# Patient Record
Sex: FEMALE | Race: WHITE | NOT HISPANIC OR LATINO | Employment: FULL TIME | URBAN - NONMETROPOLITAN AREA
[De-identification: names, ages, dates, MRNs, and addresses within clinical notes are randomized per-mention and may not be internally consistent; named-entity substitution may affect disease eponyms.]

---

## 2017-01-25 DIAGNOSIS — R59.9 LYMPH NODE ENLARGEMENT: Primary | ICD-10-CM

## 2017-02-20 ENCOUNTER — OFFICE VISIT (OUTPATIENT)
Dept: OTOLARYNGOLOGY | Facility: CLINIC | Age: 37
End: 2017-02-20

## 2017-02-20 VITALS — WEIGHT: 168 LBS | HEIGHT: 61 IN | BODY MASS INDEX: 31.72 KG/M2

## 2017-02-20 DIAGNOSIS — Z71.1 FEARED CONDITION NOT DEMONSTRATED: Primary | ICD-10-CM

## 2017-02-20 PROCEDURE — 99203 OFFICE O/P NEW LOW 30 MIN: CPT | Performed by: OTOLARYNGOLOGY

## 2017-02-21 NOTE — PROGRESS NOTES
Subjective   Moy Sparrow is a 36 y.o. female.     History of Present Illness   Patient reports she has felt a mass in her neck and under her jaw on the right side since last summer.  Thinks she had an upper respiratory infection at the outset.  At one point was bigger than it is now that she can still feel something there.  Back in June 2016 when she first noticed this she underwent an ultrasound and the area of concern was identified as a lymph node measuring 0.7 x 0.8 x 0.3 cm.  Denies weight loss, fevers, chills.  No dysphagia.      The following portions of the patient's history were reviewed and updated as appropriate: allergies, current medications, past family history, past medical history, past social history, past surgical history and problem list.      Moy Sparrow reports that she quit smoking about 6 months ago. She smoked 0.50 packs per day. She does not have any smokeless tobacco history on file. She reports that she does not drink alcohol.  Patient is not a tobacco user and has not been counseled for use of tobacco products    Family History   Problem Relation Age of Onset   • Bipolar disorder Mother    • Schizophrenia Mother    • Colon cancer Father      Colorectal Cancer         Current Outpatient Prescriptions:   •  FLUoxetine (PROzac) 40 MG capsule, Take 40 mg by mouth Daily., Disp: , Rfl:       Past Medical History   Diagnosis Date   • Depressive disorder    • Encounter for gynecological examination    • Encounter for vision screening 2005     Vision screen (1)     • Health examination of defined subpopulation      Health examination of sub-group     • Malaise and fatigue    • Palpitations    • Soft tissue swelling      right side of neck-2 different areas    • Urinary tract infectious disease          Review of Systems   Constitutional: Negative for fever and unexpected weight change.   All other systems reviewed and are negative.          Objective   Physical Exam  General:  Well-developed well-nourished female in no acute distress.  Alert and oriented ×3. Head: Normocephalic. Face: Symmetrical strength and appearance. PERRL. EOMI. Voice:Strong. Speech:Fluent  Ears: External ears no deformity, canals no discharge, tympanic membranes intact clear and mobile bilaterally.  Nose: Nares show no discharge mass polyp or purulence.  Boggy mucosa is present.  No gross external deformity.  Septum: Midline  Oral cavity: Lips and gums without lesions.  Tongue and floor of mouth without lesions.  Parotid and submandibular ducts unobstructed.  No mucosal lesions on the buccal mucosa or vestibule of the mouth.  Pharynx: No erythema exudate mass or ulcer  Neck: No lymphadenopathy.  No thyromegaly.  Trachea and larynx midline.  No masses in the parotid or submandibular glands.  Pacific leave the area of patient concern does not demonstrate a palpable mass today.  The area she is concerned about is actually just some of the suprahyoid musculature that is palpable in this area.        Assessment/Plan   Moy was seen today for neck mass.    Diagnoses and all orders for this visit:    Feared condition not demonstrated      Plan: Reassurance to the patient.  I suspect this was some mild reactive lymphoid hyperplasia when she first noticed it that is now resolved.  I would recommend no further treatment or diagnostic studies unless the mass recurs.  She is to call for recurrence.

## 2017-03-23 ENCOUNTER — HOSPITAL ENCOUNTER (OUTPATIENT)
Dept: CT IMAGING | Facility: HOSPITAL | Age: 37
Discharge: HOME | End: 2017-03-23
Admitting: NURSE PRACTITIONER

## 2017-03-23 PROCEDURE — 70450 CT HEAD/BRAIN W/O DYE: CPT

## 2017-03-24 ENCOUNTER — TRANSCRIBE ORDERS (OUTPATIENT)
Dept: LAB | Facility: HOSPITAL | Age: 37
End: 2017-03-24

## 2017-03-24 DIAGNOSIS — E02 SUBCLINICAL IODINE-DEFICIENCY HYPOTHYROIDISM: ICD-10-CM

## 2017-03-24 DIAGNOSIS — I27.0 PRIMARY PULMONARY HYPERTENSION (HCC): ICD-10-CM

## 2017-03-24 DIAGNOSIS — E56.9 UNSPECIFIED VITAMIN DEFICIENCY: Primary | ICD-10-CM

## 2017-03-28 ENCOUNTER — TELEPHONE (OUTPATIENT)
Dept: URGENT CARE | Facility: CLINIC | Age: 37
End: 2017-03-28

## 2017-11-01 ENCOUNTER — APPOINTMENT (OUTPATIENT)
Dept: LAB | Facility: HOSPITAL | Age: 37
End: 2017-11-01

## 2017-11-01 ENCOUNTER — OFFICE VISIT (OUTPATIENT)
Dept: FAMILY MEDICINE CLINIC | Facility: CLINIC | Age: 37
End: 2017-11-01

## 2017-11-01 VITALS
HEIGHT: 61 IN | DIASTOLIC BLOOD PRESSURE: 68 MMHG | BODY MASS INDEX: 29.45 KG/M2 | SYSTOLIC BLOOD PRESSURE: 110 MMHG | WEIGHT: 156 LBS

## 2017-11-01 DIAGNOSIS — R42 VERTIGO: Primary | ICD-10-CM

## 2017-11-01 DIAGNOSIS — H53.9 VISION CHANGES: Primary | ICD-10-CM

## 2017-11-01 DIAGNOSIS — H55.00 NYSTAGMUS: ICD-10-CM

## 2017-11-01 DIAGNOSIS — H53.9 VISION CHANGES: ICD-10-CM

## 2017-11-01 PROBLEM — J01.00 ACUTE NON-RECURRENT MAXILLARY SINUSITIS: Status: ACTIVE | Noted: 2017-11-01

## 2017-11-01 LAB
25(OH)D3 SERPL-MCNC: 35.5 NG/ML (ref 30–100)
ALBUMIN SERPL-MCNC: 4.3 G/DL (ref 3.4–4.8)
ALBUMIN/GLOB SERPL: 1.3 G/DL (ref 1.1–1.8)
ALP SERPL-CCNC: 47 U/L (ref 38–126)
ALT SERPL W P-5'-P-CCNC: 20 U/L (ref 9–52)
ANION GAP SERPL CALCULATED.3IONS-SCNC: 13 MMOL/L (ref 5–15)
AST SERPL-CCNC: 24 U/L (ref 14–36)
BASOPHILS # BLD AUTO: 0.05 10*3/MM3 (ref 0–0.2)
BASOPHILS NFR BLD AUTO: 0.7 % (ref 0–2)
BILIRUB SERPL-MCNC: 0.5 MG/DL (ref 0.2–1.3)
BUN BLD-MCNC: 13 MG/DL (ref 7–21)
BUN/CREAT SERPL: 19.1 (ref 7–25)
CALCIUM SPEC-SCNC: 9.1 MG/DL (ref 8.4–10.2)
CHLORIDE SERPL-SCNC: 104 MMOL/L (ref 95–110)
CO2 SERPL-SCNC: 23 MMOL/L (ref 22–31)
CREAT BLD-MCNC: 0.68 MG/DL (ref 0.5–1)
DEPRECATED RDW RBC AUTO: 40.9 FL (ref 36.4–46.3)
EOSINOPHIL # BLD AUTO: 0.19 10*3/MM3 (ref 0–0.7)
EOSINOPHIL NFR BLD AUTO: 2.8 % (ref 0–7)
ERYTHROCYTE [DISTWIDTH] IN BLOOD BY AUTOMATED COUNT: 12.6 % (ref 11.5–14.5)
GFR SERPL CREATININE-BSD FRML MDRD: 96 ML/MIN/1.73 (ref 64–149)
GLOBULIN UR ELPH-MCNC: 3.4 GM/DL (ref 2.3–3.5)
GLUCOSE BLD-MCNC: 71 MG/DL (ref 60–100)
HCT VFR BLD AUTO: 39.7 % (ref 35–45)
HGB BLD-MCNC: 13.1 G/DL (ref 12–15.5)
IMM GRANULOCYTES # BLD: 0.01 10*3/MM3 (ref 0–0.02)
IMM GRANULOCYTES NFR BLD: 0.1 % (ref 0–0.5)
IRON 24H UR-MRATE: 126 MCG/DL (ref 37–170)
LYMPHOCYTES # BLD AUTO: 1.78 10*3/MM3 (ref 0.6–4.2)
LYMPHOCYTES NFR BLD AUTO: 25.9 % (ref 10–50)
MAGNESIUM SERPL-MCNC: 2.1 MG/DL (ref 1.6–2.3)
MCH RBC QN AUTO: 29.6 PG (ref 26.5–34)
MCHC RBC AUTO-ENTMCNC: 33 G/DL (ref 31.4–36)
MCV RBC AUTO: 89.6 FL (ref 80–98)
MONOCYTES # BLD AUTO: 0.57 10*3/MM3 (ref 0–0.9)
MONOCYTES NFR BLD AUTO: 8.3 % (ref 0–12)
NEUTROPHILS # BLD AUTO: 4.28 10*3/MM3 (ref 2–8.6)
NEUTROPHILS NFR BLD AUTO: 62.2 % (ref 37–80)
PLATELET # BLD AUTO: 251 10*3/MM3 (ref 150–450)
PMV BLD AUTO: 10.3 FL (ref 8–12)
POTASSIUM BLD-SCNC: 4.4 MMOL/L (ref 3.5–5.1)
PROT SERPL-MCNC: 7.7 G/DL (ref 6.3–8.6)
RBC # BLD AUTO: 4.43 10*6/MM3 (ref 3.77–5.16)
SODIUM BLD-SCNC: 140 MMOL/L (ref 137–145)
TSH SERPL DL<=0.05 MIU/L-ACNC: 2.87 MIU/ML (ref 0.46–4.68)
VIT B12 BLD-MCNC: 261 PG/ML (ref 239–931)
WBC NRBC COR # BLD: 6.88 10*3/MM3 (ref 3.2–9.8)

## 2017-11-01 PROCEDURE — 36415 COLL VENOUS BLD VENIPUNCTURE: CPT | Performed by: NURSE PRACTITIONER

## 2017-11-01 PROCEDURE — 80053 COMPREHEN METABOLIC PANEL: CPT | Performed by: NURSE PRACTITIONER

## 2017-11-01 PROCEDURE — 82607 VITAMIN B-12: CPT | Performed by: NURSE PRACTITIONER

## 2017-11-01 PROCEDURE — 83540 ASSAY OF IRON: CPT | Performed by: NURSE PRACTITIONER

## 2017-11-01 PROCEDURE — 99213 OFFICE O/P EST LOW 20 MIN: CPT | Performed by: NURSE PRACTITIONER

## 2017-11-01 PROCEDURE — 82306 VITAMIN D 25 HYDROXY: CPT | Performed by: NURSE PRACTITIONER

## 2017-11-01 PROCEDURE — 84443 ASSAY THYROID STIM HORMONE: CPT | Performed by: NURSE PRACTITIONER

## 2017-11-01 PROCEDURE — 85025 COMPLETE CBC W/AUTO DIFF WBC: CPT | Performed by: NURSE PRACTITIONER

## 2017-11-01 PROCEDURE — 83735 ASSAY OF MAGNESIUM: CPT | Performed by: NURSE PRACTITIONER

## 2017-11-01 RX ORDER — HYDROCHLOROTHIAZIDE 25 MG/1
25 TABLET ORAL DAILY
Qty: 30 TABLET | Refills: 5 | Status: SHIPPED | OUTPATIENT
Start: 2017-11-01 | End: 2017-12-06

## 2017-11-01 RX ORDER — CEFUROXIME AXETIL 500 MG/1
500 TABLET ORAL 2 TIMES DAILY
Qty: 20 TABLET | Refills: 0 | Status: SHIPPED | OUTPATIENT
Start: 2017-11-01 | End: 2017-12-06

## 2017-11-01 RX ORDER — FLUCONAZOLE 150 MG/1
150 TABLET ORAL ONCE
Qty: 2 TABLET | Refills: 0 | Status: SHIPPED | OUTPATIENT
Start: 2017-11-01 | End: 2017-11-01

## 2017-11-01 NOTE — PROGRESS NOTES
Chief Complaint   Patient presents with   • Dizziness     has been dealing with this since March     Subjective   Moy Sparrow is a 37 y.o. female.     HPI Comments: Presents with continued  Dizziness since March-woke up one day with dizziness, seen in urgent care-normal CT-dizziness has continued and worsening     Dizziness   This is a recurrent problem. The current episode started more than 1 month ago. The problem occurs constantly. The problem has been gradually worsening. Associated symptoms include congestion, fatigue, headaches, numbness, vertigo, a visual change, vomiting and weakness. Pertinent negatives include no abdominal pain, anorexia, arthralgias, change in bowel habit, chest pain, chills, coughing, diaphoresis, fever, joint swelling, myalgias, nausea, neck pain, rash, sore throat, swollen glands or urinary symptoms. The symptoms are aggravated by bending and exertion. She has tried rest (antivert did not help-benadryl did not help ) for the symptoms. The treatment provided no relief.   Loss of Vision   This is a new problem. The current episode started 1 to 4 weeks ago. The problem occurs intermittently. The problem has been gradually worsening. Associated symptoms include congestion, fatigue, headaches, numbness, vertigo, a visual change, vomiting and weakness. Pertinent negatives include no abdominal pain, anorexia, arthralgias, change in bowel habit, chest pain, chills, coughing, diaphoresis, fever, joint swelling, myalgias, nausea, neck pain, rash, sore throat, swollen glands or urinary symptoms. The symptoms are aggravated by bending (bending and turning of head ). She has tried rest for the symptoms. The treatment provided mild relief.        The following portions of the patient's history were reviewed and updated as appropriate: allergies, current medications, past social history and problem list.    Review of Systems   Constitutional: Positive for fatigue. Negative for chills,  "diaphoresis and fever.   HENT: Positive for congestion and postnasal drip. Negative for dental problem, rhinorrhea, sinus pain, sinus pressure, sneezing, sore throat, tinnitus, trouble swallowing and voice change.    Eyes: Negative.    Respiratory: Negative.  Negative for cough.    Cardiovascular: Negative.  Negative for chest pain.   Gastrointestinal: Positive for vomiting. Negative for abdominal pain, anorexia, change in bowel habit and nausea.   Endocrine: Negative.  Negative for cold intolerance, heat intolerance, polydipsia, polyphagia and polyuria.   Genitourinary: Negative.    Musculoskeletal: Negative.  Negative for arthralgias, back pain, gait problem, joint swelling, myalgias, neck pain and neck stiffness.   Skin: Negative.  Negative for rash.   Allergic/Immunologic: Negative.  Negative for environmental allergies, food allergies and immunocompromised state.   Neurological: Positive for dizziness, vertigo, weakness, numbness and headaches. Negative for tremors, seizures, syncope, facial asymmetry, speech difficulty and light-headedness.   Hematological: Negative.  Negative for adenopathy. Does not bruise/bleed easily.   Psychiatric/Behavioral: Negative.  Negative for agitation, behavioral problems, confusion and decreased concentration.       Objective   /68  Ht 61\" (154.9 cm)  Wt 156 lb (70.8 kg)  BMI 29.48 kg/m2  Physical Exam   Constitutional: She is oriented to person, place, and time. She appears well-developed and well-nourished. No distress.   HENT:   Head: Normocephalic and atraumatic.   Mouth/Throat: No oropharyngeal exudate.   Eyes: EOM are normal. Pupils are equal, round, and reactive to light. Right eye exhibits no discharge. Left eye exhibits no discharge. No scleral icterus.   Neck: Normal range of motion. Neck supple. No JVD present. No tracheal deviation present. No thyromegaly present.   Cardiovascular: Normal rate, regular rhythm, normal heart sounds and normal pulses.  Exam " reveals no gallop and no friction rub.    No murmur heard.  Pulmonary/Chest: Effort normal and breath sounds normal. No stridor. No respiratory distress. She has no wheezes. She has no rales. She exhibits no tenderness.   Abdominal: Soft. Bowel sounds are normal. She exhibits no distension and no mass. There is no tenderness. There is no rebound and no guarding. No hernia.   Genitourinary: Rectum normal and vagina normal. Pelvic exam was performed with patient supine. Uterus is not deviated, not enlarged, not fixed and not tender. Cervix exhibits no motion tenderness, no discharge and no friability. Right adnexum displays no mass. Left adnexum displays no mass.   Musculoskeletal: Normal range of motion. She exhibits no edema, tenderness or deformity.   Lymphadenopathy:     She has no cervical adenopathy.   Neurological: She is alert and oriented to person, place, and time. She has normal reflexes. She displays normal reflexes. No cranial nerve deficit. She exhibits normal muscle tone. Coordination normal.   Nystagmus noted with lateral gaze to the right    Skin: Skin is warm and dry. No rash noted. She is not diaphoretic. No erythema. No pallor.   Psychiatric: She has a normal mood and affect.   Nursing note and vitals reviewed.      Assessment/Plan   Problem List Items Addressed This Visit        Respiratory    Acute non-recurrent maxillary sinusitis       Other    Vertigo - Primary    Relevant Orders    CBC & Differential    Comprehensive Metabolic Panel    Iron    Vitamin D 25 Hydroxy    Vitamin B12    TSH    Magnesium    CBC Auto Differential    Vision changes    Relevant Orders    MRI Brain Without Contrast    CBC & Differential    Comprehensive Metabolic Panel    Iron    Vitamin D 25 Hydroxy    Vitamin B12    TSH    Magnesium    CBC Auto Differential           New Medications Ordered This Visit   Medications   • hydrochlorothiazide (HYDRODIURIL) 25 MG tablet     Sig: Take 1 tablet by mouth Daily.     Dispense:   30 tablet     Refill:  5   • cefuroxime (CEFTIN) 500 MG tablet     Sig: Take 1 tablet by mouth 2 (Two) Times a Day.     Dispense:  20 tablet     Refill:  0   • fluconazole (DIFLUCAN) 150 MG tablet     Sig: Take 1 tablet by mouth 1 (One) Time for 1 dose.     Dispense:  2 tablet     Refill:  0       It's not just what you eat, but when you eat  Eat breakfast, and eat smaller meals throughout the day. A healthy breakfast can jumpstart your metabolism, while eating small, healthy meals (rather than the standard three large meals) keeps your energy up.   Avoid eating at night. Try to eat dinner earlier and fast for 14-16 hours until breakfast the next morning. Studies suggest that eating only when you’re most active and giving your digestive system a long break each day may help to regulate     meds as directed, diet discussed, recheck as directed

## 2017-11-09 ENCOUNTER — HOSPITAL ENCOUNTER (OUTPATIENT)
Dept: MRI IMAGING | Facility: HOSPITAL | Age: 37
Discharge: HOME | End: 2017-11-09
Admitting: NURSE PRACTITIONER

## 2017-11-09 DIAGNOSIS — H53.9 VISION CHANGES: ICD-10-CM

## 2017-11-09 PROCEDURE — A9576 INJ PROHANCE MULTIPACK: HCPCS | Performed by: NURSE PRACTITIONER

## 2017-11-09 PROCEDURE — 70553 MRI BRAIN STEM W/O & W/DYE: CPT

## 2017-11-09 PROCEDURE — 25010000002 GADOTERIDOL PER 1 ML: Performed by: NURSE PRACTITIONER

## 2017-11-09 RX ADMIN — GADOTERIDOL 15 ML: 279.3 INJECTION, SOLUTION INTRAVENOUS at 18:45

## 2017-11-10 DIAGNOSIS — R42 VERTIGO: Primary | ICD-10-CM

## 2017-11-15 ENCOUNTER — APPOINTMENT (OUTPATIENT)
Dept: LAB | Facility: HOSPITAL | Age: 37
End: 2017-11-15

## 2017-11-15 ENCOUNTER — OFFICE VISIT (OUTPATIENT)
Dept: FAMILY MEDICINE CLINIC | Facility: CLINIC | Age: 37
End: 2017-11-15

## 2017-11-15 VITALS
WEIGHT: 150 LBS | SYSTOLIC BLOOD PRESSURE: 100 MMHG | BODY MASS INDEX: 28.32 KG/M2 | HEIGHT: 61 IN | DIASTOLIC BLOOD PRESSURE: 72 MMHG

## 2017-11-15 DIAGNOSIS — F41.9 ANXIETY: ICD-10-CM

## 2017-11-15 DIAGNOSIS — Z00.00 GENERAL MEDICAL EXAM: ICD-10-CM

## 2017-11-15 DIAGNOSIS — Z12.4 PAPANICOLAOU SMEAR FOR CERVICAL CANCER SCREENING: Primary | ICD-10-CM

## 2017-11-15 LAB
CANDIDA ALBICANS: NEGATIVE
GARDNERELLA VAGINALIS: POSITIVE
TRICHOMONAS VAGINALIS PCR: NEGATIVE

## 2017-11-15 PROCEDURE — 87661 TRICHOMONAS VAGINALIS AMPLIF: CPT | Performed by: NURSE PRACTITIONER

## 2017-11-15 PROCEDURE — 87660 TRICHOMONAS VAGIN DIR PROBE: CPT | Performed by: NURSE PRACTITIONER

## 2017-11-15 PROCEDURE — 87591 N.GONORRHOEAE DNA AMP PROB: CPT | Performed by: NURSE PRACTITIONER

## 2017-11-15 PROCEDURE — 87491 CHLMYD TRACH DNA AMP PROBE: CPT | Performed by: NURSE PRACTITIONER

## 2017-11-15 PROCEDURE — 87510 GARDNER VAG DNA DIR PROBE: CPT | Performed by: NURSE PRACTITIONER

## 2017-11-15 PROCEDURE — 87624 HPV HI-RISK TYP POOLED RSLT: CPT | Performed by: NURSE PRACTITIONER

## 2017-11-15 PROCEDURE — 88142 CYTOPATH C/V THIN LAYER: CPT | Performed by: NURSE PRACTITIONER

## 2017-11-15 PROCEDURE — 88141 CYTOPATH C/V INTERPRET: CPT | Performed by: PATHOLOGY

## 2017-11-15 PROCEDURE — 99213 OFFICE O/P EST LOW 20 MIN: CPT | Performed by: NURSE PRACTITIONER

## 2017-11-15 PROCEDURE — 87480 CANDIDA DNA DIR PROBE: CPT | Performed by: NURSE PRACTITIONER

## 2017-11-15 PROCEDURE — 87109 MYCOPLASMA: CPT | Performed by: NURSE PRACTITIONER

## 2017-11-15 RX ORDER — FLUOXETINE HYDROCHLORIDE 20 MG/1
20 CAPSULE ORAL DAILY
Qty: 30 CAPSULE | Refills: 11 | Status: SHIPPED | OUTPATIENT
Start: 2017-11-15 | End: 2018-03-28

## 2017-11-15 RX ORDER — METRONIDAZOLE 500 MG/1
500 TABLET ORAL 2 TIMES DAILY
Qty: 14 TABLET | Refills: 0 | Status: SHIPPED | OUTPATIENT
Start: 2017-11-15 | End: 2017-12-06

## 2017-11-15 RX ORDER — BUSPIRONE HYDROCHLORIDE 5 MG/1
5 TABLET ORAL 3 TIMES DAILY
Qty: 90 TABLET | Refills: 5 | Status: SHIPPED | OUTPATIENT
Start: 2017-11-15 | End: 2018-08-31

## 2017-11-15 NOTE — PROGRESS NOTES
Chief Complaint   Patient presents with   • Annual Exam     Pap smear     Subjective   Moy Sparrow is a 37 y.o. female.     HPI Comments: Presents with continued  Dizziness since March-woke up one day with dizziness, seen in urgent care-normal CT-dizziness has continued and worsening     Gynecologic Exam   The patient's primary symptoms include pelvic pain and vaginal discharge. The patient's pertinent negatives include no genital itching, genital lesions, genital odor, genital rash, missed menses or vaginal bleeding. This is a recurrent problem. The current episode started 1 to 4 weeks ago. The problem occurs constantly. The problem has been gradually improving. The pain is mild. She is not pregnant. Pertinent negatives include no abdominal pain, anorexia, back pain, diarrhea, discolored urine, dysuria, fever, flank pain, frequency, hematuria, joint pain, joint swelling, nausea, rash or urgency. The treatment provided mild relief. She is sexually active. No, her partner does not have an STD. Her menstrual history has been regular. There is no history of an abdominal surgery, a  section, an ectopic pregnancy, endometriosis, a gynecological surgery, herpes simplex, menorrhagia, metrorrhagia, miscarriage, ovarian cysts, perineal abscess, PID, a terminated pregnancy or vaginosis.   Anxiety   Presents for follow-up visit. Symptoms include decreased concentration, excessive worry, irritability, nervous/anxious behavior and panic. Patient reports no chest pain, compulsions, confusion, depressed mood, dizziness, dry mouth, feeling of choking, hyperventilation, impotence, malaise, muscle tension, nausea, obsessions, palpitations, restlessness, shortness of breath or suicidal ideas. Symptoms occur most days. The quality of sleep is good. Nighttime awakenings: none.     Compliance with medications is %.        The following portions of the patient's history were reviewed and updated as appropriate:  allergies, current medications, past social history and problem list.    Review of Systems   Constitutional: Positive for irritability. Negative for fever.   HENT: Positive for postnasal drip. Negative for dental problem, rhinorrhea, sinus pain, sinus pressure, sneezing, tinnitus, trouble swallowing and voice change.    Eyes: Negative.  Negative for photophobia, pain, discharge, redness, itching and visual disturbance.   Respiratory: Negative.  Negative for apnea, choking, chest tightness and shortness of breath.    Cardiovascular: Negative.  Negative for chest pain, palpitations and leg swelling.   Gastrointestinal: Negative.  Negative for abdominal distention, abdominal pain, anorexia, diarrhea and nausea.   Endocrine: Negative.  Negative for cold intolerance, heat intolerance, polydipsia, polyphagia and polyuria.   Genitourinary: Positive for pelvic pain and vaginal discharge. Negative for decreased urine volume, difficulty urinating, dyspareunia, dysuria, enuresis, flank pain, frequency, genital sores, hematuria, impotence, menorrhagia, menstrual problem, missed menses, urgency, vaginal bleeding and vaginal pain.   Musculoskeletal: Negative.  Negative for arthralgias, back pain, gait problem, joint pain, joint swelling, myalgias, neck pain and neck stiffness.   Skin: Negative.  Negative for rash.   Allergic/Immunologic: Negative.  Negative for environmental allergies, food allergies and immunocompromised state.   Neurological: Negative.  Negative for dizziness, tremors, seizures, syncope, facial asymmetry, speech difficulty and light-headedness.   Hematological: Negative.  Negative for adenopathy. Does not bruise/bleed easily.   Psychiatric/Behavioral: Positive for decreased concentration. Negative for agitation, behavioral problems, confusion, dysphoric mood, hallucinations, self-injury, sleep disturbance and suicidal ideas. The patient is nervous/anxious. The patient is not hyperactive.    All other systems  "reviewed and are negative.      Objective   /72  Ht 61\" (154.9 cm)  Wt 150 lb (68 kg)  BMI 28.34 kg/m2  Physical Exam   Constitutional: She is oriented to person, place, and time. She appears well-developed and well-nourished. No distress. She is not intubated.   HENT:   Head: Normocephalic and atraumatic.   Mouth/Throat: No oropharyngeal exudate.   Eyes: EOM are normal. Pupils are equal, round, and reactive to light. Right eye exhibits no discharge. Left eye exhibits no discharge. No scleral icterus.   Neck: Normal range of motion. Neck supple. No JVD present. No tracheal deviation present. No thyromegaly present.   Cardiovascular: Normal rate, regular rhythm, normal heart sounds and normal pulses.  Exam reveals no gallop and no friction rub.    No murmur heard.  Pulmonary/Chest: Effort normal and breath sounds normal. No accessory muscle usage or stridor. No apnea, no tachypnea and no bradypnea. She is not intubated. No respiratory distress. She has no decreased breath sounds. She has no wheezes. She has no rhonchi. She has no rales. Chest wall is not dull to percussion. She exhibits no mass, no tenderness, no bony tenderness, no laceration, no crepitus, no edema, no deformity, no swelling and no retraction. Right breast exhibits no inverted nipple, no mass, no nipple discharge, no skin change and no tenderness. Left breast exhibits no inverted nipple, no mass, no nipple discharge, no skin change and no tenderness.   Abdominal: Soft. Bowel sounds are normal. She exhibits no distension and no mass. There is no tenderness. There is no rebound and no guarding. No hernia. Hernia confirmed negative in the right inguinal area and confirmed negative in the left inguinal area.   Genitourinary: Rectum normal and vagina normal. Rectal exam shows no external hemorrhoid, no internal hemorrhoid, no fissure, no mass, no tenderness, anal tone normal and guaiac negative stool. Pelvic exam was performed with patient " supine. No labial fusion. There is no rash, tenderness, lesion or injury on the right labia. There is no rash, tenderness, lesion or injury on the left labia. Uterus is not deviated, not enlarged, not fixed and not tender. Cervix exhibits no motion tenderness, no discharge and no friability. Right adnexum displays no mass, no tenderness and no fullness. Left adnexum displays no mass and no fullness. No erythema, tenderness or bleeding in the vagina. No foreign body in the vagina. No vaginal discharge found.   Musculoskeletal: Normal range of motion. She exhibits no edema, tenderness or deformity.   Lymphadenopathy:     She has no cervical adenopathy.        Right: No inguinal adenopathy present.        Left: No inguinal adenopathy present.   Neurological: She is alert and oriented to person, place, and time. She has normal reflexes. She displays normal reflexes. No cranial nerve deficit. She exhibits normal muscle tone. Coordination normal.   Nystagmus noted with lateral gaze to the right    Skin: Skin is warm and dry. No rash noted. She is not diaphoretic. No erythema. No pallor.   Psychiatric: She has a normal mood and affect.   Nursing note and vitals reviewed.      Assessment/Plan   Problem List Items Addressed This Visit        Other    Papanicolaou smear for cervical cancer screening - Primary    Relevant Orders    Liquid-based Pap Smear, Screening - ThinPrep Vial, Cervix    General medical exam    Relevant Orders    Chlamydia trachomatis, Neisseria gonorrhoeae, Trichomonas vaginalis, PCR - Swab, Vagina    Gardnerella vaginalis, Trichomonas vaginalis, Candida albicans, PCR - Swab, Vagina    Mycoplasma / Ureaplasma Culture - Swab, Cervix    Anxiety           New Medications Ordered This Visit   Medications   • FLUoxetine (PROZAC) 20 MG capsule     Sig: Take 1 capsule by mouth Daily.     Dispense:  30 capsule     Refill:  11   • busPIRone (BUSPAR) 5 MG tablet     Sig: Take 1 tablet by mouth 3 (Three) Times a  Day.     Dispense:  90 tablet     Refill:  5       Stress relief discussed in length. Consider therapy, suggest yoga, exercise, meditation -patient is agrees-will call back if worsen for referral

## 2017-11-16 LAB
C TRACH RRNA CVX QL NAA+PROBE: NEGATIVE
N GONORRHOEA RRNA SPEC QL NAA+PROBE: NEGATIVE
T VAGINALIS DNA VAG QL PROBE+SIG AMP: NEGATIVE

## 2017-11-20 LAB
LAB AP CASE REPORT: ABNORMAL
LAB AP GYN ADDITIONAL INFORMATION: ABNORMAL
Lab: ABNORMAL
PATH INTERP SPEC-IMP: ABNORMAL
STAT OF ADQ CVX/VAG CYTO-IMP: ABNORMAL

## 2017-11-20 RX ORDER — DOXYCYCLINE 100 MG/1
100 CAPSULE ORAL 2 TIMES DAILY
Qty: 28 CAPSULE | Refills: 0 | Status: SHIPPED | OUTPATIENT
Start: 2017-11-20 | End: 2017-12-06

## 2017-11-22 LAB
MYCOPLASMA HOMINIS: NEGATIVE
UREAPLASMA UREALYTICUM: POSITIVE

## 2017-11-24 LAB — HPV I/H RISK 4 DNA CVX QL PROBE+SIG AMP: NEGATIVE

## 2017-11-28 ENCOUNTER — TELEPHONE (OUTPATIENT)
Dept: FAMILY MEDICINE CLINIC | Facility: CLINIC | Age: 37
End: 2017-11-28

## 2017-11-28 NOTE — PROGRESS NOTES
CICI Barros, Ms.Andrews has been called with her recent lab results & recommendations.  Continue her current medications and follow-up as planned or sooner if any problems.

## 2017-11-28 NOTE — TELEPHONE ENCOUNTER
CICI Barros,  has been called with her recent lab results & recommendations.  Continue her current medications and follow-up as planned or sooner if any problems.      ----- Message from CICI Akers sent at 11/27/2017  7:59 AM CST -----  Can you let her know that HPV is negative-she is will glad over this

## 2017-12-04 RX ORDER — FLUCONAZOLE 150 MG/1
150 TABLET ORAL ONCE
Qty: 2 TABLET | Refills: 0 | Status: SHIPPED | OUTPATIENT
Start: 2017-12-04 | End: 2017-12-04

## 2017-12-06 ENCOUNTER — OFFICE VISIT (OUTPATIENT)
Dept: FAMILY MEDICINE CLINIC | Facility: CLINIC | Age: 37
End: 2017-12-06

## 2017-12-06 ENCOUNTER — APPOINTMENT (OUTPATIENT)
Dept: LAB | Facility: HOSPITAL | Age: 37
End: 2017-12-06

## 2017-12-06 VITALS
SYSTOLIC BLOOD PRESSURE: 100 MMHG | BODY MASS INDEX: 27.56 KG/M2 | WEIGHT: 146 LBS | HEIGHT: 61 IN | DIASTOLIC BLOOD PRESSURE: 70 MMHG

## 2017-12-06 DIAGNOSIS — B37.0 ORAL THRUSH: ICD-10-CM

## 2017-12-06 DIAGNOSIS — N76.0 ACUTE VAGINITIS: Primary | ICD-10-CM

## 2017-12-06 LAB
CANDIDA ALBICANS: NEGATIVE
GARDNERELLA VAGINALIS: NEGATIVE
TRICHOMONAS VAGINALIS PCR: NEGATIVE

## 2017-12-06 PROCEDURE — 87480 CANDIDA DNA DIR PROBE: CPT | Performed by: NURSE PRACTITIONER

## 2017-12-06 PROCEDURE — 87109 MYCOPLASMA: CPT | Performed by: NURSE PRACTITIONER

## 2017-12-06 PROCEDURE — 87510 GARDNER VAG DNA DIR PROBE: CPT | Performed by: NURSE PRACTITIONER

## 2017-12-06 PROCEDURE — 87660 TRICHOMONAS VAGIN DIR PROBE: CPT | Performed by: NURSE PRACTITIONER

## 2017-12-06 PROCEDURE — 99213 OFFICE O/P EST LOW 20 MIN: CPT | Performed by: NURSE PRACTITIONER

## 2017-12-06 NOTE — PROGRESS NOTES
Chief Complaint   Patient presents with   • Vaginitis     has been on antibiotics x 1 month. Burning      Subjective   Moy Sparrow is a 37 y.o. female.     Vaginitis   This is a new problem. The current episode started in the past 7 days. The problem occurs daily. The problem has been gradually worsening. Pertinent negatives include no abdominal pain, anorexia, arthralgias, change in bowel habit, chest pain, chills, congestion, coughing, diaphoresis, fatigue, fever, headaches, joint swelling, myalgias, nausea, neck pain, numbness, rash, sore throat or visual change. Treatments tried: diflucan, doxycycline and flaygl  The treatment provided mild relief.        The following portions of the patient's history were reviewed and updated as appropriate: allergies, current medications, past social history and problem list.    Review of Systems   Constitutional: Negative.  Negative for chills, diaphoresis, fatigue and fever.   HENT: Negative.  Negative for congestion and sore throat.         Sore tongue -white coating in mouth    Eyes: Negative.    Respiratory: Negative.  Negative for cough.    Cardiovascular: Negative.  Negative for chest pain, palpitations and leg swelling.   Gastrointestinal: Negative for abdominal distention, abdominal pain, anal bleeding, anorexia, change in bowel habit and nausea.   Endocrine: Negative.  Negative for cold intolerance, heat intolerance, polydipsia, polyphagia and polyuria.   Genitourinary: Positive for pelvic pain and vaginal discharge. Negative for decreased urine volume, flank pain, genital sores, urgency, vaginal bleeding and vaginal pain.        Treated for mycoplasma 2 weeks now has oral thrush and vaginitis    Musculoskeletal: Negative.  Negative for arthralgias, back pain, gait problem, joint swelling, myalgias and neck pain.   Skin: Negative.  Negative for rash.   Allergic/Immunologic: Negative.    Neurological: Negative.  Negative for numbness and headaches.  "  Hematological: Negative.    Psychiatric/Behavioral: Negative.        Objective   /70  Ht 154.9 cm (61\")  Wt 66.2 kg (146 lb)  BMI 27.59 kg/m2  Physical Exam   Constitutional: She is oriented to person, place, and time. She appears well-developed and well-nourished. No distress. She is not intubated.   HENT:   Head: Normocephalic and atraumatic.   Mouth/Throat: No oropharyngeal exudate.   White coating in mouth    Eyes: EOM are normal. Pupils are equal, round, and reactive to light. Right eye exhibits no discharge. Left eye exhibits no discharge. No scleral icterus.   Neck: Normal range of motion. Neck supple. No JVD present. No tracheal deviation present. No thyromegaly present.   Cardiovascular: Normal rate, regular rhythm, normal heart sounds and normal pulses.  Exam reveals no gallop and no friction rub.    No murmur heard.  Pulmonary/Chest: Effort normal and breath sounds normal. No accessory muscle usage or stridor. No apnea, no tachypnea and no bradypnea. She is not intubated. No respiratory distress. She has no decreased breath sounds. She has no wheezes. She has no rhonchi. She has no rales. Chest wall is not dull to percussion. She exhibits no mass, no tenderness, no bony tenderness, no laceration, no crepitus, no edema, no deformity, no swelling and no retraction. Right breast exhibits no inverted nipple, no mass, no nipple discharge, no skin change and no tenderness. Left breast exhibits no inverted nipple, no mass, no nipple discharge, no skin change and no tenderness.   Abdominal: Soft. Bowel sounds are normal. She exhibits no distension and no mass. There is no tenderness. There is no rebound and no guarding. No hernia. Hernia confirmed negative in the right inguinal area and confirmed negative in the left inguinal area.   Genitourinary: Rectum normal. Rectal exam shows no external hemorrhoid, no internal hemorrhoid, no fissure, no mass, no tenderness, anal tone normal and guaiac negative " stool. Pelvic exam was performed with patient supine. No labial fusion. There is no rash, tenderness, lesion or injury on the right labia. There is no rash, tenderness, lesion or injury on the left labia. Uterus is not deviated, not enlarged, not fixed and not tender. Cervix exhibits no motion tenderness, no discharge and no friability. Right adnexum displays no mass, no tenderness and no fullness. Left adnexum displays no mass and no fullness. No erythema, tenderness or bleeding in the vagina. No foreign body in the vagina. Vaginal discharge found.   Musculoskeletal: Normal range of motion. She exhibits no edema, tenderness or deformity.   Lymphadenopathy:     She has no cervical adenopathy.        Right: No inguinal adenopathy present.        Left: No inguinal adenopathy present.   Neurological: She is alert and oriented to person, place, and time. She has normal reflexes. She displays normal reflexes. No cranial nerve deficit. She exhibits normal muscle tone. Coordination normal.   Nystagmus noted with lateral gaze to the right    Skin: Skin is warm and dry. No rash noted. She is not diaphoretic. No erythema. No pallor.   Psychiatric: She has a normal mood and affect.   Nursing note and vitals reviewed.      Assessment/Plan   Problem List Items Addressed This Visit        Genitourinary    Acute vaginitis - Primary    Relevant Orders    Mycoplasma / Ureaplasma Culture - Swab, Cervix    Gardnerella vaginalis, Trichomonas vaginalis, Candida albicans, PCR - Swab, Vagina       Other    Oral thrush         No orders of the defined types were placed in this encounter.      It's not just what you eat, but when you eat  Eat breakfast, and eat smaller meals throughout the day. A healthy breakfast can jumpstart your metabolism, while eating small, healthy meals (rather than the standard three large meals) keeps your energy up.   Avoid eating at night. Try to eat dinner earlier and fast for 14-16 hours until breakfast the  next morning. Studies suggest that eating only when you’re most active and giving your digestive system a long break each day may help to regulate weight.     Increase yogurt in diet,pro biotics as directed

## 2017-12-07 ENCOUNTER — OFFICE VISIT (OUTPATIENT)
Dept: OTOLARYNGOLOGY | Facility: CLINIC | Age: 37
End: 2017-12-07

## 2017-12-07 VITALS
DIASTOLIC BLOOD PRESSURE: 70 MMHG | BODY MASS INDEX: 28.25 KG/M2 | WEIGHT: 149.6 LBS | SYSTOLIC BLOOD PRESSURE: 100 MMHG | HEIGHT: 61 IN

## 2017-12-07 DIAGNOSIS — H81.11 BENIGN PAROXYSMAL POSITIONAL VERTIGO OF RIGHT EAR: Primary | ICD-10-CM

## 2017-12-07 PROCEDURE — 99243 OFF/OP CNSLTJ NEW/EST LOW 30: CPT | Performed by: OTOLARYNGOLOGY

## 2017-12-07 NOTE — PROGRESS NOTES
Subjective   Moy Sparrow is a 37 y.o. female.   Is a consultation from Sally BOLANOS  History of Present Illness   Patient complains of vertigo.  Began in March and at that time symptoms were severe.  Symptoms included spinning dizziness and nausea that would be brought on by head movement.  No change in hearing.  At that time symptoms lasted approximately a month then went away for about a month but subsequently recurred.  Symptoms are not as severe now but are brought on by extending her neck or rolling over to the right in bed.  Still has a subjective spinning sensation that lasts a few seconds to it most a minute.  No otorrhea, no otalgia, no previous otologic surgery.  Also states she is being treated for oral thrush following antibiotic administration.  Was apparently given a low-dose diuretic that she says didn't help at all and she has stopped taking.      The following portions of the patient's history were reviewed and updated as appropriate: allergies, current medications, past family history, past medical history, past social history, past surgical history and problem list.      Moy Sparrow reports that she has been smoking.  She has been smoking about 0.50 packs per day. She has never used smokeless tobacco. She reports that she does not drink alcohol.  Patient is not a tobacco user and has not been counseled for use of tobacco products    Family History   Problem Relation Age of Onset   • Bipolar disorder Mother    • Schizophrenia Mother    • Colon cancer Father      Colorectal Cancer       No Known Allergies      Current Outpatient Prescriptions:   •  busPIRone (BUSPAR) 5 MG tablet, Take 1 tablet by mouth 3 (Three) Times a Day., Disp: 90 tablet, Rfl: 5  •  FLUoxetine (PROZAC) 20 MG capsule, Take 1 capsule by mouth Daily., Disp: 30 capsule, Rfl: 11  •  nystatin (MYCOSTATIN) 852249 UNIT/ML suspension, Swish and swallow 5 mL 4 (Four) Times a Day., Disp: 400 mL, Rfl: 0    Past Medical  History:   Diagnosis Date   • Depressive disorder    • Encounter for gynecological examination    • Encounter for vision screening 2005    Vision screen (1)     • Health examination of defined subpopulation     Health examination of sub-group     • Malaise and fatigue    • Palpitations    • Soft tissue swelling     right side of neck-2 different areas    • Urinary tract infectious disease          Review of Systems   Constitutional: Negative for fever.   HENT: Negative for hearing loss.    Neurological: Positive for dizziness.   All other systems reviewed and are negative.          Objective   Physical Exam  General: Well-developed well-nourished female in no acute distress.  Alert and oriented ×3. Head: Normocephalic. Face: Symmetrical strength and appearance. PERRL. EOMI. Voice:Strong. Speech:Fluent  Ears: External ears no deformity, canals no discharge, tympanic membranes intact clear and mobile bilaterally.  Nose: Nares show no discharge mass polyp or purulence.  Boggy mucosa is present.  No gross external deformity.  Septum: Midline  Oral cavity: Lips and gums without lesions.  Tongue with what appears to be some resolving Candida.  Floor of mouth without lesions.  Parotid and submandibular ducts unobstructed.  No mucosal lesions on the buccal mucosa or vestibule of the mouth.  Pharynx: No erythema exudate mass or ulcer  Neck: No lymphadenopathy.  No thyromegaly.  Trachea and larynx midline.  No masses in the parotid or submandibular glands.    [place]-Hallpike maneuvers produced brief rotatory nystagmus and subjective spinning vertigo with the head to the right    Assessment/Plan   Moy was seen today for dizziness.    Diagnoses and all orders for this visit:    Benign paroxysmal positional vertigo of right ear      Plan: Explained the nature of BPPV to the patient.  She is already partially compensated but I think she should perform [place] exercises at home morning and evening until her symptoms have  completely resolved.  Avoid antihistamines and meclizine/Dramamine as these will prolong the time for compensation.  Told her if her symptoms resolve and she can no longer induce dizziness she may discontinue the exercises and follow up with me as needed.  Advised her to call if she is still symptomatic in 6 weeks and at that point would consider physical therapy consultation.    My thanks to Ms. Cunningham for this consult

## 2017-12-12 LAB
MYCOPLASMA HOMINIS: NEGATIVE
UREAPLASMA UREALYTICUM: NEGATIVE

## 2017-12-13 ENCOUNTER — OFFICE VISIT (OUTPATIENT)
Dept: FAMILY MEDICINE CLINIC | Facility: CLINIC | Age: 37
End: 2017-12-13

## 2017-12-13 VITALS
SYSTOLIC BLOOD PRESSURE: 108 MMHG | WEIGHT: 149 LBS | DIASTOLIC BLOOD PRESSURE: 70 MMHG | BODY MASS INDEX: 28.13 KG/M2 | HEIGHT: 61 IN

## 2017-12-13 DIAGNOSIS — K64.4 EXTERNAL HEMORRHOIDS: ICD-10-CM

## 2017-12-13 DIAGNOSIS — Z12.4 ENCOUNTER FOR PAPANICOLAOU SMEAR FOR CERVICAL CANCER SCREENING: Primary | ICD-10-CM

## 2017-12-13 DIAGNOSIS — K62.89 RECTAL PAIN: ICD-10-CM

## 2017-12-13 PROCEDURE — 88141 CYTOPATH C/V INTERPRET: CPT | Performed by: PATHOLOGY

## 2017-12-13 PROCEDURE — 88142 CYTOPATH C/V THIN LAYER: CPT | Performed by: NURSE PRACTITIONER

## 2017-12-13 PROCEDURE — 87624 HPV HI-RISK TYP POOLED RSLT: CPT | Performed by: NURSE PRACTITIONER

## 2017-12-13 PROCEDURE — 99213 OFFICE O/P EST LOW 20 MIN: CPT | Performed by: NURSE PRACTITIONER

## 2017-12-13 RX ORDER — DIAZEPAM 2 MG/1
2 TABLET ORAL EVERY 8 HOURS PRN
Qty: 30 TABLET | Refills: 0 | Status: SHIPPED | OUTPATIENT
Start: 2017-12-13 | End: 2018-08-31

## 2017-12-13 NOTE — PROGRESS NOTES
Chief Complaint   Patient presents with   • Pap Smear     Subjective   Moy Sparrow is a 37 y.o. female.     Gynecologic Exam   The patient's pertinent negatives include no genital itching, genital lesions, genital odor, genital rash, missed menses, pelvic pain, vaginal bleeding or vaginal discharge. This is a recurrent problem. The current episode started 1 to 4 weeks ago. The problem occurs constantly. The problem has been gradually improving. The pain is mild. She is not pregnant. Associated symptoms include back pain. Pertinent negatives include no abdominal pain, anorexia, chills, constipation, diarrhea, discolored urine, dysuria, fever, flank pain, frequency, headaches, hematuria, joint pain, joint swelling, nausea, painful intercourse, rash, sore throat, urgency or vomiting. The treatment provided mild relief. She is sexually active. No, her partner does not have an STD. Her menstrual history has been regular. There is no history of an abdominal surgery, a  section, an ectopic pregnancy, endometriosis, a gynecological surgery, herpes simplex, menorrhagia, metrorrhagia, miscarriage, ovarian cysts, perineal abscess, PID, a terminated pregnancy or vaginosis.   Anxiety   Presents for follow-up visit. Symptoms include decreased concentration, depressed mood, excessive worry, nervous/anxious behavior and panic. Patient reports no chest pain, compulsions, confusion, dizziness, dry mouth, feeling of choking, hyperventilation, impotence, insomnia, irritability, nausea, obsessions, palpitations, restlessness, shortness of breath or suicidal ideas. Symptoms occur most days. The severity of symptoms is moderate. The quality of sleep is good. Nighttime awakenings: none.     Compliance with medications is %.        The following portions of the patient's history were reviewed and updated as appropriate: allergies, current medications, past social history and problem list.    Review of Systems    Constitutional: Negative.  Negative for activity change, appetite change, chills, diaphoresis, fatigue, fever, irritability and unexpected weight change.   HENT: Negative.  Negative for congestion, dental problem, drooling, ear discharge, ear pain, facial swelling, hearing loss, mouth sores, nosebleeds, postnasal drip, rhinorrhea, sinus pain, sinus pressure, sneezing, sore throat, tinnitus, trouble swallowing and voice change.    Eyes: Negative.  Negative for photophobia, pain, discharge, redness, itching and visual disturbance.   Respiratory: Negative.  Negative for apnea, cough, choking, chest tightness, shortness of breath, wheezing and stridor.    Cardiovascular: Negative.  Negative for chest pain, palpitations and leg swelling.   Gastrointestinal: Positive for blood in stool. Negative for abdominal distention, abdominal pain, anorexia, constipation, diarrhea, nausea and vomiting.   Endocrine: Negative.  Negative for cold intolerance, heat intolerance, polydipsia, polyphagia and polyuria.   Genitourinary: Negative.  Negative for decreased urine volume, difficulty urinating, dyspareunia, dysuria, enuresis, flank pain, frequency, genital sores, hematuria, impotence, menorrhagia, menstrual problem, missed menses, pelvic pain, urgency, vaginal bleeding, vaginal discharge and vaginal pain.   Musculoskeletal: Positive for back pain. Negative for arthralgias, gait problem, joint pain, joint swelling, myalgias, neck pain and neck stiffness.   Skin: Negative.  Negative for color change and rash.   Allergic/Immunologic: Negative.  Negative for environmental allergies, food allergies and immunocompromised state.   Neurological: Negative.  Negative for dizziness, tremors, seizures, syncope, facial asymmetry, speech difficulty, weakness, light-headedness, numbness and headaches.   Hematological: Negative.  Negative for adenopathy. Does not bruise/bleed easily.   Psychiatric/Behavioral: Positive for decreased concentration.  "Negative for agitation, behavioral problems, confusion, dysphoric mood, hallucinations, self-injury, sleep disturbance and suicidal ideas. The patient is nervous/anxious. The patient does not have insomnia and is not hyperactive.    All other systems reviewed and are negative.      Objective   /70  Ht 154.9 cm (61\")  Wt 67.6 kg (149 lb)  BMI 28.15 kg/m2  Physical Exam   Constitutional: She is oriented to person, place, and time. She appears well-developed and well-nourished. No distress. She is not intubated.   HENT:   Head: Normocephalic and atraumatic.   Mouth/Throat: No oropharyngeal exudate.   Eyes: EOM are normal. Pupils are equal, round, and reactive to light. Right eye exhibits no discharge. Left eye exhibits no discharge. No scleral icterus.   Neck: Normal range of motion. Neck supple. No JVD present. No tracheal deviation present. No thyromegaly present.   Cardiovascular: Normal rate, regular rhythm, normal heart sounds and normal pulses.  Exam reveals no gallop and no friction rub.    No murmur heard.  Pulmonary/Chest: Effort normal and breath sounds normal. No accessory muscle usage or stridor. No apnea, no tachypnea and no bradypnea. She is not intubated. No respiratory distress. She has no decreased breath sounds. She has no wheezes. She has no rhonchi. She has no rales. Chest wall is not dull to percussion. She exhibits no mass, no tenderness, no bony tenderness, no laceration, no crepitus, no edema, no deformity, no swelling and no retraction. Right breast exhibits no inverted nipple, no mass, no nipple discharge, no skin change and no tenderness. Left breast exhibits no inverted nipple, no mass, no nipple discharge, no skin change and no tenderness.   Abdominal: Soft. Bowel sounds are normal. She exhibits no shifting dullness, no distension, no pulsatile liver, no fluid wave, no abdominal bruit, no ascites, no pulsatile midline mass and no mass. There is no hepatosplenomegaly, splenomegaly " or hepatomegaly. There is no tenderness. There is no rigidity, no rebound, no guarding, no CVA tenderness, no tenderness at McBurney's point and negative Blackman's sign. No hernia. Hernia confirmed negative in the ventral area, confirmed negative in the right inguinal area and confirmed negative in the left inguinal area.   Genitourinary: Rectum normal and vagina normal. Rectal exam shows no external hemorrhoid, no internal hemorrhoid, no fissure, no mass, no tenderness, anal tone normal and guaiac negative stool. Pelvic exam was performed with patient supine. No labial fusion. There is no rash, tenderness, lesion or injury on the right labia. There is no rash, tenderness, lesion or injury on the left labia. Uterus is not deviated, not enlarged, not fixed and not tender. Cervix exhibits no motion tenderness, no discharge and no friability. Right adnexum displays no mass, no tenderness and no fullness. Left adnexum displays no mass and no fullness. No erythema, tenderness or bleeding in the vagina. No foreign body in the vagina. No vaginal discharge found.   Musculoskeletal: Normal range of motion. She exhibits no edema, tenderness or deformity.   Lymphadenopathy:     She has no cervical adenopathy.        Right: No inguinal adenopathy present.        Left: No inguinal adenopathy present.   Neurological: She is alert and oriented to person, place, and time. She has normal reflexes. She displays normal reflexes. No cranial nerve deficit. She exhibits normal muscle tone. Coordination normal.   Nystagmus noted with lateral gaze to the right    Skin: Skin is warm and dry. No rash noted. She is not diaphoretic. No erythema. No pallor.   Psychiatric: She has a normal mood and affect.   Nursing note and vitals reviewed.      Assessment/Plan   Problem List Items Addressed This Visit        Cardiovascular and Mediastinum    External hemorrhoids       Digestive    Rectal pain    Relevant Orders    Ambulatory Referral to  Gastroenterology       Other    Encounter for Papanicolaou smear for cervical cancer screening - Primary    Relevant Orders    Liquid-based Pap Smear, Screening - ThinPrep Vial, Cervix           New Medications Ordered This Visit   Medications   • diazePAM (VALIUM) 2 MG tablet     Sig: Take 1 tablet by mouth Every 8 (Eight) Hours As Needed for Anxiety.     Dispense:  30 tablet     Refill:  0       Patient understands the risks associated with this controlled medication, including tolerance and addiction.  she also agrees to only obtain this medication from me, and not from a another provider, unless that provider is covering for me in my absence.  she also agrees to be compliant in dosing, and not self adjust the dose of medication.  A signed controlled substance agreement is on file, and she has received a controlled substance education sheet at this a previous visit.  she has also signed a consent for treatment with a controlled substance as per [facility] policy. WM was obtained.

## 2017-12-20 ENCOUNTER — TELEPHONE (OUTPATIENT)
Dept: FAMILY MEDICINE CLINIC | Facility: CLINIC | Age: 37
End: 2017-12-20

## 2017-12-22 LAB — HPV I/H RISK 4 DNA CVX QL PROBE+SIG AMP: NEGATIVE

## 2018-03-01 ENCOUNTER — ANESTHESIA EVENT (OUTPATIENT)
Dept: GASTROENTEROLOGY | Facility: HOSPITAL | Age: 38
End: 2018-03-01

## 2018-03-01 ENCOUNTER — HOSPITAL ENCOUNTER (OUTPATIENT)
Facility: HOSPITAL | Age: 38
Setting detail: HOSPITAL OUTPATIENT SURGERY
Discharge: HOME | End: 2018-03-01
Attending: INTERNAL MEDICINE | Admitting: INTERNAL MEDICINE

## 2018-03-01 ENCOUNTER — ANESTHESIA (OUTPATIENT)
Dept: GASTROENTEROLOGY | Facility: HOSPITAL | Age: 38
End: 2018-03-01

## 2018-03-01 VITALS
OXYGEN SATURATION: 100 % | RESPIRATION RATE: 16 BRPM | DIASTOLIC BLOOD PRESSURE: 68 MMHG | HEART RATE: 81 BPM | WEIGHT: 150.5 LBS | TEMPERATURE: 98.4 F | SYSTOLIC BLOOD PRESSURE: 111 MMHG | BODY MASS INDEX: 28.42 KG/M2 | HEIGHT: 61 IN

## 2018-03-01 DIAGNOSIS — Z12.11 ENCOUNTER FOR SCREENING COLONOSCOPY: ICD-10-CM

## 2018-03-01 LAB — B-HCG UR QL: NEGATIVE

## 2018-03-01 PROCEDURE — 81025 URINE PREGNANCY TEST: CPT | Performed by: INTERNAL MEDICINE

## 2018-03-01 PROCEDURE — 25010000002 MIDAZOLAM PER 1 MG: Performed by: NURSE ANESTHETIST, CERTIFIED REGISTERED

## 2018-03-01 PROCEDURE — 88305 TISSUE EXAM BY PATHOLOGIST: CPT | Performed by: INTERNAL MEDICINE

## 2018-03-01 PROCEDURE — 25010000002 PROPOFOL 10 MG/ML EMULSION: Performed by: NURSE ANESTHETIST, CERTIFIED REGISTERED

## 2018-03-01 PROCEDURE — 88305 TISSUE EXAM BY PATHOLOGIST: CPT | Performed by: PATHOLOGY

## 2018-03-01 RX ORDER — LIDOCAINE HYDROCHLORIDE 10 MG/ML
INJECTION, SOLUTION INFILTRATION; PERINEURAL AS NEEDED
Status: DISCONTINUED | OUTPATIENT
Start: 2018-03-01 | End: 2018-03-01 | Stop reason: SURG

## 2018-03-01 RX ORDER — MIDAZOLAM HYDROCHLORIDE 1 MG/ML
INJECTION INTRAMUSCULAR; INTRAVENOUS AS NEEDED
Status: DISCONTINUED | OUTPATIENT
Start: 2018-03-01 | End: 2018-03-01 | Stop reason: SURG

## 2018-03-01 RX ORDER — PROPOFOL 10 MG/ML
VIAL (ML) INTRAVENOUS AS NEEDED
Status: DISCONTINUED | OUTPATIENT
Start: 2018-03-01 | End: 2018-03-01 | Stop reason: SURG

## 2018-03-01 RX ORDER — DEXTROSE AND SODIUM CHLORIDE 5; .45 G/100ML; G/100ML
30 INJECTION, SOLUTION INTRAVENOUS CONTINUOUS
Status: DISCONTINUED | OUTPATIENT
Start: 2018-03-01 | End: 2018-03-01 | Stop reason: HOSPADM

## 2018-03-01 RX ADMIN — PROPOFOL 70 MG: 10 INJECTION, EMULSION INTRAVENOUS at 10:30

## 2018-03-01 RX ADMIN — PROPOFOL 60 MG: 10 INJECTION, EMULSION INTRAVENOUS at 10:39

## 2018-03-01 RX ADMIN — MIDAZOLAM 2 MG: 1 INJECTION INTRAMUSCULAR; INTRAVENOUS at 10:28

## 2018-03-01 RX ADMIN — LIDOCAINE HYDROCHLORIDE 100 MG: 10 INJECTION, SOLUTION INFILTRATION; PERINEURAL at 10:30

## 2018-03-01 RX ADMIN — PROPOFOL 70 MG: 10 INJECTION, EMULSION INTRAVENOUS at 10:36

## 2018-03-01 RX ADMIN — DEXTROSE AND SODIUM CHLORIDE 30 ML/HR: 5; 450 INJECTION, SOLUTION INTRAVENOUS at 09:51

## 2018-03-01 NOTE — ANESTHESIA POSTPROCEDURE EVALUATION
Patient: Moy Sparrow    Procedure Summary     Date Anesthesia Start Anesthesia Stop Room / Location    03/01/18 1028 1049 [facility] ENDOSCOPY 2 / [facility] ENDOSCOPY       Procedure Diagnosis Surgeon Provider    COLONOSCOPY (N/A ) Encounter for screening colonoscopy  (Encounter for screening colonoscopy [Z12.11]) DO Leigh Mckeon CRNA          Anesthesia Type: MAC  Last vitals  BP   107/61 (03/01/18 0938)   Temp   97.2 °F (36.2 °C) (03/01/18 0938)   Pulse   71 (03/01/18 0938)   Resp   18 (03/01/18 0938)     SpO2   98 % (03/01/18 0938)     Post Anesthesia Care and Evaluation    Patient location during evaluation: bedside  Patient participation: complete - patient participated  Level of consciousness: sleepy but conscious  Pain score: 0  Pain management: adequate  Airway patency: patent  Anesthetic complications: No anesthetic complications  PONV Status: none  Cardiovascular status: acceptable  Respiratory status: acceptable  Hydration status: acceptable

## 2018-03-01 NOTE — H&P
Magen Ospina DO,[facility]  Gastroenterology  Hepatology  Endoscopy  Board Certified in Internal Medicine and gastroenterology  [address], [address]  [place]. [address]   [phone]   F [phone]     GASTROENTEROLOGY HISTORY AND PHYSICAL  NOTE   MAGEN OSPINA DO.         SUBJECTIVE:   3/1/2018    Name: Moy Sparrow  DOD: 1980        Chief Complaint:       Subjective : Rectal bleeding with a family history of colon cancer at the age of 50    Patient is 37 y.o. female presents with desire for elective colonoscopy.      ROS/HISTORY/ CURRENT MEDICATIONS/OBJECTIVE/VS/PE:   Review of Systems:   Review of Systems    History:     Past Medical History:   Diagnosis Date   • Depressive disorder    • Encounter for gynecological examination    • Encounter for vision screening     Vision screen (1)     • Health examination of defined subpopulation     Health examination of sub-group     • Malaise and fatigue    • Palpitations    • Soft tissue swelling     right side of neck-2 different areas    • Urinary tract infectious disease      Past Surgical History:   Procedure Laterality Date   •  SECTION     • PAP SMEAR       Family History   Problem Relation Age of Onset   • Bipolar disorder Mother    • Schizophrenia Mother    • Colon cancer Father      Colorectal Cancer     Social History   Substance Use Topics   • Smoking status: Current Every Day Smoker     Packs/day: 1.00     Last attempt to quit: 2016   • Smokeless tobacco: Never Used   • Alcohol use No     Prescriptions Prior to Admission   Medication Sig Dispense Refill Last Dose   • hydrocortisone (PROCTOSOL HC) 2.5 % rectal cream Apply to affected area topically 2 times every day for 2 weeks, and then as needed. 28.35 g 5 Past Month at Unknown time   • magic mouthwash oral suspension Swish and spit 10 mL 4 (Four) Times a Day.   2018   • busPIRone (BUSPAR) 5 MG tablet Take 1 tablet by mouth 3 (Three) Times a  Day. 90 tablet 5 More than a month at Unknown time   • diazePAM (VALIUM) 2 MG tablet Take 1 tablet by mouth Every 8 (Eight) Hours As Needed for Anxiety. 30 tablet 0 More than a month at Unknown time   • FLUoxetine (PROZAC) 20 MG capsule Take 1 capsule by mouth Daily. 30 capsule 11 Taking     Allergies:  Review of patient's allergies indicates no known allergies.    I have reviewed the patients medical history, surgical history and family history in the available medical record system.     Current Medications:     Current Facility-Administered Medications   Medication Dose Route Frequency Provider Last Rate Last Dose   • dextrose 5 % and sodium chloride 0.45 % infusion  30 mL/hr Intravenous Continuous Shin Vanessa, DO 30 mL/hr at 03/01/18 0951 30 mL/hr at 03/01/18 0951       Objective     Physical Exam:   Temp:  [97.2 °F (36.2 °C)] 97.2 °F (36.2 °C)  Heart Rate:  [71] 71  Resp:  [18] 18  BP: (107)/(61) 107/61    Physical Exam:  General Appearance:    Alert, cooperative, in no acute distress   Head:    Normocephalic, without obvious abnormality, atraumatic   Eyes:            Lids and lashes normal, conjunctivae and sclerae normal, no   icterus, no pallor, corneas clear, PERRLA   Ears:    Ears appear intact with no abnormalities noted   Throat:   No oral lesions, no thrush, oral mucosa moist   Neck:   No adenopathy, supple, trachea midline, no thyromegaly, no     carotid bruit, no JVD   Back:     No kyphosis present, no scoliosis present, no skin lesions,       erythema or scars, no tenderness to percussion or                   palpation,   range of motion normal   Lungs:     Clear to auscultation,respirations regular, even and                   unlabored    Heart:    Regular rhythm and normal rate, normal S1 and S2, no            murmur, no gallop, no rub, no click   Breast Exam:    Deferred   Abdomen:     Normal bowel sounds, no masses, no organomegaly, soft        non-tender, non-distended, no guarding, no  rebound                 tenderness   Genitalia:    Deferred   Extremities:   Moves all extremities well, no edema, no cyanosis, no              redness   Pulses:   Pulses palpable and equal bilaterally   Skin:   No bleeding, bruising or rash   Lymph nodes:   No palpable adenopathy   Neurologic:   Cranial nerves 2 - 12 grossly intact, sensation intact, DTR        present and equal bilaterally      Results Review:     Lab Results   Component Value Date    WBC 6.88 11/01/2017    HGB 13.1 11/01/2017    HCT 39.7 11/01/2017     11/01/2017             No results found for: LIPASE  No results found for: INR       Radiology Review:  Imaging Results (last 72 hours)     ** No results found for the last 72 hours. **           I reviewed the patient's new clinical results.  I reviewed the patient's new imaging results and agree with the interpretation.     ASSESSMENT/PLAN:   ASSESSMENT:   1.  Rectal bleeding  2.  Family history of colon cancer    PLAN:   1.  Colonoscopy    Risk and benefits associated with procedure are reviewed with the patient.  The patient wishes to proceed      Shin Vanessa DO  03/01/18  10:07 AM

## 2018-03-01 NOTE — PLAN OF CARE
Problem: Patient Care Overview (Adult)  Goal: Plan of Care Review  Outcome: Ongoing (interventions implemented as appropriate)   03/01/18 1050   Coping/Psychosocial Response Interventions   Plan Of Care Reviewed With patient   Patient Care Overview   Progress no change   Outcome Evaluation   Outcome Summary/Follow up Plan vss       Problem: GI Endoscopy (Adult)  Goal: Signs and Symptoms of Listed Potential Problems Will be Absent or Manageable (GI Endoscopy)  Outcome: Ongoing (interventions implemented as appropriate)   03/01/18 1050   GI Endoscopy   Problems Assessed (GI Endoscopy) all   Problems Present (GI Endoscopy) none

## 2018-03-01 NOTE — ANESTHESIA PREPROCEDURE EVALUATION
Anesthesia Evaluation     Patient summary reviewed   NPO Solid Status: > 8 hours  NPO Liquid Status: > 4 hours           Airway   Mallampati: II  TM distance: <3 FB  Neck ROM: full  No difficulty expected  Dental - normal exam     Pulmonary - normal exam   Cardiovascular - normal exam        Neuro/Psych  (+) dizziness/light headedness, psychiatric history Anxiety and Depression,     GI/Hepatic/Renal/Endo      Musculoskeletal     Abdominal  - normal exam   Substance History      OB/GYN          Other                        Anesthesia Plan    ASA 2     MAC     intravenous induction

## 2018-03-01 NOTE — PLAN OF CARE
Problem: Patient Care Overview (Adult)  Goal: Plan of Care Review  Outcome: Outcome(s) achieved Date Met: 03/01/18 03/01/18 1105   Coping/Psychosocial Response Interventions   Plan Of Care Reviewed With patient   Patient Care Overview   Progress no change   Outcome Evaluation   Outcome Summary/Follow up Plan vss. pt alert       Problem: GI Endoscopy (Adult)  Goal: Signs and Symptoms of Listed Potential Problems Will be Absent or Manageable (GI Endoscopy)  Outcome: Outcome(s) achieved Date Met: 03/01/18 03/01/18 1105   GI Endoscopy   Problems Assessed (GI Endoscopy) all   Problems Present (GI Endoscopy) none

## 2018-03-02 LAB
LAB AP CASE REPORT: NORMAL
Lab: NORMAL
PATH REPORT.FINAL DX SPEC: NORMAL
PATH REPORT.GROSS SPEC: NORMAL

## 2018-03-28 ENCOUNTER — OFFICE VISIT (OUTPATIENT)
Dept: FAMILY MEDICINE CLINIC | Facility: CLINIC | Age: 38
End: 2018-03-28

## 2018-03-28 VITALS
SYSTOLIC BLOOD PRESSURE: 110 MMHG | WEIGHT: 150 LBS | BODY MASS INDEX: 28.32 KG/M2 | DIASTOLIC BLOOD PRESSURE: 78 MMHG | HEIGHT: 61 IN

## 2018-03-28 DIAGNOSIS — F41.9 ANXIETY: Primary | ICD-10-CM

## 2018-03-28 DIAGNOSIS — Z12.31 ENCOUNTER FOR SCREENING MAMMOGRAM FOR MALIGNANT NEOPLASM OF BREAST: ICD-10-CM

## 2018-03-28 DIAGNOSIS — F32.89 OTHER DEPRESSION: ICD-10-CM

## 2018-03-28 DIAGNOSIS — R13.19 OTHER DYSPHAGIA: ICD-10-CM

## 2018-03-28 DIAGNOSIS — J02.9 SORE THROAT: ICD-10-CM

## 2018-03-28 PROBLEM — F32.A DEPRESSION: Status: ACTIVE | Noted: 2018-03-28

## 2018-03-28 PROCEDURE — 99213 OFFICE O/P EST LOW 20 MIN: CPT | Performed by: NURSE PRACTITIONER

## 2018-03-28 NOTE — PROGRESS NOTES
Chief Complaint   Patient presents with   • Follow-up     meds     Subjective   Moy Sparrow is a 38 y.o. female.     Anxiety   Presents for follow-up visit. Symptoms include depressed mood, excessive worry, irritability, nervous/anxious behavior, palpitations and panic. Patient reports no chest pain, compulsions, confusion, decreased concentration, dizziness, dry mouth, feeling of choking, hyperventilation, impotence, insomnia, malaise, muscle tension, nausea, obsessions, restlessness, shortness of breath or suicidal ideas. Symptoms occur most days. The severity of symptoms is moderate. The quality of sleep is good. Nighttime awakenings: none.     Her past medical history is significant for depression. Compliance with medications is %.   Depression Patient presents with the following symptoms: depressed mood, excessive worry, irritability, nervousness/anxiety, palpitations and panic.  Patient is not experiencing: choking sensation, compulsions, confusion, decreased concentration, dry mouth, hyperventilation, impotence, insomnia, malaise, muscle tension, obsessions, restlessness, shortness of breath and suicidal ideas.    Sore Throat    This is a recurrent problem. The current episode started more than 1 month ago. The problem has been gradually worsening. There has been no fever. The pain is at a severity of 7/10. The pain is severe. Associated symptoms include trouble swallowing. Pertinent negatives include no abdominal pain, congestion, coughing, diarrhea, drooling, ear discharge, ear pain, headaches, hoarse voice, plugged ear sensation, neck pain, shortness of breath, stridor, swollen glands or vomiting. She has had no exposure to strep or mono. She has tried acetaminophen, NSAIDs, gargles and cool liquids for the symptoms. The treatment provided no relief.        The following portions of the patient's history were reviewed and updated as appropriate: allergies, current medications, past social  "history and problem list.    Review of Systems   Constitutional: Positive for irritability. Negative for activity change, appetite change, chills, diaphoresis, fatigue, fever and unexpected weight change.   HENT: Positive for sore throat and trouble swallowing. Negative for congestion, dental problem, drooling, ear discharge, ear pain, hoarse voice, tinnitus and voice change.         Hx of thrush requesting refills of meds for thrush at this time.    Eyes: Negative.  Negative for visual disturbance.   Respiratory: Negative.  Negative for apnea, cough, choking, chest tightness, shortness of breath, wheezing and stridor.    Cardiovascular: Positive for palpitations. Negative for chest pain and leg swelling.   Gastrointestinal: Negative.  Negative for abdominal distention, abdominal pain, anal bleeding, blood in stool, constipation, diarrhea, nausea, rectal pain and vomiting.   Endocrine: Negative.  Negative for cold intolerance, heat intolerance and polydipsia.   Genitourinary: Negative.  Negative for difficulty urinating, dyspareunia, dysuria, enuresis and impotence.   Musculoskeletal: Negative.  Negative for arthralgias, back pain and neck pain.   Skin: Negative.  Negative for color change, pallor, rash and wound.   Allergic/Immunologic: Negative.  Negative for environmental allergies, food allergies and immunocompromised state.   Neurological: Negative.  Negative for dizziness, seizures, facial asymmetry, light-headedness, numbness and headaches.   Hematological: Negative.  Negative for adenopathy. Does not bruise/bleed easily.   Psychiatric/Behavioral: Positive for agitation and sleep disturbance. Negative for confusion, decreased concentration, dysphoric mood, hallucinations, self-injury and suicidal ideas. The patient is nervous/anxious. The patient does not have insomnia and is not hyperactive.    All other systems reviewed and are negative.      Objective   /78   Ht 154.9 cm (61\")   Wt 68 kg (150 lb)  "  LMP 03/01/2018   BMI 28.34 kg/m²   Physical Exam   Constitutional: She is oriented to person, place, and time. She appears well-developed and well-nourished. No distress.   HENT:   Head: Normocephalic and atraumatic.   Right Ear: External ear normal.   Left Ear: External ear normal.   Mouth/Throat: Oropharynx is clear and moist. No oropharyngeal exudate.   Eyes: Pupils are equal, round, and reactive to light. EOM are normal. Right eye exhibits no discharge. Left eye exhibits no discharge. No scleral icterus.   Neck: Normal range of motion. Neck supple. No tracheal deviation present. No thyromegaly present.   Cardiovascular: Normal rate, regular rhythm, normal heart sounds and intact distal pulses.  Exam reveals no gallop and no friction rub.    No murmur heard.  Pulmonary/Chest: Effort normal and breath sounds normal. No respiratory distress. She has no wheezes. She has no rales. She exhibits no tenderness.   Abdominal: Soft. Bowel sounds are normal. She exhibits no distension and no mass. There is tenderness. There is no rebound and no guarding. No hernia.   Epigastric pain    Musculoskeletal: Normal range of motion. She exhibits no edema, tenderness or deformity.   Lymphadenopathy:     She has no cervical adenopathy.   Neurological: She is alert and oriented to person, place, and time. She has normal reflexes. She displays normal reflexes. No cranial nerve deficit. She exhibits normal muscle tone. Coordination normal.   Skin: Skin is warm and dry. No rash noted. She is not diaphoretic. No erythema. No pallor.   Nursing note and vitals reviewed.      Assessment/Plan   Problem List Items Addressed This Visit        Respiratory    Sore throat       Digestive    Other dysphagia    Relevant Orders    Ambulatory Referral to Gastroenterology       Other    Anxiety - Primary    Depression    Relevant Medications    Vortioxetine HBr (TRINTELLIX) 5 MG tablet    Encounter for screening mammogram for malignant neoplasm of  breast    Relevant Orders    Mammo Screening Digital Tomosynthesis Bilateral With CAD           New Medications Ordered This Visit   Medications   • Vortioxetine HBr (TRINTELLIX) 5 MG tablet     Sig: Take 5 mg by mouth Daily With Breakfast.     Dispense:  30 tablet     Refill:  11          patient has tried and failed on the following medication--zoloft, prozac, celexa, buspar, wellbutrin and valium-patient would benefit from the following medication trintellix due to treatment failure with other medications      Refer to gastro per patient request due to trouble swallowing-requested Dr Vanessa.

## 2018-05-08 ENCOUNTER — OFFICE VISIT (OUTPATIENT)
Dept: OTOLARYNGOLOGY | Facility: CLINIC | Age: 38
End: 2018-05-08

## 2018-05-08 VITALS — BODY MASS INDEX: 29.98 KG/M2 | WEIGHT: 158.8 LBS | OXYGEN SATURATION: 98 % | HEIGHT: 61 IN

## 2018-05-08 DIAGNOSIS — K21.9 GASTROESOPHAGEAL REFLUX DISEASE WITHOUT ESOPHAGITIS: ICD-10-CM

## 2018-05-08 DIAGNOSIS — J37.0 CHRONIC LARYNGITIS: ICD-10-CM

## 2018-05-08 DIAGNOSIS — R09.89 GLOBUS SENSATION: Primary | ICD-10-CM

## 2018-05-08 PROCEDURE — 99214 OFFICE O/P EST MOD 30 MIN: CPT | Performed by: OTOLARYNGOLOGY

## 2018-05-08 PROCEDURE — 31575 DIAGNOSTIC LARYNGOSCOPY: CPT | Performed by: OTOLARYNGOLOGY

## 2018-05-08 RX ORDER — RANITIDINE 150 MG/1
150 CAPSULE ORAL 2 TIMES DAILY
Qty: 60 CAPSULE | Refills: 11 | Status: SHIPPED | OUTPATIENT
Start: 2018-05-08 | End: 2018-08-28

## 2018-05-10 NOTE — PROGRESS NOTES
Subjective   Moy Sparrow is a 38 y.o. female.       History of Present Illness   Patient that I saw previously with benign positional vertigo.  Reports that her symptoms have resolved as far as her dizziness.  Has a new complaint regarding her throat.  Was diagnosed with thrush and treated.  States the thrush has resolved but feels like she has something in the back of her throat and down around or voicebox that feels like mucus or something that she can't swallow but cannot expectorate.  Says she feels like her uvula is enlarged.  Says her throat feels very dry when she wakes up during the night or first thing in the morning.  Has some symptoms of acid reflux.  Feels like she has quite a bit of postnasal drainage but does not have any anterior rhinorrhea.  No overt dysphagia.  No hemoptysis.  Nothing in particular brought this on.  She is a former smoker.      The following portions of the patient's history were reviewed and updated as appropriate: allergies, current medications, past family history, past medical history, past social history, past surgical history and problem list.     reports that she quit smoking about 20 months ago. She smoked 1.00 pack per day. She has never used smokeless tobacco. She reports that she does not drink alcohol.   Patient is not a tobacco user and has not been counseled for use of tobacco products      Review of Systems   Constitutional: Negative for fever.   HENT: Positive for sore throat.            Objective   Physical Exam  General: Well-developed well-nourished female in no acute distress.  Alert and oriented ×3. Head: Normocephalic. Face: Symmetrical strength and appearance. PERRL. EOMI. Voice:Strong. Speech:Fluent  Ears: External ears no deformity, canals no discharge, tympanic membranes intact clear and mobile bilaterally.  Nose: Nares show no discharge mass polyp or purulence.  Boggy mucosa is present.  No gross external deformity.  Septum: Midline  Oral cavity:  Lips and gums without lesions.  Tongue and floor of mouth without lesions.  Parotid and submandibular ducts unobstructed.  No mucosal lesions on the buccal mucosa or vestibule of the mouth.  Pharynx: No erythema exudate mass or ulcer.  Uvula is unremarkable.  Neck: No lymphadenopathy.  No thyromegaly.  Trachea and larynx midline.  No masses in the parotid or submandibular glands.    Procedure Note    Pre-operative Diagnosis: Patient presents with:  Sore Throat      Post-operative Diagnosis: Chronic laryngitis    Anesthesia: topical with xylocaine and neosynephrine    Endoscopy Type:  Flexible Laryngoscopy    Procedure Details:    The patient was placed in the sitting position.  After topical anesthesia and decongestion, the 4 mm laryngoscope was passed.  The nasal cavities, nasopharynx, oropharynx, hypopharynx, and larynx were all examined.  Vocal cords were examined during respiration and phonation.  The following findings were noted:    Findings: Previously noted nasal findings were confirmed. Nasopharynx without mass, hypopharynx and larynx without evidence of neoplasm. Vocal cord mobility intact. There is chronic appearing edema and erythema of the laryngeal structures consistent with chronic laryngitis.    Condition:  Stable.  Patient tolerated procedure well.    Complications:  None    Assessment/Plan   Moy was seen today for sore throat.    Diagnoses and all orders for this visit:    Globus sensation    Chronic laryngitis    Gastroesophageal reflux disease without esophagitis        Plan: Explained findings to patient.  Reassured her there was no evidence of neoplasm.  Reassured her that her uvula was normal.  Add ranitidine 150 mg by mouth twice a day.  Advise use of cool mist vaporizer.  Maintain tobacco abstinence.  Return in 3 months, call sooner for problems.

## 2018-07-02 ENCOUNTER — OFFICE VISIT (OUTPATIENT)
Dept: FAMILY MEDICINE CLINIC | Facility: CLINIC | Age: 38
End: 2018-07-02

## 2018-07-02 VITALS
WEIGHT: 158 LBS | HEIGHT: 61 IN | BODY MASS INDEX: 29.83 KG/M2 | SYSTOLIC BLOOD PRESSURE: 100 MMHG | DIASTOLIC BLOOD PRESSURE: 64 MMHG

## 2018-07-02 DIAGNOSIS — Z12.31 ENCOUNTER FOR SCREENING MAMMOGRAM FOR MALIGNANT NEOPLASM OF BREAST: Primary | ICD-10-CM

## 2018-07-02 DIAGNOSIS — M54.2 NECK PAIN: Primary | ICD-10-CM

## 2018-07-02 DIAGNOSIS — R05.9 COUGH: ICD-10-CM

## 2018-07-02 PROCEDURE — 99213 OFFICE O/P EST LOW 20 MIN: CPT | Performed by: NURSE PRACTITIONER

## 2018-07-02 RX ORDER — BACLOFEN 10 MG/1
10 TABLET ORAL 3 TIMES DAILY
Qty: 60 TABLET | Refills: 5 | Status: SHIPPED | OUTPATIENT
Start: 2018-07-02 | End: 2018-08-29

## 2018-07-02 NOTE — PROGRESS NOTES
Chief Complaint   Patient presents with   • Neck Pain   • Shoulder Pain     left side     Subjective   Moy Sparrow is a 38 y.o. female.     Neck Pain    This is a recurrent problem. The current episode started more than 1 month ago. The problem occurs intermittently. The problem has been gradually worsening. The pain is present in the left side. The quality of the pain is described as cramping. The pain is at a severity of 5/10. The pain is moderate. The symptoms are aggravated by bending. The pain is same all the time. Stiffness is present all day. Pertinent negatives include no chest pain, fever, headaches or numbness.   Anxiety   Presents for follow-up visit. Symptoms include depressed mood, excessive worry, irritability, nervous/anxious behavior, palpitations and panic. Patient reports no chest pain, compulsions, confusion, decreased concentration, dizziness, dry mouth, feeling of choking, hyperventilation, insomnia, malaise, muscle tension, nausea, obsessions, restlessness, shortness of breath or suicidal ideas. Symptoms occur most days. The severity of symptoms is moderate. The quality of sleep is good. Nighttime awakenings: none.     Her past medical history is significant for depression. Compliance with medications is %.   Depression Patient presents with the following symptoms: depressed mood, excessive worry, irritability, nervousness/anxiety, palpitations and panic.  Patient is not experiencing: choking sensation, compulsions, confusion, decreased concentration, dry mouth, hyperventilation, insomnia, malaise, muscle tension, obsessions, restlessness, shortness of breath and suicidal ideas.    Cough   This is a recurrent problem. The current episode started more than 1 month ago. The problem has been gradually worsening. The problem occurs every few hours. The cough is non-productive. Associated symptoms include nasal congestion. Pertinent negatives include no chest pain, chills, ear  congestion, ear pain, fever, headaches, heartburn, hemoptysis, myalgias, sore throat, shortness of breath or wheezing. The treatment provided mild relief. There is no history of environmental allergies.        The following portions of the patient's history were reviewed and updated as appropriate: allergies, current medications, past social history and problem list.    Review of Systems   Constitutional: Positive for irritability. Negative for activity change, appetite change, chills, diaphoresis, fatigue, fever and unexpected weight change.   HENT: Negative for congestion, dental problem, drooling, ear pain, sneezing, sore throat and tinnitus.    Eyes: Negative.  Negative for visual disturbance.   Respiratory: Positive for cough. Negative for apnea, hemoptysis, choking, chest tightness, shortness of breath, wheezing and stridor.    Cardiovascular: Positive for palpitations. Negative for chest pain and leg swelling.   Gastrointestinal: Negative.  Negative for abdominal distention, abdominal pain, anal bleeding, blood in stool, constipation, diarrhea, heartburn and nausea.   Endocrine: Negative.  Negative for cold intolerance, heat intolerance and polydipsia.   Genitourinary: Negative.  Negative for difficulty urinating, dyspareunia, dysuria and enuresis.   Musculoskeletal: Positive for neck pain and neck stiffness. Negative for arthralgias, back pain, gait problem, joint swelling and myalgias.   Skin: Negative.  Negative for color change and pallor.   Allergic/Immunologic: Negative.  Negative for environmental allergies, food allergies and immunocompromised state.   Neurological: Negative.  Negative for dizziness, seizures, facial asymmetry, light-headedness, numbness and headaches.   Hematological: Negative.  Negative for adenopathy. Does not bruise/bleed easily.   Psychiatric/Behavioral: Positive for agitation and sleep disturbance. Negative for confusion, decreased concentration, dysphoric mood, hallucinations,  "self-injury and suicidal ideas. The patient is nervous/anxious. The patient does not have insomnia and is not hyperactive.    All other systems reviewed and are negative.      Objective   /64   Ht 154.9 cm (61\")   Wt 71.7 kg (158 lb)   BMI 29.85 kg/m²   Physical Exam   Constitutional: She is oriented to person, place, and time. She appears well-developed and well-nourished. No distress.   HENT:   Head: Normocephalic and atraumatic.   Right Ear: External ear normal.   Mouth/Throat: No oropharyngeal exudate.   Eyes: EOM are normal. Pupils are equal, round, and reactive to light. Right eye exhibits no discharge. Left eye exhibits no discharge. No scleral icterus.   Neck: Normal range of motion. Neck supple. No JVD present. No tracheal deviation present. No thyromegaly present.   Cardiovascular: Normal rate, regular rhythm, normal heart sounds and intact distal pulses.  Exam reveals no gallop and no friction rub.    No murmur heard.  Pulmonary/Chest: Effort normal and breath sounds normal. No stridor. No respiratory distress. She has no wheezes. She has no rales. She exhibits no tenderness.   Abdominal: Soft. Bowel sounds are normal. She exhibits no distension and no mass. There is no tenderness. There is no rebound and no guarding. No hernia.   Musculoskeletal: She exhibits tenderness. She exhibits no edema or deformity.        Cervical back: She exhibits decreased range of motion, bony tenderness, pain and spasm.        Back:    Lymphadenopathy:     She has no cervical adenopathy.   Neurological: She is alert and oriented to person, place, and time. She has normal reflexes. She displays normal reflexes. No cranial nerve deficit. She exhibits normal muscle tone. Coordination normal.   Skin: Skin is warm and dry. No rash noted. She is not diaphoretic. No erythema. No pallor.   Nursing note and vitals reviewed.      Assessment/Plan   Problem List Items Addressed This Visit        Respiratory    Cough       " Nervous and Auditory    Neck pain - Primary    Relevant Orders    XR Chest 2 View (Completed)    XR Spine Cervical 2 or 3 View (Completed)      XR Spine Cervical 2 or 3 View [IMG56] (Order [id number])   Order   Status: Final result   Appointment Information     PACS Images     Radiology Images   Study Result     Procedure: Cervical spine     Indication:  Neck pain        Technique:  Three    views     Prior relevant exam:  None       No evidence of acute bone abnormality is noted. No prevertebral  soft tissue swelling or widening of interspinous process is  noted. Disc heights are well maintained at all levels. Bone  mineralization is overall within normal limits.     IMPRESSION:  CONCLUSION:   1.  Normal examination of the cervical spine.     Electronically signed by:  Dilan Fabian MD  7/2/2018 4:08 PM CDT  Workstation: [id number]          New Medications Ordered This Visit   Medications   • baclofen (LIORESAL) 10 MG tablet     Sig: Take 1 tablet by mouth 3 (Three) Times a Day.     Dispense:  60 tablet     Refill:  5      neck massager as directed, diet discussed, meds as directed, recheck if worsen -xray was neg-both chest and neck

## 2018-07-23 DIAGNOSIS — R13.19 OTHER DYSPHAGIA: Primary | ICD-10-CM

## 2018-07-23 PROBLEM — Z12.31 ENCOUNTER FOR SCREENING MAMMOGRAM FOR MALIGNANT NEOPLASM OF BREAST: Status: ACTIVE | Noted: 2018-07-23

## 2018-08-06 ENCOUNTER — OFFICE VISIT (OUTPATIENT)
Dept: FAMILY MEDICINE CLINIC | Facility: CLINIC | Age: 38
End: 2018-08-06

## 2018-08-06 VITALS
HEIGHT: 61 IN | DIASTOLIC BLOOD PRESSURE: 78 MMHG | BODY MASS INDEX: 29.83 KG/M2 | WEIGHT: 158 LBS | SYSTOLIC BLOOD PRESSURE: 110 MMHG

## 2018-08-06 DIAGNOSIS — F17.200 CURRENT SMOKER: ICD-10-CM

## 2018-08-06 DIAGNOSIS — R13.19 OTHER DYSPHAGIA: Primary | ICD-10-CM

## 2018-08-06 DIAGNOSIS — F41.9 ANXIETY: ICD-10-CM

## 2018-08-06 DIAGNOSIS — J39.2 THROAT MASS: ICD-10-CM

## 2018-08-06 PROCEDURE — 99213 OFFICE O/P EST LOW 20 MIN: CPT | Performed by: NURSE PRACTITIONER

## 2018-08-06 RX ORDER — PANTOPRAZOLE SODIUM 40 MG/1
40 TABLET, DELAYED RELEASE ORAL DAILY
Qty: 30 TABLET | Refills: 11 | Status: SHIPPED | OUTPATIENT
Start: 2018-08-06 | End: 2019-10-21

## 2018-08-06 NOTE — PROGRESS NOTES
Chief Complaint   Patient presents with   • Follow-up     still having trouble swallowing     Subjective   Moy Sparrow is a 38 y.o. female.     Presents with swelling left side of neck, trouble swallowing-worsening daily -patient is a smoker-recent us showed enlarged lymph node-patient reports pain is constant and worsening with swallowing       Neck Pain    This is a recurrent problem. The current episode started more than 1 month ago. The problem occurs intermittently. The problem has been rapidly worsening. The pain is present in the left side. The quality of the pain is described as cramping. The pain is at a severity of 10/10. The pain is severe. The symptoms are aggravated by swallowing. The pain is same all the time. Stiffness is present all day. Associated symptoms include trouble swallowing. Pertinent negatives include no chest pain, fever, headaches or numbness.   Anxiety   Presents for follow-up visit. Symptoms include depressed mood, excessive worry, irritability, nervous/anxious behavior, palpitations and panic. Patient reports no chest pain, compulsions, confusion, decreased concentration, dizziness, dry mouth, feeling of choking, hyperventilation, insomnia, malaise, muscle tension, nausea, obsessions, restlessness, shortness of breath or suicidal ideas. Symptoms occur most days. The severity of symptoms is moderate. The quality of sleep is good. Nighttime awakenings: none.     Her past medical history is significant for depression. Compliance with medications is %.   Depression Patient presents with the following symptoms: depressed mood, excessive worry, irritability, nervousness/anxiety, palpitations and panic.  Patient is not experiencing: choking sensation, compulsions, confusion, decreased concentration, dry mouth, hyperventilation, insomnia, malaise, muscle tension, obsessions, restlessness, shortness of breath and suicidal ideas.    Cough   This is a recurrent problem. The  current episode started more than 1 month ago. The problem has been gradually worsening. The problem occurs every few hours. The cough is non-productive. Associated symptoms include nasal congestion. Pertinent negatives include no chest pain, chills, ear congestion, ear pain, fever, headaches, heartburn, hemoptysis, myalgias, rash, sore throat, shortness of breath or wheezing. The treatment provided mild relief. There is no history of environmental allergies.        The following portions of the patient's history were reviewed and updated as appropriate: allergies, current medications, past social history and problem list.    Review of Systems   Constitutional: Positive for irritability. Negative for activity change, appetite change, chills, diaphoresis, fatigue, fever and unexpected weight change.        Hx of smoking-continues to smoke    HENT: Positive for trouble swallowing and voice change. Negative for congestion, dental problem, drooling, ear pain, sinus pressure, sneezing, sore throat and tinnitus.    Eyes: Negative.  Negative for visual disturbance.   Respiratory: Positive for cough. Negative for apnea, hemoptysis, choking, chest tightness, shortness of breath, wheezing and stridor.    Cardiovascular: Positive for palpitations. Negative for chest pain and leg swelling.   Gastrointestinal: Positive for abdominal pain. Negative for abdominal distention, anal bleeding, blood in stool, constipation, diarrhea, heartburn, nausea and rectal pain.   Endocrine: Negative.  Negative for cold intolerance, heat intolerance and polydipsia.   Genitourinary: Negative.  Negative for difficulty urinating, dyspareunia, dysuria, enuresis and flank pain.   Musculoskeletal: Positive for neck pain and neck stiffness. Negative for arthralgias, back pain, gait problem, joint swelling and myalgias.   Skin: Negative.  Negative for color change, pallor, rash and wound.   Allergic/Immunologic: Negative.  Negative for environmental  "allergies, food allergies and immunocompromised state.   Neurological: Negative.  Negative for dizziness, seizures, facial asymmetry, speech difficulty, light-headedness, numbness and headaches.   Hematological: Negative.  Negative for adenopathy. Does not bruise/bleed easily.   Psychiatric/Behavioral: Positive for agitation, behavioral problems and sleep disturbance. Negative for confusion, decreased concentration, dysphoric mood, hallucinations, self-injury and suicidal ideas. The patient is nervous/anxious. The patient does not have insomnia and is not hyperactive.    All other systems reviewed and are negative.      Objective   /78   Ht 154.9 cm (61\")   Wt 71.7 kg (158 lb)   LMP 08/03/2018   BMI 29.85 kg/m²   Physical Exam   Constitutional: She is oriented to person, place, and time. She appears well-developed and well-nourished. No distress.   HENT:   Head: Normocephalic and atraumatic.   Right Ear: External ear normal.   Left Ear: External ear normal.   Nose: Nose normal.   Mouth/Throat: Oropharynx is clear and moist. No oropharyngeal exudate.   Swelling left side of neck in area of patient concern-see recent us.    Eyes: Pupils are equal, round, and reactive to light. EOM are normal. Right eye exhibits no discharge. Left eye exhibits no discharge. No scleral icterus.   Neck: Normal range of motion. Neck supple. No JVD present. No tracheal deviation present. No thyromegaly present.   Cardiovascular: Normal rate, regular rhythm, normal heart sounds and intact distal pulses.  Exam reveals no gallop and no friction rub.    No murmur heard.  Pulmonary/Chest: Effort normal and breath sounds normal. No stridor. No respiratory distress. She has no wheezes. She has no rales. She exhibits no tenderness.   Abdominal: Soft. Bowel sounds are normal. She exhibits no distension and no mass. There is no tenderness. There is no rebound and no guarding. No hernia.   Musculoskeletal: She exhibits no edema, tenderness " or deformity.        Cervical back: She exhibits normal range of motion, no tenderness, no bony tenderness, no pain and no spasm.   Lymphadenopathy:     She has no cervical adenopathy.   Neurological: She is alert and oriented to person, place, and time. She has normal reflexes. She displays normal reflexes. No cranial nerve deficit. She exhibits normal muscle tone. Coordination normal.   Skin: Skin is warm and dry. No rash noted. She is not diaphoretic. No erythema. No pallor.   Nursing note and vitals reviewed.      Assessment/Plan   Problem List Items Addressed This Visit        Respiratory    Throat mass    Relevant Orders    CT soft tissue neck w contrast (Completed)       Digestive    Other dysphagia - Primary    Relevant Orders    Ambulatory referral for Screening EGD       Other    Anxiety    Current smoker           New Medications Ordered This Visit   Medications   • pantoprazole (PROTONIX) 40 MG EC tablet     Sig: Take 1 tablet by mouth Daily.     Dispense:  30 tablet     Refill:  11   • diazePAM (VALIUM) 5 MG tablet     Sig: Take 1 tablet by mouth Every 6 (Six) Hours As Needed for Anxiety.     Dispense:  60 tablet     Refill:  0      add protonix for now, smoking cessation discussed, meds reviewed, diet discussed    It's not just what you eat, but when you eat  Eat breakfast, and eat smaller meals throughout the day. A healthy breakfast can jumpstart your metabolism, while eating small, healthy meals (rather than the standard three large meals) keeps your energy up.   Avoid eating at night. Try to eat dinner earlier and fast for 14-16 hours until breakfast the next morning. Studies suggest that eating only when you’re most active and giving your digestive system a long break each day may help to regulate weight.           Patient understands the risks associated with this controlled medication, including tolerance and addiction.  she also agrees to only obtain this medication from me, and not from a  another provider, unless that provider is covering for me in my absence.  she also agrees to be compliant in dosing, and not self adjust the dose of medication.  A signed controlled substance agreement is on file, and she has received a controlled substance education sheet at this a previous visit.  she has also signed a consent for treatment with a controlled substance as per [facility] policy. WM was obtained.

## 2018-08-10 ENCOUNTER — HOSPITAL ENCOUNTER (OUTPATIENT)
Dept: CT IMAGING | Facility: HOSPITAL | Age: 38
Discharge: HOME | End: 2018-08-10
Admitting: NURSE PRACTITIONER

## 2018-08-10 PROCEDURE — 70491 CT SOFT TISSUE NECK W/DYE: CPT

## 2018-08-10 PROCEDURE — 25010000002 IOPAMIDOL 61 % SOLUTION: Performed by: NURSE PRACTITIONER

## 2018-08-10 RX ADMIN — IOPAMIDOL 80 ML: 612 INJECTION, SOLUTION INTRAVENOUS at 18:18

## 2018-08-27 ENCOUNTER — OFFICE VISIT (OUTPATIENT)
Dept: SURGERY | Facility: CLINIC | Age: 38
End: 2018-08-27

## 2018-08-27 VITALS
HEART RATE: 130 BPM | SYSTOLIC BLOOD PRESSURE: 136 MMHG | WEIGHT: 152 LBS | DIASTOLIC BLOOD PRESSURE: 88 MMHG | BODY MASS INDEX: 28.7 KG/M2 | HEIGHT: 61 IN | TEMPERATURE: 98.8 F

## 2018-08-27 DIAGNOSIS — C50.411 MALIGNANT NEOPLASM OF UPPER-OUTER QUADRANT OF RIGHT BREAST IN FEMALE, ESTROGEN RECEPTOR POSITIVE (HCC): ICD-10-CM

## 2018-08-27 DIAGNOSIS — N63.0 BREAST LUMP: Primary | ICD-10-CM

## 2018-08-27 DIAGNOSIS — Z17.0 MALIGNANT NEOPLASM OF UPPER-OUTER QUADRANT OF RIGHT BREAST IN FEMALE, ESTROGEN RECEPTOR POSITIVE (HCC): ICD-10-CM

## 2018-08-27 DIAGNOSIS — N63.0 BREAST MASS: Primary | ICD-10-CM

## 2018-08-27 PROCEDURE — 99213 OFFICE O/P EST LOW 20 MIN: CPT | Performed by: SURGERY

## 2018-08-27 NOTE — PROGRESS NOTES
Subjective   Moy Sparrow is a 38 y.o. female     History of Present Illness felt a mass in the lateral aspect of her right breast last week and call her primary care provider who ordered a mammogram and ultrasound.  Patient had a mammogram and ultrasound done this morning and it was felt to be a BI-RADS 5 study of the right breast and so she was referred directly to us.  No history nipple discharge breast pain no history of any palpable masses otherwise in either breast.  No family history of significant breast cancer.  She does state that she had 2 great aunts that had breast cancer but no other relatives.  No history of trauma to her breast.  I reviewed her mammogram as well as her ultrasound and at the 9 o'clock position in the right breast 2 cm from the nipple she has a 1.4 x 1.8 x 1.1 cm hypoechoic concerning mass suggestive of malignancy.  The report states that in the right breast she has a 5 x 4 x 3 mm lymph node 6    centimeters from the nipple at 1 o'clock position.  I can appreciate this on ultrasound are not appreciated on mammogram.  Patient also has a small cyst 4 o'clock position right breast.  This was patient's first mammogram.  Denies any history of nipple discharge.  She is not on any blood thinners.  She does smoke.  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      Review of Systems   Constitutional: Negative.    HENT: Positive for trouble swallowing.    Eyes: Negative.    Respiratory: Negative.    Cardiovascular: Negative.    Gastrointestinal:        Blood in stool.   Endocrine: Negative.    Genitourinary: Negative.    Musculoskeletal: Negative.    Skin: Negative.    Allergic/Immunologic: Negative.    Neurological: Positive for headaches.   Psychiatric/Behavioral:        Depression.     Past Medical History:   Diagnosis Date   • Depressive disorder    • Encounter for gynecological examination    • Encounter for vision screening 2005    Vision screen (1)     • Health examination of defined  subpopulation     Health examination of sub-group     • Malaise and fatigue    • Palpitations    • Soft tissue swelling     right side of neck-2 different areas    • Urinary tract infectious disease      Past Surgical History:   Procedure Laterality Date   •  SECTION     • COLONOSCOPY N/A 3/1/2018    Procedure: COLONOSCOPY;  Surgeon: Shin Vanessa DO;  Location: [facility] ENDOSCOPY;  Service:    • PAP SMEAR       Family History   Problem Relation Age of Onset   • Bipolar disorder Mother    • Schizophrenia Mother    • Colon cancer Father         Colorectal Cancer     Social History     Social History   • Marital status: Single     Spouse name: N/A   • Number of children: N/A   • Years of education: N/A     Occupational History   • Not on file.     Social History Main Topics   • Smoking status: Former Smoker     Packs/day: 1.00     Quit date: 2016   • Smokeless tobacco: Never Used   • Alcohol use No   • Drug use: Unknown   • Sexual activity: Defer     Other Topics Concern   • Not on file     Social History Narrative   • No narrative on file     No Known Allergies    Home Medications:  Prior to Admission medications    Medication Sig Start Date End Date Taking? Authorizing Provider   pantoprazole (PROTONIX) 40 MG EC tablet Take 1 tablet by mouth Daily. 18  Yes Sally Cunningham APRN   Vortioxetine HBr (TRINTELLIX) 10 MG tablet Take 1 tablet by mouth Daily. 18  Yes Sally Cunningham APRN   baclofen (LIORESAL) 10 MG tablet Take 1 tablet by mouth 3 (Three) Times a Day. 18   Sally Cunningham APRN   busPIRone (BUSPAR) 5 MG tablet Take 1 tablet by mouth 3 (Three) Times a Day. 11/15/17   Sally Cunningham APRN   diazePAM (VALIUM) 2 MG tablet Take 1 tablet by mouth Every 8 (Eight) Hours As Needed for Anxiety. 17   Sally Cunningham APRN   hydrocortisone (PROCTOSOL HC) 2.5 % rectal cream Apply to affected area topically 2 times every day for 2 weeks, and then as needed. 18       MethylPREDNISolone (MEDROL, GABO,) 4 MG tablet Take as directed on package with food. 8/27/18   Shazia Gomes APRN   ranitidine (ZANTAC) 150 MG capsule Take 1 capsule by mouth 2 (Two) Times a Day. 5/8/18 5/8/19  Jah Arita MD   Vortioxetine HBr (TRINTELLIX) 5 MG tablet Take 5 mg by mouth Daily With Breakfast. 3/28/18   Sally Cunningham APRN       Objective   Physical Exam   Constitutional: She is oriented to person, place, and time. She appears well-developed and well-nourished. No distress.   HENT:   Head: Normocephalic and atraumatic.   Nose: Nose normal.   Eyes: Conjunctivae are normal.   Neck: Normal range of motion. No tracheal deviation present. No thyromegaly present.   Cardiovascular: Normal rate, regular rhythm and normal heart sounds.    No murmur heard.  Pulmonary/Chest: Effort normal and breath sounds normal. No respiratory distress. She has no wheezes. She has no rales. She exhibits no tenderness.       Abdominal: Soft. She exhibits no distension. There is no tenderness. There is no rebound and no guarding. No hernia.   Musculoskeletal: She exhibits no tenderness or deformity.   Neurological: She is alert and oriented to person, place, and time.   Skin: Skin is warm and dry. No rash noted.   Psychiatric: She has a normal mood and affect. Her behavior is normal. Judgment and thought content normal.   Vitals reviewed.      Assessment/Plan palpable concerning mass right breast.  I would recommend ultrasound guided mammotome biopsy of right breast mass and possible also biopsied a small lymph node.  I fully discussed the procedure and also leaving a clip with the patient she clearly understands the procedure alternatives risk and benefits and wishes to proceed      The encounter diagnosis was Breast mass.                     This document has been electronically signed by Abdoul Ortega MD on August 27, 2018 4:20 PM          Patient underwent ultrasound-guided right breast mammotome  biopsy and pathology was invasive ductal carcinoma that was estrogen positive progesterone negative and Herceptin negative.  I have gone over these findings with the patient and she clearly understands and wishes to proceed with mastectomy and sentinel lymph node injection and biopsy and possible axillary lymph node dissection.  We also discussed possibly doing a prophylactic left mastectomy but she wishes to wait a couple days before making that decision.  She clearly would like to proceed with surgery done as soon as possible so we will set her up for surgery next week.  I fully explained injection of right breast for sentinel lymph node biopsy and then performing sentinel lymph node biopsy and then removing right breast and nipple and possible axillary lymph node dissection.  She clearly is understands her alternatives risk and benefits and wishes to proceed

## 2018-08-27 NOTE — PATIENT INSTRUCTIONS

## 2018-08-28 ENCOUNTER — HOSPITAL ENCOUNTER (OUTPATIENT)
Dept: ULTRASOUND IMAGING | Facility: HOSPITAL | Age: 38
Discharge: HOME | End: 2018-08-28

## 2018-08-28 ENCOUNTER — HOSPITAL ENCOUNTER (OUTPATIENT)
Dept: ULTRASOUND IMAGING | Facility: HOSPITAL | Age: 38
Discharge: HOME | End: 2018-08-28
Admitting: SURGERY

## 2018-08-28 VITALS
RESPIRATION RATE: 18 BRPM | SYSTOLIC BLOOD PRESSURE: 104 MMHG | OXYGEN SATURATION: 97 % | HEIGHT: 61 IN | HEART RATE: 104 BPM | DIASTOLIC BLOOD PRESSURE: 57 MMHG | BODY MASS INDEX: 28.47 KG/M2 | WEIGHT: 150.79 LBS | TEMPERATURE: 98.7 F

## 2018-08-28 DIAGNOSIS — N63.0 BREAST MASS: ICD-10-CM

## 2018-08-28 PROCEDURE — 19083 BX BREAST 1ST LESION US IMAG: CPT | Performed by: SURGERY

## 2018-08-28 PROCEDURE — 88305 TISSUE EXAM BY PATHOLOGIST: CPT | Performed by: SURGERY

## 2018-08-28 PROCEDURE — 88341 IMHCHEM/IMCYTCHM EA ADD ANTB: CPT | Performed by: PATHOLOGY

## 2018-08-28 PROCEDURE — 88360 TUMOR IMMUNOHISTOCHEM/MANUAL: CPT | Performed by: PATHOLOGY

## 2018-08-28 PROCEDURE — 88341 IMHCHEM/IMCYTCHM EA ADD ANTB: CPT | Performed by: SURGERY

## 2018-08-28 PROCEDURE — 76942 ECHO GUIDE FOR BIOPSY: CPT

## 2018-08-28 PROCEDURE — A4648 IMPLANTABLE TISSUE MARKER: HCPCS

## 2018-08-28 PROCEDURE — 10022 US GUIDED FINE NEEDLE ASPIRATION: CPT | Performed by: SURGERY

## 2018-08-28 PROCEDURE — 88342 IMHCHEM/IMCYTCHM 1ST ANTB: CPT | Performed by: SURGERY

## 2018-08-28 PROCEDURE — 3394F QUANT HER2 IHC EVAL BRST CX: CPT | Performed by: PATHOLOGY

## 2018-08-28 PROCEDURE — 88173 CYTOPATH EVAL FNA REPORT: CPT | Performed by: SURGERY

## 2018-08-28 PROCEDURE — 88173 CYTOPATH EVAL FNA REPORT: CPT | Performed by: PATHOLOGY

## 2018-08-28 PROCEDURE — 88305 TISSUE EXAM BY PATHOLOGIST: CPT | Performed by: PATHOLOGY

## 2018-08-28 PROCEDURE — 88342 IMHCHEM/IMCYTCHM 1ST ANTB: CPT | Performed by: PATHOLOGY

## 2018-08-28 PROCEDURE — 88360 TUMOR IMMUNOHISTOCHEM/MANUAL: CPT | Performed by: SURGERY

## 2018-08-28 RX ORDER — DIAZEPAM 5 MG/1
5 TABLET ORAL ONCE
Status: COMPLETED | OUTPATIENT
Start: 2018-08-28 | End: 2018-08-28

## 2018-08-28 RX ORDER — IBUPROFEN 600 MG/1
600 TABLET ORAL EVERY 6 HOURS PRN
Status: CANCELLED | OUTPATIENT
Start: 2018-08-28

## 2018-08-28 RX ORDER — SODIUM CHLORIDE 9 MG/ML
50 INJECTION, SOLUTION INTRAVENOUS CONTINUOUS
Status: DISCONTINUED | OUTPATIENT
Start: 2018-08-28 | End: 2018-08-29 | Stop reason: HOSPADM

## 2018-08-28 RX ORDER — LIDOCAINE HYDROCHLORIDE AND EPINEPHRINE 10; 10 MG/ML; UG/ML
20 INJECTION, SOLUTION INFILTRATION; PERINEURAL ONCE
Status: COMPLETED | OUTPATIENT
Start: 2018-08-28 | End: 2018-08-28

## 2018-08-28 RX ORDER — HYDROCODONE BITARTRATE AND ACETAMINOPHEN 5; 325 MG/1; MG/1
1 TABLET ORAL ONCE AS NEEDED
Status: CANCELLED | OUTPATIENT
Start: 2018-08-28 | End: 2018-09-07

## 2018-08-28 RX ADMIN — LIDOCAINE HYDROCHLORIDE,EPINEPHRINE BITARTRATE 20 ML: 10; .01 INJECTION, SOLUTION INFILTRATION; PERINEURAL at 08:15

## 2018-08-28 RX ADMIN — DIAZEPAM 5 MG: 5 TABLET ORAL at 07:05

## 2018-08-28 RX ADMIN — SODIUM CHLORIDE 50 ML/HR: 900 INJECTION, SOLUTION INTRAVENOUS at 08:15

## 2018-08-29 LAB
CYTO UR: NORMAL
LAB AP CASE REPORT: NORMAL
LAB AP NON-GYN SPECIMEN ADEQUACY: NORMAL
PATH REPORT.FINAL DX SPEC: NORMAL

## 2018-08-30 ENCOUNTER — DOCUMENTATION (OUTPATIENT)
Dept: SURGERY | Facility: CLINIC | Age: 38
End: 2018-08-30

## 2018-08-30 DIAGNOSIS — C50.911 MALIGNANT NEOPLASM OF RIGHT FEMALE BREAST, UNSPECIFIED ESTROGEN RECEPTOR STATUS, UNSPECIFIED SITE OF BREAST (HCC): Primary | ICD-10-CM

## 2018-08-30 PROBLEM — C50.411 MALIGNANT NEOPLASM OF UPPER-OUTER QUADRANT OF RIGHT BREAST IN FEMALE, ESTROGEN RECEPTOR POSITIVE (HCC): Status: ACTIVE | Noted: 2018-08-30

## 2018-08-30 PROBLEM — Z17.0 MALIGNANT NEOPLASM OF UPPER-OUTER QUADRANT OF RIGHT BREAST IN FEMALE, ESTROGEN RECEPTOR POSITIVE: Status: ACTIVE | Noted: 2018-08-30

## 2018-08-30 LAB
CYTO UR: NORMAL
LAB AP CASE REPORT: NORMAL
LAB AP DIAGNOSIS COMMENT: NORMAL
LAB AP INTRADEPARTMENTAL CONSULT: NORMAL
LAB AP SPECIAL STAINS: NORMAL
LAB AP SYNOPTIC CHECKLIST: NORMAL
PATH REPORT.FINAL DX SPEC: NORMAL
PATH REPORT.GROSS SPEC: NORMAL

## 2018-08-31 ENCOUNTER — APPOINTMENT (OUTPATIENT)
Dept: PREADMISSION TESTING | Facility: HOSPITAL | Age: 38
End: 2018-08-31

## 2018-08-31 VITALS
SYSTOLIC BLOOD PRESSURE: 104 MMHG | DIASTOLIC BLOOD PRESSURE: 64 MMHG | BODY MASS INDEX: 28.22 KG/M2 | OXYGEN SATURATION: 98 % | RESPIRATION RATE: 14 BRPM | WEIGHT: 149.5 LBS | HEIGHT: 61 IN | HEART RATE: 87 BPM

## 2018-08-31 PROBLEM — F17.200 CURRENT SMOKER: Status: ACTIVE | Noted: 2018-08-31

## 2018-08-31 PROBLEM — J39.2 THROAT MASS: Status: ACTIVE | Noted: 2018-08-31

## 2018-08-31 LAB — B-HCG UR QL: NEGATIVE

## 2018-08-31 PROCEDURE — 81025 URINE PREGNANCY TEST: CPT | Performed by: ANESTHESIOLOGY

## 2018-08-31 RX ORDER — DIAZEPAM 5 MG/1
5 TABLET ORAL EVERY 6 HOURS PRN
Qty: 60 TABLET | Refills: 0 | Status: SHIPPED | OUTPATIENT
Start: 2018-08-31 | End: 2018-10-22 | Stop reason: SDUPTHER

## 2018-08-31 RX ORDER — SODIUM CHLORIDE, SODIUM GLUCONATE, SODIUM ACETATE, POTASSIUM CHLORIDE, AND MAGNESIUM CHLORIDE 526; 502; 368; 37; 30 MG/100ML; MG/100ML; MG/100ML; MG/100ML; MG/100ML
1000 INJECTION, SOLUTION INTRAVENOUS CONTINUOUS
Status: CANCELLED | OUTPATIENT
Start: 2018-09-05

## 2018-08-31 RX ORDER — BUSPIRONE HYDROCHLORIDE 5 MG/1
5 TABLET ORAL 3 TIMES DAILY PRN
COMMUNITY
End: 2019-02-11

## 2018-09-04 ENCOUNTER — TELEPHONE (OUTPATIENT)
Dept: SURGERY | Facility: CLINIC | Age: 38
End: 2018-09-04

## 2018-09-04 NOTE — TELEPHONE ENCOUNTER
Pt called today with more questions regarding discussion from last week with Dr. Ortega. Pt has currently elected for mastectomy scheduled for tomorrow with Dr. Ortega, but had further questions regarding lumpectomy and the SLN procedure. Pt stated she had been reading in the Breast Cancer Treatment Handbook provided to her and had further questions regarding diagnosis and treatment planning. Pt had several questions regarding genetic counseling and testing. Pt does not know for sure of her mothers breast cancer history as she is . Offered a referral to genetic counseling prior to surgery if she would like to explore counseling and testing more prior to surgery. Pt denied wanting to postpone but stated she may consider counseling in the near future. Spent approximately 30 mins in phone conversation about genetics, diagnosis, surgical decisions, and oncology treatments once surgery completed. Pt stated she will continue with her current surgical decision and will notify myself or Dr. Ortega if she changes her mind prior to tomorrow. Encouraged pt to call with any further concerns or questions. Pt stated understanding of all discussed and denied further questions.

## 2018-09-05 ENCOUNTER — ANESTHESIA EVENT (OUTPATIENT)
Dept: PERIOP | Facility: HOSPITAL | Age: 38
End: 2018-09-05

## 2018-09-05 ENCOUNTER — ANESTHESIA (OUTPATIENT)
Dept: PERIOP | Facility: HOSPITAL | Age: 38
End: 2018-09-05

## 2018-09-05 ENCOUNTER — HOSPITAL ENCOUNTER (OUTPATIENT)
Dept: NUCLEAR MEDICINE | Facility: HOSPITAL | Age: 38
Discharge: HOME | End: 2018-09-05

## 2018-09-05 ENCOUNTER — HOSPITAL ENCOUNTER (OUTPATIENT)
Facility: HOSPITAL | Age: 38
Discharge: HOME | End: 2018-09-06
Attending: SURGERY | Admitting: SURGERY

## 2018-09-05 DIAGNOSIS — Z17.0 MALIGNANT NEOPLASM OF UPPER-OUTER QUADRANT OF RIGHT BREAST IN FEMALE, ESTROGEN RECEPTOR POSITIVE (HCC): ICD-10-CM

## 2018-09-05 DIAGNOSIS — C50.411 MALIGNANT NEOPLASM OF UPPER-OUTER QUADRANT OF RIGHT BREAST IN FEMALE, ESTROGEN RECEPTOR POSITIVE (HCC): ICD-10-CM

## 2018-09-05 DIAGNOSIS — C50.911 MALIGNANT NEOPLASM OF RIGHT FEMALE BREAST, UNSPECIFIED ESTROGEN RECEPTOR STATUS, UNSPECIFIED SITE OF BREAST (HCC): ICD-10-CM

## 2018-09-05 PROCEDURE — 88332 PATH CONSLTJ SURG EA ADD BLK: CPT | Performed by: PATHOLOGY

## 2018-09-05 PROCEDURE — 19303 MAST SIMPLE COMPLETE: CPT | Performed by: SURGERY

## 2018-09-05 PROCEDURE — 88307 TISSUE EXAM BY PATHOLOGIST: CPT | Performed by: SURGERY

## 2018-09-05 PROCEDURE — 88307 TISSUE EXAM BY PATHOLOGIST: CPT | Performed by: PATHOLOGY

## 2018-09-05 PROCEDURE — 38792 RA TRACER ID OF SENTINL NODE: CPT | Performed by: SURGERY

## 2018-09-05 PROCEDURE — 25010000002 PROPOFOL 10 MG/ML EMULSION: Performed by: NURSE ANESTHETIST, CERTIFIED REGISTERED

## 2018-09-05 PROCEDURE — 25010000002 KETOROLAC TROMETHAMINE PER 15 MG: Performed by: NURSE ANESTHETIST, CERTIFIED REGISTERED

## 2018-09-05 PROCEDURE — 38525 BIOPSY/REMOVAL LYMPH NODES: CPT | Performed by: SURGERY

## 2018-09-05 PROCEDURE — 88331 PATH CONSLTJ SURG 1 BLK 1SPC: CPT | Performed by: SURGERY

## 2018-09-05 PROCEDURE — 25010000002 MIDAZOLAM PER 1 MG: Performed by: NURSE ANESTHETIST, CERTIFIED REGISTERED

## 2018-09-05 PROCEDURE — 25010000002 FENTANYL CITRATE (PF) 100 MCG/2ML SOLUTION: Performed by: NURSE ANESTHETIST, CERTIFIED REGISTERED

## 2018-09-05 PROCEDURE — 25010000002 ONDANSETRON PER 1 MG: Performed by: NURSE ANESTHETIST, CERTIFIED REGISTERED

## 2018-09-05 PROCEDURE — 0 TECHNETIUM FILTERED SULFUR COLLOID: Performed by: SURGERY

## 2018-09-05 PROCEDURE — 38792 RA TRACER ID OF SENTINL NODE: CPT

## 2018-09-05 PROCEDURE — 25010000002 DEXAMETHASONE PER 1 MG: Performed by: NURSE ANESTHETIST, CERTIFIED REGISTERED

## 2018-09-05 PROCEDURE — 88332 PATH CONSLTJ SURG EA ADD BLK: CPT | Performed by: SURGERY

## 2018-09-05 PROCEDURE — 88331 PATH CONSLTJ SURG 1 BLK 1SPC: CPT | Performed by: PATHOLOGY

## 2018-09-05 PROCEDURE — A9541 TC99M SULFUR COLLOID: HCPCS | Performed by: SURGERY

## 2018-09-05 PROCEDURE — 3260F PT CAT/PN CAT/HIST GRD DOCD: CPT | Performed by: PATHOLOGY

## 2018-09-05 RX ORDER — DEXAMETHASONE SODIUM PHOSPHATE 4 MG/ML
INJECTION, SOLUTION INTRA-ARTICULAR; INTRALESIONAL; INTRAMUSCULAR; INTRAVENOUS; SOFT TISSUE AS NEEDED
Status: DISCONTINUED | OUTPATIENT
Start: 2018-09-05 | End: 2018-09-05 | Stop reason: SURG

## 2018-09-05 RX ORDER — DIAZEPAM 5 MG/1
5 TABLET ORAL EVERY 6 HOURS PRN
Status: DISCONTINUED | OUTPATIENT
Start: 2018-09-05 | End: 2018-09-06 | Stop reason: HOSPADM

## 2018-09-05 RX ORDER — DEXTROSE, SODIUM CHLORIDE, AND POTASSIUM CHLORIDE 5; .45; .15 G/100ML; G/100ML; G/100ML
100 INJECTION INTRAVENOUS CONTINUOUS
Status: DISCONTINUED | OUTPATIENT
Start: 2018-09-05 | End: 2018-09-06 | Stop reason: HOSPADM

## 2018-09-05 RX ORDER — FENTANYL CITRATE 50 UG/ML
INJECTION, SOLUTION INTRAMUSCULAR; INTRAVENOUS AS NEEDED
Status: DISCONTINUED | OUTPATIENT
Start: 2018-09-05 | End: 2018-09-05 | Stop reason: SURG

## 2018-09-05 RX ORDER — LIDOCAINE HYDROCHLORIDE 20 MG/ML
INJECTION, SOLUTION INFILTRATION; PERINEURAL AS NEEDED
Status: DISCONTINUED | OUTPATIENT
Start: 2018-09-05 | End: 2018-09-05 | Stop reason: SURG

## 2018-09-05 RX ORDER — KETOROLAC TROMETHAMINE 30 MG/ML
INJECTION, SOLUTION INTRAMUSCULAR; INTRAVENOUS AS NEEDED
Status: DISCONTINUED | OUTPATIENT
Start: 2018-09-05 | End: 2018-09-05 | Stop reason: SURG

## 2018-09-05 RX ORDER — MEPERIDINE HYDROCHLORIDE 50 MG/ML
12.5 INJECTION INTRAMUSCULAR; INTRAVENOUS; SUBCUTANEOUS
Status: DISCONTINUED | OUTPATIENT
Start: 2018-09-05 | End: 2018-09-05 | Stop reason: HOSPADM

## 2018-09-05 RX ORDER — HYDROCODONE BITARTRATE AND ACETAMINOPHEN 7.5; 325 MG/1; MG/1
1 TABLET ORAL EVERY 4 HOURS PRN
Status: DISCONTINUED | OUTPATIENT
Start: 2018-09-05 | End: 2018-09-06 | Stop reason: HOSPADM

## 2018-09-05 RX ORDER — PROPOFOL 10 MG/ML
VIAL (ML) INTRAVENOUS AS NEEDED
Status: DISCONTINUED | OUTPATIENT
Start: 2018-09-05 | End: 2018-09-05 | Stop reason: SURG

## 2018-09-05 RX ORDER — ONDANSETRON 2 MG/ML
INJECTION INTRAMUSCULAR; INTRAVENOUS AS NEEDED
Status: DISCONTINUED | OUTPATIENT
Start: 2018-09-05 | End: 2018-09-05 | Stop reason: SURG

## 2018-09-05 RX ORDER — ISOSULFAN BLUE 50 MG/5ML
INJECTION, SOLUTION SUBCUTANEOUS AS NEEDED
Status: DISCONTINUED | OUTPATIENT
Start: 2018-09-05 | End: 2018-09-06 | Stop reason: HOSPADM

## 2018-09-05 RX ORDER — SODIUM CHLORIDE, SODIUM GLUCONATE, SODIUM ACETATE, POTASSIUM CHLORIDE, AND MAGNESIUM CHLORIDE 526; 502; 368; 37; 30 MG/100ML; MG/100ML; MG/100ML; MG/100ML; MG/100ML
1000 INJECTION, SOLUTION INTRAVENOUS CONTINUOUS
Status: DISCONTINUED | OUTPATIENT
Start: 2018-09-05 | End: 2018-09-05

## 2018-09-05 RX ORDER — PANTOPRAZOLE SODIUM 40 MG/1
40 TABLET, DELAYED RELEASE ORAL DAILY
Status: DISCONTINUED | OUTPATIENT
Start: 2018-09-06 | End: 2018-09-06 | Stop reason: HOSPADM

## 2018-09-05 RX ORDER — MIDAZOLAM HYDROCHLORIDE 1 MG/ML
INJECTION INTRAMUSCULAR; INTRAVENOUS AS NEEDED
Status: DISCONTINUED | OUTPATIENT
Start: 2018-09-05 | End: 2018-09-05 | Stop reason: SURG

## 2018-09-05 RX ORDER — NALOXONE HCL 0.4 MG/ML
0.4 VIAL (ML) INJECTION
Status: DISCONTINUED | OUTPATIENT
Start: 2018-09-05 | End: 2018-09-06 | Stop reason: HOSPADM

## 2018-09-05 RX ADMIN — PROPOFOL 150 MG: 10 INJECTION, EMULSION INTRAVENOUS at 09:07

## 2018-09-05 RX ADMIN — POTASSIUM CHLORIDE, DEXTROSE MONOHYDRATE AND SODIUM CHLORIDE 100 ML/HR: 150; 5; 450 INJECTION, SOLUTION INTRAVENOUS at 23:28

## 2018-09-05 RX ADMIN — FENTANYL CITRATE 25 MCG: 50 INJECTION, SOLUTION INTRAMUSCULAR; INTRAVENOUS at 09:19

## 2018-09-05 RX ADMIN — SODIUM CHLORIDE, SODIUM GLUCONATE, SODIUM ACETATE, POTASSIUM CHLORIDE, AND MAGNESIUM CHLORIDE: 526; 502; 368; 37; 30 INJECTION, SOLUTION INTRAVENOUS at 11:01

## 2018-09-05 RX ADMIN — ONDANSETRON 4 MG: 2 INJECTION INTRAMUSCULAR; INTRAVENOUS at 10:58

## 2018-09-05 RX ADMIN — DIAZEPAM 5 MG: 5 TABLET ORAL at 21:34

## 2018-09-05 RX ADMIN — KETOROLAC TROMETHAMINE 30 MG: 30 INJECTION, SOLUTION INTRAMUSCULAR at 11:02

## 2018-09-05 RX ADMIN — TECHNETIUM TC 99M SULFUR COLLOID 1 DOSE: KIT at 07:15

## 2018-09-05 RX ADMIN — SODIUM CHLORIDE, SODIUM GLUCONATE, SODIUM ACETATE, POTASSIUM CHLORIDE, AND MAGNESIUM CHLORIDE: 526; 502; 368; 37; 30 INJECTION, SOLUTION INTRAVENOUS at 09:00

## 2018-09-05 RX ADMIN — FENTANYL CITRATE 25 MCG: 50 INJECTION, SOLUTION INTRAMUSCULAR; INTRAVENOUS at 11:01

## 2018-09-05 RX ADMIN — POTASSIUM CHLORIDE, DEXTROSE MONOHYDRATE AND SODIUM CHLORIDE 100 ML/HR: 150; 5; 450 INJECTION, SOLUTION INTRAVENOUS at 14:13

## 2018-09-05 RX ADMIN — FENTANYL CITRATE 25 MCG: 50 INJECTION, SOLUTION INTRAMUSCULAR; INTRAVENOUS at 09:57

## 2018-09-05 RX ADMIN — PROPOFOL 20 MG: 10 INJECTION, EMULSION INTRAVENOUS at 11:03

## 2018-09-05 RX ADMIN — FENTANYL CITRATE 25 MCG: 50 INJECTION, SOLUTION INTRAMUSCULAR; INTRAVENOUS at 10:40

## 2018-09-05 RX ADMIN — FENTANYL CITRATE 25 MCG: 50 INJECTION, SOLUTION INTRAMUSCULAR; INTRAVENOUS at 09:32

## 2018-09-05 RX ADMIN — FENTANYL CITRATE 50 MCG: 50 INJECTION, SOLUTION INTRAMUSCULAR; INTRAVENOUS at 09:39

## 2018-09-05 RX ADMIN — MIDAZOLAM 2 MG: 1 INJECTION INTRAMUSCULAR; INTRAVENOUS at 09:03

## 2018-09-05 RX ADMIN — LIDOCAINE HYDROCHLORIDE 80 MG: 20 INJECTION, SOLUTION INFILTRATION; PERINEURAL at 09:07

## 2018-09-05 RX ADMIN — DEXAMETHASONE SODIUM PHOSPHATE 4 MG: 4 INJECTION, SOLUTION INTRAMUSCULAR; INTRAVENOUS at 09:07

## 2018-09-05 RX ADMIN — PROPOFOL 30 MG: 10 INJECTION, EMULSION INTRAVENOUS at 09:19

## 2018-09-05 NOTE — PLAN OF CARE
Problem: Patient Care Overview  Goal: Plan of Care Review  Outcome: Ongoing (interventions implemented as appropriate)   09/05/18 2731   Coping/Psychosocial   Plan of Care Reviewed With patient   Plan of Care Review   Progress no change   OTHER   Outcome Summary vss, pain controlled, no c/o post-op N/V, incision cdi, ROSAS stripped and compressed, pt ambulating to bathroom     Goal: Individualization and Mutuality  Outcome: Ongoing (interventions implemented as appropriate)      Problem: Breast Surgery/Reconstruction (Adult)  Goal: Signs and Symptoms of Listed Potential Problems Will be Absent, Minimized or Managed (Breast Surgery/Reconstruction)  Outcome: Ongoing (interventions implemented as appropriate)

## 2018-09-05 NOTE — OP NOTE
BREAST MASTECTOMY WITH SENTINEL NODE BIOPSY AND AXILLARY NODE DISSECTION  Procedure Note    Moy Sparrow  9/5/2018    Pre-op Diagnosis:   Malignant neoplasm of upper-outer quadrant of right breast in female, estrogen receptor positive (CMS/HCC) [C50.411, Z17.0]    Post-op Diagnosis:     Post-Op Diagnosis Codes:     * Malignant neoplasm of upper-outer quadrant of right breast in female, estrogen receptor positive (CMS/HCC) [C50.411, Z17.0]    Procedure/CPT® Codes:      Procedure(s):  INJECT RIGHT BREAST AND THEN PERFORM SENTINEL LYMPH NODE BIOPSY AND THEN  REMOVE RIGHT BREAST AND NIPPLE         (INJECTION ON SDS @7:00 A.M.)    Surgeon(s):  Abdoul Ortega MD    Anesthesia: General    Staff:   Circulator: Catie Reynaga, EUGENE; Jeri Zuñiga RN  Scrub Person: Anita Reed; Marielos Austin; Billie Tavares  Assistant: Cecilia Moreira CSA    Estimated Blood Loss: <500ml    Specimens:                ID Type Source Tests Collected by Time   A : RIGHT SENTINEL LYMPH NODE Tissue [place] Lymph Node TISSUE PATHOLOGY EXAM Abdoul Ortega MD 9/5/2018 0955   B :  Tissue Breast, Right TISSUE PATHOLOGY EXAM Abdoul Ortega MD 9/5/2018 1041         Drains:   Closed/Suction Drain 1 Right Breast Bulb 19 Fr. (Active)   Site Description Unable to view 9/5/2018 11:13 AM   Dressing Status Clean;Dry 9/5/2018 11:16 AM   Drainage Appearance Bloody 9/5/2018 11:13 AM   Status To bulb suction 9/5/2018 11:13 AM       Indications: 38 -year-old female presented with a palpable mass in the right breast.  Workup and subsequent biopsy confirmed that this was an invasive cancer.  Patient presents now for sentinel lymph node biopsy and mastectomy.    Findings: Total of 7 sentinel lymph nodes removed all of which were negative on frozen section.      Complications:  none  Procedure:  The patient was initially seen in Same Day Surgery where we injected the radioactive isotope. That was injected at the intradermal layer  "upper-outer aspect of her right breast just outside of the nipple/areolar complex and then the breast was massaged for 5 minutes by the clock. I did the injection. We waited approximately 1-1/2 hours   later and then she was taken to the operating room where she was placed under general anesthesia without any difficulty. She had SCDs in place. When she was asleep we injected Lymphazurin blue again in the upper outer aspect of her right breast intradermal layer and massaged the breast for 5 minutes by the clock. We then prepped and draped her right chest, shoulder and arm in normal sterile fashion. Appropriate timeout was taken. We began looking for the sentinel lymph node. We used the navigator probe. We found where it appeared to be hot in the lower anterior aspect of the axilla. We made the lateral portion of our mastectomy incision sharply and then developed flap up to the area with electrocautery. We then used the navigator probe and the blue dye and followed it down to what appeared to be multiple sentinel lymph nodes. We found initially some superficial nodes and then deeper there were some more nodes. We took all blue, all hot lymph nodes with the specimen and that was sent to pathology labeled as \"sentinel lymph nodes.\" We checked an ex vivo count and then went back and carefully inspected and palpated and no other palpable nodes were appreciated, no blue nodes were seen, and no significant radioactive material was present. We felt that we had the sentinel lymph nodes and they were sent to pathology. We then proceeded with our mastectomy. We made our skin incision for the upper flap sharply and then developed that with electrocautery and then began taking the breast off the chest wall from a superomedial to inferolateral direction. We took the fascia of the external oblique with the specimen. We then made our inferior flap sharply in the skin with the scalpel and then developed the flap with electrocautery, " being careful to preserve the inframammary crease. We took the breast off the chest wall from a medial to lateral direction then. We followed this up to where the sentinel lymph nodes had been removed. I actually broke scrub and went and looked at the lymph nodes with the pathologist. A total of 7 sentinel lymph nodes were removed and all 7 were negative on frozen section for metastatic disease. I came back and scrubbed in and we completed our mastectomy. Specimen was handed off. We carefully inspected. Good hemostasis was noted. A 10 mm Antoine-Gallagher drain was brought out through a separate stab incision laterally. We checked for any bleeding at the drain site and placed a 3-0 Vicryl suture there and controlled a small amount of bleeding that was noted. We irrigated. Good hemostasis was noted. We then closed the wound with interrupted 3-0 Vicryl and running 4-0 Monocryl subcuticular stitch. Glue was used for final skin closure. Dressing was placed around the drain. The patient was awakened, extubated and transferred to recovery room in awake and stable condition.         Disposition:  Patient will be transferred to the recovery room and then Further recovery.        Abdoul Ortega MD     Date: 9/5/2018  Time: 11:28 AM

## 2018-09-05 NOTE — INTERVAL H&P NOTE
H&P reviewed. The patient was examined and there are no changes to the H&P.    Spoke with patient for a second time today and she wishes to proceed now with mastectomy.  Offerred to reschedule surgery to give her more time to consider options but she wishes to proceed with mastectomy today and sln bx and possible axillary lymph node dissection.  Nurse Navigator Anais spoke with her as well.  Pt clearly understands options and wishes to proceed today.

## 2018-09-05 NOTE — ANESTHESIA POSTPROCEDURE EVALUATION
Patient: Moy Sparrow    Procedure Summary     Date:  09/05/18 Room / Location:  [facility] OR 08 / [facility] OR    Anesthesia Start:  0903 Anesthesia Stop:  1116    Procedure:  INJECT RIGHT BREAST AND THEN PERFORM SENTINEL LYMPH NODE BIOPSY AND THEN  REMOVE RIGHT BREAST AND NIPPLE AND        (INJECTION ON SDS @7:00 A.M.) (Right Breast) Diagnosis:       Malignant neoplasm of upper-outer quadrant of right breast in female, estrogen receptor positive (CMS/HCC)      (Malignant neoplasm of upper-outer quadrant of right breast in female, estrogen receptor positive (CMS/HCC) [C50.411, Z17.0])    Surgeon:  Abdoul Ortega MD Provider:  Claude Melissa MD    Anesthesia Type:  general ASA Status:  3          Anesthesia Type: general  Last vitals  BP   119/71 (09/05/18 0728)   Temp   96.6 °F (35.9 °C) (09/05/18 0728)   Pulse   78 (09/05/18 0728)   Resp   20 (09/05/18 0728)     SpO2   100 % (09/05/18 0728)     Post Anesthesia Care and Evaluation    Patient location during evaluation: PACU  Patient participation: complete - patient participated  Level of consciousness: awake and alert  Pain management: adequate  Airway patency: patent  Anesthetic complications: No anesthetic complications    Cardiovascular status: acceptable  Respiratory status: acceptable  Hydration status: acceptable

## 2018-09-05 NOTE — ANESTHESIA PREPROCEDURE EVALUATION
Anesthesia Evaluation     no history of anesthetic complications:  NPO Solid Status: > 8 hours  NPO Liquid Status: > 2 hours           Airway   Mallampati: I  TM distance: >3 FB  Neck ROM: full  No difficulty expected  Dental - normal exam     Pulmonary - normal exam    breath sounds clear to auscultation  (+) a smoker (quit 2 months ago) Former,   Cardiovascular - negative cardio ROS and normal exam  Exercise tolerance: good (4-7 METS)    Rhythm: regular  Rate: normal    (-) angina      Neuro/Psych  (+) psychiatric history Anxiety and Depression,     GI/Hepatic/Renal/Endo    (+)  GERD well controlled,      Musculoskeletal (-) negative ROS    Abdominal    Substance History - negative use     OB/GYN negative ob/gyn ROS   (-)  Pregnant        Other      history of cancer (breast)                    Anesthesia Plan    ASA 3     general     intravenous induction   Anesthetic plan, all risks, benefits, and alternatives have been provided, discussed and informed consent has been obtained with: patient and mother.

## 2018-09-05 NOTE — INTERVAL H&P NOTE
H&P updated. The patient was examined and the following changes are noted:  Patient has decided to undergo lumpectomy with sentinel lymph node biopsy instead of mastectomy with sentinel lymph node biopsy.  I reexamined the patient this morning.  The mass is easily palpable so we will not use a needle localization procedure with ultrasound.  Patient understands that she will have 2 incisions and she understands what's involved with injection for the sentinel lymph node and then removal of the sentinel lymph node and then  the lumpectomy.  Patient clearly understands the procedure alternatives risk and benefits and wishes to proceed.

## 2018-09-05 NOTE — ANESTHESIA PROCEDURE NOTES
Airway  Urgency: elective    Airway not difficult    General Information and Staff    Patient location during procedure: OR  CRNA: FILIPPO SOSA    Indications and Patient Condition  Indications for airway management: airway protection    Preoxygenated: yes  MILS maintained throughout  Mask difficulty assessment: 0 - not attempted    Final Airway Details  Final airway type: supraglottic airway      Successful airway: I-gel  Size 3    Number of attempts at approach: 1

## 2018-09-06 VITALS
BODY MASS INDEX: 28.39 KG/M2 | TEMPERATURE: 96.2 F | DIASTOLIC BLOOD PRESSURE: 59 MMHG | HEART RATE: 80 BPM | OXYGEN SATURATION: 100 % | SYSTOLIC BLOOD PRESSURE: 100 MMHG | WEIGHT: 150.35 LBS | RESPIRATION RATE: 18 BRPM | HEIGHT: 61 IN

## 2018-09-06 LAB
CYTO UR: NORMAL
LAB AP CASE REPORT: NORMAL
LAB AP INTRADEPARTMENTAL CONSULT: NORMAL
LAB AP SYNOPTIC CHECKLIST: NORMAL
Lab: NORMAL
PATH REPORT.FINAL DX SPEC: NORMAL
PATH REPORT.GROSS SPEC: NORMAL

## 2018-09-06 PROCEDURE — 25010000002 ENOXAPARIN PER 10 MG: Performed by: SURGERY

## 2018-09-06 RX ORDER — HYDROCODONE BITARTRATE AND ACETAMINOPHEN 7.5; 325 MG/1; MG/1
1 TABLET ORAL EVERY 4 HOURS PRN
Qty: 15 TABLET | Refills: 0 | Status: SHIPPED | OUTPATIENT
Start: 2018-09-06 | End: 2018-10-09

## 2018-09-06 RX ADMIN — ENOXAPARIN SODIUM 40 MG: 40 INJECTION SUBCUTANEOUS at 08:28

## 2018-09-06 RX ADMIN — PANTOPRAZOLE SODIUM 40 MG: 40 TABLET, DELAYED RELEASE ORAL at 08:28

## 2018-09-06 RX ADMIN — DIAZEPAM 5 MG: 5 TABLET ORAL at 08:54

## 2018-09-06 NOTE — DISCHARGE SUMMARY
Discharge Summary    Date of Admission: 9/5/2018  Date of Discharge:  9/6/2018  Service: General Surgery  Attending: Abdoul Ortega    Procedures Performed: Procedure(s):  INJECT RIGHT BREAST AND THEN PERFORM SENTINEL LYMPH NODE BIOPSY AND THEN  REMOVE RIGHT BREAST AND NIPPLE        (INJECTION ON SDS @7:00 A.M.)  Consults:   Consults     No orders found for last 30 day(s).        Discharge Diagnoses:   Hospital Problem List     * (Principal)Malignant neoplasm of upper-outer quadrant of right breast in female, estrogen receptor positive (CMS/HCC)          Hospital Course:  Was admitted and underwent right breast sentinel lymph node biopsy and mastectomy without incident.  Preliminary pathology on the sentinel lymph nodes were negative for metastatic disease.  Postoperatively patient is doing well.  She clearly understands take care of her 1 drain.  Wound appears to be intact any significant swelling or bleeding.  Patient will follow with me in the office on Monday or sooner she has any other concerns or questions.  Patient was given 15 Norco 7.5 tablets these.  Basis.  She can resume any and all of her home medications otherwise.    Discharge Condition: Postoperatively Stable    Discharge Medications:     Your medication list      START taking these medications      Instructions Last Dose Given Next Dose Due   HYDROcodone-acetaminophen 7.5-325 MG per tablet  Commonly known as:  NORCO      Take 1 tablet by mouth Every 4 (Four) Hours As Needed for Moderate Pain  (Pain).          CONTINUE taking these medications      Instructions Last Dose Given Next Dose Due   busPIRone 5 MG tablet  Commonly known as:  BUSPAR      Take 5 mg by mouth 3 (Three) Times a Day As Needed.       diazePAM 5 MG tablet  Commonly known as:  VALIUM      Take 1 tablet by mouth Every 6 (Six) Hours As Needed for Anxiety.       pantoprazole 40 MG EC tablet  Commonly known as:  PROTONIX      Take 1 tablet by mouth Daily.       PROCTOSOL HC 2.5 % rectal  cream  Generic drug:  hydrocortisone      Apply to affected area topically 2 times every day for 2 weeks, and then as needed.       TRINTELLIX 10 MG tablet  Generic drug:  Vortioxetine HBr      Take 1 tablet by mouth Daily.             Where to Get Your Medications      You can get these medications from any pharmacy    Bring a paper prescription for each of these medications  · HYDROcodone-acetaminophen 7.5-325 MG per tablet           Activity as instructed by Dr. Ortega.  No lifting over 20 lbs until seen in the office.  Shower as instructed.      Regular Diet              This document has been electronically signed by Abdoul Ortega MD on September 6, 2018 6:32 AM

## 2018-09-10 ENCOUNTER — OFFICE VISIT (OUTPATIENT)
Dept: SURGERY | Facility: CLINIC | Age: 38
End: 2018-09-10

## 2018-09-10 VITALS
HEIGHT: 61 IN | TEMPERATURE: 98.3 F | DIASTOLIC BLOOD PRESSURE: 64 MMHG | SYSTOLIC BLOOD PRESSURE: 112 MMHG | WEIGHT: 150 LBS | BODY MASS INDEX: 28.32 KG/M2 | HEART RATE: 105 BPM

## 2018-09-10 DIAGNOSIS — Z17.0 MALIGNANT NEOPLASM OF UPPER-OUTER QUADRANT OF RIGHT BREAST IN FEMALE, ESTROGEN RECEPTOR POSITIVE (HCC): Primary | ICD-10-CM

## 2018-09-10 DIAGNOSIS — C50.411 MALIGNANT NEOPLASM OF UPPER-OUTER QUADRANT OF RIGHT BREAST IN FEMALE, ESTROGEN RECEPTOR POSITIVE (HCC): Primary | ICD-10-CM

## 2018-09-10 PROCEDURE — 99024 POSTOP FOLLOW-UP VISIT: CPT | Performed by: SURGERY

## 2018-09-10 RX ORDER — DESVENLAFAXINE SUCCINATE 50 MG/1
50 TABLET, EXTENDED RELEASE ORAL DAILY
Qty: 30 TABLET | Refills: 11 | Status: SHIPPED | OUTPATIENT
Start: 2018-09-10 | End: 2019-11-15

## 2018-09-10 RX ORDER — DESVENLAFAXINE SUCCINATE 50 MG/1
50 TABLET, EXTENDED RELEASE ORAL DAILY
Qty: 30 TABLET | Refills: 11 | Status: SHIPPED | OUTPATIENT
Start: 2018-09-10 | End: 2018-09-10 | Stop reason: SDUPTHER

## 2018-09-10 NOTE — PATIENT INSTRUCTIONS

## 2018-09-10 NOTE — PROGRESS NOTES
38-year-old female now 5 days status post right mastectomy and sentinel lymph node biopsy for invasive ductal carcinoma.  Final pathology is back and the micro-met was found in 1 of 7 lymph nodes.  Final Diagnosis   1.  SENTINEL LYMPH NODES, RIGHT AXILLA:  MICROMETASTASIS (LESS THAN 2.0 MM) INVOLVING ONE (1) OF SEVEN (7) LYMPH NODES.     2.  MASTECTOMY, RIGHT BREAST:  INVASIVE DUCTAL CARCINOMA (1.5 CM), MODERATELY DIFFERENTIATED, 9 O'CLOCK, 2 CM FROM NIPPLE,   COMPLETELY EXCISED.  NODULAR FIBROCYSTIC CHANGE (0.4 CM), 1 O'CLOCK, 6 CM FROM NIPPLE.  NIPPLE AND SKIN NEGATIVE FOR CARCINOMA.   Electronically signed by Sly Mccarthy MD on 9/6/2018 at 1654     I went over these findings with the patient.  We ask he discusses a phone on Friday and we went over this again today here in the office.  Patient's output out of her drain is down to 50 cc per day and has been coming down in amount.  She has a small 6 side of dermal lysis on the upper flap but otherwise her incision is intact and healing nicely.  Drain site is clean.  I would not recommend further surgery for this micro-met and we discussed that.  Patient will follow up with me on Wednesday and we may be due to get her drain out at that time.Nurse Anais was present and offerred support.

## 2018-09-11 ENCOUNTER — DOCUMENTATION (OUTPATIENT)
Dept: SURGERY | Facility: CLINIC | Age: 38
End: 2018-09-11

## 2018-09-11 NOTE — PROGRESS NOTES
Followed up with pt in the office yesterday in post-op evaluation with Dr. Ortega. Pt had several questions and concerns about pathology, diagnosis, and ongoing treatment planning for known breast cancer. Spent approximately 50 mins in discussion with pt and Dr. Ortega regarding pathology, diagnosis, and continued oncology treatment plan. Provided pt with written education about Oncotype DX testing for invasive breast cancer. Oncotype DX is being ordered for further analysis prior to medical oncology consultation in a couple of weeks. Informed pt appt with med onc will be established after estimated returned time on Oncotype results is determined which generally takes approximately 2 weeks. Provided pt with new pt packet for the [facility] for review prior to appt. Pt stated she is experiencing significant anxiety and distress with body image after loss of breast. Wound is continuing to heal. Informed pt we will get a prosthesis as soon as surgical site scars down. Will investigate further assistance to address body image disturbance and resources for use. Encouraged to continue to refer to handbook for education and support. Encouraged to call me with any ongoing concerns or questions. Will f/u with pt at appt next week with Dr. Ortega to further assess pt status. Pt stated understanding of all discussed today and denied further questions.

## 2018-09-12 ENCOUNTER — OFFICE VISIT (OUTPATIENT)
Dept: SURGERY | Facility: CLINIC | Age: 38
End: 2018-09-12

## 2018-09-12 VITALS
BODY MASS INDEX: 28.32 KG/M2 | HEART RATE: 81 BPM | WEIGHT: 150 LBS | HEIGHT: 61 IN | TEMPERATURE: 98 F | DIASTOLIC BLOOD PRESSURE: 64 MMHG | SYSTOLIC BLOOD PRESSURE: 112 MMHG

## 2018-09-12 DIAGNOSIS — C50.411 MALIGNANT NEOPLASM OF UPPER-OUTER QUADRANT OF RIGHT BREAST IN FEMALE, ESTROGEN RECEPTOR POSITIVE (HCC): Primary | ICD-10-CM

## 2018-09-12 DIAGNOSIS — Z17.0 MALIGNANT NEOPLASM OF UPPER-OUTER QUADRANT OF RIGHT BREAST IN FEMALE, ESTROGEN RECEPTOR POSITIVE (HCC): Primary | ICD-10-CM

## 2018-09-12 PROCEDURE — 99024 POSTOP FOLLOW-UP VISIT: CPT | Performed by: SURGERY

## 2018-09-12 NOTE — PATIENT INSTRUCTIONS

## 2018-09-12 NOTE — PROGRESS NOTES
38-year-old female now over a week out from her right lumpectomy and sentinel lymph node biopsy.  Patient returns today to have her drain checked.  Wound appears to be clean and healing appropriately with a small area of dermal lysis of the upper flap that is unchanged from 2 days ago.  Drainage output is over 30 cc per day drain site is clean and the character of the drainage is serous.  We'll leave the drain in for now patient will follow up with us on Friday or sooner she has any other concerns or questions

## 2018-09-14 ENCOUNTER — OFFICE VISIT (OUTPATIENT)
Dept: SURGERY | Facility: CLINIC | Age: 38
End: 2018-09-14

## 2018-09-14 VITALS — HEIGHT: 61 IN

## 2018-09-14 DIAGNOSIS — Z17.0 MALIGNANT NEOPLASM OF UPPER-OUTER QUADRANT OF RIGHT BREAST IN FEMALE, ESTROGEN RECEPTOR POSITIVE (HCC): Primary | ICD-10-CM

## 2018-09-14 DIAGNOSIS — C50.411 MALIGNANT NEOPLASM OF UPPER-OUTER QUADRANT OF RIGHT BREAST IN FEMALE, ESTROGEN RECEPTOR POSITIVE (HCC): Primary | ICD-10-CM

## 2018-09-14 PROCEDURE — 99024 POSTOP FOLLOW-UP VISIT: CPT | Performed by: SURGERY

## 2018-09-14 NOTE — PROGRESS NOTES
See prior notes.  Patient returns today to have her drain checked.  She is down to less than 30 cc per day.  Drain continues to be the same character serous.  Wound appears to be healing.  Small area of dermal lysis of the upper flap is unchanged.  We removed the drain without difficulty.  Patient was instructed in wound care.  Patient will follow up with us next week.  Oncotype DX has been ordered and should take 1-2 weeks to get back and the patient be set up to see medical oncology.

## 2018-09-21 ENCOUNTER — OFFICE VISIT (OUTPATIENT)
Dept: SURGERY | Facility: CLINIC | Age: 38
End: 2018-09-21

## 2018-09-21 VITALS
TEMPERATURE: 98.3 F | HEIGHT: 61 IN | WEIGHT: 151 LBS | HEART RATE: 76 BPM | DIASTOLIC BLOOD PRESSURE: 64 MMHG | SYSTOLIC BLOOD PRESSURE: 106 MMHG | BODY MASS INDEX: 28.51 KG/M2

## 2018-09-21 DIAGNOSIS — Z17.0 MALIGNANT NEOPLASM OF UPPER-OUTER QUADRANT OF RIGHT BREAST IN FEMALE, ESTROGEN RECEPTOR POSITIVE (HCC): Primary | ICD-10-CM

## 2018-09-21 DIAGNOSIS — C50.411 MALIGNANT NEOPLASM OF UPPER-OUTER QUADRANT OF RIGHT BREAST IN FEMALE, ESTROGEN RECEPTOR POSITIVE (HCC): Primary | ICD-10-CM

## 2018-09-21 PROCEDURE — 99024 POSTOP FOLLOW-UP VISIT: CPT | Performed by: SURGERY

## 2018-09-21 NOTE — PROGRESS NOTES
38-year-old female now over 2 weeks out from a right mastectomy and sentinel lymph node biopsy.  She had a drain out now for a week.  Set up to see medical oncology next week already.  As small amount of fluid lateral aspect of her incision otherwise incision is intact dermal lysis is resolved and there is no redness or obvious drainage.  Recommended going and aspirate that small fluid collection laterally and she agreed and we aspirated approximately 50 cc of serous fluid and aspirated the cavity dry with an 18-gauge needle after prepped the area with Betadine.  Patient tolerated the procedure well.  Patient will follow up with us in 2 weeks or sooner she has any other concerns or questions.

## 2018-09-21 NOTE — PATIENT INSTRUCTIONS

## 2018-09-24 ENCOUNTER — CONSULT (OUTPATIENT)
Dept: ONCOLOGY | Facility: CLINIC | Age: 38
End: 2018-09-24

## 2018-09-24 ENCOUNTER — LAB (OUTPATIENT)
Dept: ONCOLOGY | Facility: HOSPITAL | Age: 38
End: 2018-09-24

## 2018-09-24 ENCOUNTER — DOCUMENTATION (OUTPATIENT)
Dept: ONCOLOGY | Facility: CLINIC | Age: 38
End: 2018-09-24

## 2018-09-24 VITALS
RESPIRATION RATE: 18 BRPM | DIASTOLIC BLOOD PRESSURE: 76 MMHG | BODY MASS INDEX: 28.4 KG/M2 | TEMPERATURE: 99 F | HEIGHT: 61 IN | WEIGHT: 150.4 LBS | HEART RATE: 90 BPM | SYSTOLIC BLOOD PRESSURE: 118 MMHG | OXYGEN SATURATION: 99 %

## 2018-09-24 DIAGNOSIS — Z17.0 MALIGNANT NEOPLASM OF UPPER-OUTER QUADRANT OF RIGHT BREAST IN FEMALE, ESTROGEN RECEPTOR POSITIVE (HCC): Primary | ICD-10-CM

## 2018-09-24 DIAGNOSIS — C50.411 MALIGNANT NEOPLASM OF UPPER-OUTER QUADRANT OF RIGHT BREAST IN FEMALE, ESTROGEN RECEPTOR POSITIVE (HCC): Primary | ICD-10-CM

## 2018-09-24 DIAGNOSIS — C50.411 MALIGNANT NEOPLASM OF UPPER-OUTER QUADRANT OF RIGHT BREAST IN FEMALE, ESTROGEN RECEPTOR POSITIVE (HCC): ICD-10-CM

## 2018-09-24 DIAGNOSIS — Z17.0 MALIGNANT NEOPLASM OF UPPER-OUTER QUADRANT OF RIGHT BREAST IN FEMALE, ESTROGEN RECEPTOR POSITIVE (HCC): ICD-10-CM

## 2018-09-24 LAB
ALBUMIN SERPL-MCNC: 4.3 G/DL (ref 3.4–4.8)
ALBUMIN/GLOB SERPL: 1.2 G/DL (ref 1.1–1.8)
ALP SERPL-CCNC: 69 U/L (ref 38–126)
ALT SERPL W P-5'-P-CCNC: 24 U/L (ref 9–52)
ANION GAP SERPL CALCULATED.3IONS-SCNC: 11 MMOL/L (ref 5–15)
AST SERPL-CCNC: 21 U/L (ref 14–36)
BASOPHILS # BLD AUTO: 0.02 10*3/MM3 (ref 0–0.2)
BASOPHILS NFR BLD AUTO: 0.2 % (ref 0–2)
BILIRUB SERPL-MCNC: 0.5 MG/DL (ref 0.2–1.3)
BUN BLD-MCNC: 13 MG/DL (ref 7–21)
BUN/CREAT SERPL: 21.3 (ref 7–25)
CALCIUM SPEC-SCNC: 8.9 MG/DL (ref 8.4–10.2)
CHLORIDE SERPL-SCNC: 100 MMOL/L (ref 95–110)
CO2 SERPL-SCNC: 25 MMOL/L (ref 22–31)
CREAT BLD-MCNC: 0.61 MG/DL (ref 0.5–1)
DEPRECATED RDW RBC AUTO: 39.3 FL (ref 36.4–46.3)
EOSINOPHIL # BLD AUTO: 0.05 10*3/MM3 (ref 0–0.7)
EOSINOPHIL NFR BLD AUTO: 0.6 % (ref 0–7)
ERYTHROCYTE [DISTWIDTH] IN BLOOD BY AUTOMATED COUNT: 12.3 % (ref 11.5–14.5)
GFR SERPL CREATININE-BSD FRML MDRD: 109 ML/MIN/1.73 (ref 58–135)
GLOBULIN UR ELPH-MCNC: 3.5 GM/DL (ref 2.3–3.5)
GLUCOSE BLD-MCNC: 100 MG/DL (ref 60–100)
HCT VFR BLD AUTO: 37.7 % (ref 35–45)
HGB BLD-MCNC: 13.1 G/DL (ref 12–15.5)
IMM GRANULOCYTES # BLD: 0.01 10*3/MM3 (ref 0–0.02)
IMM GRANULOCYTES NFR BLD: 0.1 % (ref 0–0.5)
LYMPHOCYTES # BLD AUTO: 1.4 10*3/MM3 (ref 0.6–4.2)
LYMPHOCYTES NFR BLD AUTO: 16 % (ref 10–50)
MCH RBC QN AUTO: 30.1 PG (ref 26.5–34)
MCHC RBC AUTO-ENTMCNC: 34.7 G/DL (ref 31.4–36)
MCV RBC AUTO: 86.7 FL (ref 80–98)
MONOCYTES # BLD AUTO: 0.45 10*3/MM3 (ref 0–0.9)
MONOCYTES NFR BLD AUTO: 5.1 % (ref 0–12)
NEUTROPHILS # BLD AUTO: 6.82 10*3/MM3 (ref 2–8.6)
NEUTROPHILS NFR BLD AUTO: 78 % (ref 37–80)
PLATELET # BLD AUTO: 269 10*3/MM3 (ref 150–450)
PMV BLD AUTO: 8.7 FL (ref 8–12)
POTASSIUM BLD-SCNC: 4.1 MMOL/L (ref 3.5–5.1)
PROT SERPL-MCNC: 7.8 G/DL (ref 6.3–8.6)
RBC # BLD AUTO: 4.35 10*6/MM3 (ref 3.77–5.16)
SODIUM BLD-SCNC: 136 MMOL/L (ref 137–145)
WBC NRBC COR # BLD: 8.75 10*3/MM3 (ref 3.2–9.8)

## 2018-09-24 PROCEDURE — 99205 OFFICE O/P NEW HI 60 MIN: CPT | Performed by: INTERNAL MEDICINE

## 2018-09-24 PROCEDURE — 80053 COMPREHEN METABOLIC PANEL: CPT

## 2018-09-24 PROCEDURE — 86706 HEP B SURFACE ANTIBODY: CPT

## 2018-09-24 PROCEDURE — 86707 HEPATITIS BE ANTIBODY: CPT

## 2018-09-24 PROCEDURE — G0463 HOSPITAL OUTPT CLINIC VISIT: HCPCS | Performed by: INTERNAL MEDICINE

## 2018-09-24 PROCEDURE — 87350 HEPATITIS BE AG IA: CPT

## 2018-09-24 PROCEDURE — 80074 ACUTE HEPATITIS PANEL: CPT

## 2018-09-24 PROCEDURE — 85025 COMPLETE CBC W/AUTO DIFF WBC: CPT

## 2018-09-24 PROCEDURE — 86704 HEP B CORE ANTIBODY TOTAL: CPT

## 2018-09-24 NOTE — PATIENT INSTRUCTIONS
Doxorubicin injection  What is this medicine?  DOXORUBICIN (dox oh LAUREL bi sin) is a chemotherapy drug. It is used to treat many kinds of cancer like leukemia, lymphoma, neuroblastoma, sarcoma, and Wilms' tumor. It is also used to treat bladder cancer, breast cancer, lung cancer, ovarian cancer, stomach cancer, and thyroid cancer.  This medicine may be used for other purposes; ask your health care provider or pharmacist if you have questions.  COMMON BRAND NAME(S): Adriamycin, Adriamycin PFS, Adriamycin RDF, Rubex  What should I tell my health care provider before I take this medicine?  They need to know if you have any of these conditions:  -heart disease  -history of low blood counts caused by a medicine  -liver disease  -recent or ongoing radiation therapy  -an unusual or allergic reaction to doxorubicin, other chemotherapy agents, other medicines, foods, dyes, or preservatives  -pregnant or trying to get pregnant  -breast-feeding  How should I use this medicine?  This drug is given as an infusion into a vein. It is administered in a hospital or clinic by a specially trained health care professional. If you have pain, swelling, burning or any unusual feeling around the site of your injection, tell your health care professional right away.  Talk to your pediatrician regarding the use of this medicine in children. Special care may be needed.  Overdosage: If you think you have taken too much of this medicine contact a poison control center or emergency room at once.  NOTE: This medicine is only for you. Do not share this medicine with others.  What if I miss a dose?  It is important not to miss your dose. Call your doctor or health care professional if you are unable to keep an appointment.  What may interact with this medicine?  This medicine may interact with the following medications:  -6-mercaptopurine  -paclitaxel  -phenytoin  -Peace's Wort  -trastuzumab  -verapamil  This list may not describe all possible  interactions. Give your health care provider a list of all the medicines, herbs, non-prescription drugs, or dietary supplements you use. Also tell them if you smoke, drink alcohol, or use illegal drugs. Some items may interact with your medicine.  What should I watch for while using this medicine?  This drug may make you feel generally unwell. This is not uncommon, as chemotherapy can affect healthy cells as well as cancer cells. Report any side effects. Continue your course of treatment even though you feel ill unless your doctor tells you to stop.  There is a maximum amount of this medicine you should receive throughout your life. The amount depends on the medical condition being treated and your overall health. Your doctor will watch how much of this medicine you receive in your lifetime. Tell your doctor if you have taken this medicine before.  You may need blood work done while you are taking this medicine.  Your urine may turn red for a few days after your dose. This is not blood. If your urine is dark or brown, call your doctor.  In some cases, you may be given additional medicines to help with side effects. Follow all directions for their use.  Call your doctor or health care professional for advice if you get a fever, chills or sore throat, or other symptoms of a cold or flu. Do not treat yourself. This drug decreases your body's ability to fight infections. Try to avoid being around people who are sick.  This medicine may increase your risk to bruise or bleed. Call your doctor or health care professional if you notice any unusual bleeding.  Talk to your doctor about your risk of cancer. You may be more at risk for certain types of cancers if you take this medicine.  Do not become pregnant while taking this medicine or for 6 months after stopping it. Women should inform their doctor if they wish to become pregnant or think they might be pregnant. Men should not father a child while taking this medicine and  for 6 months after stopping it. There is a potential for serious side effects to an unborn child. Talk to your health care professional or pharmacist for more information. Do not breast-feed an infant while taking this medicine.  This medicine has caused ovarian failure in some women and reduced sperm counts in some men This medicine may interfere with the ability to have a child. Talk with your doctor or health care professional if you are concerned about your fertility.  What side effects may I notice from receiving this medicine?  Side effects that you should report to your doctor or health care professional as soon as possible:  -allergic reactions like skin rash, itching or hives, swelling of the face, lips, or tongue  -breathing problems  -chest pain  -fast or irregular heartbeat  -low blood counts - this medicine may decrease the number of white blood cells, red blood cells and platelets. You may be at increased risk for infections and bleeding.  -pain, redness, or irritation at site where injected  -signs of infection - fever or chills, cough, sore throat, pain or difficulty passing urine  -signs of decreased platelets or bleeding - bruising, pinpoint red spots on the skin, black, tarry stools, blood in the urine  -swelling of the ankles, feet, hands  -tiredness  -weakness  Side effects that usually do not require medical attention (report to your doctor or health care professional if they continue or are bothersome):  -diarrhea  -hair loss  -mouth sores  -nail discoloration or damage  -nausea  -red colored urine  -vomiting  This list may not describe all possible side effects. Call your doctor for medical advice about side effects. You may report side effects to FDA at 3-458-FDA-6052.  Where should I keep my medicine?  This drug is given in a hospital or clinic and will not be stored at home.  NOTE: This sheet is a summary. It may not cover all possible information. If you have questions about this  medicine, talk to your doctor, pharmacist, or health care provider.  © 2018 [employer] (2017-02-13 11:28:51)  Cyclophosphamide injection  What is this medicine?  CYCLOPHOSPHAMIDE (caroline velazco LESLIE fa mide) is a chemotherapy drug. It slows the growth of cancer cells. This medicine is used to treat many types of cancer like lymphoma, myeloma, leukemia, breast cancer, and ovarian cancer, to name a few.  This medicine may be used for other purposes; ask your health care provider or pharmacist if you have questions.  COMMON BRAND NAME(S): Cytoxan, Neosar  What should I tell my health care provider before I take this medicine?  They need to know if you have any of these conditions:  -blood disorders  -history of other chemotherapy  -infection  -kidney disease  -liver disease  -recent or ongoing radiation therapy  -tumors in the bone marrow  -an unusual or allergic reaction to cyclophosphamide, other chemotherapy, other medicines, foods, dyes, or preservatives  -pregnant or trying to get pregnant  -breast-feeding  How should I use this medicine?  This drug is usually given as an injection into a vein or muscle or by infusion into a vein. It is administered in a hospital or clinic by a specially trained health care professional.  Talk to your pediatrician regarding the use of this medicine in children. Special care may be needed.  Overdosage: If you think you have taken too much of this medicine contact a poison control center or emergency room at once.  NOTE: This medicine is only for you. Do not share this medicine with others.  What if I miss a dose?  It is important not to miss your dose. Call your doctor or health care professional if you are unable to keep an appointment.  What may interact with this medicine?  This medicine may interact with the following medications:  -amiodarone  -amphotericin B  -azathioprine  -certain antiviral medicines for HIV or AIDS such as protease inhibitors (e.g., indinavir,  ritonavir) and zidovudine  -certain blood pressure medications such as benazepril, captopril, enalapril, fosinopril, lisinopril, moexipril, monopril, perindopril, quinapril, ramipril, trandolapril  -certain cancer medications such as anthracyclines (e.g., daunorubicin, doxorubicin), busulfan, cytarabine, paclitaxel, pentostatin, tamoxifen, trastuzumab  -certain diuretics such as chlorothiazide, chlorthalidone, hydrochlorothiazide, indapamide, metolazone  -certain medicines that treat or prevent blood clots like warfarin  -certain muscle relaxants such as succinylcholine  -cyclosporine  -etanercept  -indomethacin  -medicines to increase blood counts like filgrastim, pegfilgrastim, sargramostim  -medicines used as general anesthesia  -metronidazole  -natalizumab  This list may not describe all possible interactions. Give your health care provider a list of all the medicines, herbs, non-prescription drugs, or dietary supplements you use. Also tell them if you smoke, drink alcohol, or use illegal drugs. Some items may interact with your medicine.  What should I watch for while using this medicine?  Visit your doctor for checks on your progress. This drug may make you feel generally unwell. This is not uncommon, as chemotherapy can affect healthy cells as well as cancer cells. Report any side effects. Continue your course of treatment even though you feel ill unless your doctor tells you to stop.  Drink water or other fluids as directed. Urinate often, even at night.  In some cases, you may be given additional medicines to help with side effects. Follow all directions for their use.  Call your doctor or health care professional for advice if you get a fever, chills or sore throat, or other symptoms of a cold or flu. Do not treat yourself. This drug decreases your body's ability to fight infections. Try to avoid being around people who are sick.  This medicine may increase your risk to bruise or bleed. Call your doctor or  health care professional if you notice any unusual bleeding.  Be careful brushing and flossing your teeth or using a toothpick because you may get an infection or bleed more easily. If you have any dental work done, tell your dentist you are receiving this medicine.  You may get drowsy or dizzy. Do not drive, use machinery, or do anything that needs mental alertness until you know how this medicine affects you.  Do not become pregnant while taking this medicine or for 1 year after stopping it. Women should inform their doctor if they wish to become pregnant or think they might be pregnant. Men should not father a child while taking this medicine and for 4 months after stopping it. There is a potential for serious side effects to an unborn child. Talk to your health care professional or pharmacist for more information. Do not breast-feed an infant while taking this medicine.  This medicine may interfere with the ability to have a child. This medicine has caused ovarian failure in some women. This medicine has caused reduced sperm counts in some men. You should talk with your doctor or health care professional if you are concerned about your fertility.  If you are going to have surgery, tell your doctor or health care professional that you have taken this medicine.  What side effects may I notice from receiving this medicine?  Side effects that you should report to your doctor or health care professional as soon as possible:  -allergic reactions like skin rash, itching or hives, swelling of the face, lips, or tongue  -low blood counts - this medicine may decrease the number of white blood cells, red blood cells and platelets. You may be at increased risk for infections and bleeding.  -signs of infection - fever or chills, cough, sore throat, pain or difficulty passing urine  -signs of decreased platelets or bleeding - bruising, pinpoint red spots on the skin, black, tarry stools, blood in the urine  -signs of  decreased red blood cells - unusually weak or tired, fainting spells, lightheadedness  -breathing problems  -dark urine  -dizziness  -palpitations  -swelling of the ankles, feet, hands  -trouble passing urine or change in the amount of urine  -weight gain  -yellowing of the eyes or skin  Side effects that usually do not require medical attention (report to your doctor or health care professional if they continue or are bothersome):  -changes in nail or skin color  -hair loss  -missed menstrual periods  -mouth sores  -nausea, vomiting  This list may not describe all possible side effects. Call your doctor for medical advice about side effects. You may report side effects to FDA at 7-211-FDA-1519.  Where should I keep my medicine?  This drug is given in a hospital or clinic and will not be stored at home.  NOTE: This sheet is a summary. It may not cover all possible information. If you have questions about this medicine, talk to your doctor, pharmacist, or health care provider.  © 2018 [employer] (2013-11-01 16:22:58)  Paclitaxel injection  What is this medicine?  PACLITAXEL (GABO li TAX el) is a chemotherapy drug. It targets fast dividing cells, like cancer cells, and causes these cells to die. This medicine is used to treat ovarian cancer, breast cancer, and other cancers.  This medicine may be used for other purposes; ask your health care provider or pharmacist if you have questions.  COMMON BRAND NAME(S): Onxol, Taxol  What should I tell my health care provider before I take this medicine?  They need to know if you have any of these conditions:  -blood disorders  -irregular heartbeat  -infection (especially a virus infection such as chickenpox, cold sores, or herpes)  -liver disease  -previous or ongoing radiation therapy  -an unusual or allergic reaction to paclitaxel, alcohol, polyoxyethylated castor oil, other chemotherapy agents, other medicines, foods, dyes, or preservatives  -pregnant or trying to  get pregnant  -breast-feeding  How should I use this medicine?  This drug is given as an infusion into a vein. It is administered in a hospital or clinic by a specially trained health care professional.  Talk to your pediatrician regarding the use of this medicine in children. Special care may be needed.  Overdosage: If you think you have taken too much of this medicine contact a poison control center or emergency room at once.  NOTE: This medicine is only for you. Do not share this medicine with others.  What if I miss a dose?  It is important not to miss your dose. Call your doctor or health care professional if you are unable to keep an appointment.  What may interact with this medicine?  Do not take this medicine with any of the following medications:  -disulfiram  -metronidazole  This medicine may also interact with the following medications:  -cyclosporine  -diazepam  -ketoconazole  -medicines to increase blood counts like filgrastim, pegfilgrastim, sargramostim  -other chemotherapy drugs like cisplatin, doxorubicin, epirubicin, etoposide, teniposide, vincristine  -quinidine  -testosterone  -vaccines  -verapamil  Talk to your doctor or health care professional before taking any of these medicines:  -acetaminophen  -aspirin  -ibuprofen  -ketoprofen  -naproxen  This list may not describe all possible interactions. Give your health care provider a list of all the medicines, herbs, non-prescription drugs, or dietary supplements you use. Also tell them if you smoke, drink alcohol, or use illegal drugs. Some items may interact with your medicine.  What should I watch for while using this medicine?  Your condition will be monitored carefully while you are receiving this medicine. You will need important blood work done while you are taking this medicine.  This medicine can cause serious allergic reactions. To reduce your risk you will need to take other medicine(s) before treatment with this medicine. If you  experience allergic reactions like skin rash, itching or hives, swelling of the face, lips, or tongue, tell your doctor or health care professional right away.  In some cases, you may be given additional medicines to help with side effects. Follow all directions for their use.  This drug may make you feel generally unwell. This is not uncommon, as chemotherapy can affect healthy cells as well as cancer cells. Report any side effects. Continue your course of treatment even though you feel ill unless your doctor tells you to stop.  Call your doctor or health care professional for advice if you get a fever, chills or sore throat, or other symptoms of a cold or flu. Do not treat yourself. This drug decreases your body's ability to fight infections. Try to avoid being around people who are sick.  This medicine may increase your risk to bruise or bleed. Call your doctor or health care professional if you notice any unusual bleeding.  Be careful brushing and flossing your teeth or using a toothpick because you may get an infection or bleed more easily. If you have any dental work done, tell your dentist you are receiving this medicine.  Avoid taking products that contain aspirin, acetaminophen, ibuprofen, naproxen, or ketoprofen unless instructed by your doctor. These medicines may hide a fever.  Do not become pregnant while taking this medicine. Women should inform their doctor if they wish to become pregnant or think they might be pregnant. There is a potential for serious side effects to an unborn child. Talk to your health care professional or pharmacist for more information. Do not breast-feed an infant while taking this medicine.  Men are advised not to father a child while receiving this medicine.  This product may contain alcohol. Ask your pharmacist or healthcare provider if this medicine contains alcohol. Be sure to tell all healthcare providers you are taking this medicine. Certain medicines, like metronidazole  and disulfiram, can cause an unpleasant reaction when taken with alcohol. The reaction includes flushing, headache, nausea, vomiting, sweating, and increased thirst. The reaction can last from 30 minutes to several hours.  What side effects may I notice from receiving this medicine?  Side effects that you should report to your doctor or health care professional as soon as possible:  -allergic reactions like skin rash, itching or hives, swelling of the face, lips, or tongue  -low blood counts - This drug may decrease the number of white blood cells, red blood cells and platelets. You may be at increased risk for infections and bleeding.  -signs of infection - fever or chills, cough, sore throat, pain or difficulty passing urine  -signs of decreased platelets or bleeding - bruising, pinpoint red spots on the skin, black, tarry stools, nosebleeds  -signs of decreased red blood cells - unusually weak or tired, fainting spells, lightheadedness  -breathing problems  -chest pain  -high or low blood pressure  -mouth sores  -nausea and vomiting  -pain, swelling, redness or irritation at the injection site  -pain, tingling, numbness in the hands or feet  -slow or irregular heartbeat  -swelling of the ankle, feet, hands  Side effects that usually do not require medical attention (report to your doctor or health care professional if they continue or are bothersome):  -bone pain  -complete hair loss including hair on your head, underarms, pubic hair, eyebrows, and eyelashes  -changes in the color of fingernails  -diarrhea  -loosening of the fingernails  -loss of appetite  -muscle or joint pain  -red flush to skin  -sweating  This list may not describe all possible side effects. Call your doctor for medical advice about side effects. You may report side effects to FDA at 0-222-FDA-7196.  Where should I keep my medicine?  This drug is given in a hospital or clinic and will not be stored at home.  NOTE: This sheet is a summary. It  may not cover all possible information. If you have questions about this medicine, talk to your doctor, pharmacist, or health care provider.  © 2018 [employer] (2016-10-18 19:58:00)  Bone Scan  A bone scan is an imaging study of your bones. It is used to identify and diagnose bone problems. You may have this test to check for:  · Cancer in your bones.  · A broken or cracked bone.  · Bone infection.  · A cause of bone pain.  · Certain other bone diseases.    For this test, a small amount of a radioactive substance (radiotracer) is injected into your blood. Your bones will absorb the radiotracer for a short time. The radiotracer gives off radioactive energy. This energy can be captured by a type of camera that makes images of your bones (scintigrams). Abnormal bones will take up too much or too little of the radiotracer. This will show up in the images.  Tell a health care provider about:  · Any allergies you have, including any previous reactions you have had during an exam that used radiotracer.  · All medicines you are taking, including vitamins, herbs, eye drops, creams, and over-the-counter medicines.  · Any blood disorders you have.  · Any surgeries you have had.  · Any medical conditions you have.  · If you are pregnant or you think that you may be pregnant.  · If you are breastfeeding.  What are the risks?  Generally, this is a safe procedure. However, problems may occur, including:  · Exposure to radiation (a small amount).  · Bleeding at the injection site.  · Infection at the injection site. This is rare.  · Allergic reaction to the radiotracer. Severe reactions may cause a rash or breathing trouble. These reactions are rare.    What happens before the procedure?  · Ask your health care provider about changing or stopping your regular medicines. This is especially important if you are taking diabetes medicines or blood thinners.  · Do not take any medicines that contain bismuth for 4 days before  the scan.  · Do not have X-rays that use barium contrast material for 4 days before the scan.  · Do not wear metallic jewelry to the scan.  · Do not drink very much for 4 hours before the scan. At the beginning of the scan, you will need to drink several glasses of water.  What happens during the procedure?  · An IV tube will be inserted into one of your veins.  · You will lie down on an exam table.  · The radiotracer will be injected through the IV tube. You may feel a cold sensation in your arm.  · Some pictures (images) may be taken right after the injection.  · Then you will need to drink 6-8 glasses of water to flush the excess tracer out of your system.  · You may have to wait several hours before more images are taken.  · You will get back on the exam table for more images.  ? The camera may move around your body.  ? You may be asked to stay still or to change your position.  The procedure may vary among health care providers and hospitals.  What happens after the procedure?  · You may have to wait until a nuclear medicine specialist (radiologist) checks the images to make sure that they are readable.  · More images may be taken, if necessary.  · You will be watched to make sure that you do not have a reaction to the procedure or the injected medicine.  · It is your responsibility to obtain your test results. Ask your health care provider or the department performing the test when and how you will get your results.  · Return to your normal activities as directed by your health care provider.  This information is not intended to replace advice given to you by your health care provider. Make sure you discuss any questions you have with your health care provider.  Document Released: 12/15/2001 Document Revised: 04/27/2017 Document Reviewed: 10/21/2015  [employer] Patient Education © 2018 [employer]

## 2018-09-24 NOTE — PROGRESS NOTES
Moy Feng Andrews  [id number]  1980      REASON FOR CONSULTATION:  This is a very pleasant 38-year-old female who was seen in consultation at the request of Dr. Ortega for evaluation of newly diagnosed breast cancer.     Provide an opinion on any further workup or treatment                             REQUESTING PHYSICIAN:  Abdoul Ortega MD    RECORDS OBTAINED:  Records of the patients history including those obtained from the referring provider were reviewed and summarized in detail.      History of Present Illness     This is a very pleasant 38-year-old female who was seen in consultation at the request of Dr. Ortega for evaluation of newly diagnosed breast cancer.  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  Her past medical history is otherwise unremarkable except history of anxiety/depression and gastroesophageal reflux disease.  She tells me that she felt a lump in her right breast in August 2018 and subsequently underwent a mammogram and ultrasonographic evaluation on August 27 which showed right breast mass approximately 1.5 cm.  The breast mass was felt to be suspicious for malignancy.  She then underwent ultrasound guided biopsy of right breast on 8/28/18 which showed invasive ductal carcinoma, moderately differentiated.  The carcinoma was tested positive for estrogen receptor and it was tested negative for progesterone receptor as well as HER-2.  Patient underwent right mastectomy and sentinel lymph node biopsy on September 5 which showed invasive ductal carcinoma of 1.5 cm, moderately differentiated which was completely excised.  [place] lymph node showed micrometastasis (less than 2 mm) involving 1 of 7 lymph nodes.  She is here today to discuss her diagnosis, prognosis and potential treatment options.    Past Medical History:   Diagnosis Date   • Anxiety    • Cancer (CMS/HCC)     breast right   • Depressive disorder    • Encounter for gynecological examination     • Encounter for vision screening     Vision screen (1)     • Health examination of defined subpopulation     Health examination of sub-group     • Malaise and fatigue    • Palpitations    • Polyp of colon, villous adenoma    • Skin cancer     skin   • Soft tissue swelling     right side of neck-2 different areas    • Urinary tract infectious disease    • Wears glasses         Past Surgical History:   Procedure Laterality Date   •  SECTION     • COLONOSCOPY N/A 3/1/2018    Procedure: COLONOSCOPY;  Surgeon: Shin Vanessa DO;  Location: [facility] ENDOSCOPY;  Service:    • MASTECTOMY WITH SENTINEL NODE BIOPSY AND AXILLARY NODE DISSECTION Right 2018    Procedure: INJECT RIGHT BREAST AND THEN PERFORM SENTINEL LYMPH NODE BIOPSY AND THEN  REMOVE RIGHT BREAST AND NIPPLE AND        (INJECTION ON SDS @7:00 A.M.);  Surgeon: Abdoul Ortega MD;  Location: [facility] OR;  Service: General   • PAP SMEAR     • US GUIDED FINE NEEDLE ASPIRATION  2018        Current Outpatient Prescriptions on File Prior to Visit   Medication Sig Dispense Refill   • busPIRone (BUSPAR) 5 MG tablet Take 5 mg by mouth 3 (Three) Times a Day As Needed.     • desvenlafaxine (PRISTIQ) 50 MG 24 hr tablet Take 1 tablet by mouth Daily. 30 tablet 11   • diazePAM (VALIUM) 5 MG tablet Take 1 tablet by mouth Every 6 (Six) Hours As Needed for Anxiety. 60 tablet 0   • HYDROcodone-acetaminophen (NORCO) 7.5-325 MG per tablet Take 1 tablet by mouth Every 4 (Four) Hours As Needed for Moderate Pain. 15 tablet 0   • hydrocortisone (PROCTOSOL HC) 2.5 % rectal cream Apply to affected area topically 2 times every day for 2 weeks, and then as needed. 28.35 g 5   • pantoprazole (PROTONIX) 40 MG EC tablet Take 1 tablet by mouth Daily. 30 tablet 11     No current facility-administered medications on file prior to visit.         ALLERGIES:  No Known Allergies     Social History     Social History   • Marital status: Single     Social History Main  Topics   • Smoking status: Former Smoker     Packs/day: 1.00     Quit date: 8/20/2016   • Smokeless tobacco: Never Used   • Alcohol use No   • Drug use: No   • Sexual activity: Defer     Other Topics Concern   • Not on file        Family History   Problem Relation Age of Onset   • Bipolar disorder Mother    • Schizophrenia Mother    • Colon cancer Father         Colorectal Cancer        Review of Systems     CONSTITUTIONAL: Fatigue+ No weight loss, fever, chills, weakness.  HEENT: Eyes: No visual loss, blurred vision, double vision or yellow sclerae. Ears, Nose, Throat: No hearing loss, sneezing, congestion, runny nose or sore throat.  SKIN: No rash or itching.  CARDIOVASCULAR: No chest pain, chest pressure or chest discomfort. No palpitations or edema.  RESPIRATORY: No shortness of breath, cough or sputum.  GASTROINTESTINAL: No anorexia, nausea, vomiting or diarrhea. No abdominal pain or blood.  GENITOURINARY: Negative for urgency, frequency or dysuria.   NEUROLOGICAL: No headache, dizziness, syncope, paralysis, ataxia, numbness or tingling in the extremities. No change in bowel or bladder control.  MUSCULOSKELETAL: Back pain+  HEMATOLOGIC: No anemia, bleeding or bruising.  LYMPHATICS: No enlarged nodes. No history of splenectomy.  PSYCHIATRIC: Anxiety + depression+  ENDOCRINOLOGIC: No reports of sweating, cold or heat intolerance. No polyuria or polydipsia.  ALLERGIES: No history of asthma, hives, eczema or rhinitis.      Objective       Physical Exam    General: Alert, awake, oriented.  Well dressed.  Not in apparent distress. Vitals as above.   HEAD: normocephalic, atraumatic.   EYES: PERRL, EOMI. Fundi normal, vision is grossly intact.  Neck: Supple, no adenopathy or thyromegaly.   Throat: normal oral cavity and pharynx. No inflammation, swelling, exudate, or lesions.  CARDIAC: Normal S1 and S2. No S3, S4 or murmurs. Rhythm is regular.Extremities are warm and well perfused.   LUNGS: Clear to auscultation and  percussion without rales, rhonchi, wheezing or diminished breath sounds.  ABDOMEN: Positive bowel sounds. Soft, nondistended, nontender  . No guarding or rebound. No masses.  Back:No bony tenderness.   EXTREMITIES: No significant deformity or joint abnormality.  Peripheral pulses intact. No varicosities.  Skin: No rash or bruising.  Neurological: Grossly non-focal exam. No focal weakness. Gait: Normal.   Psych: Mood and affect normal. No hallucination or suicidal thoughts.   Lymphatics:No adenopathy       RECENT LABS: Independently reviewed and summarized.  Hematology WBC   Date Value Ref Range Status   11/01/2017 6.88 3.20 - 9.80 10*3/mm3 Final     RBC   Date Value Ref Range Status   11/01/2017 4.43 3.77 - 5.16 10*6/mm3 Final     Hemoglobin   Date Value Ref Range Status   11/01/2017 13.1 12.0 - 15.5 g/dL Final     Hematocrit   Date Value Ref Range Status   11/01/2017 39.7 35.0 - 45.0 % Final     Platelets   Date Value Ref Range Status   11/01/2017 251 150 - 450 10*3/mm3 Final      Imaging independently reviewed. Diagnostic mammogram on 8/27 reviewed and showed right breast  mass corresponds to 9:00 position 2 cm from the nipple 1.4 x 1.76 x 1.1 cm mass which is highlysuggestive of malignancy.    Pathology reviewed.   Final Diagnosis   1.  SENTINEL LYMPH NODES, RIGHT AXILLA:  MICROMETASTASIS (LESS THAN 2.0 MM) INVOLVING ONE (1) OF SEVEN (7) LYMPH NODES.     2.  MASTECTOMY, RIGHT BREAST:  INVASIVE DUCTAL CARCINOMA (1.5 CM), MODERATELY DIFFERENTIATED, 9 O'CLOCK, 2 CM FROM NIPPLE,   COMPLETELY EXCISED.  NODULAR FIBROCYSTIC CHANGE (0.4 CM), 1 O'CLOCK, 6 CM FROM NIPPLE.  NIPPLE AND SKIN NEGATIVE FOR CARCINOMA.     ER 70%, AK-, HER2 negative   Oncotype DX recurrent score 36.    I have reviewed old records and summarized them in HPI as well as assessment and plan section of this note.        Assessment/Plan     This is a very pleasant 38-year-old female who was seen in consultation at the request of Dr. Ortega for  "evaluation of newly diagnosed right breast cancer.  Patient felt a mass in the lateral aspect of her right breast earlier in August,2018 and subsequently had mammogram and ultrasound performed which showed concern for malignancy.  Subsequently she was evaluated by Dr. Ortega and underwent right mastectomy and sentinel lymph node evaluation.  Her final pathology came back as [id number] - IB disease. She was ER+ MS - and HER2 -.     We had an extensive discussion about her diagnosis, prognosis and potential treatment options.     She has stage IB disease, which is ER+ MS - and HER2 -. Oncotype DX recurrence score high at 36.    NCCN guidelines recommend adjuvant endocrine therapy as well as adjuvant chemotherapy followed by endocrine therapy both as category 2B recommendations and states that 21-gene RT-PCT assay recurrence score can be considered in select patients with 1-3 involved ipsilateral axillary lymph nodes to guide the addition of combination chemotherapy to standard hormonal therapy.  Retrospective analysis of a prospective randomized trial suggests that the test is predictive in this group similar to its performance in node-negative disease.    She falls into \"high-risk\" premenopausal breast cancer category. While formal criteria to define such risk are not present, a reasonable definition would include patients in whom chemotherapy is indicated, such as patients with the presence of pathologically involved lymph nodes, large tumor size, high tumor grade, lymphovascular invasion, and/or high risk of recurrence based on a genomic assay (eg, Recurrence Score [RS] >31 on the 21-gene recurrence assay). I do believe she meets some criteria to included in this groups, especially lymph node involvement as well as high recurrence score. The results of the subgroup analyses of the SOFT and TEXT trials suggest that patients with sufficiently higher-risk breast cancer that warranted chemotherapy administration may " experience a DFS benefit from OS plus either AI or tamoxifen compared with tamoxifen alone, although overall survival is not improved (due to prolong natural history of ER positive breast cancer, and it is possible that the survival difference might become apparent with time in future once these studies mature).      I discussed with her options of adjuvant chemotherapy with hormonal therapy versus adjuvant hormonal therapy alone. After our discussion, patient wants to think about her treatment options and told me that she will get back to us with her decision.     She is reporting back pain/ left shoulder pain with tenderness. I will obtain bone scan to rule out metastatic disease.     Discussed role of RT in treatment breast cancer. I will consult Dr Wick for consideration of RT.     Given young age at diagnosis and multiple family members with cancer diagnosis, recommend genetic counseling referral.     Recommendations:   - Discussed diagnosis, prognosis and potential treatment options at length.   - Discussed role of adjuvant chemotherapy with hormonal therapy versus adjuvant hormonal therapy alone. Side effects associated with chemotherapy discussed at length. All questions answered. After our discussion, she wants to think about her options and will get back to us with her decision.   - Recommend laboratory tests.   - Recommend bone scan given her complain of back pain to rule out metastatic disease.   - Recommend referral to genetic counseling for genetic risk evaluation.   - Recommend referral to radiation oncology to discuss role of adjuvant RT.     I have spend > 60 minutes times face to face with the patient and more than 50% of time was spent on counseling/coordinating care.     Thank you for involving me in Ms Sparrow's care.     Please call us if any questions/concerns.     Yobani Emery MD   Hematology Oncology

## 2018-09-25 LAB
HBV CORE AB SER DONR QL IA: NEGATIVE
HBV CORE IGM SERPL QL IA: NEGATIVE
HBV E AB SERPL QL IA: NEGATIVE
HBV E AG SERPL QL IA: POSITIVE
HBV SURFACE AB SER QL: REACTIVE
HBV SURFACE AG SERPL QL IA: NEGATIVE
HCV AB S/CO SERPL IA: <0.1 S/CO RATIO (ref 0–0.9)
LABORATORY COMMENT REPORT: NORMAL

## 2018-09-25 NOTE — PROGRESS NOTES
"New patient consultation.  Medical oncology.  Distress score #8.   Oncology SW met in the exam room with patient subsequent to her initial  medical oncology consultation. Pt. Newly diagnosed with breast cancer of right breast.  38 year old female  patient presents alert and oriented x 3, pt presents with blunted / tearful affect and mood congruent.  Pt. Distress screening discussed with pt indicating she could have marked a \"10\" as she processed her emotional distress. Pt. Self reports history of anxiety and depression and is prescribed medication, stating compliance and expected efficacy.  Pt. Is accompanied this day by her aunt that presents as positive support.  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   Pt. Was openly supported from a person centered therapeutic approach as she processed distress.  LCSW offered education as to  supports to include financial navigator and assistance with FMLA.  Pt. Responded receptive to SW feedback, ongoing support was reinforced.  SW will continue to follow offering the availably for individual counseling, emotional support and referral for extended supports as indicated by need or request.    "

## 2018-09-27 ENCOUNTER — APPOINTMENT (OUTPATIENT)
Dept: NUCLEAR MEDICINE | Facility: HOSPITAL | Age: 38
End: 2018-09-27

## 2018-09-27 ENCOUNTER — TELEPHONE (OUTPATIENT)
Dept: ONCOLOGY | Facility: HOSPITAL | Age: 38
End: 2018-09-27

## 2018-09-27 NOTE — TELEPHONE ENCOUNTER
----- Message from Keya Alfaro sent at 9/27/2018  9:01 AM CDT -----  Regarding: QUESTIONS ABOUT LAB RESULTS  Contact: [phone]  PT HAD QUESTIONS ABOUT HER LAB RESULTS.

## 2018-10-01 ENCOUNTER — LAB (OUTPATIENT)
Dept: ONCOLOGY | Facility: HOSPITAL | Age: 38
End: 2018-10-01
Attending: INTERNAL MEDICINE

## 2018-10-01 ENCOUNTER — HOSPITAL ENCOUNTER (OUTPATIENT)
Dept: NUCLEAR MEDICINE | Facility: HOSPITAL | Age: 38
Discharge: HOME | End: 2018-10-01

## 2018-10-01 DIAGNOSIS — C50.411 MALIGNANT NEOPLASM OF UPPER-OUTER QUADRANT OF RIGHT BREAST IN FEMALE, ESTROGEN RECEPTOR POSITIVE (HCC): ICD-10-CM

## 2018-10-01 DIAGNOSIS — R76.8 POSITIVE HEPATITIS SEROLOGY: Primary | ICD-10-CM

## 2018-10-01 DIAGNOSIS — R76.8 POSITIVE HEPATITIS SEROLOGY: ICD-10-CM

## 2018-10-01 DIAGNOSIS — Z17.0 MALIGNANT NEOPLASM OF UPPER-OUTER QUADRANT OF RIGHT BREAST IN FEMALE, ESTROGEN RECEPTOR POSITIVE (HCC): ICD-10-CM

## 2018-10-01 PROCEDURE — 36415 COLL VENOUS BLD VENIPUNCTURE: CPT | Performed by: INTERNAL MEDICINE

## 2018-10-01 PROCEDURE — 0 TECHNETIUM MEDRONATE KIT: Performed by: INTERNAL MEDICINE

## 2018-10-01 PROCEDURE — A9503 TC99M MEDRONATE: HCPCS | Performed by: INTERNAL MEDICINE

## 2018-10-01 PROCEDURE — 87517 HEPATITIS B DNA QUANT: CPT

## 2018-10-01 PROCEDURE — 78306 BONE IMAGING WHOLE BODY: CPT

## 2018-10-01 PROCEDURE — 80074 ACUTE HEPATITIS PANEL: CPT

## 2018-10-01 RX ORDER — TC 99M MEDRONATE 20 MG/10ML
27.1 INJECTION, POWDER, LYOPHILIZED, FOR SOLUTION INTRAVENOUS
Status: COMPLETED | OUTPATIENT
Start: 2018-10-01 | End: 2018-10-01

## 2018-10-01 RX ADMIN — Medication 27.1 MILLICURIE: at 10:08

## 2018-10-02 ENCOUNTER — HOSPITAL ENCOUNTER (OUTPATIENT)
Dept: ULTRASOUND IMAGING | Facility: HOSPITAL | Age: 38
Discharge: HOME | End: 2018-10-02
Admitting: SURGERY

## 2018-10-02 ENCOUNTER — OFFICE VISIT (OUTPATIENT)
Dept: SURGERY | Facility: CLINIC | Age: 38
End: 2018-10-02

## 2018-10-02 ENCOUNTER — APPOINTMENT (OUTPATIENT)
Dept: RADIATION ONCOLOGY | Facility: HOSPITAL | Age: 38
End: 2018-10-02

## 2018-10-02 VITALS
WEIGHT: 148 LBS | HEART RATE: 80 BPM | DIASTOLIC BLOOD PRESSURE: 78 MMHG | HEIGHT: 61 IN | TEMPERATURE: 98.1 F | BODY MASS INDEX: 27.94 KG/M2 | SYSTOLIC BLOOD PRESSURE: 126 MMHG

## 2018-10-02 DIAGNOSIS — R10.11 RIGHT UPPER QUADRANT ABDOMINAL PAIN: ICD-10-CM

## 2018-10-02 DIAGNOSIS — R10.11 RIGHT UPPER QUADRANT ABDOMINAL PAIN: Primary | ICD-10-CM

## 2018-10-02 DIAGNOSIS — C50.411 MALIGNANT NEOPLASM OF UPPER-OUTER QUADRANT OF RIGHT BREAST IN FEMALE, ESTROGEN RECEPTOR POSITIVE (HCC): ICD-10-CM

## 2018-10-02 DIAGNOSIS — Z17.0 MALIGNANT NEOPLASM OF UPPER-OUTER QUADRANT OF RIGHT BREAST IN FEMALE, ESTROGEN RECEPTOR POSITIVE (HCC): ICD-10-CM

## 2018-10-02 LAB
HBV DNA SERPL NAA+PROBE-ACNC: NORMAL IU/ML
LOG10 HBV IU/ML: NORMAL LOG10 IU/ML
TEST INFORMATION: NORMAL

## 2018-10-02 PROCEDURE — 76705 ECHO EXAM OF ABDOMEN: CPT

## 2018-10-02 PROCEDURE — 99024 POSTOP FOLLOW-UP VISIT: CPT | Performed by: SURGERY

## 2018-10-02 NOTE — PATIENT INSTRUCTIONS

## 2018-10-02 NOTE — PROGRESS NOTES
38-year-old female now nearly 4 weeks out from her right mastectomy and sentinel lymph node biopsy for breast cancer.  She had 1 lymph node that had a micro-met present.  She is seen medical oncology and they've recommended chemotherapy due to high Oncotype DX score of 33.  Patient needs to have a MediPort placed.  She is also concerned about pain in her right upper quadrant and she's convinced herself that she may have metastatic disease there.    Allergies: No Known Allergies      Home Medications:  Prior to Admission medications    Medication Sig Start Date End Date Taking? Authorizing Provider   desvenlafaxine (PRISTIQ) 50 MG 24 hr tablet Take 1 tablet by mouth Daily. 9/10/18  Yes Sally Cunningham APRN   diazePAM (VALIUM) 5 MG tablet Take 1 tablet by mouth Every 6 (Six) Hours As Needed for Anxiety. 8/31/18  Yes Sally Cunningham APRN   pantoprazole (PROTONIX) 40 MG EC tablet Take 1 tablet by mouth Daily. 8/6/18  Yes Sally Cunningham APRN   busPIRone (BUSPAR) 5 MG tablet Take 5 mg by mouth 3 (Three) Times a Day As Needed.    Provider, MD Ranjit   HYDROcodone-acetaminophen (NORCO) 7.5-325 MG per tablet Take 1 tablet by mouth Every 4 (Four) Hours As Needed for Moderate Pain. 9/6/18   Abdoul Ortega MD   hydrocortisone (PROCTOSOL HC) 2.5 % rectal cream Apply to affected area topically 2 times every day for 2 weeks, and then as needed. 2/7/18          Social History     Social History   • Marital status: Single     Spouse name: N/A   • Number of children: N/A   • Years of education: N/A     Occupational History   • Not on file.     Social History Main Topics   • Smoking status: Current Some Day Smoker   • Smokeless tobacco: Never Used      Comment: smokes 2 cigs/ day   • Alcohol use No   • Drug use: No   • Sexual activity: Defer     Other Topics Concern   • Not on file     Social History Narrative   • No narrative on file       Past Medical History:   Diagnosis Date   • Anxiety    • Cancer (CMS/HCC)     breast  right   • Depressive disorder    • Encounter for gynecological examination    • Encounter for vision screening     Vision screen (1)     • Health examination of defined subpopulation     Health examination of sub-group     • Malaise and fatigue    • Palpitations    • Polyp of colon, villous adenoma    • Skin cancer     skin   • Soft tissue swelling     right side of neck-2 different areas    • Urinary tract infectious disease    • Wears glasses        Family History   Problem Relation Age of Onset   • Bipolar disorder Mother    • Schizophrenia Mother    • Colon cancer Father         Colorectal Cancer   • Cancer Father    • No Known Problems Sister        Past Surgical History:   Procedure Laterality Date   •  SECTION     • COLONOSCOPY N/A 3/1/2018    Procedure: COLONOSCOPY;  Surgeon: Shin Vanessa DO;  Location: [facility] ENDOSCOPY;  Service:    • MASTECTOMY Right    • MASTECTOMY WITH SENTINEL NODE BIOPSY AND AXILLARY NODE DISSECTION Right 2018    Procedure: INJECT RIGHT BREAST AND THEN PERFORM SENTINEL LYMPH NODE BIOPSY AND THEN  REMOVE RIGHT BREAST AND NIPPLE AND        (INJECTION ON SDS @7:00 A.M.);  Surgeon: Abdoul Ortega MD;  Location: [facility] OR;  Service: General   • PAP SMEAR     • US GUIDED FINE NEEDLE ASPIRATION  2018   Review of systems  Denies chest pain  Denies shortness breath  No prior neck surgery  No arm swelling      Alert and appropriate  Neck unremarkable no obvious scars  Mastectomy incision clean and healing and flap stuck down nicely no obvious fluid collections  Lungs clear heart regular rate and rhythm  Abdomen unremarkable  Skin warm and dry    Assessment and plan  MediPort placement.  I fully discussed procedure alternatives risk benefits with the patient she clearly understands and wishes to proceed.    Regarding her right upper quadrant pain I would recommend a gallbladder and/or liver ultrasound.  She agrees and will call her with the  results.

## 2018-10-03 LAB
HAV IGM SERPL QL IA: NEGATIVE
HBV CORE IGM SERPL QL IA: NEGATIVE
HBV SURFACE AG SERPL QL IA: NEGATIVE
HCV AB SER DONR QL: NEGATIVE

## 2018-10-05 ENCOUNTER — ANESTHESIA (OUTPATIENT)
Dept: GASTROENTEROLOGY | Facility: HOSPITAL | Age: 38
End: 2018-10-05

## 2018-10-05 ENCOUNTER — HOSPITAL ENCOUNTER (OUTPATIENT)
Facility: HOSPITAL | Age: 38
Setting detail: HOSPITAL OUTPATIENT SURGERY
Discharge: HOME | End: 2018-10-05
Attending: INTERNAL MEDICINE | Admitting: INTERNAL MEDICINE

## 2018-10-05 ENCOUNTER — ANESTHESIA EVENT (OUTPATIENT)
Dept: GASTROENTEROLOGY | Facility: HOSPITAL | Age: 38
End: 2018-10-05

## 2018-10-05 VITALS
TEMPERATURE: 96.9 F | OXYGEN SATURATION: 99 % | SYSTOLIC BLOOD PRESSURE: 116 MMHG | HEIGHT: 61 IN | DIASTOLIC BLOOD PRESSURE: 70 MMHG | WEIGHT: 149 LBS | HEART RATE: 80 BPM | BODY MASS INDEX: 28.13 KG/M2 | RESPIRATION RATE: 18 BRPM

## 2018-10-05 DIAGNOSIS — R13.19 ESOPHAGEAL DYSPHAGIA: ICD-10-CM

## 2018-10-05 PROCEDURE — 88305 TISSUE EXAM BY PATHOLOGIST: CPT | Performed by: PATHOLOGY

## 2018-10-05 PROCEDURE — 25010000002 PROPOFOL 10 MG/ML EMULSION: Performed by: NURSE ANESTHETIST, CERTIFIED REGISTERED

## 2018-10-05 PROCEDURE — 88305 TISSUE EXAM BY PATHOLOGIST: CPT | Performed by: INTERNAL MEDICINE

## 2018-10-05 RX ORDER — DEXTROSE AND SODIUM CHLORIDE 5; .45 G/100ML; G/100ML
20 INJECTION, SOLUTION INTRAVENOUS CONTINUOUS
Status: DISCONTINUED | OUTPATIENT
Start: 2018-10-05 | End: 2018-10-05 | Stop reason: HOSPADM

## 2018-10-05 RX ORDER — PROPOFOL 10 MG/ML
VIAL (ML) INTRAVENOUS AS NEEDED
Status: DISCONTINUED | OUTPATIENT
Start: 2018-10-05 | End: 2018-10-05 | Stop reason: SURG

## 2018-10-05 RX ORDER — LIDOCAINE HYDROCHLORIDE 10 MG/ML
INJECTION, SOLUTION INFILTRATION; PERINEURAL AS NEEDED
Status: DISCONTINUED | OUTPATIENT
Start: 2018-10-05 | End: 2018-10-05 | Stop reason: SURG

## 2018-10-05 RX ADMIN — LIDOCAINE HYDROCHLORIDE 100 MG: 10 INJECTION, SOLUTION INFILTRATION; PERINEURAL at 15:01

## 2018-10-05 RX ADMIN — DEXTROSE AND SODIUM CHLORIDE 20 ML/HR: 5; 450 INJECTION, SOLUTION INTRAVENOUS at 14:13

## 2018-10-05 RX ADMIN — PROPOFOL 50 MG: 10 INJECTION, EMULSION INTRAVENOUS at 15:04

## 2018-10-05 RX ADMIN — PROPOFOL 50 MG: 10 INJECTION, EMULSION INTRAVENOUS at 15:07

## 2018-10-05 RX ADMIN — PROPOFOL 150 MG: 10 INJECTION, EMULSION INTRAVENOUS at 15:01

## 2018-10-05 NOTE — ANESTHESIA POSTPROCEDURE EVALUATION
Patient: Moy Sparrow    Procedure Summary     Date:  10/05/18 Room / Location:  [facility] ENDOSCOPY 2 / [facility] ENDOSCOPY    Anesthesia Start:  1453 Anesthesia Stop:  1510    Procedure:  ESOPHAGOGASTRODUODENOSCOPY (N/A ) Diagnosis:       Esophageal dysphagia      (Esophageal dysphagia [R13.10])    Surgeon:  Shin Vansesa DO Provider:  Tatiana Gil CRNA    Anesthesia Type:  MAC ASA Status:  3          Anesthesia Type: MAC  Last vitals  BP   110/65 (10/05/18 1406)   Temp   96.6 °F (35.9 °C) (10/05/18 1406)   Pulse   68 (10/05/18 1406)   Resp   18 (10/05/18 1406)     SpO2   100 % (10/05/18 1406)     Post Anesthesia Care and Evaluation    Patient location during evaluation: bedside  Patient participation: waiting for patient participation  Level of consciousness: sleepy but conscious  Pain score: 0  Pain management: adequate  Airway patency: patent  Anesthetic complications: No anesthetic complications  PONV Status: none  Cardiovascular status: acceptable  Respiratory status: acceptable  Hydration status: acceptable

## 2018-10-05 NOTE — ANESTHESIA PREPROCEDURE EVALUATION
Anesthesia Evaluation     NPO Solid Status: > 8 hours  NPO Liquid Status: > 8 hours           Airway   Mallampati: II  TM distance: >3 FB  Neck ROM: full  No difficulty expected  Dental - normal exam     Pulmonary - normal exam   Cardiovascular - normal exam        Neuro/Psych  (+) dizziness/light headedness,     GI/Hepatic/Renal/Endo      Musculoskeletal     (+) neck pain,   Abdominal  - normal exam   Substance History      OB/GYN          Other      history of cancer active                    Anesthesia Plan    ASA 3     MAC     intravenous induction   Anesthetic plan, all risks, benefits, and alternatives have been provided, discussed and informed consent has been obtained with: patient.

## 2018-10-05 NOTE — H&P
Magen Ospina DO,[facility]  Gastroenterology  Hepatology  Endoscopy  Board Certified in Internal Medicine and gastroenterology  [address], [address]  [place]. [address]   [phone]   F [phone]     GASTROENTEROLOGY HISTORY AND PHYSICAL  NOTE   MAGEN OSPINA DO.         SUBJECTIVE:   10/5/2018    Name: Moy Sparrow  DOD: 1980        Chief Complaint:     Subjective : Globus sensation.  History of Candida esophagitis.  Personal history of breast cancer    Patient is 38 y.o. female presents with desire for elective EGD for evaluation for etiology of globus.      ROS/HISTORY/ CURRENT MEDICATIONS/OBJECTIVE/VS/PE:   Review of Systems:   Review of Systems    History:     Past Medical History:   Diagnosis Date   • Anxiety    • Cancer (CMS/HCC)     breast right   • Depressive disorder    • Encounter for gynecological examination    • Encounter for vision screening     Vision screen (1)     • Health examination of defined subpopulation     Health examination of sub-group     • Malaise and fatigue    • Palpitations    • Polyp of colon, villous adenoma    • Skin cancer     skin   • Soft tissue swelling     right side of neck-2 different areas    • Urinary tract infectious disease    • Wears glasses      Past Surgical History:   Procedure Laterality Date   •  SECTION     • COLONOSCOPY N/A 3/1/2018    Procedure: COLONOSCOPY;  Surgeon: Magen Ospina DO;  Location: [facility] ENDOSCOPY;  Service:    • MASTECTOMY Right    • MASTECTOMY WITH SENTINEL NODE BIOPSY AND AXILLARY NODE DISSECTION Right 2018    Procedure: INJECT RIGHT BREAST AND THEN PERFORM SENTINEL LYMPH NODE BIOPSY AND THEN  REMOVE RIGHT BREAST AND NIPPLE AND        (INJECTION ON SDS @7:00 A.M.);  Surgeon: Abdoul Ortega MD;  Location: [facility] OR;  Service: General   • PAP SMEAR     • US GUIDED FINE NEEDLE ASPIRATION  2018     Family History   Problem Relation Age of Onset   • Bipolar disorder  Mother    • Schizophrenia Mother    • Colon cancer Father         Colorectal Cancer   • Cancer Father    • No Known Problems Sister      Social History   Substance Use Topics   • Smoking status: Current Some Day Smoker   • Smokeless tobacco: Never Used      Comment: smokes 2 cigs/ day   • Alcohol use No     Prescriptions Prior to Admission   Medication Sig Dispense Refill Last Dose   • busPIRone (BUSPAR) 5 MG tablet Take 5 mg by mouth 3 (Three) Times a Day As Needed.   10/4/2018 at Unknown time   • desvenlafaxine (PRISTIQ) 50 MG 24 hr tablet Take 1 tablet by mouth Daily. 30 tablet 11 10/4/2018 at Unknown time   • diazePAM (VALIUM) 5 MG tablet Take 1 tablet by mouth Every 6 (Six) Hours As Needed for Anxiety. 60 tablet 0 10/4/2018 at Unknown time   • pantoprazole (PROTONIX) 40 MG EC tablet Take 1 tablet by mouth Daily. 30 tablet 11 10/4/2018 at Unknown time   • HYDROcodone-acetaminophen (NORCO) 7.5-325 MG per tablet Take 1 tablet by mouth Every 4 (Four) Hours As Needed for Moderate Pain. 15 tablet 0 Unknown at Unknown time   • hydrocortisone (PROCTOSOL HC) 2.5 % rectal cream Apply to affected area topically 2 times every day for 2 weeks, and then as needed. 28.35 g 5 Unknown at Unknown time     Allergies:  Patient has no known allergies.    I have reviewed the patients medical history, surgical history and family history in the available medical record system.     Current Medications:     Current Facility-Administered Medications   Medication Dose Route Frequency Provider Last Rate Last Dose   • dextrose 5 % and sodium chloride 0.45 % infusion  20 mL/hr Intravenous Continuous Shin Vanessa DO 20 mL/hr at 10/05/18 1413 20 mL/hr at 10/05/18 1413       Objective     Physical Exam:   Temp:  [96.6 °F (35.9 °C)] 96.6 °F (35.9 °C)  Heart Rate:  [68] 68  Resp:  [18] 18  BP: (110)/(65) 110/65    Physical Exam:  General Appearance:    Alert, cooperative, in no acute distress   Head:    Normocephalic, without obvious  abnormality, atraumatic   Eyes:            Lids and lashes normal, conjunctivae and sclerae normal, no   icterus, no pallor, corneas clear, PERRLA   Ears:    Ears appear intact with no abnormalities noted   Throat:   No oral lesions, no thrush, oral mucosa moist   Neck:   No adenopathy, supple, trachea midline, no thyromegaly, no     carotid bruit, no JVD   Back:     No kyphosis present, no scoliosis present, no skin lesions,       erythema or scars, no tenderness to percussion or                   palpation,   range of motion normal   Lungs:     Clear to auscultation,respirations regular, even and                   unlabored    Heart:    Regular rhythm and normal rate, normal S1 and S2, no            murmur, no gallop, no rub, no click   Breast Exam:    Deferred   Abdomen:     Normal bowel sounds, no masses, no organomegaly, soft        non-tender, non-distended, no guarding, no rebound                 tenderness   Genitalia:    Deferred   Extremities:   Moves all extremities well, no edema, no cyanosis, no              redness   Pulses:   Pulses palpable and equal bilaterally   Skin:   No bleeding, bruising or rash   Lymph nodes:   No palpable adenopathy   Neurologic:   Cranial nerves 2 - 12 grossly intact, sensation intact, DTR        present and equal bilaterally      Results Review:     Lab Results   Component Value Date    WBC 8.75 09/24/2018    WBC 6.88 11/01/2017    HGB 13.1 09/24/2018    HGB 13.1 11/01/2017    HCT 37.7 09/24/2018    HCT 39.7 11/01/2017     09/24/2018     11/01/2017             No results found for: LIPASE  No results found for: INR       Radiology Review:  Imaging Results (last 72 hours)     ** No results found for the last 72 hours. **           I reviewed the patient's new clinical results.  I reviewed the patient's new imaging results and agree with the interpretation.     ASSESSMENT/PLAN:   ASSESSMENT:   1.  Globus sensation  2.  Acid reflux, not resolved with daily  PPI  3.  Personal history of breast cancer    PLAN:   1.  Esophagogastroduodenoscopy with biopsies as indicated    Risk and benefits associated with the procedure are reviewed with the patient.  The patient wishes to proceed      Shin Vanessa DO  10/05/18  2:48 PM

## 2018-10-08 LAB
LAB AP CASE REPORT: NORMAL
PATH REPORT.FINAL DX SPEC: NORMAL
PATH REPORT.GROSS SPEC: NORMAL

## 2018-10-09 DIAGNOSIS — Z17.0 MALIGNANT NEOPLASM OF UPPER-OUTER QUADRANT OF RIGHT BREAST IN FEMALE, ESTROGEN RECEPTOR POSITIVE (HCC): Primary | ICD-10-CM

## 2018-10-09 DIAGNOSIS — C50.411 MALIGNANT NEOPLASM OF UPPER-OUTER QUADRANT OF RIGHT BREAST IN FEMALE, ESTROGEN RECEPTOR POSITIVE (HCC): Primary | ICD-10-CM

## 2018-10-09 RX ORDER — DEXAMETHASONE 4 MG/1
TABLET ORAL
Qty: 6 TABLET | Refills: 3 | Status: SHIPPED | OUTPATIENT
Start: 2018-10-09 | End: 2018-11-26 | Stop reason: SDUPTHER

## 2018-10-09 RX ORDER — ONDANSETRON HYDROCHLORIDE 8 MG/1
8 TABLET, FILM COATED ORAL 3 TIMES DAILY PRN
Qty: 90 TABLET | Refills: 3 | Status: SHIPPED | OUTPATIENT
Start: 2018-10-09 | End: 2018-12-10

## 2018-10-10 ENCOUNTER — ANESTHESIA EVENT (OUTPATIENT)
Dept: PERIOP | Facility: HOSPITAL | Age: 38
End: 2018-10-10

## 2018-10-10 ENCOUNTER — ANESTHESIA (OUTPATIENT)
Dept: PERIOP | Facility: HOSPITAL | Age: 38
End: 2018-10-10

## 2018-10-10 ENCOUNTER — APPOINTMENT (OUTPATIENT)
Dept: GENERAL RADIOLOGY | Facility: HOSPITAL | Age: 38
End: 2018-10-10

## 2018-10-10 ENCOUNTER — HOSPITAL ENCOUNTER (OUTPATIENT)
Facility: HOSPITAL | Age: 38
Setting detail: HOSPITAL OUTPATIENT SURGERY
Discharge: HOME | End: 2018-10-10
Attending: SURGERY | Admitting: SURGERY

## 2018-10-10 VITALS
OXYGEN SATURATION: 98 % | RESPIRATION RATE: 16 BRPM | SYSTOLIC BLOOD PRESSURE: 111 MMHG | TEMPERATURE: 97.9 F | DIASTOLIC BLOOD PRESSURE: 64 MMHG | BODY MASS INDEX: 28.47 KG/M2 | WEIGHT: 150.79 LBS | HEART RATE: 90 BPM | HEIGHT: 61 IN

## 2018-10-10 PROCEDURE — 25010000002 IOPAMIDOL 61 % SOLUTION: Performed by: SURGERY

## 2018-10-10 PROCEDURE — 76937 US GUIDE VASCULAR ACCESS: CPT | Performed by: SURGERY

## 2018-10-10 PROCEDURE — 25010000002 MIDAZOLAM PER 1 MG: Performed by: NURSE ANESTHETIST, CERTIFIED REGISTERED

## 2018-10-10 PROCEDURE — 36561 INSERT TUNNELED CV CATH: CPT | Performed by: SURGERY

## 2018-10-10 PROCEDURE — 25010000002 FENTANYL CITRATE (PF) 100 MCG/2ML SOLUTION: Performed by: NURSE ANESTHETIST, CERTIFIED REGISTERED

## 2018-10-10 PROCEDURE — 76000 FLUOROSCOPY <1 HR PHYS/QHP: CPT

## 2018-10-10 PROCEDURE — 25010000002 ONDANSETRON PER 1 MG: Performed by: NURSE ANESTHETIST, CERTIFIED REGISTERED

## 2018-10-10 PROCEDURE — 77001 FLUOROGUIDE FOR VEIN DEVICE: CPT | Performed by: SURGERY

## 2018-10-10 PROCEDURE — C1788 PORT, INDWELLING, IMP: HCPCS | Performed by: SURGERY

## 2018-10-10 PROCEDURE — 25010000003 CEFAZOLIN PER 500 MG: Performed by: NURSE ANESTHETIST, CERTIFIED REGISTERED

## 2018-10-10 PROCEDURE — 25010000002 DEXAMETHASONE PER 1 MG: Performed by: NURSE ANESTHETIST, CERTIFIED REGISTERED

## 2018-10-10 PROCEDURE — 25010000002 PROPOFOL 10 MG/ML EMULSION: Performed by: NURSE ANESTHETIST, CERTIFIED REGISTERED

## 2018-10-10 DEVICE — POWERPORT CLEARVUE IMPLANTABLE PORT WITH ATTACHABLE 8F POLYURETHANE OPEN-ENDED SINGLE-LUMEN VENOUS CATHETER INTERMEDIATE KIT
Type: IMPLANTABLE DEVICE | Site: CHEST | Status: FUNCTIONAL
Brand: POWERPORT CLEARVUE

## 2018-10-10 RX ORDER — EPHEDRINE SULFATE 50 MG/ML
5 INJECTION, SOLUTION INTRAVENOUS ONCE AS NEEDED
Status: DISCONTINUED | OUTPATIENT
Start: 2018-10-10 | End: 2018-10-10 | Stop reason: HOSPADM

## 2018-10-10 RX ORDER — PROPOFOL 10 MG/ML
VIAL (ML) INTRAVENOUS AS NEEDED
Status: DISCONTINUED | OUTPATIENT
Start: 2018-10-10 | End: 2018-10-10 | Stop reason: SURG

## 2018-10-10 RX ORDER — DIPHENHYDRAMINE HYDROCHLORIDE 50 MG/ML
12.5 INJECTION INTRAMUSCULAR; INTRAVENOUS
Status: DISCONTINUED | OUTPATIENT
Start: 2018-10-10 | End: 2018-10-10 | Stop reason: HOSPADM

## 2018-10-10 RX ORDER — LIDOCAINE HYDROCHLORIDE 20 MG/ML
INJECTION, SOLUTION INFILTRATION; PERINEURAL AS NEEDED
Status: DISCONTINUED | OUTPATIENT
Start: 2018-10-10 | End: 2018-10-10 | Stop reason: SURG

## 2018-10-10 RX ORDER — MEPERIDINE HYDROCHLORIDE 50 MG/ML
12.5 INJECTION INTRAMUSCULAR; INTRAVENOUS; SUBCUTANEOUS
Status: DISCONTINUED | OUTPATIENT
Start: 2018-10-10 | End: 2018-10-10 | Stop reason: HOSPADM

## 2018-10-10 RX ORDER — SODIUM CHLORIDE, SODIUM GLUCONATE, SODIUM ACETATE, POTASSIUM CHLORIDE, AND MAGNESIUM CHLORIDE 526; 502; 368; 37; 30 MG/100ML; MG/100ML; MG/100ML; MG/100ML; MG/100ML
1000 INJECTION, SOLUTION INTRAVENOUS CONTINUOUS
Status: DISCONTINUED | OUTPATIENT
Start: 2018-10-10 | End: 2018-10-10 | Stop reason: HOSPADM

## 2018-10-10 RX ORDER — ONDANSETRON 2 MG/ML
INJECTION INTRAMUSCULAR; INTRAVENOUS AS NEEDED
Status: DISCONTINUED | OUTPATIENT
Start: 2018-10-10 | End: 2018-10-10 | Stop reason: SURG

## 2018-10-10 RX ORDER — ONDANSETRON 2 MG/ML
4 INJECTION INTRAMUSCULAR; INTRAVENOUS ONCE AS NEEDED
Status: DISCONTINUED | OUTPATIENT
Start: 2018-10-10 | End: 2018-10-10 | Stop reason: HOSPADM

## 2018-10-10 RX ORDER — DEXAMETHASONE SODIUM PHOSPHATE 4 MG/ML
INJECTION, SOLUTION INTRA-ARTICULAR; INTRALESIONAL; INTRAMUSCULAR; INTRAVENOUS; SOFT TISSUE AS NEEDED
Status: DISCONTINUED | OUTPATIENT
Start: 2018-10-10 | End: 2018-10-10 | Stop reason: SURG

## 2018-10-10 RX ORDER — ACETAMINOPHEN 325 MG/1
650 TABLET ORAL ONCE AS NEEDED
Status: DISCONTINUED | OUTPATIENT
Start: 2018-10-10 | End: 2018-10-10 | Stop reason: HOSPADM

## 2018-10-10 RX ORDER — FLUMAZENIL 0.1 MG/ML
0.2 INJECTION INTRAVENOUS AS NEEDED
Status: DISCONTINUED | OUTPATIENT
Start: 2018-10-10 | End: 2018-10-10 | Stop reason: HOSPADM

## 2018-10-10 RX ORDER — LABETALOL HYDROCHLORIDE 5 MG/ML
5 INJECTION, SOLUTION INTRAVENOUS
Status: DISCONTINUED | OUTPATIENT
Start: 2018-10-10 | End: 2018-10-10 | Stop reason: HOSPADM

## 2018-10-10 RX ORDER — ACETAMINOPHEN 650 MG/1
650 SUPPOSITORY RECTAL ONCE AS NEEDED
Status: DISCONTINUED | OUTPATIENT
Start: 2018-10-10 | End: 2018-10-10 | Stop reason: HOSPADM

## 2018-10-10 RX ORDER — NALOXONE HCL 0.4 MG/ML
0.2 VIAL (ML) INJECTION AS NEEDED
Status: DISCONTINUED | OUTPATIENT
Start: 2018-10-10 | End: 2018-10-10 | Stop reason: HOSPADM

## 2018-10-10 RX ORDER — CEFAZOLIN SODIUM 1 G/3ML
INJECTION, POWDER, FOR SOLUTION INTRAMUSCULAR; INTRAVENOUS AS NEEDED
Status: DISCONTINUED | OUTPATIENT
Start: 2018-10-10 | End: 2018-10-10 | Stop reason: SURG

## 2018-10-10 RX ORDER — HYDROCODONE BITARTRATE AND ACETAMINOPHEN 5; 325 MG/1; MG/1
1 TABLET ORAL EVERY 6 HOURS PRN
Qty: 12 TABLET | Refills: 0 | Status: SHIPPED | OUTPATIENT
Start: 2018-10-10 | End: 2018-12-27

## 2018-10-10 RX ORDER — FENTANYL CITRATE 50 UG/ML
INJECTION, SOLUTION INTRAMUSCULAR; INTRAVENOUS AS NEEDED
Status: DISCONTINUED | OUTPATIENT
Start: 2018-10-10 | End: 2018-10-10 | Stop reason: SURG

## 2018-10-10 RX ORDER — MIDAZOLAM HYDROCHLORIDE 1 MG/ML
INJECTION INTRAMUSCULAR; INTRAVENOUS AS NEEDED
Status: DISCONTINUED | OUTPATIENT
Start: 2018-10-10 | End: 2018-10-10 | Stop reason: SURG

## 2018-10-10 RX ADMIN — SODIUM CHLORIDE, SODIUM GLUCONATE, SODIUM ACETATE, POTASSIUM CHLORIDE, AND MAGNESIUM CHLORIDE 1000 ML: 526; 502; 368; 37; 30 INJECTION, SOLUTION INTRAVENOUS at 06:27

## 2018-10-10 RX ADMIN — ONDANSETRON 4 MG: 2 INJECTION INTRAMUSCULAR; INTRAVENOUS at 07:35

## 2018-10-10 RX ADMIN — CEFAZOLIN SODIUM 1 G: 1 INJECTION, POWDER, FOR SOLUTION INTRAMUSCULAR; INTRAVENOUS at 07:27

## 2018-10-10 RX ADMIN — DEXAMETHASONE SODIUM PHOSPHATE 4 MG: 4 INJECTION, SOLUTION INTRAMUSCULAR; INTRAVENOUS at 07:35

## 2018-10-10 RX ADMIN — PROPOFOL 150 MG: 10 INJECTION, EMULSION INTRAVENOUS at 07:15

## 2018-10-10 RX ADMIN — FENTANYL CITRATE 25 MCG: 50 INJECTION, SOLUTION INTRAMUSCULAR; INTRAVENOUS at 07:42

## 2018-10-10 RX ADMIN — FENTANYL CITRATE 25 MCG: 50 INJECTION, SOLUTION INTRAMUSCULAR; INTRAVENOUS at 07:26

## 2018-10-10 RX ADMIN — FENTANYL CITRATE 25 MCG: 50 INJECTION, SOLUTION INTRAMUSCULAR; INTRAVENOUS at 07:33

## 2018-10-10 RX ADMIN — LIDOCAINE HYDROCHLORIDE 80 MG: 20 INJECTION, SOLUTION INFILTRATION; PERINEURAL at 07:15

## 2018-10-10 RX ADMIN — FENTANYL CITRATE 25 MCG: 50 INJECTION, SOLUTION INTRAMUSCULAR; INTRAVENOUS at 07:20

## 2018-10-10 RX ADMIN — MIDAZOLAM HYDROCHLORIDE 2 MG: 2 INJECTION, SOLUTION INTRAMUSCULAR; INTRAVENOUS at 07:26

## 2018-10-10 NOTE — ANESTHESIA PROCEDURE NOTES
Airway  Urgency: elective    Date/Time: 10/10/2018 7:17 AM  End Time:10/10/2018 7:17 AM    General Information and Staff    Patient location during procedure: OR  CRNA: KEVIN GUTIERREZ    Indications and Patient Condition  Indications for airway management: airway protection    Preoxygenated: yes  MILS maintained throughout  Mask difficulty assessment: 0 - not attempted    Final Airway Details  Final airway type: supraglottic airway      Successful airway: I-gel  Size 3    Number of attempts at approach: 1

## 2018-10-10 NOTE — OP NOTE
INSERTION VENOUS ACCESS DEVICE  Procedure Note    Moy Feng Andrews  10/10/2018    Pre-op Diagnosis:   Malignant neoplasm of upper-outer quadrant of right breast in female, estrogen receptor positive (CMS/HCC) [C50.411, Z17.0]    Post-op Diagnosis:     Post-Op Diagnosis Codes:     * Malignant neoplasm of upper-outer quadrant of right breast in female, estrogen receptor positive (CMS/HCC) [C50.411, Z17.0]    Procedure/CPT® Codes:      Procedure(s):  MEDIPORT PLACEMENT      (LEFT SIDE,UNATTACHED PORT )      (C-ARM)    Surgeon(s):  Abdoul Ortega MD    Anesthesia: General    Staff:   Circulator: Silvia Figueredo RN  Radiology Technologist: Lindsay Ambriz  Scrub Person: Stephanie Alejo; Billie Tavares  Assistant: Shruthi Ziegler CSA    Estimated Blood Loss: minimal    Specimens:                None      Drains:   Closed/Suction Drain 1 Right Breast Bulb 19 Fr. (Active)       Indications: 38-year-old female recently underwent mastectomy for breast cancer presents now for MediPort placement for adjuvant chemotherapy.     Findings:  Catheter in good position under fluoroscopy and aspirates and flushes nicely at the completion    Complications:  None    Procedure:  Patient was brought to the operating room where she was placed under general anesthesia without any difficulty. She had SCDs in place, received Ancef IV antibiotics. Her left neck and upper chest was prepped and draped in the normal sterile fashion. Appropriate timeout was taken. Patient was placed in Trendelenburg position. We evaluated left IJ with ultrasound. Appeared to be patent by ultrasound criteria. We accessed it with the 1st attempt with the needle and then placed the wire. Wire did not slide in easily. At that point, we had to readjust the needle, which we did, and this time the wire passed easily. We checked under fluoroscopy. The wire laid nicely, in good position. We then mapped out the tunnel as well as the pocket, infiltrated that with  local. Made our skin incision for the pocket sharply and then used electrocautery and some blunt dissection, developed a subcutaneous pocket for the port. We brought the catheter from the pocket to the access site and then again with the patient in Trendelenburg position we placed the dilator over the guidewire and then removed dilator and then placed the dilator with Peel Away sheath over the guidewire, this time removing the dilator and the guidewire with the Peel Away sheath in position. We then fed the catheter through the Peel Away sheath. We checked under fluoroscopy as we did this and we had to redirect the catheter, which we did. The catheter laid in good position. We took away the Peel Away sheath. We then brought the catheter back under fluoroscopy to where the tip was in good position. We flushed the catheter with contrast and confirmed the tip. We then flushed the catheter with heparinized saline. We then cut the catheter to the appropriate length and connected it to the port and then snapped the connector together. We then aspirated and flushed this with heparinized saline. We then checked under fluoroscopy one more time. Could not see the tip of the catheter and was concerned that we pulled the catheter back, so we put some contrast through the catheter and the port and confirmed that the tip of the catheter was in good position. We then aspirated and flushed with heparinized flush the port and the catheter. We placed the port into position into the pocket and then placed a 2-0 Prolene suture superiorly on each side. The pocket was then closed with 3-0 Vicryl and a running 4-0 Monocryl subcuticular stitch. Access site was closed with 4-0 Monocryl subcuticular stitch. Glue was used for final skin closure at both sites.  Patient was awakened, extubated, and transferred to the recovery room in awake and stable condition.Chest x-ray in the recovery room demonstrates the port and catheter to be in good  position.         Disposition:  She'll be discharged per same-day surgery protocol.  She'll follow up with [facility] as scheduled she was given 12 Norco 5 tablets these on a when necessary basis for pain        Abdoul Ortega MD     Date: 10/10/2018  Time: 8:12 AM

## 2018-10-10 NOTE — ANESTHESIA PREPROCEDURE EVALUATION
Anesthesia Evaluation     no history of anesthetic complications:  NPO Solid Status: > 8 hours  NPO Liquid Status: > 2 hours           Airway   Mallampati: II  TM distance: >3 FB  Neck ROM: full  Possible difficult intubation  Dental - normal exam     Pulmonary - normal exam    breath sounds clear to auscultation  (+) a smoker (quit 2 months ago) Current,     ROS comment: FINDINGS:             - lines/tubes:    none     - cardiac:         size within normal limits         - mediastinum: contour within normal limits         - lungs:         no focal air space process, pulmonary  interstitial edema, nodule(s)/mass             - pleura:         no evidence of  fluid                  - osseous:         unremarkable for age                  - misc.:       IMPRESSION:  CONCLUSION:        1. No evidence of an active cardiopulmonary process.                                                  Electronically signed by:  KENDRA Sparrow MD  7/2/2018 4:01 PM  Cardiovascular - negative cardio ROS and normal exam  Exercise tolerance: good (4-7 METS)    Rhythm: regular  Rate: normal    (-) angina, murmur      Neuro/Psych  (+) dizziness/light headedness (Vertigo.), psychiatric history Anxiety and Depression,     GI/Hepatic/Renal/Endo    (+)  GERD well controlled,      Musculoskeletal     (+) neck pain (Cervicalgia.),       ROS comment: Procedure: Cervical spine     Indication:  Neck pain        Technique:  Three    views     Prior relevant exam:  None       No evidence of acute bone abnormality is noted. No prevertebral  soft tissue swelling or widening of interspinous process is  noted. Disc heights are well maintained at all levels. Bone  mineralization is overall within normal limits.     IMPRESSION:  CONCLUSION:   1.  Normal examination of the cervical spine.     Electronically signed by:  Dilan Fabian MD  7/2/2018 4:08 PM CDT  Abdominal    Substance History - negative use     OB/GYN negative ob/gyn ROS   (-)  Pregnant         Other      history of cancer (breast)                      Anesthesia Plan    ASA 3     general     intravenous induction   Anesthetic plan, all risks, benefits, and alternatives have been provided, discussed and informed consent has been obtained with: patient and spouse/significant other.

## 2018-10-10 NOTE — INTERVAL H&P NOTE
GB u/s was negative for any gallstones or liver lesions.  H&P reviewed. The patient was examined and there are no changes to the H&P.

## 2018-10-10 NOTE — ANESTHESIA POSTPROCEDURE EVALUATION
Patient: Moy Sparrow    Procedure Summary     Date:  10/10/18 Room / Location:  [facility] OR 09 / [facility] OR    Anesthesia Start:  0712 Anesthesia Stop:  0812    Procedure:  MEDIPORT PLACEMENT      (LEFT SIDE,UNATTACHED PORT )      (C-ARM) (Left Chest) Diagnosis:       Malignant neoplasm of upper-outer quadrant of right breast in female, estrogen receptor positive (CMS/HCC)      (Malignant neoplasm of upper-outer quadrant of right breast in female, estrogen receptor positive (CMS/HCC) [C50.411, Z17.0])    Surgeon:  Abdoul Ortega MD Provider:  Alvin Faith MD    Anesthesia Type:  general ASA Status:  3          Anesthesia Type: general  Last vitals  BP   100/62 (10/10/18 0616)   Temp   98.4 °F (36.9 °C) (10/10/18 0616)   Pulse   70 (10/10/18 0616)   Resp   18 (10/10/18 0616)     SpO2   98 % (10/10/18 0616)     Post Anesthesia Care and Evaluation    Patient location during evaluation: PACU  Patient participation: complete - patient participated  Level of consciousness: awake  Pain score: 0  Pain management: adequate  Airway patency: patent  Anesthetic complications: No anesthetic complications  PONV Status: none  Cardiovascular status: acceptable  Respiratory status: acceptable  Hydration status: acceptable

## 2018-10-10 NOTE — H&P (VIEW-ONLY)
38-year-old female now nearly 4 weeks out from her right mastectomy and sentinel lymph node biopsy for breast cancer.  She had 1 lymph node that had a micro-met present.  She is seen medical oncology and they've recommended chemotherapy due to high Oncotype DX score of 33.  Patient needs to have a MediPort placed.  She is also concerned about pain in her right upper quadrant and she's convinced herself that she may have metastatic disease there.    Allergies: No Known Allergies      Home Medications:  Prior to Admission medications    Medication Sig Start Date End Date Taking? Authorizing Provider   desvenlafaxine (PRISTIQ) 50 MG 24 hr tablet Take 1 tablet by mouth Daily. 9/10/18  Yes Sally Cunningham APRN   diazePAM (VALIUM) 5 MG tablet Take 1 tablet by mouth Every 6 (Six) Hours As Needed for Anxiety. 8/31/18  Yes Sally Cunninhgam APRN   pantoprazole (PROTONIX) 40 MG EC tablet Take 1 tablet by mouth Daily. 8/6/18  Yes Sally Cunningham APRN   busPIRone (BUSPAR) 5 MG tablet Take 5 mg by mouth 3 (Three) Times a Day As Needed.    Provider, MD Ranjit   HYDROcodone-acetaminophen (NORCO) 7.5-325 MG per tablet Take 1 tablet by mouth Every 4 (Four) Hours As Needed for Moderate Pain. 9/6/18   Abdoul Ortega MD   hydrocortisone (PROCTOSOL HC) 2.5 % rectal cream Apply to affected area topically 2 times every day for 2 weeks, and then as needed. 2/7/18          Social History     Social History   • Marital status: Single     Spouse name: N/A   • Number of children: N/A   • Years of education: N/A     Occupational History   • Not on file.     Social History Main Topics   • Smoking status: Current Some Day Smoker   • Smokeless tobacco: Never Used      Comment: smokes 2 cigs/ day   • Alcohol use No   • Drug use: No   • Sexual activity: Defer     Other Topics Concern   • Not on file     Social History Narrative   • No narrative on file       Past Medical History:   Diagnosis Date   • Anxiety    • Cancer (CMS/HCC)     breast  right   • Depressive disorder    • Encounter for gynecological examination    • Encounter for vision screening     Vision screen (1)     • Health examination of defined subpopulation     Health examination of sub-group     • Malaise and fatigue    • Palpitations    • Polyp of colon, villous adenoma    • Skin cancer     skin   • Soft tissue swelling     right side of neck-2 different areas    • Urinary tract infectious disease    • Wears glasses        Family History   Problem Relation Age of Onset   • Bipolar disorder Mother    • Schizophrenia Mother    • Colon cancer Father         Colorectal Cancer   • Cancer Father    • No Known Problems Sister        Past Surgical History:   Procedure Laterality Date   •  SECTION     • COLONOSCOPY N/A 3/1/2018    Procedure: COLONOSCOPY;  Surgeon: Shin Vanessa DO;  Location: [facility] ENDOSCOPY;  Service:    • MASTECTOMY Right    • MASTECTOMY WITH SENTINEL NODE BIOPSY AND AXILLARY NODE DISSECTION Right 2018    Procedure: INJECT RIGHT BREAST AND THEN PERFORM SENTINEL LYMPH NODE BIOPSY AND THEN  REMOVE RIGHT BREAST AND NIPPLE AND        (INJECTION ON SDS @7:00 A.M.);  Surgeon: Abdoul Ortega MD;  Location: [facility] OR;  Service: General   • PAP SMEAR     • US GUIDED FINE NEEDLE ASPIRATION  2018   Review of systems  Denies chest pain  Denies shortness breath  No prior neck surgery  No arm swelling      Alert and appropriate  Neck unremarkable no obvious scars  Mastectomy incision clean and healing and flap stuck down nicely no obvious fluid collections  Lungs clear heart regular rate and rhythm  Abdomen unremarkable  Skin warm and dry    Assessment and plan  MediPort placement.  I fully discussed procedure alternatives risk benefits with the patient she clearly understands and wishes to proceed.    Regarding her right upper quadrant pain I would recommend a gallbladder and/or liver ultrasound.  She agrees and will call her with the  results.

## 2018-10-15 ENCOUNTER — TELEPHONE (OUTPATIENT)
Dept: ONCOLOGY | Facility: HOSPITAL | Age: 38
End: 2018-10-15

## 2018-10-15 ENCOUNTER — INFUSION (OUTPATIENT)
Dept: ONCOLOGY | Facility: HOSPITAL | Age: 38
End: 2018-10-15
Attending: INTERNAL MEDICINE

## 2018-10-15 ENCOUNTER — OFFICE VISIT (OUTPATIENT)
Dept: ONCOLOGY | Facility: CLINIC | Age: 38
End: 2018-10-15
Attending: INTERNAL MEDICINE

## 2018-10-15 VITALS
RESPIRATION RATE: 18 BRPM | WEIGHT: 154.6 LBS | SYSTOLIC BLOOD PRESSURE: 116 MMHG | BODY MASS INDEX: 29.19 KG/M2 | TEMPERATURE: 98.6 F | DIASTOLIC BLOOD PRESSURE: 77 MMHG | HEART RATE: 94 BPM | HEIGHT: 61 IN | OXYGEN SATURATION: 99 %

## 2018-10-15 DIAGNOSIS — Z17.0 MALIGNANT NEOPLASM OF UPPER-OUTER QUADRANT OF RIGHT BREAST IN FEMALE, ESTROGEN RECEPTOR POSITIVE (HCC): Primary | ICD-10-CM

## 2018-10-15 DIAGNOSIS — IMO0001 OTHER COMPLICATION DUE TO VENOUS ACCESS DEVICE, INITIAL ENCOUNTER: ICD-10-CM

## 2018-10-15 DIAGNOSIS — Z45.2 ENCOUNTER FOR VENOUS ACCESS DEVICE CARE: ICD-10-CM

## 2018-10-15 DIAGNOSIS — C50.411 MALIGNANT NEOPLASM OF UPPER-OUTER QUADRANT OF RIGHT BREAST IN FEMALE, ESTROGEN RECEPTOR POSITIVE (HCC): Primary | ICD-10-CM

## 2018-10-15 DIAGNOSIS — C50.411 MALIGNANT NEOPLASM OF UPPER-OUTER QUADRANT OF RIGHT BREAST IN FEMALE, ESTROGEN RECEPTOR POSITIVE (HCC): ICD-10-CM

## 2018-10-15 DIAGNOSIS — Z17.0 MALIGNANT NEOPLASM OF UPPER-OUTER QUADRANT OF RIGHT BREAST IN FEMALE, ESTROGEN RECEPTOR POSITIVE (HCC): ICD-10-CM

## 2018-10-15 DIAGNOSIS — Z23 VACCINE FOR STREPTOCOCCUS PNEUMONIAE AND INFLUENZA: ICD-10-CM

## 2018-10-15 DIAGNOSIS — Z23 FLU VACCINE NEED: ICD-10-CM

## 2018-10-15 PROBLEM — T82.898A OTHER COMPLICATION DUE TO VENOUS ACCESS DEVICE: Status: ACTIVE | Noted: 2018-10-15

## 2018-10-15 LAB
ALBUMIN SERPL-MCNC: 4.1 G/DL (ref 3.4–4.8)
ALBUMIN/GLOB SERPL: 1.1 G/DL (ref 1.1–1.8)
ALP SERPL-CCNC: 70 U/L (ref 38–126)
ALT SERPL W P-5'-P-CCNC: 31 U/L (ref 9–52)
ANION GAP SERPL CALCULATED.3IONS-SCNC: 11 MMOL/L (ref 5–15)
AST SERPL-CCNC: 27 U/L (ref 14–36)
BASOPHILS # BLD AUTO: 0.03 10*3/MM3 (ref 0–0.2)
BASOPHILS NFR BLD AUTO: 0.3 % (ref 0–2)
BILIRUB SERPL-MCNC: 0.3 MG/DL (ref 0.2–1.3)
BUN BLD-MCNC: 19 MG/DL (ref 7–21)
BUN/CREAT SERPL: 29.2 (ref 7–25)
CALCIUM SPEC-SCNC: 8.7 MG/DL (ref 8.4–10.2)
CHLORIDE SERPL-SCNC: 101 MMOL/L (ref 95–110)
CO2 SERPL-SCNC: 23 MMOL/L (ref 22–31)
CREAT BLD-MCNC: 0.65 MG/DL (ref 0.5–1)
DEPRECATED RDW RBC AUTO: 39.9 FL (ref 36.4–46.3)
EOSINOPHIL # BLD AUTO: 0.19 10*3/MM3 (ref 0–0.7)
EOSINOPHIL NFR BLD AUTO: 2.2 % (ref 0–7)
ERYTHROCYTE [DISTWIDTH] IN BLOOD BY AUTOMATED COUNT: 12.5 % (ref 11.5–14.5)
GFR SERPL CREATININE-BSD FRML MDRD: 101 ML/MIN/1.73 (ref 58–135)
GLOBULIN UR ELPH-MCNC: 3.6 GM/DL (ref 2.3–3.5)
GLUCOSE BLD-MCNC: 96 MG/DL (ref 60–100)
HCT VFR BLD AUTO: 36.4 % (ref 35–45)
HGB BLD-MCNC: 12.5 G/DL (ref 12–15.5)
IMM GRANULOCYTES # BLD: 0.03 10*3/MM3 (ref 0–0.02)
IMM GRANULOCYTES NFR BLD: 0.3 % (ref 0–0.5)
LYMPHOCYTES # BLD AUTO: 2.83 10*3/MM3 (ref 0.6–4.2)
LYMPHOCYTES NFR BLD AUTO: 32.7 % (ref 10–50)
MCH RBC QN AUTO: 29.8 PG (ref 26.5–34)
MCHC RBC AUTO-ENTMCNC: 34.3 G/DL (ref 31.4–36)
MCV RBC AUTO: 86.9 FL (ref 80–98)
MONOCYTES # BLD AUTO: 0.85 10*3/MM3 (ref 0–0.9)
MONOCYTES NFR BLD AUTO: 9.8 % (ref 0–12)
NEUTROPHILS # BLD AUTO: 4.73 10*3/MM3 (ref 2–8.6)
NEUTROPHILS NFR BLD AUTO: 54.7 % (ref 37–80)
PLATELET # BLD AUTO: 207 10*3/MM3 (ref 150–450)
PMV BLD AUTO: 8.8 FL (ref 8–12)
POTASSIUM BLD-SCNC: 3.8 MMOL/L (ref 3.5–5.1)
PROT SERPL-MCNC: 7.7 G/DL (ref 6.3–8.6)
RBC # BLD AUTO: 4.19 10*6/MM3 (ref 3.77–5.16)
SODIUM BLD-SCNC: 135 MMOL/L (ref 137–145)
WBC NRBC COR # BLD: 8.66 10*3/MM3 (ref 3.2–9.8)

## 2018-10-15 PROCEDURE — 25010000002 FOSAPREPITANT PER 1 MG: Performed by: INTERNAL MEDICINE

## 2018-10-15 PROCEDURE — 90661 CCIIV3 VAC ABX FR 0.5 ML IM: CPT | Performed by: INTERNAL MEDICINE

## 2018-10-15 PROCEDURE — 99214 OFFICE O/P EST MOD 30 MIN: CPT | Performed by: INTERNAL MEDICINE

## 2018-10-15 PROCEDURE — 25010000002 PEGFILGRASTIM 6 MG/0.6ML PREFILLED SYRINGE KIT: Performed by: INTERNAL MEDICINE

## 2018-10-15 PROCEDURE — 25010000003 DEXAMETHASONE SODIUM PHOSPHATE 100 MG/10ML SOLUTION: Performed by: INTERNAL MEDICINE

## 2018-10-15 PROCEDURE — 25010000002 CYCLOPHOSPHAMIDE PER 100 MG: Performed by: INTERNAL MEDICINE

## 2018-10-15 PROCEDURE — 25010000002 DOXORUBICIN PER 10 MG: Performed by: INTERNAL MEDICINE

## 2018-10-15 PROCEDURE — 25010000002 ALTEPLASE 2 MG RECONSTITUTED SOLUTION: Performed by: INTERNAL MEDICINE

## 2018-10-15 PROCEDURE — 80053 COMPREHEN METABOLIC PANEL: CPT

## 2018-10-15 PROCEDURE — 25010000002 PALONOSETRON PER 25 MCG: Performed by: INTERNAL MEDICINE

## 2018-10-15 PROCEDURE — 36415 COLL VENOUS BLD VENIPUNCTURE: CPT | Performed by: INTERNAL MEDICINE

## 2018-10-15 PROCEDURE — 25010000002 INFLUENZA VAC SUBUNIT QUAD 0.5 ML SUSPENSION PREFILLED SYRINGE: Performed by: INTERNAL MEDICINE

## 2018-10-15 PROCEDURE — 96367 TX/PROPH/DG ADDL SEQ IV INF: CPT | Performed by: INTERNAL MEDICINE

## 2018-10-15 PROCEDURE — G0008 ADMIN INFLUENZA VIRUS VAC: HCPCS | Performed by: INTERNAL MEDICINE

## 2018-10-15 PROCEDURE — 96375 TX/PRO/DX INJ NEW DRUG ADDON: CPT | Performed by: INTERNAL MEDICINE

## 2018-10-15 PROCEDURE — 96377 APPLICATON ON-BODY INJECTOR: CPT | Performed by: INTERNAL MEDICINE

## 2018-10-15 PROCEDURE — 36593 DECLOT VASCULAR DEVICE: CPT | Performed by: INTERNAL MEDICINE

## 2018-10-15 PROCEDURE — 96413 CHEMO IV INFUSION 1 HR: CPT | Performed by: INTERNAL MEDICINE

## 2018-10-15 PROCEDURE — 96411 CHEMO IV PUSH ADDL DRUG: CPT | Performed by: INTERNAL MEDICINE

## 2018-10-15 PROCEDURE — 85025 COMPLETE CBC W/AUTO DIFF WBC: CPT

## 2018-10-15 RX ORDER — DOXORUBICIN HYDROCHLORIDE 2 MG/ML
60 INJECTION, SOLUTION INTRAVENOUS ONCE
Status: CANCELLED | OUTPATIENT
Start: 2018-10-15

## 2018-10-15 RX ORDER — SODIUM CHLORIDE 9 MG/ML
250 INJECTION, SOLUTION INTRAVENOUS ONCE
Status: CANCELLED | OUTPATIENT
Start: 2018-10-15

## 2018-10-15 RX ORDER — SODIUM CHLORIDE 0.9 % (FLUSH) 0.9 %
10 SYRINGE (ML) INJECTION AS NEEDED
Status: CANCELLED | OUTPATIENT
Start: 2018-10-22

## 2018-10-15 RX ORDER — DIAZEPAM 5 MG/1
5 TABLET ORAL EVERY 6 HOURS PRN
Qty: 60 TABLET | Refills: 0 | OUTPATIENT
Start: 2018-10-15

## 2018-10-15 RX ORDER — PALONOSETRON 0.05 MG/ML
0.25 INJECTION, SOLUTION INTRAVENOUS ONCE
Status: COMPLETED | OUTPATIENT
Start: 2018-10-15 | End: 2018-10-15

## 2018-10-15 RX ORDER — SODIUM CHLORIDE 0.9 % (FLUSH) 0.9 %
10 SYRINGE (ML) INJECTION AS NEEDED
Status: DISCONTINUED | OUTPATIENT
Start: 2018-10-15 | End: 2018-10-15 | Stop reason: HOSPADM

## 2018-10-15 RX ORDER — DOXORUBICIN HYDROCHLORIDE 2 MG/ML
60 INJECTION, SOLUTION INTRAVENOUS ONCE
Status: COMPLETED | OUTPATIENT
Start: 2018-10-15 | End: 2018-10-15

## 2018-10-15 RX ORDER — PALONOSETRON 0.05 MG/ML
0.25 INJECTION, SOLUTION INTRAVENOUS ONCE
Status: CANCELLED | OUTPATIENT
Start: 2018-10-15

## 2018-10-15 RX ORDER — SODIUM CHLORIDE 9 MG/ML
250 INJECTION, SOLUTION INTRAVENOUS ONCE
Status: COMPLETED | OUTPATIENT
Start: 2018-10-15 | End: 2018-10-15

## 2018-10-15 RX ADMIN — SODIUM CHLORIDE, PRESERVATIVE FREE 500 UNITS: 5 INJECTION INTRAVENOUS at 12:01

## 2018-10-15 RX ADMIN — CYCLOPHOSPHAMIDE 1000 MG: 1 INJECTION, POWDER, FOR SOLUTION INTRAVENOUS; ORAL at 11:17

## 2018-10-15 RX ADMIN — PEGFILGRASTIM 6 MG: KIT SUBCUTANEOUS at 12:01

## 2018-10-15 RX ADMIN — DEXAMETHASONE SODIUM PHOSPHATE 12 MG: 10 INJECTION, SOLUTION INTRAMUSCULAR; INTRAVENOUS at 09:57

## 2018-10-15 RX ADMIN — DOXORUBICIN HYDROCHLORIDE 100 MG: 2 INJECTION, SOLUTION INTRAVENOUS at 10:58

## 2018-10-15 RX ADMIN — Medication 10 ML: at 12:01

## 2018-10-15 RX ADMIN — SODIUM CHLORIDE 150 MG: 9 INJECTION, SOLUTION INTRAVENOUS at 10:18

## 2018-10-15 RX ADMIN — ALTEPLASE 2 MG: 2.2 INJECTION, POWDER, LYOPHILIZED, FOR SOLUTION INTRAVENOUS at 08:43

## 2018-10-15 RX ADMIN — SODIUM CHLORIDE 250 ML: 9 INJECTION, SOLUTION INTRAVENOUS at 09:46

## 2018-10-15 RX ADMIN — Medication 10 ML: at 08:12

## 2018-10-15 RX ADMIN — PALONOSETRON HYDROCHLORIDE 0.25 MG: 0.25 INJECTION INTRAVENOUS at 09:46

## 2018-10-15 RX ADMIN — INFLUENZA A VIRUS A/SINGAPORE/GP1908/2015 IVR-180 (H1N1) ANTIGEN (MDCK CELL DERIVED, PROPIOLACTONE INACTIVATED), INFLUENZA A VIRUS A/NORTH CAROLINA/04/2016 (H3N2) HEMAGGLUTININ ANTIGEN (MDCK CELL DERIVED, PROPIOLACTONE INACTIVATED), INFLUENZA B VIRUS B/IOWA/06/2017 HEMAGGLUTININ ANTIGEN (MDCK CELL DERIVED, PROPIOLACTONE INACTIVATED), INFLUENZA B VIRUS B/SINGAPORE/INFTT-16-0610/2016 HEMAGGLUTININ ANTIGEN (MDCK CELL DERIVED, PROPIOLACTONE INACTIVATED) 0.5 ML: 15; 15; 15; 15 INJECTION, SUSPENSION INTRAMUSCULAR at 12:02

## 2018-10-15 NOTE — PROGRESS NOTES
Adult Outpatient Nutrition  Assessment    Patient Name:  Moy Sparrow  YOB: 1980  MRN: [id number]    Assessment Date:  10/15/2018    CHART REVIEW  Moy Sparrow   1980  38 y.o.  Wt: 154.8 lb  Ht: 61 in  Dx: breast cancer  Tx: chemo      PATIENT/FAMILY COMMENTS  Weight Change: up/down--stable over past year  Diet: regular  Food Allergies: nkfa  Appetite/Intake: good  Supplements: na  Meal Preparation: pt (great friend support)  Nutrition Concerns:  - mild constipation  - working full time    No problems with chewing/swallowing/nausea/vomiting/  diarrhea.    GOAL(s)  -to optimize nutrition in attempt to prevent loss of LBM        EDUCATION  Discussed  -high protein diet  -importance of staying hydrated  -food safety  -supplement suggestions (if needed in future)    To reinforce education, written information with RD's name & number provided.  Patient receptive to suggestions--agreed to call on dietitian as needed.              Liza Wei RD                                Electronically signed by:  Liza Wei RD  10/15/18 5:14 PM

## 2018-10-15 NOTE — PATIENT INSTRUCTIONS
Chemotherapy  Chemotherapy is the use of medicines to stop or slow the growth of cancer cells. Depending on the type and stage of your cancer, you may have chemotherapy to:  · Cure your cancer.  · Slow the progression of your cancer.  · Ease your cancer symptoms.  · Improve the benefits of radiation treatment.  · Shrink a tumor before surgery.  · Rid the body of cancer cells that remain after a tumor is surgically removed.    How is chemotherapy given?  Chemotherapy may be given:  · By mouth in liquid or pill form.  · Through a thin tube that is inserted into a vein or artery.  · By getting a shot.  · By rubbing a cream or ointment on your skin.  · Through liquids that are placed directly into various areas of the body, such as the abdomen, chest, or bladder.    How often is chemotherapy given?  Chemotherapy may be given continuously over time, or it may be given in cycles. For example, you may take the medicine for one week out of every month.  For how long will I need chemotherapy treatments?  The length of treatment depends on many factors, including:  · The type of cancer.  · Whether the cancer has spread.  · How you respond to the chemotherapy.  · Whether you develop side effects.    Some types of chemotherapy medicine are given only one time. Others are given for months, years, or for life.  What safety precautions must I take while on chemotherapy?  Chemotherapy medicines are very strong. They will be in all of your bodily fluids, including your urine, stool, saliva, sweat, tears, vaginal secretions, and semen. You must carefully follow some safety precautions to prevent harm to others while you are using these medicines. Here are some recommended precautions:  · Make sure that people who help care for you wear disposable gloves if they are going to come into contact with any of your bodily fluids. Women who are pregnant or breastfeeding should not handle any of your bodily fluids.  · Wash any clothes,  towels, and linens that may have your bodily fluids on them twice in a washing machine using very hot water.  · Dispose of adult diapers, tampons, and sanitary napkins by first sealing them in a plastic bag.  · Use a condom when having sex for at least 2 weeks after receiving your chemotherapy.  · Do not share beverages or food.  · Keep your chemotherapy medicines in their original bottles. Keep them in a high, safe location, away from children. Do not expose them to heat or moisture. Do not put them in containers with other types of medicines.  · Dispose of all wrappers for your chemotherapy medicines by sealing them in a separate plastic bag.  · Do not throw away extra medicine, and do not flush it down the toilet. Take medicine that you are not going to use to your health care provider's office where it can be disposed of properly.  · Follow your health care provider’s directions for the proper disposal of needles, IV tubing, and other medical supplies that have come into contact with your chemotherapy medicines.  · If you are issued a hazardous waste container, make sure you understand the directions for using it.  · Wash your hands thoroughly with warm water and soap after using the bathroom. Dry your hands with disposable paper towels.  · When using the toilet:  ? Flush it twice after each use, including after vomiting.  ? Close the lid of the toilet prior to flushing. This helps to avoid splashing.  ? Both men and women should sit to use the toilet. This helps avoid splashing.    What are the side effects of chemotherapy?  Side effects depend on a variety of factors, including:  · The specific type of chemotherapy medicine used.  · The dosage.  · How long the medicine is used for.  · Your overall health.    Some of the side effects you may experience include:  · Fatigue and decreased energy.  · Decreased appetite.  · Changes in your sense of smell or taste.  · Nausea.  · Vomiting.  · Constipation or  diarrhea.  · Hair loss.  · Increased susceptibility to infection.  · Easy bleeding.  · Mouth sores.  · Burning or tingling in the hands or feet.  · Memory problems.    This information is not intended to replace advice given to you by your health care provider. Make sure you discuss any questions you have with your health care provider.  Document Released: 10/14/2008 Document Revised: 07/07/2017 Document Reviewed: 05/26/2015  [facility] Interactive Patient Education © 2018 [employer]  Doxorubicin injection  What is this medicine?  DOXORUBICIN (dox oh LAUREL bi sin) is a chemotherapy drug. It is used to treat many kinds of cancer like leukemia, lymphoma, neuroblastoma, sarcoma, and Wilms' tumor. It is also used to treat bladder cancer, breast cancer, lung cancer, ovarian cancer, stomach cancer, and thyroid cancer.  This medicine may be used for other purposes; ask your health care provider or pharmacist if you have questions.  COMMON BRAND NAME(S): Adriamycin, Adriamycin PFS, Adriamycin RDF, Rubex  What should I tell my health care provider before I take this medicine?  They need to know if you have any of these conditions:  -heart disease  -history of low blood counts caused by a medicine  -liver disease  -recent or ongoing radiation therapy  -an unusual or allergic reaction to doxorubicin, other chemotherapy agents, other medicines, foods, dyes, or preservatives  -pregnant or trying to get pregnant  -breast-feeding  How should I use this medicine?  This drug is given as an infusion into a vein. It is administered in a hospital or clinic by a specially trained health care professional. If you have pain, swelling, burning or any unusual feeling around the site of your injection, tell your health care professional right away.  Talk to your pediatrician regarding the use of this medicine in children. Special care may be needed.  Overdosage: If you think you have taken too much of this medicine contact a poison control  center or emergency room at once.  NOTE: This medicine is only for you. Do not share this medicine with others.  What if I miss a dose?  It is important not to miss your dose. Call your doctor or health care professional if you are unable to keep an appointment.  What may interact with this medicine?  This medicine may interact with the following medications:  -6-mercaptopurine  -paclitaxel  -phenytoin  -Peace's Wort  -trastuzumab  -verapamil  This list may not describe all possible interactions. Give your health care provider a list of all the medicines, herbs, non-prescription drugs, or dietary supplements you use. Also tell them if you smoke, drink alcohol, or use illegal drugs. Some items may interact with your medicine.  What should I watch for while using this medicine?  This drug may make you feel generally unwell. This is not uncommon, as chemotherapy can affect healthy cells as well as cancer cells. Report any side effects. Continue your course of treatment even though you feel ill unless your doctor tells you to stop.  There is a maximum amount of this medicine you should receive throughout your life. The amount depends on the medical condition being treated and your overall health. Your doctor will watch how much of this medicine you receive in your lifetime. Tell your doctor if you have taken this medicine before.  You may need blood work done while you are taking this medicine.  Your urine may turn red for a few days after your dose. This is not blood. If your urine is dark or brown, call your doctor.  In some cases, you may be given additional medicines to help with side effects. Follow all directions for their use.  Call your doctor or health care professional for advice if you get a fever, chills or sore throat, or other symptoms of a cold or flu. Do not treat yourself. This drug decreases your body's ability to fight infections. Try to avoid being around people who are sick.  This medicine may  increase your risk to bruise or bleed. Call your doctor or health care professional if you notice any unusual bleeding.  Talk to your doctor about your risk of cancer. You may be more at risk for certain types of cancers if you take this medicine.  Do not become pregnant while taking this medicine or for 6 months after stopping it. Women should inform their doctor if they wish to become pregnant or think they might be pregnant. Men should not father a child while taking this medicine and for 6 months after stopping it. There is a potential for serious side effects to an unborn child. Talk to your health care professional or pharmacist for more information. Do not breast-feed an infant while taking this medicine.  This medicine has caused ovarian failure in some women and reduced sperm counts in some men This medicine may interfere with the ability to have a child. Talk with your doctor or health care professional if you are concerned about your fertility.  What side effects may I notice from receiving this medicine?  Side effects that you should report to your doctor or health care professional as soon as possible:  -allergic reactions like skin rash, itching or hives, swelling of the face, lips, or tongue  -breathing problems  -chest pain  -fast or irregular heartbeat  -low blood counts - this medicine may decrease the number of white blood cells, red blood cells and platelets. You may be at increased risk for infections and bleeding.  -pain, redness, or irritation at site where injected  -signs of infection - fever or chills, cough, sore throat, pain or difficulty passing urine  -signs of decreased platelets or bleeding - bruising, pinpoint red spots on the skin, black, tarry stools, blood in the urine  -swelling of the ankles, feet, hands  -tiredness  -weakness  Side effects that usually do not require medical attention (report to your doctor or health care professional if they continue or are  bothersome):  -diarrhea  -hair loss  -mouth sores  -nail discoloration or damage  -nausea  -red colored urine  -vomiting  This list may not describe all possible side effects. Call your doctor for medical advice about side effects. You may report side effects to FDA at 9-301-FDA-7124.  Where should I keep my medicine?  This drug is given in a hospital or clinic and will not be stored at home.  NOTE: This sheet is a summary. It may not cover all possible information. If you have questions about this medicine, talk to your doctor, pharmacist, or health care provider.  © 2018 [employer] (2017-02-13 11:28:51)  Cyclophosphamide injection  What is this medicine?  CYCLOPHOSPHAMIDE (sye kloe LESLIE fa mide) is a chemotherapy drug. It slows the growth of cancer cells. This medicine is used to treat many types of cancer like lymphoma, myeloma, leukemia, breast cancer, and ovarian cancer, to name a few.  This medicine may be used for other purposes; ask your health care provider or pharmacist if you have questions.  COMMON BRAND NAME(S): Cytoxan, Neosar  What should I tell my health care provider before I take this medicine?  They need to know if you have any of these conditions:  -blood disorders  -history of other chemotherapy  -infection  -kidney disease  -liver disease  -recent or ongoing radiation therapy  -tumors in the bone marrow  -an unusual or allergic reaction to cyclophosphamide, other chemotherapy, other medicines, foods, dyes, or preservatives  -pregnant or trying to get pregnant  -breast-feeding  How should I use this medicine?  This drug is usually given as an injection into a vein or muscle or by infusion into a vein. It is administered in a hospital or clinic by a specially trained health care professional.  Talk to your pediatrician regarding the use of this medicine in children. Special care may be needed.  Overdosage: If you think you have taken too much of this medicine contact a poison control  center or emergency room at once.  NOTE: This medicine is only for you. Do not share this medicine with others.  What if I miss a dose?  It is important not to miss your dose. Call your doctor or health care professional if you are unable to keep an appointment.  What may interact with this medicine?  This medicine may interact with the following medications:  -amiodarone  -amphotericin B  -azathioprine  -certain antiviral medicines for HIV or AIDS such as protease inhibitors (e.g., indinavir, ritonavir) and zidovudine  -certain blood pressure medications such as benazepril, captopril, enalapril, fosinopril, lisinopril, moexipril, monopril, perindopril, quinapril, ramipril, trandolapril  -certain cancer medications such as anthracyclines (e.g., daunorubicin, doxorubicin), busulfan, cytarabine, paclitaxel, pentostatin, tamoxifen, trastuzumab  -certain diuretics such as chlorothiazide, chlorthalidone, hydrochlorothiazide, indapamide, metolazone  -certain medicines that treat or prevent blood clots like warfarin  -certain muscle relaxants such as succinylcholine  -cyclosporine  -etanercept  -indomethacin  -medicines to increase blood counts like filgrastim, pegfilgrastim, sargramostim  -medicines used as general anesthesia  -metronidazole  -natalizumab  This list may not describe all possible interactions. Give your health care provider a list of all the medicines, herbs, non-prescription drugs, or dietary supplements you use. Also tell them if you smoke, drink alcohol, or use illegal drugs. Some items may interact with your medicine.  What should I watch for while using this medicine?  Visit your doctor for checks on your progress. This drug may make you feel generally unwell. This is not uncommon, as chemotherapy can affect healthy cells as well as cancer cells. Report any side effects. Continue your course of treatment even though you feel ill unless your doctor tells you to stop.  Drink water or other fluids as  directed. Urinate often, even at night.  In some cases, you may be given additional medicines to help with side effects. Follow all directions for their use.  Call your doctor or health care professional for advice if you get a fever, chills or sore throat, or other symptoms of a cold or flu. Do not treat yourself. This drug decreases your body's ability to fight infections. Try to avoid being around people who are sick.  This medicine may increase your risk to bruise or bleed. Call your doctor or health care professional if you notice any unusual bleeding.  Be careful brushing and flossing your teeth or using a toothpick because you may get an infection or bleed more easily. If you have any dental work done, tell your dentist you are receiving this medicine.  You may get drowsy or dizzy. Do not drive, use machinery, or do anything that needs mental alertness until you know how this medicine affects you.  Do not become pregnant while taking this medicine or for 1 year after stopping it. Women should inform their doctor if they wish to become pregnant or think they might be pregnant. Men should not father a child while taking this medicine and for 4 months after stopping it. There is a potential for serious side effects to an unborn child. Talk to your health care professional or pharmacist for more information. Do not breast-feed an infant while taking this medicine.  This medicine may interfere with the ability to have a child. This medicine has caused ovarian failure in some women. This medicine has caused reduced sperm counts in some men. You should talk with your doctor or health care professional if you are concerned about your fertility.  If you are going to have surgery, tell your doctor or health care professional that you have taken this medicine.  What side effects may I notice from receiving this medicine?  Side effects that you should report to your doctor or health care professional as soon as  possible:  -allergic reactions like skin rash, itching or hives, swelling of the face, lips, or tongue  -low blood counts - this medicine may decrease the number of white blood cells, red blood cells and platelets. You may be at increased risk for infections and bleeding.  -signs of infection - fever or chills, cough, sore throat, pain or difficulty passing urine  -signs of decreased platelets or bleeding - bruising, pinpoint red spots on the skin, black, tarry stools, blood in the urine  -signs of decreased red blood cells - unusually weak or tired, fainting spells, lightheadedness  -breathing problems  -dark urine  -dizziness  -palpitations  -swelling of the ankles, feet, hands  -trouble passing urine or change in the amount of urine  -weight gain  -yellowing of the eyes or skin  Side effects that usually do not require medical attention (report to your doctor or health care professional if they continue or are bothersome):  -changes in nail or skin color  -hair loss  -missed menstrual periods  -mouth sores  -nausea, vomiting  This list may not describe all possible side effects. Call your doctor for medical advice about side effects. You may report side effects to FDA at 2-276-FDA-8451.  Where should I keep my medicine?  This drug is given in a hospital or clinic and will not be stored at home.  NOTE: This sheet is a summary. It may not cover all possible information. If you have questions about this medicine, talk to your doctor, pharmacist, or health care provider.  © 2018 [employer] (2013-11-01 16:22:58)  Pegfilgrastim injection  What is this medicine?  PEGFILGRASTIM (PEG lizzeth gra stim) is a long-acting granulocyte colony-stimulating factor that stimulates the growth of neutrophils, a type of white blood cell important in the body's fight against infection. It is used to reduce the incidence of fever and infection in patients with certain types of cancer who are receiving chemotherapy that affects  the bone marrow, and to increase survival after being exposed to high doses of radiation.  This medicine may be used for other purposes; ask your health care provider or pharmacist if you have questions.  COMMON BRAND NAME(S): Neulasta  What should I tell my health care provider before I take this medicine?  They need to know if you have any of these conditions:  -kidney disease  -latex allergy  -ongoing radiation therapy  -sickle cell disease  -skin reactions to acrylic adhesives (On-Body Injector only)  -an unusual or allergic reaction to pegfilgrastim, filgrastim, other medicines, foods, dyes, or preservatives  -pregnant or trying to get pregnant  -breast-feeding  How should I use this medicine?  This medicine is for injection under the skin. If you get this medicine at home, you will be taught how to prepare and give the pre-filled syringe or how to use the On-body Injector. Refer to the patient Instructions for Use for detailed instructions. Use exactly as directed. Tell your healthcare provider immediately if you suspect that the On-body Injector may not have performed as intended or if you suspect the use of the On-body Injector resulted in a missed or partial dose.  It is important that you put your used needles and syringes in a special sharps container. Do not put them in a trash can. If you do not have a sharps container, call your pharmacist or healthcare provider to get one.  Talk to your pediatrician regarding the use of this medicine in children. While this drug may be prescribed for selected conditions, precautions do apply.  Overdosage: If you think you have taken too much of this medicine contact a poison control center or emergency room at once.  NOTE: This medicine is only for you. Do not share this medicine with others.  What if I miss a dose?  It is important not to miss your dose. Call your doctor or health care professional if you miss your dose. If you miss a dose due to an On-body Injector  failure or leakage, a new dose should be administered as soon as possible using a single prefilled syringe for manual use.  What may interact with this medicine?  Interactions have not been studied.  Give your health care provider a list of all the medicines, herbs, non-prescription drugs, or dietary supplements you use. Also tell them if you smoke, drink alcohol, or use illegal drugs. Some items may interact with your medicine.  This list may not describe all possible interactions. Give your health care provider a list of all the medicines, herbs, non-prescription drugs, or dietary supplements you use. Also tell them if you smoke, drink alcohol, or use illegal drugs. Some items may interact with your medicine.  What should I watch for while using this medicine?  You may need blood work done while you are taking this medicine.  If you are going to need a MRI, CT scan, or other procedure, tell your doctor that you are using this medicine (On-Body Injector only).  What side effects may I notice from receiving this medicine?  Side effects that you should report to your doctor or health care professional as soon as possible:  -allergic reactions like skin rash, itching or hives, swelling of the face, lips, or tongue  -dizziness  -fever  -pain, redness, or irritation at site where injected  -pinpoint red spots on the skin  -red or dark-brown urine  -shortness of breath or breathing problems  -stomach or side pain, or pain at the shoulder  -swelling  -tiredness  -trouble passing urine or change in the amount of urine  Side effects that usually do not require medical attention (report to your doctor or health care professional if they continue or are bothersome):  -bone pain  -muscle pain  This list may not describe all possible side effects. Call your doctor for medical advice about side effects. You may report side effects to FDA at 5-978-FDA-7773.  Where should I keep my medicine?  Keep out of the reach of  children.  Store pre-filled syringes in a refrigerator between 2 and 8 degrees C (36 and 46 degrees F). Do not freeze. Keep in carton to protect from light. Throw away this medicine if it is left out of the refrigerator for more than 48 hours. Throw away any unused medicine after the expiration date.  NOTE: This sheet is a summary. It may not cover all possible information. If you have questions about this medicine, talk to your doctor, pharmacist, or health care provider.  © 2018 [employer] (2017-12-14 12:58:03)

## 2018-10-15 NOTE — TELEPHONE ENCOUNTER
Called patient, left message to let her know that her FMLA is available to be picked in the front office.  Also, informed patient to complete her employee health flu survey and indicate she had flu vaccine elsewhere.  Skye Sanchez RN  October 15, 2018  3:48 PM

## 2018-10-15 NOTE — PROGRESS NOTES
Moy Keith    Ms Sparrow presented for follow up appointment for breast cancer.   We discussed chemotherapy regimen at length. Discussed side effects associated with chemotherapy at length.  Management of potential side effects associated with chemotherapy discussed at length.  Discussed management of chemotherapy-induced nausea and vomiting at length as well.  Discussed risk of febrile neutropenia and instructed her to go to hospital immediately for any fever while on chemotherapy.      History of Present Illness    This is a very pleasant 38-year-old female who was seen in consultation at the request of Dr. Ortega for evaluation of newly diagnosed breast cancer on 9/24/18.  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  Her past medical history is otherwise unremarkable except history of anxiety/depression and gastroesophageal reflux disease.She tells me that she felt a lump in her right breast in August 2018 and subsequently underwent a mammogram and ultrasonographic evaluation on August 27 which showed right breast mass approximately 1.5 cm.  The breast mass was felt to be suspicious for malignancy.  She then underwent ultrasound guided biopsy of right breast on 8/28/18 which showed invasive ductal carcinoma, moderately differentiated.  The carcinoma was tested positive for estrogen receptor and it was tested negative for progesterone receptor as well as HER-2.  Patient underwent right mastectomy and sentinel lymph node biopsy on September 5 which showed invasive ductal carcinoma of 1.5 cm, moderately differentiated which was completely excised.  [place] lymph node showed micrometastasis (less than 2 mm) involving 1 of 7 lymph nodes.    Her recurrence score was high at 36, so we recommended adjuvant chemotherapy with dose dense AC followed by taxane. She was started on chemotherapy on 10/15/18.     Past Medical History, Past Surgical History, Social History, Family  "History have been reviewed and are without significant changes except as mentioned.    Review of Systems   CONSTITUTIONAL: Fatigue+ No weight loss, fever, chills, weakness.  HEENT: Eyes: No visual loss, blurred vision, double vision or yellow sclerae. Ears, Nose, Throat: No hearing loss, sneezing, congestion, runny nose or sore throat.  SKIN: No rash or itching.  CARDIOVASCULAR: No chest pain, chest pressure or chest discomfort. No palpitations or edema.  RESPIRATORY: No shortness of breath, cough or sputum.  GASTROINTESTINAL: No anorexia, nausea, vomiting or diarrhea. No abdominal pain or blood.  GENITOURINARY: Negative for urgency, frequency or dysuria.   NEUROLOGICAL: No headache, dizziness, syncope, paralysis, ataxia, numbness or tingling in the extremities. No change in bowel or bladder control.  MUSCULOSKELETAL: Back pain+  HEMATOLOGIC: No anemia, bleeding or bruising.  LYMPHATICS: No enlarged nodes. No history of splenectomy.  PSYCHIATRIC: Anxiety + depression+  ENDOCRINOLOGIC: No reports of sweating, cold or heat intolerance. No polyuria or polydipsia.  ALLERGIES: No history of asthma, hives, eczema or rhinitis.    Medications:  The current medication list was reviewed in the EMR    ALLERGIES:  No Known Allergies    Objective      Vitals:    10/15/18 0811   BP: 116/77   Pulse: 94   Resp: 18   Temp: 98.6 °F (37 °C)   TempSrc: Temporal Artery    SpO2: 99%   Weight: 70.1 kg (154 lb 9.6 oz)   Height: 154.9 cm (60.98\")   PainSc: 0-No pain     Current Status 10/15/2018   ECOG score 0       Physical Exam    General: Alert, awake, oriented.  Well dressed.  Not in apparent distress. Vitals as above.   HEAD: normocephalic, atraumatic.   EYES: PERRL, EOMI. Fundi normal, vision is grossly intact.  Neck: Supple, no adenopathy or thyromegaly.   Throat: normal oral cavity and pharynx. No inflammation, swelling, exudate, or lesions.  CARDIAC: Normal S1 and S2. No S3, S4 or murmurs. Rhythm is regular.Extremities are warm and " well perfused.   LUNGS: Clear to auscultation and percussion without rales, rhonchi, wheezing or diminished breath sounds.  ABDOMEN: Positive bowel sounds. Soft, nondistended, nontender  . No guarding or rebound. No masses.  Back:No bony tenderness.   EXTREMITIES: No significant deformity or joint abnormality.  Peripheral pulses intact. No varicosities.  Skin: No rash or bruising.  Neurological: Grossly non-focal exam. No focal weakness. Gait: Normal.   Psych: Mood and affect normal. No hallucination or suicidal thoughts.   Lymphatics:No adenopathy     RECENT LABS: Independently reviewed and summarized  Hematology WBC   Date Value Ref Range Status   10/15/2018 8.66 3.20 - 9.80 10*3/mm3 Final     RBC   Date Value Ref Range Status   10/15/2018 4.19 3.77 - 5.16 10*6/mm3 Final     Hemoglobin   Date Value Ref Range Status   10/15/2018 12.5 12.0 - 15.5 g/dL Final     Hematocrit   Date Value Ref Range Status   10/15/2018 36.4 35.0 - 45.0 % Final     Platelets   Date Value Ref Range Status   10/15/2018 207 150 - 450 10*3/mm3 Final            Lab Results   Component Value Date    GLUCOSE 96 10/15/2018    BUN 19 10/15/2018    CREATININE 0.65 10/15/2018    EGFRIFNONA 101 10/15/2018    BCR 29.2 (H) 10/15/2018    K 3.8 10/15/2018    CO2 23.0 10/15/2018    CALCIUM 8.7 10/15/2018    ALBUMIN 4.10 10/15/2018    AST 27 10/15/2018    ALT 31 10/15/2018       Imaging: Ultrasound gallbladder results reviewed and showed normal liver, bile duct and gallbladder.  Nuclear medicine bone scan whole-body results reviewed independently and showed no findings suspicious for skeletal metastatic disease.       Result of echo reviewed. Showed LVEF of 50%. No valvular heart disease.       Diagnosis:   (1) Invasive ductal carcinoma, right   Date of diagnosis: 9/5/18   Stage: IB ([id number]), recurrence score 36   Prior treatment: Right mastectomy with sentinel node biopsy and ALND (9/5/18)  Chemotherapy: Start date (10/15/18)     Assessment/Plan        (1) Invasive ductal carcinoma, right, stage IB, ER+/[place]-/HER2-      Discussed risks versus benefits of  chemotherapy at length. Management of potential side effects associated with chemotherapy discussed at length. All questions answered.   - Educated about chemotherapy induce nausea and emesis. Prescriptions for zofran  sent to her pharmacy.   - Educated about risk of febrile neutropenia and instructed her to come to hospital if any fever at home while on chemotherapy.   - She understood our conversation and wants to proceed with chemotherapy. Informed consent obtained.   - Labs reviewed and stable.  - Echo: normal EF without any structural heart disease.     Proceed with cycle 1 today.     (2) Encounter for vaccination: We will give her influenza and prevnar 13 vaccines today.       Yobani Emery MD     10/15/2018      CC:

## 2018-10-16 ENCOUNTER — CONSULT (OUTPATIENT)
Dept: RADIATION ONCOLOGY | Facility: HOSPITAL | Age: 38
End: 2018-10-16

## 2018-10-16 VITALS
WEIGHT: 152.2 LBS | DIASTOLIC BLOOD PRESSURE: 75 MMHG | TEMPERATURE: 97.9 F | HEIGHT: 61 IN | HEART RATE: 108 BPM | BODY MASS INDEX: 28.74 KG/M2 | SYSTOLIC BLOOD PRESSURE: 126 MMHG | RESPIRATION RATE: 16 BRPM

## 2018-10-16 DIAGNOSIS — C50.411 MALIGNANT NEOPLASM OF UPPER-OUTER QUADRANT OF RIGHT BREAST IN FEMALE, ESTROGEN RECEPTOR POSITIVE (HCC): Primary | ICD-10-CM

## 2018-10-16 DIAGNOSIS — Z17.0 MALIGNANT NEOPLASM OF UPPER-OUTER QUADRANT OF RIGHT BREAST IN FEMALE, ESTROGEN RECEPTOR POSITIVE (HCC): Primary | ICD-10-CM

## 2018-10-16 PROCEDURE — 99204 OFFICE O/P NEW MOD 45 MIN: CPT | Performed by: RADIOLOGY

## 2018-10-16 RX ORDER — MULTIPLE VITAMINS W/ MINERALS TAB 9MG-400MCG
1 TAB ORAL DAILY
COMMUNITY
End: 2020-03-03

## 2018-10-16 NOTE — PATIENT INSTRUCTIONS
Radiation Simulation  Radiation Simulation is a process where the radiation treatment fields are defined, filmed and drawn on your skin. The simulator is actually a computed tomography (CT) scanner that we use to contour your body. The images are then sent to the physics department where the team and your doctor -- arrange the radiation beams and make a customized plan. We will take special care to make your position as comfortable as possible while making sure treatment will be given the same every time. Since people come in all shapes and sizes, very specific patient measurements are important.  Treatment unit parameters are finalized and recorded. All setup information is documented to make your treatment record complete. This is an important part of the planning process. The CT simulation scan is not like a regular CT scan, instead we use it to contour the shape of your body and visualize its structures. You doctor can then arrange the beams and make a plan using a computer system.      The following are members of the radiation therapy team that is involved and you may see during your simulation:  Physician:  is in charge of your simulation and gives direction to the therapists in the simulator.  Radiation therapists: trained, certified, and qualified to participate in CT simulation and administer daily treatments.      Oncology nurse: is a registered nurse who specializes in the needs of patients receiving radiation treatments.   Medical physicist: specializes in the principles of radiation physics. He is responsible for maintaining all therapy equipment as well as overseeing the treatment planning process.  : is responsible for performing dose calculations as well as developing treatment plans in conjunction with the physician and physicist.    What to Wear  Since we will be marking on your skin, it is necessary that we expose the treatment site. We may also need you to remove other  clothing worn close to the treatment are so we can produce the same treatment every time. . For example, having a shirt rumpled underneath you or snugly fitting pantyhose around your waist may change your position slightly from day to day. We can easily avoid this problem by simply having you change into a gown.  Please leave your jewelry at home, especially if the simulation involves the head, neck, or chest region.   Choose clothing that is comfortable and easy to remove.   Please do not bother the skin markings unless your therapist tells you so. These markings may rub off slightly onto your clothing, especially during warm summer months, so consider wearing old clothing and underwear when you come for your appointment.  How to Prepare  There are no dietary restrictions prior to your simulation. You may eat and drink as usual, unless instructed otherwise.       Immobilization  Immobilization means “to prevent movement or to keep in place.” We use immobilization devices that do just that. These are treatment aids that are pre-made, or that we construct, to help maintain the desired treatment position throughout the entire course of therapy. Some of these aids, to name a few, are:  • Tape (simple masking, paper or silk tape)  • Foam sponges  • Specially designed headrests  • Acrylic molds  • Foam body molds  • Plaster casts      Keep in mind that your particular setup may not require any special devices, or it may require a combination of items. It depends on your treatment site, position and your ability to stay in a position for a limited amount of time. We try to anticipate which simulations will require special devices so that we correctly estimate the amount of time you will need to allow for your appointment.  Contrast  In order to better visualize organs and other anatomy in or around the treatment area, we may need to administer contrast material (radiographic dye) during simulation. Contrast can be given  intravenously (with a needle through the vein), by mouth or through a catheter inserted by the nurse or therapist. This procedure will be done for simulation only -- not for daily treatment -- and requires no special preparation prior to your appointment.  Marking the Fields  The therapist will take a few preliminary measurements. A rough sketch of the area may be marked on your skin by using markers directly on the skin with ink by the physician and/or the therapist. The simulation team will then leave the room. We will watch you and can hear you at all times.    Then a series of things may happen:  • Room lights may go on and off  • Red laser lights may appear  • The table will move into the scanner  • All kinds of noises will be audible  The CT scanner is a large hole in a machine that your body will pass through. There is plenty of room and you should not feel enclosed or claustrophobic.  Measurements  The physician will create a radiation therapy prescription, a written directive for the radiation team to follow. In order to carry this out, we must combine treatment field information with data taken about your body shape and size. Using the CT scan, the physician will contour the areas needed and generate the plan while you are at home.  Tattoos  In most cases, we do not apply tattoos to jimena pateints unless it is requested or you cant keep the marks on your skin. Marks that are needed are covered with a special clear dressing called Tegaderm.   Photographs  We will take your photograph on the day of simulation for two reasons: 1) for chart documentation, and 2) for a treatment set-up aid. We will take a facial picture for identification purposes and this will be part of your permanent treatment record. Each day before your treatment, we will ask you for your name and birth date for verification.  We will also take photographs of the treatment and setup fields. Since the treatment therapist may not be present  at the time of simulation, using photos along with buenrostro makes daily setup easier. The same is true for the dosimetrist. Sometimes viewing a field on the patient via photograph can answer questions that arise during the calculation process. These photos are necessary and they remain a confidential part of your treatment record.  How Long Does Planning Take  Our Dosimetrist and Physicist will use the images captured during your CT simulation to plan your treatments. The planning process is very complex and takes a lot of time and detail.    Depending on the type of treatment you are going to receive, your radiation plan may take up to 2 weeks from the day of your simulation.   ? The radiation staff will call you when your plan is ready and you will be scheduled a date to start your radiation treatments.   ? We will also call you to update you on the progress of your plan as needed.   ? If we have not called you within 10 days from your simulation, please call us. The time between simulation and treatment in no way compromises the outcome of your therapy. These factors are considered by the radiation therapy team when planning your specific course of treatment.  Since the course of treatment can run from two to eight weeks, we constantly monitor each patient’s setup in a number of ways. If a change occurs, it may be necessary to schedule a return visit to the simulator on short notice. An example of this might be a weight gain or loss that would require new calculations. In order to interrupt your treatment schedule as little as possible, a simulation check that same day or the following morning may be appropriate. Again, this is routine and does not suggest a change in your health status. It simply means that we are doing our job in providing the best care we know how. It is important that we stay aware of all patient changes.  Our goal is to make every patient as knowledgeable about the radiation therapy process as  possible and comfortable enough to ask questions. If at any time during your many visits with us you are unsure as to what is happening around you, please do not hesitate to ask. At a time in your life when things are seemingly out of your control, knowing what to expect can be a powerful tool in becoming refocused and moving onward.      If you have any concerns or questions before your treatment gets started, please do not hesitate to call the Radiation staff at [phone]. External Beam Radiation Therapy  External beam radiation therapy is a type of radiation treatment. This type of radiation therapy can deliver radiation to a fairly large area. This is the most common type of radiation therapy for cancer. This therapy may be done to:  · Treat cancer by:  ? Destroying cancer cells. Radiation delivered during the treatment damages cancer cells. It may also damage normal cells, but normal cells have the DNA to repair themselves while cancer cells do not.  ? Helping with symptoms of your cancer.  ? Stopping the growth of any remaining cancer cells after surgery.  ? Preventing cancer cells from growing in areas that do not have cancer (prophylactic radiation therapy).  · Treat or shrink a tumor.  · Reduce pain (palliative therapy).    The amount of radiation you will receive and the length of therapy depend on your medical condition. You should not feel the radiation being delivered or any pain during your therapy.  Let your health care provider know about:  · Any allergies you have.  · All medicines you are taking, including vitamins, herbs, eye drops, creams, and over-the-counter medicines.  · Any problems you or family members have had with anesthetic medicines.  · Any blood disorders you have.  · Any surgeries you have had.  · Any medical conditions you have.  · Whether you are pregnant or may be pregnant.  What are the risks?  Generally, this is a safe procedure. However, radiation therapy can place a person  at a higher risk for a second cancer later in life.  Most people experience side effects from the therapy. Side effects depend on the amount of radiation and the part of the body that was exposed to radiation. The most common side effects include:  · Skin changes.  · Hair loss.  · Fatigue.  · Nausea and vomiting.    What happens before the procedure?  · There will be a planning session (simulation). During the session:  ? Your health care provider will plan exactly where the radiation will be delivered (treatment field).  ? You will be positioned for your therapy. The goal is to have a position that can be reproduced for each therapy session.  ? Temporary marks may be drawn on your body. Permanent marks may also be drawn on your body in order for you to be positioned the same way for each therapy session.  ? A tool that holds a body part in place (immobilization device) may be used to keep the area of treatment in the correct position.  · Follow instructions from your health care provider about eating or drinking restrictions.  · Ask your health care provider about changing or stopping your regular medicines. This is especially important if you are taking diabetes medicines or blood thinners.  What happens during the procedure?  · You will either lie on a table or sit in a chair in the position determined for your therapy.  · You may have a heavy shield placed on you to protect tissues and organs that are not being treated.  · The radiation machine (linear accelerator) will move around you to deliver the radiation in exact doses from different angles. The machine will not touch you.  The procedure may vary among health care providers and hospitals.  What happens after the procedure?  · You may return to your normal routine including diet, activities, and medicines as told by your health care provider.  · You may want to have someone take you home from the hospital or clinic.  Summary  · External beam radiation  therapy is a type of radiation treatment for cancer.  · The amount of radiation you will receive and the length of therapy depend on your medical condition.  · Most people experience side effects from the therapy. Side effects depend on the amount of radiation and the part of the body that was exposed to radiation.  This information is not intended to replace advice given to you by your health care provider. Make sure you discuss any questions you have with your health care provider.  Document Released: 05/06/2010 Document Revised: 12/18/2017 Document Reviewed: 12/18/2017  [employer] Patient Education © 2018 [employer]

## 2018-10-16 NOTE — PROGRESS NOTES
New Patient Visit      Patient: Moy Sparrow   YOB: 1980   Medical Record Number: [id number]   Date of Visit: October 16, 2018   Primary Diagnosis: Stage IB (cT1c cN1mi M0) moderately differentiated invasive ductal carcinoma of the right breast (ER +, [place] -, HER-2/vincent -).  ICD 10 Code: C50.411                                               History of Present Illness: Ms. Moy Sparrow is a 38-year-old white female who works as a nurse at [employer] and has a history of anxiety/depression and GERD, now with recently diagnosed Stage IB infiltrating ductal carcinoma of the right breast. She underwent a right mastectomy with sentinel node biopsy and has recently started adjuvant chemotherapy. She presents to our clinic today to discuss radiation therapy recommendations.    Below is a summary of her relevant history.    August 2018 the patient noted a palpable right breast mass on self breast exam, and contacted Sally BOLANOS, her PCP, who ordered a mammogram/ultrasound (patient’s first mammogram).    8/27/18 bilateral diagnostic mammogram with ultrasound revealed a 1.4 x 1.76 x 1.1 cm mass with irregular borders in the 9 o’clock position of the right breast, 2 cm from the nipple. The mass was highly suspicious for malignancy (BI-RADS 5), with biopsy recommended. Also noted was a 0.53 x 0.3 x 0.4 cm mass lesion in the 1 o’clock position of the right breast, 6 cm from the nipple, suspicious for a lymph node. A 0.57 cm benign cyst was noted in the 4 o’clock position of the right breast.    8/27/18 the patient was seen in consultation by Dr. Abdoul Ortega, who palpated a 1.5 cm mass in the lateral aspect of the right breast, and recommended ultrasound guided mammotome biopsy of the lesion and the small lymph node.    8/28/18 patient underwent ultrasound-guided breast biopsy.  Pathology: Moderately differentiated invasive ductal carcinoma measuring at least 0.9 cm in dimension, ER + (70%), [place] -, and  HER-2/vincent 1+ (negative).    8/30/18 results of biopsy were discussed with the patient by Dr. Ortega, and treatment options were reviewed. The patient elected to proceed with right mastectomy with SNB.    9/5/18 Ms. Sparrow underwent a right breast mastectomy with sentinel lobe biopsy per Dr. Ortega.  Pathology: Moderately differentiated (grade 2) invasive ductal carcinoma measuring 1.5 cm in greatest dimension, completely excised. Surgical margins were negative (>10 mm). 1 of 7 lymph nodes was involved with micrometastases (>0.2 mm to 2 mm).  Stage pT1c pN1mi    9/24/18 the patient was seen in consultation by Dr. Hilton. Her Oncotype DX Recurrence Score was 36, which puts her in the high risk category. That fact, combined with her positive lymph node, led Dr. Hilton to recommend adjuvant chemotherapy with hormonal therapy.  Since the patient was complaining of back pain at that time, a bone scan was ordered.    10/1/18 nuclear medicine whole body bone scan revealed no evidence of skeletal metastatic disease.    10/10/18 patient underwent Mediport placement per Dr. Ortega.    10/15/18 patient received her 1st cycle of Adriamycin/Cytoxan chemotherapy.    The patient presents to our clinic today to discuss radiation therapy recommendations.     This is a new problem to me.  (Old medical records were requested, reviewed, and summarized in the HPI)    Review of Systems   Psychiatric/Behavioral: The patient is nervous/anxious.           All other systems reviewed and are negative.    Past Medical History:   Diagnosis Date   • Anxiety    • Cancer (CMS/HCC)     breast right   • Depressive disorder    • Encounter for gynecological examination    • Encounter for vision screening 2005    Vision screen (1)     • Health examination of defined subpopulation     Health examination of sub-group     • Malaise and fatigue    • Palpitations    • Skin cancer      Skin Ca Pre Cancer   • Soft tissue swelling     right side of neck-2  different areas    • Urinary tract infectious disease    • Wears glasses         Past Surgical History:   Procedure Laterality Date   •  SECTION     • COLONOSCOPY N/A 3/1/2018    Procedure: COLONOSCOPY;  Surgeon: Shin Vanessa DO;  Location: [facility] ENDOSCOPY;  Service:    • ENDOSCOPY N/A 10/5/2018    Procedure: ESOPHAGOGASTRODUODENOSCOPY;  Surgeon: Shin Vanessa DO;  Location: [facility] ENDOSCOPY;  Service: Gastroenterology   • MASTECTOMY Right    • MASTECTOMY WITH SENTINEL NODE BIOPSY AND AXILLARY NODE DISSECTION Right 2018    Procedure: INJECT RIGHT BREAST AND THEN PERFORM SENTINEL LYMPH NODE BIOPSY AND THEN  REMOVE RIGHT BREAST AND NIPPLE AND        (INJECTION ON SDS @7:00 A.M.);  Surgeon: Abdoul Ortega MD;  Location: [facility] OR;  Service: General   • PAP SMEAR     • US GUIDED FINE NEEDLE ASPIRATION  2018   • VENOUS ACCESS DEVICE (PORT) INSERTION Left 10/10/2018    Procedure: MEDIPORT PLACEMENT      (LEFT SIDE,UNATTACHED PORT )      (C-ARM);  Surgeon: Abdoul Ortega MD;  Location: [facility] OR;  Service: General      Family History   Problem Relation Age of Onset   • Bipolar disorder Mother    • Schizophrenia Mother    • Colon cancer Father         Colorectal Cancer   • No Known Problems Sister         Social History     Social History   • Marital status: Single     Spouse name: N/A   • Number of children: 2   • Years of education: 14     Occupational History   • nurse      Social History Main Topics   • Smoking status: Current Some Day Smoker     Years: 25.00   • Smokeless tobacco: Never Used      Comment: smokes 2 cigs/ day   • Alcohol use No   • Drug use: No   • Sexual activity: Defer     Other Topics Concern   • Not on file     Social History Narrative   • No narrative on file     Allergies: Patient has no known allergies.     Prior to Admission medications    Medication Sig Start Date End Date Taking? Authorizing Provider   busPIRone (BUSPAR) 5 MG tablet Take 5 mg by mouth  "3 (Three) Times a Day As Needed.   Yes ProviderRanjit MD   desvenlafaxine (PRISTIQ) 50 MG 24 hr tablet Take 1 tablet by mouth Daily. 9/10/18  Yes Sally Cunningham APRN   dexamethasone (DECADRON) 4 MG tablet Take 2 tablets in the morning daily on Days 2, 3 & 4.  Take with food. 10/9/18  Yes Hailey Emery MD   diazePAM (VALIUM) 5 MG tablet Take 1 tablet by mouth Every 6 (Six) Hours As Needed for Anxiety. 8/31/18  Yes Sally Cunningham APRN   hydrocortisone (PROCTOSOL HC) 2.5 % rectal cream Apply to affected area topically 2 times every day for 2 weeks, and then as needed. 2/7/18  Yes    Multiple Vitamins-Minerals (MULTIVITAMIN WITH MINERALS) tablet tablet Take 1 tablet by mouth Daily.   Yes ProviderRanjit MD   ondansetron (ZOFRAN) 8 MG tablet Take 1 tablet by mouth 3 (Three) Times a Day As Needed for Nausea or Vomiting. 10/9/18  Yes Hailey Emery MD   pantoprazole (PROTONIX) 40 MG EC tablet Take 1 tablet by mouth Daily. 8/6/18  Yes Sally Cunningham APRN   HYDROcodone-acetaminophen (NORCO) 5-325 MG per tablet Take 1 tablet by mouth Every 6 (Six) Hours As Needed for (Pain). 10/10/18   Abdoul Ortega MD      Pain:(on a scale of 0-10)  Pain Score    10/16/18 1107   PainSc: 0-No pain        Quality of Life: 100 - Full Activity  Advanced Care Plan: N    Physical Examination:  Vitals:     Vitals:    10/16/18 1107   BP: 126/75   Pulse: 108   Resp: 16   Temp: 97.9 °F (36.6 °C)       Height: 154.9 cm (60.98\")  Weight: Weight: 69 kg (152 lb 3.2 oz) Body mass index is 28.77 kg/m².      Constitutional: The patient is a well-developed, well-nourished white female in no acute distress.  Alert and oriented ×3.  HEENT: Atraumatic. Normocephalic. No abnormalities noted.  Lymphatics: No cervical, supraclavicular, or axillary lymphadenopathy is palpated.  CV: Regular rate and rhythm.  No murmurs, rubs, or gallops are appreciated.  Respiratory: Lungs clear to auscultation.  Breath sounds equal " bilaterally.  Breasts: The right breast is surgically absent. Surgical scars on the right chest wall are well healed, with no suspicious lesions or nodules palpated. The left breast is within normal limits, with no suspicious lesions or nodules palpated. The patient's Port-A-Cath is noted in the left supraclavicular area.  GI: Abdomen soft, nontender, nondistended, with no hepatosplenomegaly or masses palpated.  Extremities: No clubbing, cyanosis, or edema.  Neurologic: Cranial nerves II through XII are grossly intact, with no focal neurological deficits noted on exam.  Psychiatric: Alert and oriented x3. Normal affect, with no anxiety or depression noted.    Radiographs :    Bilateral diagnostic mammogram with 3-D tomosynthesis with CAD  (8/27/18)   REASON FOR EXAM: Right breast palpable abnormality   FINDINGS: This is a patient's baseline study. Patient presents  with right breast palpable mass. The palpable area of concern was  marked with a triangular skin marker. In this region there is a  mammographic mass. Ultrasound was performed of this mass to  further evaluate. The mass is in the 9:00 position right breast 2  cm from the nipple and measures 1.4 x 1.76 x 1.1 cm. The mass has  irregular borders and dopplerable blood flow. This mass is highly  suspicious for malignancy. Ultrasound also demonstrates right  breast 4:00 position 2 cm from nipple oval anechoic structure  with good through acoustic transmission consistent with benign  cyst. This cyst measures 0.57 cm in greatest diameter. Right  breast 1:00 position 6 cm from nipple solid reniform shape mass  lesion which measures 0.53 x 0.3 x 0.4 cm. This is suspicious for  lymph node. The cysts and lymph node are also demonstrated on  mammography. No other suspicious mass. No malignant type  microcalcifications. Benign bilateral breast calcifications. No  skin thickening or retraction.   Parenchymal pattern: Scattered fibroglandular densities    IMPRESSION:  1.   Right breast palpable mass corresponds to 9:00 position 2 cm  from the nipple 1.4 x 1.76 x 1.1 cm mass which is highly  suggestive of malignancy. Biopsy is recommended to further  evaluate.  2.  Right breast 1:00 position 6 cm from nipple small mass lesion  as described above suspicious for lymph node. Cannot exclude  metastatic involvement. Recommend biopsy.  3.  Right breast 4:00 position simple cyst.     BI-RADS category 5: Highly suggestive of malignancy.   Recommendation: Ultrasound biopsy recommended.    NUCLEAR MEDICINE WHOLE BODY BONE SCAN (10/1/18)   CLINICAL HISTORY: 38-year-old female with history given for back  pain and tenderness. Breast cancer diagnosis.   COMPARISON: None   FINDINGS:     Normal physiologic radiotracer activity noted within the  appendicular and axial skeleton. No finding suspicious or  characteristic for skeletal metastatic disease.   Expected tracer activity within the kidneys and urinary bladder.   IMPRESSION:  No finding suspicious or characteristic for skeletal metastatic  disease.      Pathology:     Tissue Pathology Exam  (8/28/18)       Final Diagnosis   MAMMOTOME CORES, RIGHT BREAST:   INVASIVE DUCTAL CARCINOMA, MODERATELY DIFFERENTIATED.   INVASIVE CARCINOMA SIZE:  AT LEAST 0.9 CM.   POSITIVE FOR ESTROGEN RECEPTORS (70% OF CELLS, MODERATE INTENSITY OF STAINING).   NEGATIVE FOR PROGESTERONE RECEPTORS.   NEGATIVE FOR HER2 (1+, IMMUNOSTAIN).            Tissue Pathology Exam (9/5/18) he was       Final Diagnosis   1.  SENTINEL LYMPH NODES, RIGHT AXILLA:  MICROMETASTASIS (LESS THAN 2.0 MM) INVOLVING ONE (1) OF SEVEN (7) LYMPH NODES.     2.  MASTECTOMY, RIGHT BREAST:  INVASIVE DUCTAL CARCINOMA (1.5 CM), MODERATELY DIFFERENTIATED, 9 O'CLOCK, 2 CM FROM NIPPLE,   COMPLETELY EXCISED.  NODULAR FIBROCYSTIC CHANGE (0.4 CM), 1 O'CLOCK, 6 CM FROM NIPPLE.  NIPPLE AND SKIN NEGATIVE FOR CARCINOMA.   Electronically signed by Sly Mccarthy MD on 9/6/2018 at 1654   Synoptic Checklist    INVASIVE CARCINOMA OF THE BREAST   Breast Invasive - All Specimens   SPECIMEN   Procedure  Total mastectomy    Specimen Laterality  Right    TUMOR   Histologic Type  Invasive carcinoma of no special type (ductal, not otherwise specified)    Glandular (Acinar) / Tubular Differentiation  Score 3    Nuclear Pleomorphism  Score 3    Mitotic Rate  Score 1 (<=3 mitoses per mm2)    Overall Grade  Grade 2 (scores of 6 or 7)    Tumor Size  Greatest dimension of largest invasive focus in Millimeters (mm): 15 Millimeters (mm)   Ductal Carcinoma In Situ (DCIS)  Not identified    Tumor Extent     Nipple DCIS  DCIS does not involve the nipple epidermis    Accessory Findings     Treatment Effect  No known presurgical therapy    MARGINS   Invasive Carcinoma Margins  Uninvolved by invasive carcinoma    Distance from Closest Margin in Millimeters (mm)  Distance is > 10 Millimeters (mm)    Closest Margin  Deep    LYMPH NODES   Regional Lymph Nodes  Involved by tumor cells    Number of Lymph Nodes with Macrometastases (> 2 mm)  0    Number of Lymph Nodes with Micrometastases (> 0.2 mm to 2 mm and / or > 200 cells)  1    Number of Lymph Nodes with Isolated Tumor Cells (<= 0.2 mm and <= 200 cells)  0    Number of Lymph Nodes Examined  7    Number of [place] Nodes Examined  7    PATHOLOGIC STAGE CLASSIFICATION (pTNM, AJCC 8th Edition)   TNM Descriptors  Not applicable    Primary Tumor (Invasive Carcinoma) (pT)  pT1c    Regional Lymph Nodes (pN)     Category (pN)  pN1mi                 Labs:   WBC   Date Value Ref Range Status   10/15/2018 8.66 3.20 - 9.80 10*3/mm3 Final     Hemoglobin   Date Value Ref Range Status   10/15/2018 12.5 12.0 - 15.5 g/dL Final     Hematocrit   Date Value Ref Range Status   10/15/2018 36.4 35.0 - 45.0 % Final     Platelets   Date Value Ref Range Status   10/15/2018 207 150 - 450 10*3/mm3 Final       ASSESSMENT/PLAN: Ms. Moy Sparrow is a 38-year-old white female status post right mastectomy with sentinel  node biopsy for Stage IB (cT1c cN1mi M0) moderately differentiated invasive ductal carcinoma of the right breast (ER +, [place] -, HER-2/vincent -). She received her first cycle of adjuvant Adriamycin/Cytoxan chemotherapy yesterday. The NCCN Guidelines as well as the risks, benefits, and rationale for adjuvant radiation therapy following mastectomy were discussed at length with the patient and her best friend. Due to her young age, sentinel lymph node positivity, and high Oncotype DX Recurrence Score, I would recommend adjuvant radiation therapy following chemotherapy to help with local control of her disease. She voices understanding and agrees to proceed with adjuvant radiation therapy following the completion of chemotherapy, at which time we plan to deliver 6040 cGy in 33 fractions.    Sincerely,    Claudio Amezquita MD  Radiation Oncology    Electronically signed by Claudio Amezquita MD 10/16/2018  2:56 PM    cc: Dr. Hailey Cunningham, APRN

## 2018-10-22 ENCOUNTER — DOCUMENTATION (OUTPATIENT)
Dept: GENETICS | Facility: HOSPITAL | Age: 38
End: 2018-10-22

## 2018-10-22 ENCOUNTER — OFFICE VISIT (OUTPATIENT)
Dept: ONCOLOGY | Facility: CLINIC | Age: 38
End: 2018-10-22
Attending: INTERNAL MEDICINE

## 2018-10-22 ENCOUNTER — APPOINTMENT (OUTPATIENT)
Dept: ONCOLOGY | Facility: HOSPITAL | Age: 38
End: 2018-10-22
Attending: INTERNAL MEDICINE

## 2018-10-22 ENCOUNTER — OFFICE VISIT (OUTPATIENT)
Dept: FAMILY MEDICINE CLINIC | Facility: CLINIC | Age: 38
End: 2018-10-22

## 2018-10-22 VITALS
HEART RATE: 95 BPM | TEMPERATURE: 98.4 F | RESPIRATION RATE: 18 BRPM | SYSTOLIC BLOOD PRESSURE: 145 MMHG | WEIGHT: 151.6 LBS | HEIGHT: 61 IN | BODY MASS INDEX: 28.62 KG/M2 | OXYGEN SATURATION: 98 % | DIASTOLIC BLOOD PRESSURE: 73 MMHG

## 2018-10-22 VITALS
SYSTOLIC BLOOD PRESSURE: 110 MMHG | WEIGHT: 151 LBS | HEIGHT: 61 IN | BODY MASS INDEX: 28.51 KG/M2 | DIASTOLIC BLOOD PRESSURE: 80 MMHG

## 2018-10-22 DIAGNOSIS — G89.29 CHRONIC BILATERAL THORACIC BACK PAIN: ICD-10-CM

## 2018-10-22 DIAGNOSIS — K12.1 STOMATITIS: ICD-10-CM

## 2018-10-22 DIAGNOSIS — C50.411 MALIGNANT NEOPLASM OF UPPER-OUTER QUADRANT OF RIGHT BREAST IN FEMALE, ESTROGEN RECEPTOR POSITIVE (HCC): Primary | ICD-10-CM

## 2018-10-22 DIAGNOSIS — F41.9 ANXIETY: Primary | ICD-10-CM

## 2018-10-22 DIAGNOSIS — Z17.0 MALIGNANT NEOPLASM OF RIGHT BREAST IN FEMALE, ESTROGEN RECEPTOR POSITIVE, UNSPECIFIED SITE OF BREAST (HCC): Primary | ICD-10-CM

## 2018-10-22 DIAGNOSIS — C50.411 MALIGNANT NEOPLASM OF UPPER-OUTER QUADRANT OF RIGHT BREAST IN FEMALE, ESTROGEN RECEPTOR POSITIVE (HCC): ICD-10-CM

## 2018-10-22 DIAGNOSIS — F41.1 GENERALIZED ANXIETY DISORDER: ICD-10-CM

## 2018-10-22 DIAGNOSIS — Z17.0 MALIGNANT NEOPLASM OF UPPER-OUTER QUADRANT OF RIGHT BREAST IN FEMALE, ESTROGEN RECEPTOR POSITIVE (HCC): Primary | ICD-10-CM

## 2018-10-22 DIAGNOSIS — C50.911 MALIGNANT NEOPLASM OF RIGHT BREAST IN FEMALE, ESTROGEN RECEPTOR POSITIVE, UNSPECIFIED SITE OF BREAST (HCC): Primary | ICD-10-CM

## 2018-10-22 DIAGNOSIS — M54.6 CHRONIC BILATERAL THORACIC BACK PAIN: ICD-10-CM

## 2018-10-22 DIAGNOSIS — Z80.3 FAMILY HISTORY OF BREAST CANCER: ICD-10-CM

## 2018-10-22 DIAGNOSIS — Z80.0 FAMILY HISTORY OF COLON CANCER: ICD-10-CM

## 2018-10-22 DIAGNOSIS — Z17.0 MALIGNANT NEOPLASM OF UPPER-OUTER QUADRANT OF RIGHT BREAST IN FEMALE, ESTROGEN RECEPTOR POSITIVE (HCC): ICD-10-CM

## 2018-10-22 PROCEDURE — 99213 OFFICE O/P EST LOW 20 MIN: CPT | Performed by: NURSE PRACTITIONER

## 2018-10-22 PROCEDURE — 99213 OFFICE O/P EST LOW 20 MIN: CPT | Performed by: INTERNAL MEDICINE

## 2018-10-22 PROCEDURE — G0463 HOSPITAL OUTPT CLINIC VISIT: HCPCS | Performed by: INTERNAL MEDICINE

## 2018-10-22 RX ORDER — DIAZEPAM 5 MG/1
5 TABLET ORAL EVERY 6 HOURS PRN
Qty: 60 TABLET | Refills: 0 | Status: SHIPPED | OUTPATIENT
Start: 2018-10-22 | End: 2019-03-14 | Stop reason: SDUPTHER

## 2018-10-22 NOTE — PROGRESS NOTES
Chief Complaint   Patient presents with   • Follow-up     meds and refills     Subjective   Moy Sparrow is a 38 y.o. female.     Presents with stress and anxiety related to recent dx and treatment for breast cancer -started Chemo last week       Anxiety   Presents for follow-up visit. Symptoms include depressed mood, excessive worry, irritability, nervous/anxious behavior, palpitations and panic. Patient reports no chest pain, compulsions, confusion, decreased concentration, dizziness, dry mouth, feeling of choking, hyperventilation, impotence, insomnia, malaise, muscle tension, nausea, obsessions, restlessness or suicidal ideas. Symptoms occur most days. The severity of symptoms is moderate. The quality of sleep is good. Nighttime awakenings: none.     Her past medical history is significant for depression. Compliance with medications is %.   Depression   Visit Type: follow-up  Patient presents with the following symptoms: depressed mood, excessive worry, feelings of worthlessness, irritability, nervousness/anxiety, palpitations and panic.  Patient is not experiencing: anhedonia, chest pain, choking sensation, compulsions, confusion, decreased concentration, dry mouth, hypersomnia, hyperventilation, impotence, insomnia, malaise, muscle tension, nausea, obsessions, psychomotor agitation, psychomotor retardation, restlessness, suicidal ideas, suicidal planning, thoughts of death and weight gain.  Frequency of symptoms: most days   Severity: severe   Sleep quality: fair  Nighttime awakenings: many  Compliance with medications:  %  Side effects:  Headaches       The following portions of the patient's history were reviewed and updated as appropriate: allergies, current medications, past social history and problem list.    Review of Systems   Constitutional: Positive for activity change, fatigue and irritability. Negative for appetite change, chills, diaphoresis, fever, unexpected weight change and  "weight gain.   HENT: Negative for dental problem, facial swelling, tinnitus and voice change.    Eyes: Negative.  Negative for visual disturbance.   Respiratory: Negative.  Negative for apnea, choking, chest tightness and wheezing.    Cardiovascular: Positive for palpitations. Negative for chest pain and leg swelling.   Gastrointestinal: Negative.  Negative for abdominal distention, anal bleeding, blood in stool, constipation, nausea and rectal pain.   Endocrine: Negative.  Negative for cold intolerance, heat intolerance, polydipsia, polyphagia and polyuria.   Genitourinary: Negative.  Negative for difficulty urinating, dyspareunia, dysuria, enuresis and impotence.   Musculoskeletal: Negative.  Negative for arthralgias and back pain.   Skin: Negative.  Negative for color change, pallor, rash and wound.   Allergic/Immunologic: Negative.  Negative for environmental allergies, food allergies and immunocompromised state.   Neurological: Negative.  Negative for dizziness, seizures, facial asymmetry, speech difficulty, weakness, light-headedness, numbness and headaches.   Hematological: Negative.  Negative for adenopathy. Does not bruise/bleed easily.   Psychiatric/Behavioral: Positive for agitation and sleep disturbance. Negative for behavioral problems, confusion, decreased concentration, dysphoric mood, hallucinations, self-injury and suicidal ideas. The patient is nervous/anxious. The patient does not have insomnia and is not hyperactive.    All other systems reviewed and are negative.      Objective   /80   Ht 154.9 cm (60.98\")   Wt 68.5 kg (151 lb)   LMP 09/24/2018   BMI 28.55 kg/m²   Physical Exam   Constitutional: She is oriented to person, place, and time. She appears well-developed and well-nourished. No distress.   HENT:   Head: Normocephalic and atraumatic.   Right Ear: External ear normal.   Left Ear: External ear normal.   Mouth/Throat: Oropharynx is clear and moist. No oropharyngeal exudate. "   Eyes: Pupils are equal, round, and reactive to light. EOM are normal. Right eye exhibits no discharge. Left eye exhibits no discharge. No scleral icterus.   Neck: Normal range of motion. Neck supple. No tracheal deviation present. No thyromegaly present.   Cardiovascular: Normal rate, regular rhythm, normal heart sounds and intact distal pulses.  Exam reveals no gallop and no friction rub.    No murmur heard.  Pulmonary/Chest: Effort normal and breath sounds normal. No respiratory distress. She has no wheezes. She has no rales. She exhibits no tenderness.   Healing scar right chest wall    Abdominal: Soft. Bowel sounds are normal. She exhibits no distension and no mass. There is no tenderness. There is no rebound and no guarding. No hernia.   Musculoskeletal: Normal range of motion. She exhibits no edema, tenderness or deformity.   Lymphadenopathy:     She has no cervical adenopathy.   Neurological: She is alert and oriented to person, place, and time. She has normal reflexes. She displays normal reflexes. No cranial nerve deficit or sensory deficit. She exhibits normal muscle tone. Coordination normal.   Skin: Skin is warm and dry. No rash noted. She is not diaphoretic. No erythema. No pallor.   Nursing note and vitals reviewed.      Assessment/Plan   Problem List Items Addressed This Visit        Other    Anxiety - Primary           New Medications Ordered This Visit   Medications   • diazePAM (VALIUM) 5 MG tablet     Sig: Take 1 tablet by mouth Every 6 (Six) Hours As Needed for Anxiety.     Dispense:  60 tablet     Refill:  0     Patient understands the risks associated with this controlled medication, including tolerance and addiction.  she also agrees to only obtain this medication from me, and not from a another provider, unless that provider is covering for me in my absence.  she also agrees to be compliant in dosing, and not self adjust the dose of medication.  A signed controlled substance agreement is on  file, and she has received a controlled substance education sheet at this a previous visit.  she has also signed a consent for treatment with a controlled substance as per [facility] policy. WM was obtained.

## 2018-10-22 NOTE — PROGRESS NOTES
Moy Sparrow, a 38-year-old female, was seen for genetic counseling via telemedicine due to a personal history of breast cancer. Ms. Sparrow was recently diagnosed with an ER positive OH negative invasive ductal breast cancer of the right breast at age 38. She underwent a right mastectomy and is currently undergoing adjuvant chemotherapy. She is planning for radiation following chemotherapy. She is pre-menopausal and retains her uterus and ovaries. She was interested in discussing her risk for a hereditary cancer syndrome.  Ms. Sparrow was interested in pursuing a multi-gene panel to evaluate her risk of cancer, therefore the CancerNext panel was ordered through [employer] which analyzes BRCA1/2 and 32 additional genes associated with an increased cancer risk. The genes on this panel include APC, JESSICA, BARD1, BMPR1A, BRCA1, BRCA2, BRIP1, CDH1, CDK4, CDKN2A, CHEK2, DICER1, EPCAM, GREM1, HOXB13, MLH1, MRE11A, MSH2, MSH6, MUTYH, NBN, NF1, PALB2, PMS2, POLD1, POLE, PTEN, RAD50, RAD51C, RAD51D, SMAD4, SMARCA4, STK11, and TP53. Results are expected within 2-3 weeks.     PERTINENT FAMILY HISTORY: (See attached pedigree)   Father:    Colon cancer, 50  Mat. Aunt:   Rectal cancer, 40s  Mat. Great Aunt:  Breast cancer  Mat. Great Aunt:  Breast cancer  Mat. Great Aunt:  Stomach cancer  Mat. Great Aunt (x3):  Lung cancer  Mat. Great Grandfather: Colon cancer  Mat. Great Grandfather: Kidney cancer  Mat. Great Grandmother: Throat cancer    We do not have medical records regarding any of these diagnoses.      RISK ASSESSMENT:  Ms. Sparrow’s personal and family history of cancer raises the question of a hereditary cancer syndrome. We discussed BRCA1/2 testing as well as the option of pursuing a panel that would test for other genes known to impact cancer risk in addition to BRCA1/2.   Ms. Sparrow clearly meets NCCN guidelines criteria for BRCA1/2 testing based on her personal history of breast cancer diagnosed under the age of 45.  These risk assessments are based on the family history information provided at the time of the appointment, and could change in the future should new information be obtained.    GENETIC COUNSELING (30 minutes):  We reviewed the family history information in detail. Cases of cancer follow three general patterns: sporadic, familial, and hereditary.  While most cancer is sporadic, some cases appear to occur in family clusters.  These cases are said to be familial and account for 10-20% of cancer cases.  Familial cases may be due to a combination of shared genes and environmental factors among family members.  In even fewer families, the cancer is said to be inherited, and the genes responsible for the cancer are known.      Family histories typical of hereditary cancer syndromes usually include multiple first- and second-degree relatives diagnosed with cancer types that define a syndrome.  These cases tend to be diagnosed at younger-than-expected ages and can be bilateral or multifocal.  The cancer in these families follows an autosomal dominant inheritance pattern, which indicates the likely presence of a mutation in a cancer susceptibility gene.  Children and siblings of an individual believed to carry this mutation have a 50% chance of inheriting that mutation, thereby inheriting the increased risk to develop cancer.  These mutations can be passed down from the maternal or the paternal lineage.    Hereditary breast cancer accounts for 5-10% of all cases of breast cancer.  A significant proportion of hereditary breast and ovarian cancer can be attributed to mutations in the BRCA1 and BRCA2 genes.  Mutations in these genes confer an increased risk for breast cancer, ovarian cancer, male breast cancer, prostate cancer, and pancreatic cancer. Women with a BRCA1 or BRCA2 mutation who have already been diagnosed with breast cancer have a 40-60% lifetime risk of a second breast cancer. Women with a BRCA1 or BRCA2 mutation  have up to a 44% risk of ovarian cancer.      Given her family history of early onset colorectal cancer, we briefly discussed that Sánchez syndrome is the most common cause of hereditary colon cancer. It is caused by mutations in the mismatch repair genes and increases the risk for colon cancer (up to 80%), endometrial cancer (up to 60%), as well as other cancers (ovarian, upper GI, brain, stomach). There are national management guidelines that have been established for individuals known to have Sánchez syndrome.      We discussed that there are other genes that are known to be associated with an increased risk for cancer.  Some of these genes have well defined cancer risks and established management guidelines.  Other genes that can be tested for have been more recently described, and there may be less data regarding the risks and therefore may not have established management guidelines. We discussed these limitations at length.  Based on Ms. Sparrow’s desire to get as much information as possible regarding her personal risks and potential risks for her family, she opted to pursue testing through a panel that would look at several other genes known to increase the risk for cancer.    GENETIC TESTING:  The risks, benefits and limitations of genetic testing and implications for clinical management following testing were reviewed.  DNA test results can influence decisions regarding screening, prevention and surgical management.  Genetic testing can have significant psychological implications for both individuals and families.  Also discussed was the possibility of employment and insurance discrimination based on genetic test results and the laws in place to prevent this (KARIN).    We discussed panel testing, which would involve testing for BRCA1/2 as well as 32 additional genes that are associated with increased cancer risk. The benefits and limitations of genetic testing were discussed and Ms. Sparrow decided to pursue  testing via the panel. The implications of a positive or negative test result were discussed. We discussed the possibility that, in some cases, genetic test results may be informative or may be ambiguous due to the identification of a genetic variant. These variants may or may not be associated with an increased cancer risk.  With multigene panel testing, it is not uncommon for a variant of uncertain significance (VUS) to be identified.  If a VUS is identified, testing family members is typically not recommended and screening recommendations are made based on the family history.  The laboratories that perform genetic testing work to reclassify the VUS and send out an amended report if and when a VUS is reclassified.  The majority of variant findings are ultimately reclassified to a negative result.  Given her personal and family history, a negative test result would not eliminate all cancer risk to her relatives, although the risk would not be as high as it would with positive genetic testing.      PLAN: Genetic testing via the CancerNext panel through [employer] was ordered and results are expected within 2-3 weeks. We will call her to discuss these results once they are received. Ms. Sparrow is welcome to contact us in the meantime with any questions she may have.      Piedad Alberto MS, [facility] [place], [facility]  Licensed Certified Genetic Counselor

## 2018-10-22 NOTE — PROGRESS NOTES
Moy Feng Andrews    Subjective    Ms Sparrow presented for follow up appointment for breast cancer.   She received her cycle 1 of chemotherapy last week and has tolerated it well for most of the part.  Reports some inflammation in her mouth and throat are likely secondary to stomatitis.  No issues with nausea or vomiting.  Continue to struggle with anxiety.  Reporting pain in her upper back which is chronic issue for her and is asking whether she can use lidocaine patch for this.  No fever or chills.      History of Present Illness    This is a very pleasant 38-year-old female who was seen in consultation at the request of Dr. Ortega for evaluation of newly diagnosed breast cancer on 9/24/18.  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  Her past medical history is otherwise unremarkable except history of anxiety/depression and gastroesophageal reflux disease.She tells me that she felt a lump in her right breast in August 2018 and subsequently underwent a mammogram and ultrasonographic evaluation on August 27 which showed right breast mass approximately 1.5 cm.  The breast mass was felt to be suspicious for malignancy.  She then underwent ultrasound guided biopsy of right breast on 8/28/18 which showed invasive ductal carcinoma, moderately differentiated.  The carcinoma was tested positive for estrogen receptor and it was tested negative for progesterone receptor as well as HER-2.  Patient underwent right mastectomy and sentinel lymph node biopsy on September 5 which showed invasive ductal carcinoma of 1.5 cm, moderately differentiated which was completely excised.  [place] lymph node showed micrometastasis (less than 2 mm) involving 1 of 7 lymph nodes.    Her recurrence score was high at 36, so we recommended adjuvant chemotherapy with dose dense AC followed by taxane. She was started on chemotherapy on 10/15/18.     Past Medical History, Past Surgical History, Social History, Family  "History have been reviewed and are without significant changes except as mentioned.    Review of Systems   CONSTITUTIONAL: Fatigue+ No weight loss, fever, chills, weakness.  HEENT: Eyes: No visual loss, blurred vision, double vision or yellow sclerae. Ears, Nose, Throat: No hearing loss, sneezing, congestion, runny nose or sore throat.  SKIN: No rash or itching.  CARDIOVASCULAR: No chest pain, chest pressure or chest discomfort. No palpitations or edema.  RESPIRATORY: No shortness of breath, cough or sputum.  GASTROINTESTINAL: No anorexia, nausea, vomiting or diarrhea. No abdominal pain or blood.  GENITOURINARY: Negative for urgency, frequency or dysuria.   NEUROLOGICAL: No headache, dizziness, syncope, paralysis, ataxia, numbness or tingling in the extremities. No change in bowel or bladder control.  MUSCULOSKELETAL: Back pain+  HEMATOLOGIC: No anemia, bleeding or bruising.  LYMPHATICS: No enlarged nodes. No history of splenectomy.  PSYCHIATRIC: Anxiety + depression+  ENDOCRINOLOGIC: No reports of sweating, cold or heat intolerance. No polyuria or polydipsia.  ALLERGIES: No history of asthma, hives, eczema or rhinitis.    Medications:  The current medication list was reviewed in the EMR    ALLERGIES:  No Known Allergies    Objective      Vitals:    10/22/18 0824   BP: 145/73   Pulse: 95   Resp: 18   Temp: 98.4 °F (36.9 °C)   TempSrc: Temporal Artery    SpO2: 98%   Weight: 68.8 kg (151 lb 9.6 oz)   Height: 154.9 cm (60.98\")   PainSc: 0-No pain     Current Status 10/22/2018   ECOG score 0       Physical Exam    General: Alert, awake, oriented.  Well dressed.  Not in apparent distress. Vitals as above.   HEAD: normocephalic, atraumatic.   EYES: PERRL, EOMI. Fundi normal, vision is grossly intact.  Neck: Supple, no adenopathy or thyromegaly.   Throat: normal oral cavity and pharynx. No inflammation, swelling, exudate, or lesions.  CARDIAC: Normal S1 and S2. No S3, S4 or murmurs. Rhythm is regular.Extremities are warm " and well perfused.   LUNGS: Clear to auscultation and percussion without rales, rhonchi, wheezing or diminished breath sounds.  ABDOMEN: Positive bowel sounds. Soft, nondistended, nontender  . No guarding or rebound. No masses.  Back:No bony tenderness.   EXTREMITIES: No significant deformity or joint abnormality.  Peripheral pulses intact. No varicosities.  Skin: No rash or bruising.  Neurological: Grossly non-focal exam. No focal weakness. Gait: Normal.   Psych: Mood and affect normal. No hallucination or suicidal thoughts.   Lymphatics:No adenopathy     RECENT LABS: Independently reviewed and summarized  Hematology WBC   Date Value Ref Range Status   10/15/2018 8.66 3.20 - 9.80 10*3/mm3 Final     RBC   Date Value Ref Range Status   10/15/2018 4.19 3.77 - 5.16 10*6/mm3 Final     Hemoglobin   Date Value Ref Range Status   10/15/2018 12.5 12.0 - 15.5 g/dL Final     Hematocrit   Date Value Ref Range Status   10/15/2018 36.4 35.0 - 45.0 % Final     Platelets   Date Value Ref Range Status   10/15/2018 207 150 - 450 10*3/mm3 Final            Lab Results   Component Value Date    GLUCOSE 96 10/15/2018    BUN 19 10/15/2018    CREATININE 0.65 10/15/2018    EGFRIFNONA 101 10/15/2018    BCR 29.2 (H) 10/15/2018    K 3.8 10/15/2018    CO2 23.0 10/15/2018    CALCIUM 8.7 10/15/2018    ALBUMIN 4.10 10/15/2018    AST 27 10/15/2018    ALT 31 10/15/2018       Imaging: Ultrasound gallbladder results reviewed and showed normal liver, bile duct and gallbladder.  Nuclear medicine bone scan whole-body results reviewed independently and showed no findings suspicious for skeletal metastatic disease.       Result of echo reviewed. Showed LVEF of 65%. No valvular heart disease.       Diagnosis:   (1) Invasive ductal carcinoma, right   Date of diagnosis: 9/5/18   Stage: IB ([id number]), recurrence score 36   Prior treatment: Right mastectomy with sentinel node biopsy and ALND (9/5/18)  Chemotherapy: Start date (10/15/18)     Assessment/Plan        (1) Invasive ductal carcinoma, right, stage IB, ER+/MT-/HER2-      - Tolerated cycle 1 dose dense AC well last week without any significant side effects.   - Reporting some mouth soreness without any sores. No issue with nausea, emesis.   - She is working, mainly at [employer].   - No fever or chills.   - Discussed that I wont be available next week and our NP Nusrat will see her for cycle 2.   - Seeing genetic counselor today for evaluation.       (2) Stomatitis: No mouth sores. Prescription for magic mouthwash given to the patient today.     (3) Upper back pain: Chronic, likely musculo-skeletal in nature. Bone scan negative for any metastases. Recommend to use lidocaine patch.     (4) Anxiety: Patient continues to remain anxious about her overall cancer, which is understandable. She remains on Pristiq and valium as per her PCP.     Yobani Emery MD     10/22/2018      CC:

## 2018-10-23 ENCOUNTER — DOCUMENTATION (OUTPATIENT)
Dept: NUTRITION | Facility: HOSPITAL | Age: 38
End: 2018-10-23

## 2018-10-23 NOTE — PROGRESS NOTES
"Adult Outpatient Nutrition  Assessment    Patient Name:  Moy Sparrow  YOB: 1980  MRN: [id number]    Assessment Date:  Entry from 10/22/18 visit    Comments:  Follow-up visit to check tolerance of 1st chemo treatment. Wt 151 lb (stable). Experienced mild intermit nausea. Fatigue at time of visit. 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 Stated \"I am staying very positive\"  Pt also placing emphasis on nutrition as requested. Moy agreed to call RD as needed.                        Electronically signed by:  Liza Wei RD  10/23/18 10:27 AM   "

## 2018-10-29 ENCOUNTER — TELEPHONE (OUTPATIENT)
Dept: ONCOLOGY | Facility: HOSPITAL | Age: 38
End: 2018-10-29

## 2018-10-29 ENCOUNTER — OFFICE VISIT (OUTPATIENT)
Dept: ONCOLOGY | Facility: CLINIC | Age: 38
End: 2018-10-29
Attending: INTERNAL MEDICINE

## 2018-10-29 ENCOUNTER — INFUSION (OUTPATIENT)
Dept: ONCOLOGY | Facility: HOSPITAL | Age: 38
End: 2018-10-29
Attending: INTERNAL MEDICINE

## 2018-10-29 VITALS
OXYGEN SATURATION: 98 % | WEIGHT: 152.6 LBS | TEMPERATURE: 98.6 F | HEIGHT: 61 IN | DIASTOLIC BLOOD PRESSURE: 77 MMHG | BODY MASS INDEX: 28.81 KG/M2 | RESPIRATION RATE: 18 BRPM | HEART RATE: 87 BPM | SYSTOLIC BLOOD PRESSURE: 116 MMHG

## 2018-10-29 DIAGNOSIS — Z17.0 MALIGNANT NEOPLASM OF UPPER-OUTER QUADRANT OF RIGHT BREAST IN FEMALE, ESTROGEN RECEPTOR POSITIVE (HCC): ICD-10-CM

## 2018-10-29 DIAGNOSIS — IMO0001 OTHER COMPLICATION DUE TO VENOUS ACCESS DEVICE, INITIAL ENCOUNTER: ICD-10-CM

## 2018-10-29 DIAGNOSIS — C50.411 MALIGNANT NEOPLASM OF UPPER-OUTER QUADRANT OF RIGHT BREAST IN FEMALE, ESTROGEN RECEPTOR POSITIVE (HCC): Primary | ICD-10-CM

## 2018-10-29 DIAGNOSIS — C50.411 MALIGNANT NEOPLASM OF UPPER-OUTER QUADRANT OF RIGHT BREAST IN FEMALE, ESTROGEN RECEPTOR POSITIVE (HCC): ICD-10-CM

## 2018-10-29 DIAGNOSIS — Z45.2 ENCOUNTER FOR VENOUS ACCESS DEVICE CARE: ICD-10-CM

## 2018-10-29 DIAGNOSIS — Z17.0 MALIGNANT NEOPLASM OF UPPER-OUTER QUADRANT OF RIGHT BREAST IN FEMALE, ESTROGEN RECEPTOR POSITIVE (HCC): Primary | ICD-10-CM

## 2018-10-29 LAB
ALBUMIN SERPL-MCNC: 4 G/DL (ref 3.4–4.8)
ALBUMIN/GLOB SERPL: 1.2 G/DL (ref 1.1–1.8)
ALP SERPL-CCNC: 70 U/L (ref 38–126)
ALT SERPL W P-5'-P-CCNC: 19 U/L (ref 9–52)
ANION GAP SERPL CALCULATED.3IONS-SCNC: 8 MMOL/L (ref 5–15)
AST SERPL-CCNC: 25 U/L (ref 14–36)
BASOPHILS # BLD AUTO: 0.04 10*3/MM3 (ref 0–0.2)
BASOPHILS NFR BLD AUTO: 0.5 % (ref 0–2)
BILIRUB SERPL-MCNC: 0.2 MG/DL (ref 0.2–1.3)
BUN BLD-MCNC: 17 MG/DL (ref 7–21)
BUN/CREAT SERPL: 23.6 (ref 7–25)
CALCIUM SPEC-SCNC: 9 MG/DL (ref 8.4–10.2)
CHLORIDE SERPL-SCNC: 105 MMOL/L (ref 95–110)
CO2 SERPL-SCNC: 25 MMOL/L (ref 22–31)
CREAT BLD-MCNC: 0.72 MG/DL (ref 0.5–1)
DEPRECATED RDW RBC AUTO: 40.2 FL (ref 36.4–46.3)
EOSINOPHIL # BLD AUTO: 0.03 10*3/MM3 (ref 0–0.7)
EOSINOPHIL NFR BLD AUTO: 0.3 % (ref 0–7)
ERYTHROCYTE [DISTWIDTH] IN BLOOD BY AUTOMATED COUNT: 12.7 % (ref 11.5–14.5)
GFR SERPL CREATININE-BSD FRML MDRD: 90 ML/MIN/1.73 (ref 58–135)
GLOBULIN UR ELPH-MCNC: 3.3 GM/DL (ref 2.3–3.5)
GLUCOSE BLD-MCNC: 94 MG/DL (ref 60–100)
HCT VFR BLD AUTO: 33.1 % (ref 35–45)
HGB BLD-MCNC: 11.1 G/DL (ref 12–15.5)
IMM GRANULOCYTES # BLD: 0.22 10*3/MM3 (ref 0–0.02)
IMM GRANULOCYTES NFR BLD: 2.5 % (ref 0–0.5)
LYMPHOCYTES # BLD AUTO: 2.39 10*3/MM3 (ref 0.6–4.2)
LYMPHOCYTES NFR BLD AUTO: 26.9 % (ref 10–50)
MCH RBC QN AUTO: 29.4 PG (ref 26.5–34)
MCHC RBC AUTO-ENTMCNC: 33.5 G/DL (ref 31.4–36)
MCV RBC AUTO: 87.6 FL (ref 80–98)
MONOCYTES # BLD AUTO: 1.11 10*3/MM3 (ref 0–0.9)
MONOCYTES NFR BLD AUTO: 12.5 % (ref 0–12)
NEUTROPHILS # BLD AUTO: 5.09 10*3/MM3 (ref 2–8.6)
NEUTROPHILS NFR BLD AUTO: 57.3 % (ref 37–80)
PLATELET # BLD AUTO: 266 10*3/MM3 (ref 150–450)
PMV BLD AUTO: 8.7 FL (ref 8–12)
POTASSIUM BLD-SCNC: 3.8 MMOL/L (ref 3.5–5.1)
PROT SERPL-MCNC: 7.3 G/DL (ref 6.3–8.6)
RBC # BLD AUTO: 3.78 10*6/MM3 (ref 3.77–5.16)
SODIUM BLD-SCNC: 138 MMOL/L (ref 137–145)
WBC NRBC COR # BLD: 8.88 10*3/MM3 (ref 3.2–9.8)

## 2018-10-29 PROCEDURE — 99214 OFFICE O/P EST MOD 30 MIN: CPT | Performed by: NURSE PRACTITIONER

## 2018-10-29 PROCEDURE — 25010000002 CYCLOPHOSPHAMIDE PER 100 MG: Performed by: NURSE PRACTITIONER

## 2018-10-29 PROCEDURE — 96377 APPLICATON ON-BODY INJECTOR: CPT | Performed by: NURSE PRACTITIONER

## 2018-10-29 PROCEDURE — 96411 CHEMO IV PUSH ADDL DRUG: CPT | Performed by: NURSE PRACTITIONER

## 2018-10-29 PROCEDURE — 25010000003 DEXAMETHASONE SODIUM PHOSPHATE 100 MG/10ML SOLUTION: Performed by: NURSE PRACTITIONER

## 2018-10-29 PROCEDURE — 85025 COMPLETE CBC W/AUTO DIFF WBC: CPT

## 2018-10-29 PROCEDURE — 96367 TX/PROPH/DG ADDL SEQ IV INF: CPT | Performed by: NURSE PRACTITIONER

## 2018-10-29 PROCEDURE — 25010000002 FOSAPREPITANT PER 1 MG: Performed by: NURSE PRACTITIONER

## 2018-10-29 PROCEDURE — 25010000002 PEGFILGRASTIM 6 MG/0.6ML PREFILLED SYRINGE KIT: Performed by: NURSE PRACTITIONER

## 2018-10-29 PROCEDURE — 96413 CHEMO IV INFUSION 1 HR: CPT | Performed by: NURSE PRACTITIONER

## 2018-10-29 PROCEDURE — 25010000002 PALONOSETRON PER 25 MCG: Performed by: NURSE PRACTITIONER

## 2018-10-29 PROCEDURE — 80053 COMPREHEN METABOLIC PANEL: CPT

## 2018-10-29 PROCEDURE — 96375 TX/PRO/DX INJ NEW DRUG ADDON: CPT | Performed by: NURSE PRACTITIONER

## 2018-10-29 PROCEDURE — 25010000002 DOXORUBICIN PER 10 MG: Performed by: NURSE PRACTITIONER

## 2018-10-29 RX ORDER — DOXORUBICIN HYDROCHLORIDE 2 MG/ML
60 INJECTION, SOLUTION INTRAVENOUS ONCE
Status: COMPLETED | OUTPATIENT
Start: 2018-10-29 | End: 2018-10-29

## 2018-10-29 RX ORDER — SODIUM CHLORIDE 0.9 % (FLUSH) 0.9 %
10 SYRINGE (ML) INJECTION AS NEEDED
Status: DISCONTINUED | OUTPATIENT
Start: 2018-10-29 | End: 2018-10-29 | Stop reason: HOSPADM

## 2018-10-29 RX ORDER — PALONOSETRON 0.05 MG/ML
0.25 INJECTION, SOLUTION INTRAVENOUS ONCE
Status: CANCELLED | OUTPATIENT
Start: 2018-10-29

## 2018-10-29 RX ORDER — DOXORUBICIN HYDROCHLORIDE 2 MG/ML
60 INJECTION, SOLUTION INTRAVENOUS ONCE
Status: CANCELLED | OUTPATIENT
Start: 2018-10-29

## 2018-10-29 RX ORDER — SODIUM CHLORIDE 9 MG/ML
250 INJECTION, SOLUTION INTRAVENOUS ONCE
Status: COMPLETED | OUTPATIENT
Start: 2018-10-29 | End: 2018-10-29

## 2018-10-29 RX ORDER — PALONOSETRON 0.05 MG/ML
0.25 INJECTION, SOLUTION INTRAVENOUS ONCE
Status: COMPLETED | OUTPATIENT
Start: 2018-10-29 | End: 2018-10-29

## 2018-10-29 RX ORDER — SODIUM CHLORIDE 9 MG/ML
250 INJECTION, SOLUTION INTRAVENOUS ONCE
Status: CANCELLED | OUTPATIENT
Start: 2018-10-29

## 2018-10-29 RX ORDER — SODIUM CHLORIDE 0.9 % (FLUSH) 0.9 %
10 SYRINGE (ML) INJECTION AS NEEDED
Status: CANCELLED | OUTPATIENT
Start: 2018-11-12

## 2018-10-29 RX ADMIN — SODIUM CHLORIDE 250 ML: 9 INJECTION, SOLUTION INTRAVENOUS at 10:12

## 2018-10-29 RX ADMIN — DEXAMETHASONE SODIUM PHOSPHATE 12 MG: 10 INJECTION, SOLUTION INTRAMUSCULAR; INTRAVENOUS at 10:12

## 2018-10-29 RX ADMIN — PALONOSETRON HYDROCHLORIDE 0.25 MG: 0.25 INJECTION INTRAVENOUS at 09:59

## 2018-10-29 RX ADMIN — PEGFILGRASTIM 6 MG: KIT SUBCUTANEOUS at 12:55

## 2018-10-29 RX ADMIN — DOXORUBICIN HYDROCHLORIDE 100 MG: 2 INJECTION, SOLUTION INTRAVENOUS at 11:45

## 2018-10-29 RX ADMIN — SODIUM CHLORIDE, PRESERVATIVE FREE 500 UNITS: 5 INJECTION INTRAVENOUS at 12:55

## 2018-10-29 RX ADMIN — SODIUM CHLORIDE 100 ML: 9 INJECTION, SOLUTION INTRAVENOUS at 11:03

## 2018-10-29 RX ADMIN — Medication 10 ML: at 08:55

## 2018-10-29 RX ADMIN — Medication 10 ML: at 12:55

## 2018-10-29 RX ADMIN — CYCLOPHOSPHAMIDE 1000 MG: 1 INJECTION, POWDER, FOR SOLUTION INTRAVENOUS; ORAL at 12:12

## 2018-10-29 NOTE — PROGRESS NOTES
DATE OF VISIT: 10/29/2018    REASON FOR VISIT:   Present today for cycle #2 dose dense A/C chemotherapy     History of Present Illness    This is a very pleasant 38-year-old female who was seen in consultation at the request of Dr. Ortega for evaluation of newly diagnosed breast cancer on 9/24/18.  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  Her past medical history is otherwise unremarkable except history of anxiety/depression and gastroesophageal reflux disease.She tells me that she felt a lump in her right breast in August 2018 and subsequently underwent a mammogram and ultrasonographic evaluation on August 27 which showed right breast mass approximately 1.5 cm.  The breast mass was felt to be suspicious for malignancy.  She then underwent ultrasound guided biopsy of right breast on 8/28/18 which showed invasive ductal carcinoma, moderately differentiated.  The carcinoma was tested positive for estrogen receptor and it was tested negative for progesterone receptor as well as HER-2.  Patient underwent right mastectomy and sentinel lymph node biopsy on September 5 which showed invasive ductal carcinoma of 1.5 cm, moderately differentiated which was completely excised.  [place] lymph node showed micrometastasis (less than 2 mm) involving 1 of 7 lymph nodes.     Her recurrence score was high at 36, so it was recommended adjuvant chemotherapy with dose dense AC followed by taxane. She was started on chemotherapy on 10/15/18.   She is here today for cycle #2 chemotherapy. She has tolerated ok, with some nausea and diarrhea. She denies any mouth sore but states she feels like her mouth is sensitive. She has prescription for magic mouth wash that she needs to  from pharmacy.      PAST MEDICAL HISTORY:    Past Medical History:   Diagnosis Date   • Anxiety    • Cancer (CMS/HCC)     breast right   • Depressive disorder    • Encounter for gynecological examination    • Encounter for vision  screening     Vision screen (1)     • Health examination of defined subpopulation     Health examination of sub-group     • Malaise and fatigue    • Palpitations    • Skin cancer      Skin Ca Pre Cancer   • Soft tissue swelling     right side of neck-2 different areas    • Urinary tract infectious disease    • Wears glasses        SOCIAL HISTORY:    Social History   Substance Use Topics   • Smoking status: Current Some Day Smoker     Years: 25.00   • Smokeless tobacco: Never Used      Comment: smokes 2 cigs/ day   • Alcohol use No       Surgical History :  Past Surgical History:   Procedure Laterality Date   •  SECTION     • COLONOSCOPY N/A 3/1/2018    Procedure: COLONOSCOPY;  Surgeon: Shin Vanessa DO;  Location: [facility] ENDOSCOPY;  Service:    • ENDOSCOPY N/A 10/5/2018    Procedure: ESOPHAGOGASTRODUODENOSCOPY;  Surgeon: Shin Vanessa DO;  Location: [facility] ENDOSCOPY;  Service: Gastroenterology   • MASTECTOMY Right    • MASTECTOMY WITH SENTINEL NODE BIOPSY AND AXILLARY NODE DISSECTION Right 2018    Procedure: INJECT RIGHT BREAST AND THEN PERFORM SENTINEL LYMPH NODE BIOPSY AND THEN  REMOVE RIGHT BREAST AND NIPPLE AND        (INJECTION ON SDS @7:00 A.M.);  Surgeon: Abdoul Ortega MD;  Location: [facility] OR;  Service: General   • PAP SMEAR     • US GUIDED FINE NEEDLE ASPIRATION  2018   • VENOUS ACCESS DEVICE (PORT) INSERTION Left 10/10/2018    Procedure: MEDIPORT PLACEMENT      (LEFT SIDE,UNATTACHED PORT )      (C-ARM);  Surgeon: Abdoul Ortega MD;  Location: [facility];  Service: General       ALLERGIES:    No Known Allergies    REVIEW OF SYSTEMS:      CONSTITUTIONAL:  No fever, chills, or night sweats.     HEENT:  No epistaxis, mouth sores, or difficulty swallowing.    RESPIRATORY:  No new shortness of breath or cough at present.    CARDIOVASCULAR:  No chest pain or palpitations.    GASTROINTESTINAL:  No abdominal pain, nausea, vomiting, or blood in the  "stool.    GENITOURINARY:  No dysuria or hematuria.    MUSCULOSKELETAL:  No any new back pain or arthralgias.     NEUROLOGICAL:  No tingling or numbness. No new headache or dizziness.     LYMPHATICS:  Denies any abnormal swollen and anywhere in the body.    SKIN:  Denies any new skin rash.    PHYSICAL EXAMINATION:      VITAL SIGNS:  /77   Pulse 87   Temp 98.6 °F (37 °C) (Temporal Artery )   Resp 18   Ht 154.9 cm (60.98\")   Wt 69.2 kg (152 lb 9.6 oz)   SpO2 98%   BMI 28.85 kg/m²     GENERAL:  Not in any distress.    HEENT:  Normocephalic, Atraumatic.Mild Conjunctival pallor. No icterus.  No Facial Asymmetry noted.    NECK:  No adenopathy. No JVD.    RESPIRATORY:  Fair air entry bilateral. No rhonchi or wheezing.    CARDIOVASCULAR:  S1, S2. Regular rate and rhythm. No murmur or gallop appreciated.    ABDOMEN:  Soft, obese, nontender. Bowel sounds present in all four quadrants.  No organomegaly appreciated.    EXTREMITIES:  No edema.No Calf Tenderness.    NEUROLOGIC:  Alert, awake and oriented ×3.      SKIN : No new skin lesion identified  DIAGNOSTIC DATA:    Glucose   Date Value Ref Range Status   10/29/2018 94 60 - 100 mg/dL Final     Sodium   Date Value Ref Range Status   10/29/2018 138 137 - 145 mmol/L Final     Potassium   Date Value Ref Range Status   10/29/2018 3.8 3.5 - 5.1 mmol/L Final     CO2   Date Value Ref Range Status   10/29/2018 25.0 22.0 - 31.0 mmol/L Final     Chloride   Date Value Ref Range Status   10/29/2018 105 95 - 110 mmol/L Final     Anion Gap   Date Value Ref Range Status   10/29/2018 8.0 5.0 - 15.0 mmol/L Final     Creatinine   Date Value Ref Range Status   10/29/2018 0.72 0.50 - 1.00 mg/dL Final     BUN   Date Value Ref Range Status   10/29/2018 17 7 - 21 mg/dL Final     BUN/Creatinine Ratio   Date Value Ref Range Status   10/29/2018 23.6 7.0 - 25.0 Final     Calcium   Date Value Ref Range Status   10/29/2018 9.0 8.4 - 10.2 mg/dL Final     eGFR Non  Amer   Date Value Ref " Range Status   10/29/2018 90 58 - 135 mL/min/1.73 Final     Alkaline Phosphatase   Date Value Ref Range Status   10/29/2018 70 38 - 126 U/L Final     Total Protein   Date Value Ref Range Status   10/29/2018 7.3 6.3 - 8.6 g/dL Final     ALT (SGPT)   Date Value Ref Range Status   10/29/2018 19 9 - 52 U/L Final     AST (SGOT)   Date Value Ref Range Status   10/29/2018 25 14 - 36 U/L Final     Total Bilirubin   Date Value Ref Range Status   10/29/2018 0.2 0.2 - 1.3 mg/dL Final     Albumin   Date Value Ref Range Status   10/29/2018 4.00 3.40 - 4.80 g/dL Final     Globulin   Date Value Ref Range Status   10/29/2018 3.3 2.3 - 3.5 gm/dL Final     Lab Results   Component Value Date    WBC 8.88 10/29/2018    HGB 11.1 (L) 10/29/2018    HCT 33.1 (L) 10/29/2018    MCV 87.6 10/29/2018     10/29/2018     Lab Results   Component Value Date    NEUTROABS 5.09 10/29/2018    IRON 126 11/01/2017    [id number] 261 11/01/2017     No results found for: , LABCA2, AFPTM, HCGQUANT, , CHROMGRNA, [id number], CEA, REFLABREPO]      Assessment and Plan:    (1) Invasive ductal carcinoma, right, stage IB, ER+/SC-/HER2-       - Tolerated cycle 1 dose dense AC well last week without any significant side effects.   - Reporting some mouth soreness without any sores. No issue with nausea, emesis.   - She is working, mainly at [employer].   - No fever or chills.   - She will proceed with cycle #2 today and will return to clinic in 2 weeks for next cycle and appt with Dr. Hilton.         (2) Stomatitis: No mouth sores. Encouraged pt to  prescription today for magic mouthwash.      (3) Upper back pain: Chronic, likely musculo-skeletal in nature. Bone scan negative for any metastases. Recommend to use lidocaine patch.      (4) Anxiety: Patient continues to remain anxious about her overall cancer, which is understandable. She remains on Pristiq and valium as per her PCP.       This document has been signed by CICI White on  October 29, 2018 1:56 PM

## 2018-10-29 NOTE — TELEPHONE ENCOUNTER
Pt called and left message for me to call us back regarding her labs.  Pt called and lab results given to her.

## 2018-11-06 ENCOUNTER — APPOINTMENT (OUTPATIENT)
Dept: RADIATION ONCOLOGY | Facility: HOSPITAL | Age: 38
End: 2018-11-06

## 2018-11-12 ENCOUNTER — INFUSION (OUTPATIENT)
Dept: ONCOLOGY | Facility: HOSPITAL | Age: 38
End: 2018-11-12
Attending: INTERNAL MEDICINE

## 2018-11-12 ENCOUNTER — OFFICE VISIT (OUTPATIENT)
Dept: ONCOLOGY | Facility: CLINIC | Age: 38
End: 2018-11-12
Attending: INTERNAL MEDICINE

## 2018-11-12 VITALS
BODY MASS INDEX: 29.3 KG/M2 | RESPIRATION RATE: 18 BRPM | TEMPERATURE: 97.9 F | DIASTOLIC BLOOD PRESSURE: 73 MMHG | HEART RATE: 87 BPM | SYSTOLIC BLOOD PRESSURE: 124 MMHG | WEIGHT: 155 LBS

## 2018-11-12 DIAGNOSIS — Z17.0 MALIGNANT NEOPLASM OF UPPER-OUTER QUADRANT OF RIGHT BREAST IN FEMALE, ESTROGEN RECEPTOR POSITIVE (HCC): Primary | ICD-10-CM

## 2018-11-12 DIAGNOSIS — Z45.2 ENCOUNTER FOR VENOUS ACCESS DEVICE CARE: ICD-10-CM

## 2018-11-12 DIAGNOSIS — C50.411 MALIGNANT NEOPLASM OF UPPER-OUTER QUADRANT OF RIGHT BREAST IN FEMALE, ESTROGEN RECEPTOR POSITIVE (HCC): ICD-10-CM

## 2018-11-12 DIAGNOSIS — C50.411 MALIGNANT NEOPLASM OF UPPER-OUTER QUADRANT OF RIGHT BREAST IN FEMALE, ESTROGEN RECEPTOR POSITIVE (HCC): Primary | ICD-10-CM

## 2018-11-12 DIAGNOSIS — R11.0 CHEMOTHERAPY-INDUCED NAUSEA: ICD-10-CM

## 2018-11-12 DIAGNOSIS — Z17.0 MALIGNANT NEOPLASM OF UPPER-OUTER QUADRANT OF RIGHT BREAST IN FEMALE, ESTROGEN RECEPTOR POSITIVE (HCC): ICD-10-CM

## 2018-11-12 DIAGNOSIS — Z51.11 ENCOUNTER FOR CHEMOTHERAPY MANAGEMENT: Primary | ICD-10-CM

## 2018-11-12 DIAGNOSIS — T45.1X5A CHEMOTHERAPY-INDUCED NAUSEA: ICD-10-CM

## 2018-11-12 DIAGNOSIS — IMO0001 OTHER COMPLICATION DUE TO VENOUS ACCESS DEVICE, INITIAL ENCOUNTER: ICD-10-CM

## 2018-11-12 LAB
ALBUMIN SERPL-MCNC: 4.1 G/DL (ref 3.4–4.8)
ALBUMIN/GLOB SERPL: 1.4 G/DL (ref 1.1–1.8)
ALP SERPL-CCNC: 84 U/L (ref 38–126)
ALT SERPL W P-5'-P-CCNC: 9 U/L (ref 9–52)
ANION GAP SERPL CALCULATED.3IONS-SCNC: 7 MMOL/L (ref 5–15)
AST SERPL-CCNC: 17 U/L (ref 14–36)
BILIRUB SERPL-MCNC: <0.1 MG/DL (ref 0.2–1.3)
BUN BLD-MCNC: 19 MG/DL (ref 7–21)
BUN/CREAT SERPL: 28.4 (ref 7–25)
CALCIUM SPEC-SCNC: 8.8 MG/DL (ref 8.4–10.2)
CHLORIDE SERPL-SCNC: 103 MMOL/L (ref 95–110)
CO2 SERPL-SCNC: 26 MMOL/L (ref 22–31)
CREAT BLD-MCNC: 0.67 MG/DL (ref 0.5–1)
DEPRECATED RDW RBC AUTO: 42.1 FL (ref 36.4–46.3)
EOSINOPHIL # BLD MANUAL: 0.1 10*3/MM3 (ref 0–0.7)
EOSINOPHIL NFR BLD MANUAL: 1 % (ref 0–7)
ERYTHROCYTE [DISTWIDTH] IN BLOOD BY AUTOMATED COUNT: 13.4 % (ref 11.5–14.5)
GFR SERPL CREATININE-BSD FRML MDRD: 97 ML/MIN/1.73 (ref 58–135)
GLOBULIN UR ELPH-MCNC: 2.9 GM/DL (ref 2.3–3.5)
GLUCOSE BLD-MCNC: 107 MG/DL (ref 60–100)
HCT VFR BLD AUTO: 32.6 % (ref 35–45)
HGB BLD-MCNC: 10.9 G/DL (ref 12–15.5)
LYMPHOCYTES # BLD MANUAL: 2.04 10*3/MM3 (ref 0.6–4.2)
LYMPHOCYTES NFR BLD MANUAL: 20 % (ref 10–50)
LYMPHOCYTES NFR BLD MANUAL: 9 % (ref 0–12)
MCH RBC QN AUTO: 29.5 PG (ref 26.5–34)
MCHC RBC AUTO-ENTMCNC: 33.4 G/DL (ref 31.4–36)
MCV RBC AUTO: 88.1 FL (ref 80–98)
METAMYELOCYTES NFR BLD MANUAL: 2 % (ref 0–0)
MONOCYTES # BLD AUTO: 0.92 10*3/MM3 (ref 0–0.9)
MYELOCYTES NFR BLD MANUAL: 3 % (ref 0–0)
NEUTROPHILS # BLD AUTO: 6.63 10*3/MM3 (ref 2–8.6)
NEUTROPHILS NFR BLD MANUAL: 61 % (ref 37–80)
NEUTS BAND NFR BLD MANUAL: 4 % (ref 0–5)
PLAT MORPH BLD: NORMAL
PLATELET # BLD AUTO: 225 10*3/MM3 (ref 150–450)
PMV BLD AUTO: 9 FL (ref 8–12)
POLYCHROMASIA BLD QL SMEAR: ABNORMAL
POTASSIUM BLD-SCNC: 3.9 MMOL/L (ref 3.5–5.1)
PROT SERPL-MCNC: 7 G/DL (ref 6.3–8.6)
RBC # BLD AUTO: 3.7 10*6/MM3 (ref 3.77–5.16)
SODIUM BLD-SCNC: 136 MMOL/L (ref 137–145)
TOXIC GRANULATION: ABNORMAL
WBC NRBC COR # BLD: 10.2 10*3/MM3 (ref 3.2–9.8)

## 2018-11-12 PROCEDURE — 96413 CHEMO IV INFUSION 1 HR: CPT | Performed by: INTERNAL MEDICINE

## 2018-11-12 PROCEDURE — 96375 TX/PRO/DX INJ NEW DRUG ADDON: CPT | Performed by: INTERNAL MEDICINE

## 2018-11-12 PROCEDURE — 96411 CHEMO IV PUSH ADDL DRUG: CPT | Performed by: INTERNAL MEDICINE

## 2018-11-12 PROCEDURE — 36591 DRAW BLOOD OFF VENOUS DEVICE: CPT | Performed by: INTERNAL MEDICINE

## 2018-11-12 PROCEDURE — 25010000002 CYCLOPHOSPHAMIDE PER 100 MG: Performed by: INTERNAL MEDICINE

## 2018-11-12 PROCEDURE — 85025 COMPLETE CBC W/AUTO DIFF WBC: CPT

## 2018-11-12 PROCEDURE — 99214 OFFICE O/P EST MOD 30 MIN: CPT | Performed by: INTERNAL MEDICINE

## 2018-11-12 PROCEDURE — 25010000002 DOXORUBICIN PER 10 MG: Performed by: INTERNAL MEDICINE

## 2018-11-12 PROCEDURE — 25010000002 FOSAPREPITANT PER 1 MG: Performed by: INTERNAL MEDICINE

## 2018-11-12 PROCEDURE — 80053 COMPREHEN METABOLIC PANEL: CPT

## 2018-11-12 PROCEDURE — 25010000002 PEGFILGRASTIM 6 MG/0.6ML PREFILLED SYRINGE KIT: Performed by: INTERNAL MEDICINE

## 2018-11-12 PROCEDURE — 25010000002 PALONOSETRON PER 25 MCG: Performed by: INTERNAL MEDICINE

## 2018-11-12 PROCEDURE — 25010000003 DEXAMETHASONE SODIUM PHOSPHATE 100 MG/10ML SOLUTION 10 ML VIAL: Performed by: INTERNAL MEDICINE

## 2018-11-12 RX ORDER — SODIUM CHLORIDE 0.9 % (FLUSH) 0.9 %
10 SYRINGE (ML) INJECTION AS NEEDED
Status: CANCELLED | OUTPATIENT
Start: 2018-11-12

## 2018-11-12 RX ORDER — SODIUM CHLORIDE 9 MG/ML
250 INJECTION, SOLUTION INTRAVENOUS ONCE
Status: COMPLETED | OUTPATIENT
Start: 2018-11-12 | End: 2018-11-12

## 2018-11-12 RX ORDER — PALONOSETRON 0.05 MG/ML
0.25 INJECTION, SOLUTION INTRAVENOUS ONCE
Status: COMPLETED | OUTPATIENT
Start: 2018-11-12 | End: 2018-11-12

## 2018-11-12 RX ORDER — OLANZAPINE 10 MG/1
10 TABLET ORAL NIGHTLY
Qty: 3 TABLET | Refills: 0 | Status: SHIPPED | OUTPATIENT
Start: 2018-11-12 | End: 2018-11-26

## 2018-11-12 RX ORDER — SODIUM CHLORIDE 9 MG/ML
250 INJECTION, SOLUTION INTRAVENOUS ONCE
Status: CANCELLED | OUTPATIENT
Start: 2018-11-12

## 2018-11-12 RX ORDER — DOXORUBICIN HYDROCHLORIDE 2 MG/ML
60 INJECTION, SOLUTION INTRAVENOUS ONCE
Status: CANCELLED | OUTPATIENT
Start: 2018-11-12

## 2018-11-12 RX ORDER — PALONOSETRON 0.05 MG/ML
0.25 INJECTION, SOLUTION INTRAVENOUS ONCE
Status: CANCELLED | OUTPATIENT
Start: 2018-11-12

## 2018-11-12 RX ORDER — DOXORUBICIN HYDROCHLORIDE 2 MG/ML
60 INJECTION, SOLUTION INTRAVENOUS ONCE
Status: COMPLETED | OUTPATIENT
Start: 2018-11-12 | End: 2018-11-12

## 2018-11-12 RX ORDER — SODIUM CHLORIDE 0.9 % (FLUSH) 0.9 %
10 SYRINGE (ML) INJECTION AS NEEDED
Status: DISCONTINUED | OUTPATIENT
Start: 2018-11-12 | End: 2018-11-12 | Stop reason: HOSPADM

## 2018-11-12 RX ADMIN — SODIUM CHLORIDE 100 ML: 9 INJECTION, SOLUTION INTRAVENOUS at 12:09

## 2018-11-12 RX ADMIN — SODIUM CHLORIDE 250 ML: 9 INJECTION, SOLUTION INTRAVENOUS at 11:13

## 2018-11-12 RX ADMIN — CYCLOPHOSPHAMIDE 1000 MG: 1 INJECTION, POWDER, FOR SOLUTION INTRAVENOUS; ORAL at 13:13

## 2018-11-12 RX ADMIN — SODIUM CHLORIDE, PRESERVATIVE FREE 500 UNITS: 5 INJECTION INTRAVENOUS at 14:00

## 2018-11-12 RX ADMIN — PEGFILGRASTIM 6 MG: KIT SUBCUTANEOUS at 14:00

## 2018-11-12 RX ADMIN — Medication 10 ML: at 09:49

## 2018-11-12 RX ADMIN — PALONOSETRON HYDROCHLORIDE 0.25 MG: 0.25 INJECTION INTRAVENOUS at 11:16

## 2018-11-12 RX ADMIN — Medication 10 ML: at 14:00

## 2018-11-12 RX ADMIN — DOXORUBICIN HYDROCHLORIDE 100 MG: 2 INJECTION, SOLUTION INTRAVENOUS at 12:49

## 2018-11-12 RX ADMIN — DEXAMETHASONE SODIUM PHOSPHATE 12 MG: 10 INJECTION, SOLUTION INTRAMUSCULAR; INTRAVENOUS at 11:40

## 2018-11-12 NOTE — PROGRESS NOTES
Moy Sparrow    Subjective    Ms Sparrow presented for follow up appointment for breast cancer.   She is here for cycle 3 dose dense AC.   She has tolerated treatment very well.  Had some issues with stomatitis after cycle 1 but this is resolved now.  Her main issue is chemotherapy-related nausea especially couple of days after her chemo days.  This is persistent even after using antinausea medications.  Discussed about using Zyprexa for additional nausea control and prescription sent to the pharmacy.  She also reports worsening back and muscle pain couple of days after chemotherapy.  It is possible that this was related to chemotherapy but certainly is unusual.  She reports that the symptom is manageable.  Discuss result of genetic testing which showed no inherited mutation.  She is doing much better with her anxiety and seems to be coping well.   She continues to work.   ROS as below.       History of Present Illness    This is a very pleasant 38-year-old female who was seen in consultation at the request of Dr. Ortega for evaluation of newly diagnosed breast cancer on 9/24/18.  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  Her past medical history is otherwise unremarkable except history of anxiety/depression and gastroesophageal reflux disease.She tells me that she felt a lump in her right breast in August 2018 and subsequently underwent a mammogram and ultrasonographic evaluation on August 27 which showed right breast mass approximately 1.5 cm.  The breast mass was felt to be suspicious for malignancy.  She then underwent ultrasound guided biopsy of right breast on 8/28/18 which showed invasive ductal carcinoma, moderately differentiated.  The carcinoma was tested positive for estrogen receptor and it was tested negative for progesterone receptor as well as HER-2.  Patient underwent right mastectomy and sentinel lymph node biopsy on September 5 which showed invasive ductal carcinoma  of 1.5 cm, moderately differentiated which was completely excised.  [place] lymph node showed micrometastasis (less than 2 mm) involving 1 of 7 lymph nodes.    Her recurrence score was high at 36, so we recommended adjuvant chemotherapy with dose dense AC followed by taxane. She was started on chemotherapy on 10/15/18.     Past Medical History, Past Surgical History, Social History, Family History have been reviewed and are without significant changes except as mentioned.    Review of Systems   CONSTITUTIONAL: Fatigue+ No weight loss, fever, chills, weakness.  HEENT: Eyes: No visual loss, blurred vision, double vision or yellow sclerae. Ears, Nose, Throat: No hearing loss, sneezing, congestion, runny nose or sore throat.  SKIN: No rash or itching.  CARDIOVASCULAR: No chest pain, chest pressure or chest discomfort. No palpitations or edema.  RESPIRATORY: No shortness of breath, cough or sputum.  GASTROINTESTINAL: No anorexia, nausea, vomiting or diarrhea. No abdominal pain or blood.  GENITOURINARY: Negative for urgency, frequency or dysuria.   NEUROLOGICAL: No headache, dizziness, syncope, paralysis, ataxia, numbness or tingling in the extremities. No change in bowel or bladder control.  MUSCULOSKELETAL: Back pain+  HEMATOLOGIC: No anemia, bleeding or bruising.  LYMPHATICS: No enlarged nodes. No history of splenectomy.  PSYCHIATRIC: Anxiety + depression+  ENDOCRINOLOGIC: No reports of sweating, cold or heat intolerance. No polyuria or polydipsia.  ALLERGIES: No history of asthma, hives, eczema or rhinitis.    Medications:  The current medication list was reviewed in the EMR    ALLERGIES:  No Known Allergies    Objective      Vitals:    11/12/18 1010   BP: 124/73   Pulse: 87   Resp: 18   Temp: 97.9 °F (36.6 °C)   Weight: 70.3 kg (155 lb)   PainSc: 0-No pain     Current Status 11/12/2018   ECOG score 1       Physical Exam    General: Alert, awake, oriented.  Well dressed.  Not in apparent distress. Vitals as above.    HEAD: normocephalic, atraumatic.   EYES: PERRL, EOMI. Fundi normal, vision is grossly intact.  Neck: Supple, no adenopathy or thyromegaly.   Throat: normal oral cavity and pharynx. No inflammation, swelling, exudate, or lesions.  CARDIAC: Normal S1 and S2. No S3, S4 or murmurs. Rhythm is regular.Extremities are warm and well perfused.   LUNGS: Clear to auscultation and percussion without rales, rhonchi, wheezing or diminished breath sounds.  ABDOMEN: Positive bowel sounds. Soft, nondistended, nontender  . No guarding or rebound. No masses.  Back:No bony tenderness.   EXTREMITIES: No significant deformity or joint abnormality.  Peripheral pulses intact. No varicosities.  Skin: No rash or bruising.  Neurological: Grossly non-focal exam. No focal weakness. Gait: Normal.   Psych: Mood and affect normal. No hallucination or suicidal thoughts.   Lymphatics:No adenopathy     RECENT LABS: Independently reviewed and summarized  Hematology WBC   Date Value Ref Range Status   11/12/2018 10.20 (H) 3.20 - 9.80 10*3/mm3 Final     RBC   Date Value Ref Range Status   11/12/2018 3.70 (L) 3.77 - 5.16 10*6/mm3 Final     Hemoglobin   Date Value Ref Range Status   11/12/2018 10.9 (L) 12.0 - 15.5 g/dL Final     Hematocrit   Date Value Ref Range Status   11/12/2018 32.6 (L) 35.0 - 45.0 % Final     Platelets   Date Value Ref Range Status   11/12/2018 225 150 - 450 10*3/mm3 Final            Lab Results   Component Value Date    GLUCOSE 107 (H) 11/12/2018    BUN 19 11/12/2018    CREATININE 0.67 11/12/2018    EGFRIFNONA 97 11/12/2018    BCR 28.4 (H) 11/12/2018    K 3.9 11/12/2018    CO2 26.0 11/12/2018    CALCIUM 8.8 11/12/2018    ALBUMIN 4.10 11/12/2018    AST 17 11/12/2018    ALT 9 11/12/2018           Diagnosis:   (1) Invasive ductal carcinoma, right   Date of diagnosis: 9/5/18   Stage: IB ([id number]), recurrence score 36   Prior treatment: Right mastectomy with sentinel node biopsy and ALND (9/5/18)  Chemotherapy: Start date (10/15/18)    (2) Encounter for chemotherapy management  (3) Chemotherapy induce nausea   (4) Upper back pain   (5) Anxiety     Assessment/Plan       (1) Invasive ductal carcinoma, right, stage IB, ER+/AZ-/HER2-   (2) Encounter for chemotherapy management      - Tolerated cycle 1,2 dose dense AC well last week without any significant side effects.   - Reporting chemotherapy induce nausea. No emesis.   - She is working, mainly at [employer] station.   - No fever or chills.   - Genetic testing - no heritable mutation identified (panel of 34 genes).     (3) Chemotherapy induce nausea (persistent): Worse a couple of days after chemotherapy. This is persistent even after using zofran, compazine and dexamethasone. Recommend zyprexa at bedtime on D2-D4. Prescription of zyprexa sent.     (4) Upper back pain: Chronic, likely musculo-skeletal in nature. This does get worse after chemotherapy. Bone scan negative for any metastases. Recommend to use lidocaine patch.     (5) Anxiety: She is coping well with her diagnosis and treatment.     Prescription: Zyprexa prescription sent to pharmacy.     Yobani Emery MD     11/12/2018      CC:

## 2018-11-19 ENCOUNTER — TELEPHONE (OUTPATIENT)
Dept: ONCOLOGY | Facility: CLINIC | Age: 38
End: 2018-11-19

## 2018-11-19 ENCOUNTER — TELEPHONE (OUTPATIENT)
Dept: ONCOLOGY | Facility: HOSPITAL | Age: 38
End: 2018-11-19

## 2018-11-19 ENCOUNTER — INFUSION (OUTPATIENT)
Dept: ONCOLOGY | Facility: HOSPITAL | Age: 38
End: 2018-11-19
Attending: INTERNAL MEDICINE

## 2018-11-19 DIAGNOSIS — R50.9 FEVER, UNSPECIFIED FEVER CAUSE: Primary | ICD-10-CM

## 2018-11-19 DIAGNOSIS — Z17.0 MALIGNANT NEOPLASM OF UPPER-OUTER QUADRANT OF RIGHT BREAST IN FEMALE, ESTROGEN RECEPTOR POSITIVE (HCC): ICD-10-CM

## 2018-11-19 DIAGNOSIS — J02.9 SORE THROAT: Primary | ICD-10-CM

## 2018-11-19 DIAGNOSIS — J02.9 SORE THROAT: ICD-10-CM

## 2018-11-19 DIAGNOSIS — Z45.2 ENCOUNTER FOR VENOUS ACCESS DEVICE CARE: ICD-10-CM

## 2018-11-19 DIAGNOSIS — IMO0001 OTHER COMPLICATION DUE TO VENOUS ACCESS DEVICE, INITIAL ENCOUNTER: ICD-10-CM

## 2018-11-19 DIAGNOSIS — C50.411 MALIGNANT NEOPLASM OF UPPER-OUTER QUADRANT OF RIGHT BREAST IN FEMALE, ESTROGEN RECEPTOR POSITIVE (HCC): ICD-10-CM

## 2018-11-19 DIAGNOSIS — R05.9 COUGH: Primary | ICD-10-CM

## 2018-11-19 LAB
B PERT DNA SPEC QL NAA+PROBE: NOT DETECTED
BASOPHILS # BLD AUTO: 0.03 10*3/MM3 (ref 0–0.2)
BASOPHILS NFR BLD AUTO: 1.9 % (ref 0–2)
C PNEUM DNA NPH QL NAA+NON-PROBE: NOT DETECTED
DEPRECATED RDW RBC AUTO: 41.9 FL (ref 36.4–46.3)
EOSINOPHIL # BLD AUTO: 0.01 10*3/MM3 (ref 0–0.7)
EOSINOPHIL NFR BLD AUTO: 0.6 % (ref 0–7)
ERYTHROCYTE [DISTWIDTH] IN BLOOD BY AUTOMATED COUNT: 13.6 % (ref 11.5–14.5)
FLUAV H1 2009 PAND RNA NPH QL NAA+PROBE: NOT DETECTED
FLUAV H1 HA GENE NPH QL NAA+PROBE: NOT DETECTED
FLUAV H3 RNA NPH QL NAA+PROBE: NOT DETECTED
FLUAV SUBTYP SPEC NAA+PROBE: NOT DETECTED
FLUBV RNA ISLT QL NAA+PROBE: NOT DETECTED
HADV DNA SPEC NAA+PROBE: NOT DETECTED
HCOV 229E RNA SPEC QL NAA+PROBE: NOT DETECTED
HCOV HKU1 RNA SPEC QL NAA+PROBE: NOT DETECTED
HCOV NL63 RNA SPEC QL NAA+PROBE: NOT DETECTED
HCOV OC43 RNA SPEC QL NAA+PROBE: NOT DETECTED
HCT VFR BLD AUTO: 29.8 % (ref 35–45)
HGB BLD-MCNC: 10.2 G/DL (ref 12–15.5)
HMPV RNA NPH QL NAA+NON-PROBE: NOT DETECTED
HPIV1 RNA SPEC QL NAA+PROBE: NOT DETECTED
HPIV2 RNA SPEC QL NAA+PROBE: NOT DETECTED
HPIV3 RNA NPH QL NAA+PROBE: NOT DETECTED
HPIV4 P GENE NPH QL NAA+PROBE: NOT DETECTED
IMM GRANULOCYTES # BLD: 0.1 10*3/MM3 (ref 0–0.02)
IMM GRANULOCYTES NFR BLD: 6.3 % (ref 0–0.5)
LYMPHOCYTES # BLD AUTO: 0.69 10*3/MM3 (ref 0.6–4.2)
LYMPHOCYTES NFR BLD AUTO: 43.4 % (ref 10–50)
M PNEUMO IGG SER IA-ACNC: NOT DETECTED
MCH RBC QN AUTO: 29.7 PG (ref 26.5–34)
MCHC RBC AUTO-ENTMCNC: 34.2 G/DL (ref 31.4–36)
MCV RBC AUTO: 86.9 FL (ref 80–98)
MONOCYTES # BLD AUTO: 0.17 10*3/MM3 (ref 0–0.9)
MONOCYTES NFR BLD AUTO: 10.7 % (ref 0–12)
NEUTROPHILS # BLD AUTO: 0.59 10*3/MM3 (ref 2–8.6)
NEUTROPHILS NFR BLD AUTO: 37.1 % (ref 37–80)
PLATELET # BLD AUTO: 170 10*3/MM3 (ref 150–450)
PMV BLD AUTO: 8.4 FL (ref 8–12)
RBC # BLD AUTO: 3.43 10*6/MM3 (ref 3.77–5.16)
RHINOVIRUS RNA SPEC NAA+PROBE: NOT DETECTED
RSV RNA NPH QL NAA+NON-PROBE: NOT DETECTED
S PYO AG THROAT QL: NEGATIVE
WBC NRBC COR # BLD: 1.59 10*3/MM3 (ref 3.2–9.8)

## 2018-11-19 PROCEDURE — 87081 CULTURE SCREEN ONLY: CPT

## 2018-11-19 PROCEDURE — 87633 RESP VIRUS 12-25 TARGETS: CPT

## 2018-11-19 PROCEDURE — 36415 COLL VENOUS BLD VENIPUNCTURE: CPT | Performed by: INTERNAL MEDICINE

## 2018-11-19 PROCEDURE — 87581 M.PNEUMON DNA AMP PROBE: CPT

## 2018-11-19 PROCEDURE — 87880 STREP A ASSAY W/OPTIC: CPT

## 2018-11-19 PROCEDURE — 87798 DETECT AGENT NOS DNA AMP: CPT

## 2018-11-19 PROCEDURE — 87486 CHLMYD PNEUM DNA AMP PROBE: CPT

## 2018-11-19 PROCEDURE — 85025 COMPLETE CBC W/AUTO DIFF WBC: CPT

## 2018-11-19 RX ORDER — AMOXICILLIN AND CLAVULANATE POTASSIUM 500; 125 MG/1; MG/1
1 TABLET, FILM COATED ORAL 3 TIMES DAILY
Qty: 21 TABLET | Refills: 0 | Status: SHIPPED | OUTPATIENT
Start: 2018-11-19 | End: 2018-11-26

## 2018-11-19 RX ORDER — SODIUM CHLORIDE 0.9 % (FLUSH) 0.9 %
10 SYRINGE (ML) INJECTION AS NEEDED
Status: DISCONTINUED | OUTPATIENT
Start: 2018-11-19 | End: 2018-11-19 | Stop reason: HOSPADM

## 2018-11-19 RX ORDER — SODIUM CHLORIDE 0.9 % (FLUSH) 0.9 %
10 SYRINGE (ML) INJECTION AS NEEDED
Status: CANCELLED | OUTPATIENT
Start: 2018-11-26

## 2018-11-19 NOTE — TELEPHONE ENCOUNTER
----- Message from Hailey Emery MD sent at 11/19/2018  3:28 PM CST -----  Hi     I have ordered augmentin prescription to her pharmacy. Can we let her know to pick it up.     Also her neutrophil count is low. If she has any fever ( temp > 100), she needs to come to hospital immediately.     Thank you

## 2018-11-19 NOTE — TELEPHONE ENCOUNTER
Pt called back. Transferred the call to Dr Hilton. Dr Hilton spoke with pt and ordered lab work and nasal swab. Pt coming in today for to have labs drawn.

## 2018-11-22 LAB — BACTERIA SPEC AEROBE CULT: NORMAL

## 2018-11-26 ENCOUNTER — OFFICE VISIT (OUTPATIENT)
Dept: ONCOLOGY | Facility: CLINIC | Age: 38
End: 2018-11-26
Attending: INTERNAL MEDICINE

## 2018-11-26 ENCOUNTER — INFUSION (OUTPATIENT)
Dept: ONCOLOGY | Facility: HOSPITAL | Age: 38
End: 2018-11-26
Attending: INTERNAL MEDICINE

## 2018-11-26 ENCOUNTER — APPOINTMENT (OUTPATIENT)
Dept: ONCOLOGY | Facility: HOSPITAL | Age: 38
End: 2018-11-26
Attending: INTERNAL MEDICINE

## 2018-11-26 VITALS
HEIGHT: 61 IN | RESPIRATION RATE: 18 BRPM | WEIGHT: 145 LBS | HEART RATE: 100 BPM | BODY MASS INDEX: 27.38 KG/M2 | OXYGEN SATURATION: 98 % | SYSTOLIC BLOOD PRESSURE: 124 MMHG | TEMPERATURE: 99.8 F | DIASTOLIC BLOOD PRESSURE: 81 MMHG

## 2018-11-26 DIAGNOSIS — R11.0 CHEMOTHERAPY-INDUCED NAUSEA: ICD-10-CM

## 2018-11-26 DIAGNOSIS — R63.4 UNINTENTIONAL WEIGHT LOSS: ICD-10-CM

## 2018-11-26 DIAGNOSIS — IMO0001 OTHER COMPLICATION DUE TO VENOUS ACCESS DEVICE, INITIAL ENCOUNTER: ICD-10-CM

## 2018-11-26 DIAGNOSIS — C50.411 MALIGNANT NEOPLASM OF UPPER-OUTER QUADRANT OF RIGHT BREAST IN FEMALE, ESTROGEN RECEPTOR POSITIVE (HCC): Primary | ICD-10-CM

## 2018-11-26 DIAGNOSIS — Z45.2 ENCOUNTER FOR VENOUS ACCESS DEVICE CARE: ICD-10-CM

## 2018-11-26 DIAGNOSIS — Z51.11 ENCOUNTER FOR CHEMOTHERAPY MANAGEMENT: Primary | ICD-10-CM

## 2018-11-26 DIAGNOSIS — Z17.0 MALIGNANT NEOPLASM OF UPPER-OUTER QUADRANT OF RIGHT BREAST IN FEMALE, ESTROGEN RECEPTOR POSITIVE (HCC): ICD-10-CM

## 2018-11-26 DIAGNOSIS — L27.0 ALLERGIC DRUG RASH: ICD-10-CM

## 2018-11-26 DIAGNOSIS — T45.1X5A CHEMOTHERAPY-INDUCED NAUSEA: ICD-10-CM

## 2018-11-26 DIAGNOSIS — Z17.0 MALIGNANT NEOPLASM OF UPPER-OUTER QUADRANT OF RIGHT BREAST IN FEMALE, ESTROGEN RECEPTOR POSITIVE (HCC): Primary | ICD-10-CM

## 2018-11-26 DIAGNOSIS — F41.1 GENERALIZED ANXIETY DISORDER: ICD-10-CM

## 2018-11-26 DIAGNOSIS — C50.411 MALIGNANT NEOPLASM OF UPPER-OUTER QUADRANT OF RIGHT BREAST IN FEMALE, ESTROGEN RECEPTOR POSITIVE (HCC): ICD-10-CM

## 2018-11-26 LAB
ALBUMIN SERPL-MCNC: 4.7 G/DL (ref 3.4–4.8)
ALBUMIN/GLOB SERPL: 1.6 G/DL (ref 1.1–1.8)
ALP SERPL-CCNC: 94 U/L (ref 38–126)
ALT SERPL W P-5'-P-CCNC: 12 U/L (ref 9–52)
ANION GAP SERPL CALCULATED.3IONS-SCNC: 14 MMOL/L (ref 5–15)
AST SERPL-CCNC: 22 U/L (ref 14–36)
BASOPHILS # BLD AUTO: 0.04 10*3/MM3 (ref 0–0.2)
BASOPHILS NFR BLD AUTO: 0.4 % (ref 0–2)
BILIRUB SERPL-MCNC: 0.3 MG/DL (ref 0.2–1.3)
BUN BLD-MCNC: 15 MG/DL (ref 7–21)
BUN/CREAT SERPL: 20.8 (ref 7–25)
CALCIUM SPEC-SCNC: 9.1 MG/DL (ref 8.4–10.2)
CHLORIDE SERPL-SCNC: 100 MMOL/L (ref 95–110)
CO2 SERPL-SCNC: 24 MMOL/L (ref 22–31)
CREAT BLD-MCNC: 0.72 MG/DL (ref 0.5–1)
DEPRECATED RDW RBC AUTO: 42.3 FL (ref 36.4–46.3)
EOSINOPHIL # BLD AUTO: 0.03 10*3/MM3 (ref 0–0.7)
EOSINOPHIL NFR BLD AUTO: 0.3 % (ref 0–7)
ERYTHROCYTE [DISTWIDTH] IN BLOOD BY AUTOMATED COUNT: 14.3 % (ref 11.5–14.5)
GFR SERPL CREATININE-BSD FRML MDRD: 90 ML/MIN/1.73 (ref 58–135)
GLOBULIN UR ELPH-MCNC: 2.9 GM/DL (ref 2.3–3.5)
GLUCOSE BLD-MCNC: 113 MG/DL (ref 60–100)
HCT VFR BLD AUTO: 32.4 % (ref 35–45)
HGB BLD-MCNC: 11.1 G/DL (ref 12–15.5)
IMM GRANULOCYTES # BLD: 0.36 10*3/MM3 (ref 0–0.02)
IMM GRANULOCYTES NFR BLD: 3.9 % (ref 0–0.5)
LYMPHOCYTES # BLD AUTO: 1.79 10*3/MM3 (ref 0.6–4.2)
LYMPHOCYTES NFR BLD AUTO: 19.5 % (ref 10–50)
MCH RBC QN AUTO: 29.5 PG (ref 26.5–34)
MCHC RBC AUTO-ENTMCNC: 34.3 G/DL (ref 31.4–36)
MCV RBC AUTO: 86.2 FL (ref 80–98)
MONOCYTES # BLD AUTO: 1.1 10*3/MM3 (ref 0–0.9)
MONOCYTES NFR BLD AUTO: 12 % (ref 0–12)
NEUTROPHILS # BLD AUTO: 5.86 10*3/MM3 (ref 2–8.6)
NEUTROPHILS NFR BLD AUTO: 63.9 % (ref 37–80)
NRBC BLD MANUAL-RTO: 0 /100 WBC (ref 0–0)
PLATELET # BLD AUTO: 222 10*3/MM3 (ref 150–450)
PMV BLD AUTO: 8.8 FL (ref 8–12)
POTASSIUM BLD-SCNC: 3.4 MMOL/L (ref 3.5–5.1)
PROT SERPL-MCNC: 7.6 G/DL (ref 6.3–8.6)
RBC # BLD AUTO: 3.76 10*6/MM3 (ref 3.77–5.16)
SODIUM BLD-SCNC: 138 MMOL/L (ref 137–145)
WBC NRBC COR # BLD: 9.18 10*3/MM3 (ref 3.2–9.8)

## 2018-11-26 PROCEDURE — 99214 OFFICE O/P EST MOD 30 MIN: CPT | Performed by: INTERNAL MEDICINE

## 2018-11-26 PROCEDURE — 25010000003 DEXAMETHASONE SODIUM PHOSPHATE 100 MG/10ML SOLUTION 10 ML VIAL: Performed by: INTERNAL MEDICINE

## 2018-11-26 PROCEDURE — 80053 COMPREHEN METABOLIC PANEL: CPT

## 2018-11-26 PROCEDURE — 96409 CHEMO IV PUSH SNGL DRUG: CPT | Performed by: INTERNAL MEDICINE

## 2018-11-26 PROCEDURE — 96377 APPLICATON ON-BODY INJECTOR: CPT | Performed by: INTERNAL MEDICINE

## 2018-11-26 PROCEDURE — 25010000002 PEGFILGRASTIM 6 MG/0.6ML PREFILLED SYRINGE KIT: Performed by: INTERNAL MEDICINE

## 2018-11-26 PROCEDURE — 96411 CHEMO IV PUSH ADDL DRUG: CPT | Performed by: INTERNAL MEDICINE

## 2018-11-26 PROCEDURE — 25010000002 DOXORUBICIN PER 10 MG: Performed by: INTERNAL MEDICINE

## 2018-11-26 PROCEDURE — 96367 TX/PROPH/DG ADDL SEQ IV INF: CPT | Performed by: INTERNAL MEDICINE

## 2018-11-26 PROCEDURE — 25010000002 CYCLOPHOSPHAMIDE PER 100 MG: Performed by: INTERNAL MEDICINE

## 2018-11-26 PROCEDURE — 36591 DRAW BLOOD OFF VENOUS DEVICE: CPT | Performed by: INTERNAL MEDICINE

## 2018-11-26 PROCEDURE — 85025 COMPLETE CBC W/AUTO DIFF WBC: CPT

## 2018-11-26 PROCEDURE — 96375 TX/PRO/DX INJ NEW DRUG ADDON: CPT | Performed by: INTERNAL MEDICINE

## 2018-11-26 PROCEDURE — 25010000002 FOSAPREPITANT PER 1 MG: Performed by: INTERNAL MEDICINE

## 2018-11-26 PROCEDURE — 25010000002 PALONOSETRON PER 25 MCG: Performed by: INTERNAL MEDICINE

## 2018-11-26 RX ORDER — SODIUM CHLORIDE 9 MG/ML
250 INJECTION, SOLUTION INTRAVENOUS ONCE
Status: CANCELLED | OUTPATIENT
Start: 2018-11-26

## 2018-11-26 RX ORDER — DOXORUBICIN HYDROCHLORIDE 2 MG/ML
60 INJECTION, SOLUTION INTRAVENOUS ONCE
Status: CANCELLED | OUTPATIENT
Start: 2018-11-26

## 2018-11-26 RX ORDER — PALONOSETRON 0.05 MG/ML
0.25 INJECTION, SOLUTION INTRAVENOUS ONCE
Status: CANCELLED | OUTPATIENT
Start: 2018-11-26

## 2018-11-26 RX ORDER — SODIUM CHLORIDE 0.9 % (FLUSH) 0.9 %
10 SYRINGE (ML) INJECTION AS NEEDED
Status: DISCONTINUED | OUTPATIENT
Start: 2018-11-26 | End: 2018-11-26 | Stop reason: HOSPADM

## 2018-11-26 RX ORDER — PALONOSETRON 0.05 MG/ML
0.25 INJECTION, SOLUTION INTRAVENOUS ONCE
Status: COMPLETED | OUTPATIENT
Start: 2018-11-26 | End: 2018-11-26

## 2018-11-26 RX ORDER — DOXORUBICIN HYDROCHLORIDE 2 MG/ML
60 INJECTION, SOLUTION INTRAVENOUS ONCE
Status: COMPLETED | OUTPATIENT
Start: 2018-11-26 | End: 2018-11-26

## 2018-11-26 RX ORDER — SODIUM CHLORIDE 0.9 % (FLUSH) 0.9 %
10 SYRINGE (ML) INJECTION AS NEEDED
Status: CANCELLED | OUTPATIENT
Start: 2018-12-10

## 2018-11-26 RX ORDER — SODIUM CHLORIDE 9 MG/ML
250 INJECTION, SOLUTION INTRAVENOUS ONCE
Status: COMPLETED | OUTPATIENT
Start: 2018-11-26 | End: 2018-11-26

## 2018-11-26 RX ORDER — DEXAMETHASONE 4 MG/1
TABLET ORAL
Qty: 10 TABLET | Refills: 2 | Status: SHIPPED | OUTPATIENT
Start: 2018-11-26 | End: 2018-12-10

## 2018-11-26 RX ADMIN — DEXAMETHASONE SODIUM PHOSPHATE 12 MG: 10 INJECTION, SOLUTION INTRAMUSCULAR; INTRAVENOUS at 09:47

## 2018-11-26 RX ADMIN — SODIUM CHLORIDE 250 ML: 9 INJECTION, SOLUTION INTRAVENOUS at 09:31

## 2018-11-26 RX ADMIN — PEGFILGRASTIM 6 MG: KIT SUBCUTANEOUS at 12:17

## 2018-11-26 RX ADMIN — SODIUM CHLORIDE, PRESERVATIVE FREE 500 UNITS: 5 INJECTION INTRAVENOUS at 12:17

## 2018-11-26 RX ADMIN — SODIUM CHLORIDE, PRESERVATIVE FREE 10 ML: 5 INJECTION INTRAVENOUS at 08:51

## 2018-11-26 RX ADMIN — CYCLOPHOSPHAMIDE 1000 MG: 1 INJECTION, POWDER, FOR SOLUTION INTRAVENOUS; ORAL at 11:28

## 2018-11-26 RX ADMIN — SODIUM CHLORIDE, PRESERVATIVE FREE 10 ML: 5 INJECTION INTRAVENOUS at 12:16

## 2018-11-26 RX ADMIN — DOXORUBICIN HYDROCHLORIDE 100 MG: 2 INJECTION, SOLUTION INTRAVENOUS at 11:05

## 2018-11-26 RX ADMIN — SODIUM CHLORIDE 100 ML: 9 INJECTION, SOLUTION INTRAVENOUS at 10:23

## 2018-11-26 RX ADMIN — PALONOSETRON HYDROCHLORIDE 0.25 MG: 0.25 INJECTION INTRAVENOUS at 09:35

## 2018-11-26 NOTE — PROGRESS NOTES
Moy Sparrow    Subjective    Ms Sparrow presented for follow up appointment for breast cancer.   She is here for cycle 4 dose dense AC.   Developed likely viral URI last week. However, this has resolved.   She was neutropenic and was started on augmentin. Developed itchy rash on upper extremity, likely related to augmentin.   She has tolerated treatment very well.  Had some issues with stomatitis after cycle 1 but this is resolved now.  Her main issue is chemotherapy-related nausea especially couple of days after her chemo days.  This is persistent even after using antinausea medications.  She has not tolerated zyprexa well. Instructed her to use PRN benzo and dexamethasone for breakthrough nausea.   Continues to struggle with anxiety related to her illness.    ROS as below.       History of Present Illness    This is a very pleasant 38-year-old female who was seen in consultation at the request of Dr. Ortega for evaluation of newly diagnosed breast cancer on 9/24/18.  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  Her past medical history is otherwise unremarkable except history of anxiety/depression and gastroesophageal reflux disease.She tells me that she felt a lump in her right breast in August 2018 and subsequently underwent a mammogram and ultrasonographic evaluation on August 27 which showed right breast mass approximately 1.5 cm.  The breast mass was felt to be suspicious for malignancy.  She then underwent ultrasound guided biopsy of right breast on 8/28/18 which showed invasive ductal carcinoma, moderately differentiated.  The carcinoma was tested positive for estrogen receptor and it was tested negative for progesterone receptor as well as HER-2.  Patient underwent right mastectomy and sentinel lymph node biopsy on September 5 which showed invasive ductal carcinoma of 1.5 cm, moderately differentiated which was completely excised.  [place] lymph node showed micrometastasis  "(less than 2 mm) involving 1 of 7 lymph nodes.    Her recurrence score was high at 36, so we recommended adjuvant chemotherapy with dose dense AC followed by taxane. She was started on chemotherapy on 10/15/18.     Past Medical History, Past Surgical History, Social History, Family History have been reviewed and are without significant changes except as mentioned.    Review of Systems   CONSTITUTIONAL: Fatigue+  Weight loss + No  fever, chills, weakness.  HEENT: Eyes: No visual loss, blurred vision, double vision or yellow sclerae. Ears, Nose, Throat: No hearing loss, sneezing, congestion, runny nose or sore throat.  SKIN: No rash or itching.  CARDIOVASCULAR: No chest pain, chest pressure or chest discomfort. No palpitations or edema.  RESPIRATORY: No shortness of breath, cough or sputum.  GASTROINTESTINAL: nausea + No anorexia,  vomiting or diarrhea. No abdominal pain or blood.  GENITOURINARY: Negative for urgency, frequency or dysuria.   NEUROLOGICAL: No headache, dizziness, syncope, paralysis, ataxia, numbness or tingling in the extremities. No change in bowel or bladder control.  MUSCULOSKELETAL: Back pain+  HEMATOLOGIC: No anemia, bleeding or bruising.  LYMPHATICS: No enlarged nodes. No history of splenectomy.  PSYCHIATRIC: Anxiety + depression+  ENDOCRINOLOGIC: No reports of sweating, cold or heat intolerance. No polyuria or polydipsia.  ALLERGIES: No history of asthma, hives, eczema or rhinitis.    Medications:  The current medication list was reviewed in the EMR    ALLERGIES:    Allergies   Allergen Reactions   • Penicillins Rash       Objective      Vitals:    11/26/18 0857   BP: 124/81   Pulse: 100   Resp: 18   Temp: 99.8 °F (37.7 °C)   TempSrc: Temporal   SpO2: 98%   Weight: 65.8 kg (145 lb)   Height: 154.9 cm (60.98\")   PainSc: 0-No pain     Current Status 11/26/2018   ECOG score 0       Physical Exam    General: Alert, awake, oriented.  Well dressed.  Not in apparent distress. Vitals as above.   HEAD: " normocephalic, atraumatic.   EYES: PERRL, EOMI. Fundi normal, vision is grossly intact.  Neck: Supple, no adenopathy or thyromegaly.   Throat: normal oral cavity and pharynx. No inflammation, swelling, exudate, or lesions.  CARDIAC: Normal S1 and S2. No S3, S4 or murmurs. Rhythm is regular.Extremities are warm and well perfused.   LUNGS: Clear to auscultation and percussion without rales, rhonchi, wheezing or diminished breath sounds.  ABDOMEN: Positive bowel sounds. Soft, nondistended, nontender  . No guarding or rebound. No masses.  Back:No bony tenderness.   EXTREMITIES: No significant deformity or joint abnormality.  Peripheral pulses intact. No varicosities.  Skin: No rash or bruising.  Neurological: Grossly non-focal exam. No focal weakness. Gait: Normal.   Psych: Mood and affect normal. No hallucination or suicidal thoughts.   Lymphatics:No adenopathy     RECENT LABS: Independently reviewed and summarized  Hematology WBC   Date Value Ref Range Status   11/26/2018 9.18 3.20 - 9.80 10*3/mm3 Final     RBC   Date Value Ref Range Status   11/26/2018 3.76 (L) 3.77 - 5.16 10*6/mm3 Final     Hemoglobin   Date Value Ref Range Status   11/26/2018 11.1 (L) 12.0 - 15.5 g/dL Final     Hematocrit   Date Value Ref Range Status   11/26/2018 32.4 (L) 35.0 - 45.0 % Final     Platelets   Date Value Ref Range Status   11/26/2018 222 150 - 450 10*3/mm3 Final            Lab Results   Component Value Date    GLUCOSE 113 (H) 11/26/2018    BUN 15 11/26/2018    CREATININE 0.72 11/26/2018    EGFRIFNONA 90 11/26/2018    BCR 20.8 11/26/2018    K 3.4 (L) 11/26/2018    CO2 24.0 11/26/2018    CALCIUM 9.1 11/26/2018    ALBUMIN 4.70 11/26/2018    AST 22 11/26/2018    ALT 12 11/26/2018       Imaging ( Independently reviewed): .    Diagnosis:   (1) Invasive ductal carcinoma, right   Date of diagnosis: 9/5/18   Stage: IB ([id number]), recurrence score 36   Prior treatment: Right mastectomy with sentinel node biopsy and ALND  (9/5/18)  Chemotherapy: Start date (10/15/18)   (2) Encounter for chemotherapy management  (3) Chemotherapy induce nausea (persistent)   (4) Upper back pain   (5) Anxiety (persistent)   (6) Drug rash (new problem)  (7) Unintentional weight loss (new problem)     Assessment/Plan       (1) Invasive ductal carcinoma, right, stage IB, ER+/NH-/HER2-   (2) Encounter for chemotherapy management      - Tolerated cycle 1,2,3 dose dense AC well last week without any significant side effects.   - Reporting chemotherapy induce nausea. No emesis.   - She is working, mainly at [place] station.   - No fever or chills.   - Genetic testing - no heritable mutation identified (panel of 34 genes).     (3) Chemotherapy induce nausea (persistent):  This is persistent even after using zofran, compazin. Encouraged her to use PRN steroid and benzo for additional nausea. She has not tolerated zyprexa well and developed sleepiness/fatigue.     (4) Upper back pain: Chronic, likely musculo-skeletal in nature. This does get worse after chemotherapy. Bone scan negative for any metastases. Recommend to use lidocaine patch.     (5) Anxiety: Continues to struggle with generalized anxiety. On pristiq. Continue this for now.     (6) Rash: Likely allergic secondary to augmentin. Recommend stopping augmentin.     (7) Unintentional weight loss: Recommend protein shakes/supplements.       Yobani Emery MD     11/26/2018      CC:

## 2018-12-03 NOTE — TELEPHONE ENCOUNTER
Spoke with Dr. Ortiz regarding this patient since Dr. Hilton is out. He states to call in refill for magic mouthwash and also Diflucan 200 mg x1 for yeast infection. Called prescriptions in to [facility] and informed patient. She states understanding.

## 2018-12-10 ENCOUNTER — OFFICE VISIT (OUTPATIENT)
Dept: ONCOLOGY | Facility: CLINIC | Age: 38
End: 2018-12-10
Attending: INTERNAL MEDICINE

## 2018-12-10 ENCOUNTER — APPOINTMENT (OUTPATIENT)
Dept: ONCOLOGY | Facility: HOSPITAL | Age: 38
End: 2018-12-10
Attending: INTERNAL MEDICINE

## 2018-12-10 ENCOUNTER — INFUSION (OUTPATIENT)
Dept: ONCOLOGY | Facility: HOSPITAL | Age: 38
End: 2018-12-10
Attending: INTERNAL MEDICINE

## 2018-12-10 VITALS
BODY MASS INDEX: 28.81 KG/M2 | WEIGHT: 152.6 LBS | OXYGEN SATURATION: 99 % | RESPIRATION RATE: 18 BRPM | SYSTOLIC BLOOD PRESSURE: 107 MMHG | HEIGHT: 61 IN | TEMPERATURE: 99.1 F | HEART RATE: 91 BPM | DIASTOLIC BLOOD PRESSURE: 71 MMHG

## 2018-12-10 VITALS
RESPIRATION RATE: 18 BRPM | HEART RATE: 94 BPM | DIASTOLIC BLOOD PRESSURE: 67 MMHG | TEMPERATURE: 98.6 F | SYSTOLIC BLOOD PRESSURE: 112 MMHG

## 2018-12-10 DIAGNOSIS — Z51.11 ENCOUNTER FOR CHEMOTHERAPY MANAGEMENT: Primary | ICD-10-CM

## 2018-12-10 DIAGNOSIS — C50.411 MALIGNANT NEOPLASM OF UPPER-OUTER QUADRANT OF RIGHT BREAST IN FEMALE, ESTROGEN RECEPTOR POSITIVE (HCC): ICD-10-CM

## 2018-12-10 DIAGNOSIS — Z17.0 MALIGNANT NEOPLASM OF UPPER-OUTER QUADRANT OF RIGHT BREAST IN FEMALE, ESTROGEN RECEPTOR POSITIVE (HCC): Primary | ICD-10-CM

## 2018-12-10 DIAGNOSIS — F41.1 GENERALIZED ANXIETY DISORDER: ICD-10-CM

## 2018-12-10 DIAGNOSIS — C50.411 MALIGNANT NEOPLASM OF UPPER-OUTER QUADRANT OF RIGHT BREAST IN FEMALE, ESTROGEN RECEPTOR POSITIVE (HCC): Primary | ICD-10-CM

## 2018-12-10 DIAGNOSIS — Z45.2 ENCOUNTER FOR VENOUS ACCESS DEVICE CARE: ICD-10-CM

## 2018-12-10 DIAGNOSIS — Z17.0 MALIGNANT NEOPLASM OF UPPER-OUTER QUADRANT OF RIGHT BREAST IN FEMALE, ESTROGEN RECEPTOR POSITIVE (HCC): ICD-10-CM

## 2018-12-10 DIAGNOSIS — IMO0001 OTHER COMPLICATION DUE TO VENOUS ACCESS DEVICE, INITIAL ENCOUNTER: ICD-10-CM

## 2018-12-10 LAB
ALBUMIN SERPL-MCNC: 4.2 G/DL (ref 3.4–4.8)
ALBUMIN/GLOB SERPL: 1.4 G/DL (ref 1.1–1.8)
ALP SERPL-CCNC: 66 U/L (ref 38–126)
ALT SERPL W P-5'-P-CCNC: 11 U/L (ref 9–52)
ANION GAP SERPL CALCULATED.3IONS-SCNC: 12 MMOL/L (ref 5–15)
ANISOCYTOSIS BLD QL: ABNORMAL
AST SERPL-CCNC: 16 U/L (ref 14–36)
BASOPHILS # BLD MANUAL: 0.09 10*3/MM3 (ref 0–0.2)
BASOPHILS NFR BLD AUTO: 1 % (ref 0–2)
BILIRUB SERPL-MCNC: 0.1 MG/DL (ref 0.2–1.3)
BUN BLD-MCNC: 14 MG/DL (ref 7–21)
BUN/CREAT SERPL: 23.7 (ref 7–25)
CALCIUM SPEC-SCNC: 8.9 MG/DL (ref 8.4–10.2)
CHLORIDE SERPL-SCNC: 105 MMOL/L (ref 95–110)
CO2 SERPL-SCNC: 23 MMOL/L (ref 22–31)
CREAT BLD-MCNC: 0.59 MG/DL (ref 0.5–1)
DEPRECATED RDW RBC AUTO: 47.7 FL (ref 36.4–46.3)
ERYTHROCYTE [DISTWIDTH] IN BLOOD BY AUTOMATED COUNT: 15.7 % (ref 11.5–14.5)
GFR SERPL CREATININE-BSD FRML MDRD: 113 ML/MIN/1.73 (ref 58–135)
GLOBULIN UR ELPH-MCNC: 2.9 GM/DL (ref 2.3–3.5)
GLUCOSE BLD-MCNC: 100 MG/DL (ref 60–100)
HCT VFR BLD AUTO: 28.5 % (ref 35–45)
HGB BLD-MCNC: 9.9 G/DL (ref 12–15.5)
LYMPHOCYTES # BLD MANUAL: 1.34 10*3/MM3 (ref 0.6–4.2)
LYMPHOCYTES NFR BLD MANUAL: 11 % (ref 0–12)
LYMPHOCYTES NFR BLD MANUAL: 15 % (ref 10–50)
MCH RBC QN AUTO: 30.4 PG (ref 26.5–34)
MCHC RBC AUTO-ENTMCNC: 34.7 G/DL (ref 31.4–36)
MCV RBC AUTO: 87.4 FL (ref 80–98)
MONOCYTES # BLD AUTO: 0.98 10*3/MM3 (ref 0–0.9)
NEUTROPHILS # BLD AUTO: 6.5 10*3/MM3 (ref 2–8.6)
NEUTROPHILS NFR BLD MANUAL: 73 % (ref 37–80)
PLATELET # BLD AUTO: 201 10*3/MM3 (ref 150–450)
PMV BLD AUTO: 8.7 FL (ref 8–12)
POLYCHROMASIA BLD QL SMEAR: ABNORMAL
POTASSIUM BLD-SCNC: 3.5 MMOL/L (ref 3.5–5.1)
PROT SERPL-MCNC: 7.1 G/DL (ref 6.3–8.6)
RBC # BLD AUTO: 3.26 10*6/MM3 (ref 3.77–5.16)
SMALL PLATELETS BLD QL SMEAR: ADEQUATE
SODIUM BLD-SCNC: 140 MMOL/L (ref 137–145)
WBC MORPH BLD: NORMAL
WBC NRBC COR # BLD: 8.91 10*3/MM3 (ref 3.2–9.8)

## 2018-12-10 PROCEDURE — 25010000002 DIPHENHYDRAMINE PER 50 MG: Performed by: INTERNAL MEDICINE

## 2018-12-10 PROCEDURE — 96415 CHEMO IV INFUSION ADDL HR: CPT | Performed by: INTERNAL MEDICINE

## 2018-12-10 PROCEDURE — 96413 CHEMO IV INFUSION 1 HR: CPT | Performed by: INTERNAL MEDICINE

## 2018-12-10 PROCEDURE — 25010000002 PEGFILGRASTIM 6 MG/0.6ML PREFILLED SYRINGE KIT: Performed by: INTERNAL MEDICINE

## 2018-12-10 PROCEDURE — 25010000002 PACLITAXEL PER 30 MG: Performed by: INTERNAL MEDICINE

## 2018-12-10 PROCEDURE — 25010000003 DEXAMETHASONE SODIUM PHOSPHATE 100 MG/10ML SOLUTION 10 ML VIAL: Performed by: INTERNAL MEDICINE

## 2018-12-10 PROCEDURE — 80053 COMPREHEN METABOLIC PANEL: CPT

## 2018-12-10 PROCEDURE — 96377 APPLICATON ON-BODY INJECTOR: CPT | Performed by: INTERNAL MEDICINE

## 2018-12-10 PROCEDURE — 96375 TX/PRO/DX INJ NEW DRUG ADDON: CPT | Performed by: INTERNAL MEDICINE

## 2018-12-10 PROCEDURE — 85025 COMPLETE CBC W/AUTO DIFF WBC: CPT

## 2018-12-10 PROCEDURE — 99214 OFFICE O/P EST MOD 30 MIN: CPT | Performed by: INTERNAL MEDICINE

## 2018-12-10 RX ORDER — SODIUM CHLORIDE 0.9 % (FLUSH) 0.9 %
10 SYRINGE (ML) INJECTION AS NEEDED
Status: CANCELLED | OUTPATIENT
Start: 2018-12-27

## 2018-12-10 RX ORDER — SODIUM CHLORIDE 9 MG/ML
250 INJECTION, SOLUTION INTRAVENOUS ONCE
Status: CANCELLED | OUTPATIENT
Start: 2018-12-10

## 2018-12-10 RX ORDER — SODIUM CHLORIDE 9 MG/ML
250 INJECTION, SOLUTION INTRAVENOUS ONCE
Status: COMPLETED | OUTPATIENT
Start: 2018-12-10 | End: 2018-12-10

## 2018-12-10 RX ORDER — FAMOTIDINE 10 MG/ML
20 INJECTION, SOLUTION INTRAVENOUS ONCE
Status: CANCELLED | OUTPATIENT
Start: 2018-12-10

## 2018-12-10 RX ORDER — SODIUM CHLORIDE 0.9 % (FLUSH) 0.9 %
10 SYRINGE (ML) INJECTION AS NEEDED
Status: DISCONTINUED | OUTPATIENT
Start: 2018-12-10 | End: 2018-12-10 | Stop reason: HOSPADM

## 2018-12-10 RX ORDER — FAMOTIDINE 10 MG/ML
20 INJECTION, SOLUTION INTRAVENOUS ONCE
Status: COMPLETED | OUTPATIENT
Start: 2018-12-10 | End: 2018-12-10

## 2018-12-10 RX ADMIN — SODIUM CHLORIDE, PRESERVATIVE FREE 10 ML: 5 INJECTION INTRAVENOUS at 16:16

## 2018-12-10 RX ADMIN — SODIUM CHLORIDE 250 ML: 9 INJECTION, SOLUTION INTRAVENOUS at 11:54

## 2018-12-10 RX ADMIN — SODIUM CHLORIDE, PRESERVATIVE FREE 500 UNITS: 5 INJECTION INTRAVENOUS at 16:17

## 2018-12-10 RX ADMIN — PACLITAXEL 295 MG: 6 INJECTION, SOLUTION INTRAVENOUS at 13:09

## 2018-12-10 RX ADMIN — DEXAMETHASONE SODIUM PHOSPHATE 20 MG: 10 INJECTION, SOLUTION INTRAMUSCULAR; INTRAVENOUS at 12:30

## 2018-12-10 RX ADMIN — PEGFILGRASTIM 6 MG: KIT SUBCUTANEOUS at 16:35

## 2018-12-10 RX ADMIN — FAMOTIDINE 20 MG: 10 INJECTION INTRAVENOUS at 12:59

## 2018-12-10 RX ADMIN — DIPHENHYDRAMINE HYDROCHLORIDE 50 MG: 50 INJECTION, SOLUTION INTRAMUSCULAR; INTRAVENOUS at 11:54

## 2018-12-10 RX ADMIN — SODIUM CHLORIDE, PRESERVATIVE FREE 10 ML: 5 INJECTION INTRAVENOUS at 09:03

## 2018-12-10 NOTE — PATIENT INSTRUCTIONS
Chemotherapy  Chemotherapy is the use of medicines to stop or slow the growth of cancer cells. Depending on the type and stage of your cancer, you may have chemotherapy to:  · Cure your cancer.  · Slow the progression of your cancer.  · Ease your cancer symptoms.  · Improve the benefits of radiation treatment.  · Shrink a tumor before surgery.  · Rid the body of cancer cells that remain after a tumor is surgically removed.    How is chemotherapy given?  Chemotherapy may be given:  · By mouth in liquid or pill form.  · Through a thin tube that is inserted into a vein or artery.  · By getting a shot.  · By rubbing a cream or ointment on your skin.  · Through liquids that are placed directly into various areas of the body, such as the abdomen, chest, or bladder.    How often is chemotherapy given?  Chemotherapy may be given continuously over time, or it may be given in cycles. For example, you may take the medicine for one week out of every month.  For how long will I need chemotherapy treatments?  The length of treatment depends on many factors, including:  · The type of cancer.  · Whether the cancer has spread.  · How you respond to the chemotherapy.  · Whether you develop side effects.    Some types of chemotherapy medicine are given only one time. Others are given for months, years, or for life.  What safety precautions must I take while on chemotherapy?  Chemotherapy medicines are very strong. They will be in all of your bodily fluids, including your urine, stool, saliva, sweat, tears, vaginal secretions, and semen. You must carefully follow some safety precautions to prevent harm to others while you are using these medicines. Here are some recommended precautions:  · Make sure that people who help care for you wear disposable gloves if they are going to come into contact with any of your bodily fluids. Women who are pregnant or breastfeeding should not handle any of your bodily fluids.  · Wash any clothes,  towels, and linens that may have your bodily fluids on them twice in a washing machine using very hot water.  · Dispose of adult diapers, tampons, and sanitary napkins by first sealing them in a plastic bag.  · Use a condom when having sex for at least 2 weeks after receiving your chemotherapy.  · Do not share beverages or food.  · Keep your chemotherapy medicines in their original bottles. Keep them in a high, safe location, away from children. Do not expose them to heat or moisture. Do not put them in containers with other types of medicines.  · Dispose of all wrappers for your chemotherapy medicines by sealing them in a separate plastic bag.  · Do not throw away extra medicine, and do not flush it down the toilet. Take medicine that you are not going to use to your health care provider's office where it can be disposed of properly.  · Follow your health care provider’s directions for the proper disposal of needles, IV tubing, and other medical supplies that have come into contact with your chemotherapy medicines.  · If you are issued a hazardous waste container, make sure you understand the directions for using it.  · Wash your hands thoroughly with warm water and soap after using the bathroom. Dry your hands with disposable paper towels.  · When using the toilet:  ? Flush it twice after each use, including after vomiting.  ? Close the lid of the toilet prior to flushing. This helps to avoid splashing.  ? Both men and women should sit to use the toilet. This helps avoid splashing.    What are the side effects of chemotherapy?  Side effects depend on a variety of factors, including:  · The specific type of chemotherapy medicine used.  · The dosage.  · How long the medicine is used for.  · Your overall health.    Some of the side effects you may experience include:  · Fatigue and decreased energy.  · Decreased appetite.  · Changes in your sense of smell or taste.  · Nausea.  · Vomiting.  · Constipation or  diarrhea.  · Hair loss.  · Increased susceptibility to infection.  · Easy bleeding.  · Mouth sores.  · Burning or tingling in the hands or feet.  · Memory problems.    This information is not intended to replace advice given to you by your health care provider. Make sure you discuss any questions you have with your health care provider.  Document Released: 10/14/2008 Document Revised: 07/07/2017 Document Reviewed: 05/26/2015  [employer] Interactive Patient Education © 2018 [employer]  Paclitaxel injection  What is this medicine?  PACLITAXEL (GABO li TAX el) is a chemotherapy drug. It targets fast dividing cells, like cancer cells, and causes these cells to die. This medicine is used to treat ovarian cancer, breast cancer, and other cancers.  This medicine may be used for other purposes; ask your health care provider or pharmacist if you have questions.  COMMON BRAND NAME(S): Onxol, Taxol  What should I tell my health care provider before I take this medicine?  They need to know if you have any of these conditions:  -blood disorders  -irregular heartbeat  -infection (especially a virus infection such as chickenpox, cold sores, or herpes)  -liver disease  -previous or ongoing radiation therapy  -an unusual or allergic reaction to paclitaxel, alcohol, polyoxyethylated castor oil, other chemotherapy agents, other medicines, foods, dyes, or preservatives  -pregnant or trying to get pregnant  -breast-feeding  How should I use this medicine?  This drug is given as an infusion into a vein. It is administered in a hospital or clinic by a specially trained health care professional.  Talk to your pediatrician regarding the use of this medicine in children. Special care may be needed.  Overdosage: If you think you have taken too much of this medicine contact a poison control center or emergency room at once.  NOTE: This medicine is only for you. Do not share this medicine with others.  What if I miss a dose?  It is important  not to miss your dose. Call your doctor or health care professional if you are unable to keep an appointment.  What may interact with this medicine?  Do not take this medicine with any of the following medications:  -disulfiram  -metronidazole  This medicine may also interact with the following medications:  -cyclosporine  -diazepam  -ketoconazole  -medicines to increase blood counts like filgrastim, pegfilgrastim, sargramostim  -other chemotherapy drugs like cisplatin, doxorubicin, epirubicin, etoposide, teniposide, vincristine  -quinidine  -testosterone  -vaccines  -verapamil  Talk to your doctor or health care professional before taking any of these medicines:  -acetaminophen  -aspirin  -ibuprofen  -ketoprofen  -naproxen  This list may not describe all possible interactions. Give your health care provider a list of all the medicines, herbs, non-prescription drugs, or dietary supplements you use. Also tell them if you smoke, drink alcohol, or use illegal drugs. Some items may interact with your medicine.  What should I watch for while using this medicine?  Your condition will be monitored carefully while you are receiving this medicine. You will need important blood work done while you are taking this medicine.  This medicine can cause serious allergic reactions. To reduce your risk you will need to take other medicine(s) before treatment with this medicine. If you experience allergic reactions like skin rash, itching or hives, swelling of the face, lips, or tongue, tell your doctor or health care professional right away.  In some cases, you may be given additional medicines to help with side effects. Follow all directions for their use.  This drug may make you feel generally unwell. This is not uncommon, as chemotherapy can affect healthy cells as well as cancer cells. Report any side effects. Continue your course of treatment even though you feel ill unless your doctor tells you to stop.  Call your doctor or  health care professional for advice if you get a fever, chills or sore throat, or other symptoms of a cold or flu. Do not treat yourself. This drug decreases your body's ability to fight infections. Try to avoid being around people who are sick.  This medicine may increase your risk to bruise or bleed. Call your doctor or health care professional if you notice any unusual bleeding.  Be careful brushing and flossing your teeth or using a toothpick because you may get an infection or bleed more easily. If you have any dental work done, tell your dentist you are receiving this medicine.  Avoid taking products that contain aspirin, acetaminophen, ibuprofen, naproxen, or ketoprofen unless instructed by your doctor. These medicines may hide a fever.  Do not become pregnant while taking this medicine. Women should inform their doctor if they wish to become pregnant or think they might be pregnant. There is a potential for serious side effects to an unborn child. Talk to your health care professional or pharmacist for more information. Do not breast-feed an infant while taking this medicine.  Men are advised not to father a child while receiving this medicine.  This product may contain alcohol. Ask your pharmacist or healthcare provider if this medicine contains alcohol. Be sure to tell all healthcare providers you are taking this medicine. Certain medicines, like metronidazole and disulfiram, can cause an unpleasant reaction when taken with alcohol. The reaction includes flushing, headache, nausea, vomiting, sweating, and increased thirst. The reaction can last from 30 minutes to several hours.  What side effects may I notice from receiving this medicine?  Side effects that you should report to your doctor or health care professional as soon as possible:  -allergic reactions like skin rash, itching or hives, swelling of the face, lips, or tongue  -low blood counts - This drug may decrease the number of white blood cells,  red blood cells and platelets. You may be at increased risk for infections and bleeding.  -signs of infection - fever or chills, cough, sore throat, pain or difficulty passing urine  -signs of decreased platelets or bleeding - bruising, pinpoint red spots on the skin, black, tarry stools, nosebleeds  -signs of decreased red blood cells - unusually weak or tired, fainting spells, lightheadedness  -breathing problems  -chest pain  -high or low blood pressure  -mouth sores  -nausea and vomiting  -pain, swelling, redness or irritation at the injection site  -pain, tingling, numbness in the hands or feet  -slow or irregular heartbeat  -swelling of the ankle, feet, hands  Side effects that usually do not require medical attention (report to your doctor or health care professional if they continue or are bothersome):  -bone pain  -complete hair loss including hair on your head, underarms, pubic hair, eyebrows, and eyelashes  -changes in the color of fingernails  -diarrhea  -loosening of the fingernails  -loss of appetite  -muscle or joint pain  -red flush to skin  -sweating  This list may not describe all possible side effects. Call your doctor for medical advice about side effects. You may report side effects to FDA at 1-196-FDA-5827.  Where should I keep my medicine?  This drug is given in a hospital or clinic and will not be stored at home.  NOTE: This sheet is a summary. It may not cover all possible information. If you have questions about this medicine, talk to your doctor, pharmacist, or health care provider.  © 2018 [employer] (2016-10-18 19:58:00)  Pegfilgrastim injection  What is this medicine?  PEGFILGRASTIM (PEG lizzeth gra stim) is a long-acting granulocyte colony-stimulating factor that stimulates the growth of neutrophils, a type of white blood cell important in the body's fight against infection. It is used to reduce the incidence of fever and infection in patients with certain types of cancer who  are receiving chemotherapy that affects the bone marrow, and to increase survival after being exposed to high doses of radiation.  This medicine may be used for other purposes; ask your health care provider or pharmacist if you have questions.  COMMON BRAND NAME(S): Neulasta  What should I tell my health care provider before I take this medicine?  They need to know if you have any of these conditions:  -kidney disease  -latex allergy  -ongoing radiation therapy  -sickle cell disease  -skin reactions to acrylic adhesives (On-Body Injector only)  -an unusual or allergic reaction to pegfilgrastim, filgrastim, other medicines, foods, dyes, or preservatives  -pregnant or trying to get pregnant  -breast-feeding  How should I use this medicine?  This medicine is for injection under the skin. If you get this medicine at home, you will be taught how to prepare and give the pre-filled syringe or how to use the On-body Injector. Refer to the patient Instructions for Use for detailed instructions. Use exactly as directed. Tell your healthcare provider immediately if you suspect that the On-body Injector may not have performed as intended or if you suspect the use of the On-body Injector resulted in a missed or partial dose.  It is important that you put your used needles and syringes in a special sharps container. Do not put them in a trash can. If you do not have a sharps container, call your pharmacist or healthcare provider to get one.  Talk to your pediatrician regarding the use of this medicine in children. While this drug may be prescribed for selected conditions, precautions do apply.  Overdosage: If you think you have taken too much of this medicine contact a poison control center or emergency room at once.  NOTE: This medicine is only for you. Do not share this medicine with others.  What if I miss a dose?  It is important not to miss your dose. Call your doctor or health care professional if you miss your dose. If you  miss a dose due to an On-body Injector failure or leakage, a new dose should be administered as soon as possible using a single prefilled syringe for manual use.  What may interact with this medicine?  Interactions have not been studied.  Give your health care provider a list of all the medicines, herbs, non-prescription drugs, or dietary supplements you use. Also tell them if you smoke, drink alcohol, or use illegal drugs. Some items may interact with your medicine.  This list may not describe all possible interactions. Give your health care provider a list of all the medicines, herbs, non-prescription drugs, or dietary supplements you use. Also tell them if you smoke, drink alcohol, or use illegal drugs. Some items may interact with your medicine.  What should I watch for while using this medicine?  You may need blood work done while you are taking this medicine.  If you are going to need a MRI, CT scan, or other procedure, tell your doctor that you are using this medicine (On-Body Injector only).  What side effects may I notice from receiving this medicine?  Side effects that you should report to your doctor or health care professional as soon as possible:  -allergic reactions like skin rash, itching or hives, swelling of the face, lips, or tongue  -dizziness  -fever  -pain, redness, or irritation at site where injected  -pinpoint red spots on the skin  -red or dark-brown urine  -shortness of breath or breathing problems  -stomach or side pain, or pain at the shoulder  -swelling  -tiredness  -trouble passing urine or change in the amount of urine  Side effects that usually do not require medical attention (report to your doctor or health care professional if they continue or are bothersome):  -bone pain  -muscle pain  This list may not describe all possible side effects. Call your doctor for medical advice about side effects. You may report side effects to FDA at 4-221-FDA-3461.  Where should I keep my  medicine?  Keep out of the reach of children.  Store pre-filled syringes in a refrigerator between 2 and 8 degrees C (36 and 46 degrees F). Do not freeze. Keep in carton to protect from light. Throw away this medicine if it is left out of the refrigerator for more than 48 hours. Throw away any unused medicine after the expiration date.  NOTE: This sheet is a summary. It may not cover all possible information. If you have questions about this medicine, talk to your doctor, pharmacist, or health care provider.  © 2018 [employer]vier/[employer] (2017-12-14 12:58:03)

## 2018-12-11 PROBLEM — F17.200 CURRENT SMOKER: Status: RESOLVED | Noted: 2018-08-31 | Resolved: 2018-12-11

## 2018-12-11 NOTE — PROGRESS NOTES
Moy Sparrow    Subjective    Ms Andrews presented for follow up appointment for breast cancer.   She has finished dose dense AC and is going to start paclitaxel today.   No major side effects from treatment.   Reporting chest pain in last few days. PORT site looks good. No arm or leg swelling. She does struggle with heart burn. Suspect MSK or GI in nature.   Continues to struggle with anxiety related to her illness.    ROS as below.       History of Present Illness    This is a very pleasant 38-year-old female who was seen in consultation at the request of Dr. Ortega for evaluation of newly diagnosed breast cancer on 9/24/18.  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  Her past medical history is otherwise unremarkable except history of anxiety/depression and gastroesophageal reflux disease.She tells me that she felt a lump in her right breast in August 2018 and subsequently underwent a mammogram and ultrasonographic evaluation on August 27 which showed right breast mass approximately 1.5 cm.  The breast mass was felt to be suspicious for malignancy.  She then underwent ultrasound guided biopsy of right breast on 8/28/18 which showed invasive ductal carcinoma, moderately differentiated.  The carcinoma was tested positive for estrogen receptor and it was tested negative for progesterone receptor as well as HER-2.  Patient underwent right mastectomy and sentinel lymph node biopsy on September 5 which showed invasive ductal carcinoma of 1.5 cm, moderately differentiated which was completely excised.  [place] lymph node showed micrometastasis (less than 2 mm) involving 1 of 7 lymph nodes.    Her recurrence score was high at 36, so we recommended adjuvant chemotherapy with dose dense AC followed by taxane. She was started on chemotherapy on 10/15/18.     Past Medical History, Past Surgical History, Social History, Family History have been reviewed and are without significant changes  "except as mentioned.    Review of Systems   CONSTITUTIONAL: Fatigue+  Weight loss + No  fever, chills, weakness.  HEENT: Eyes: No visual loss, blurred vision, double vision or yellow sclerae. Ears, Nose, Throat: No hearing loss, sneezing, congestion, runny nose or sore throat.  SKIN: No rash or itching.  CARDIOVASCULAR: chest pain + . No palpitations or edema.  RESPIRATORY: chronic exertional SOB + No  cough or sputum.  GASTROINTESTINAL: nausea + No anorexia,  vomiting or diarrhea. No abdominal pain or blood.  GENITOURINARY: Negative for urgency, frequency or dysuria.   NEUROLOGICAL: No headache, dizziness, syncope, paralysis, ataxia, numbness or tingling in the extremities. No change in bowel or bladder control.  MUSCULOSKELETAL: Back pain+  HEMATOLOGIC: No anemia, bleeding or bruising.  LYMPHATICS: No enlarged nodes. No history of splenectomy.  PSYCHIATRIC: Anxiety + depression+  ENDOCRINOLOGIC: No reports of sweating, cold or heat intolerance. No polyuria or polydipsia.  ALLERGIES: No history of asthma, hives, eczema or rhinitis.    Medications:  The current medication list was reviewed in the EMR    ALLERGIES:    Allergies   Allergen Reactions   • Penicillins Rash       Objective      Vitals:    11/26/18 0857   BP: 124/81   Pulse: 100   Resp: 18   Temp: 99.8 °F (37.7 °C)   TempSrc: Temporal   SpO2: 98%   Weight: 65.8 kg (145 lb)   Height: 154.9 cm (60.98\")   PainSc: 0-No pain     Current Status 11/26/2018   ECOG score 0       Physical Exam    General: Alert, awake, oriented.  Well dressed.  Not in apparent distress. Vitals as above.   HEAD: normocephalic, atraumatic.   EYES: PERRL, EOMI. Fundi normal, vision is grossly intact.  Neck: Supple, no adenopathy or thyromegaly.   Throat: normal oral cavity and pharynx. No inflammation, swelling, exudate, or lesions.  CARDIAC: Normal S1 and S2. No S3, S4 or murmurs. Rhythm is regular.Extremities are warm and well perfused.   LUNGS: Clear to auscultation and percussion " without rales, rhonchi, wheezing or diminished breath sounds.  ABDOMEN: Positive bowel sounds. Soft, nondistended, nontender  . No guarding or rebound. No masses.  Back:No bony tenderness.   EXTREMITIES: No significant deformity or joint abnormality.  Peripheral pulses intact. No varicosities.  Skin: No rash or bruising.  Neurological: Grossly non-focal exam. No focal weakness. Gait: Normal.   Psych: Mood and affect normal. No hallucination or suicidal thoughts.   Lymphatics:No adenopathy     RECENT LABS: Independently reviewed and summarized  Hematology WBC   Date Value Ref Range Status   11/26/2018 9.18 3.20 - 9.80 10*3/mm3 Final     RBC   Date Value Ref Range Status   11/26/2018 3.76 (L) 3.77 - 5.16 10*6/mm3 Final     Hemoglobin   Date Value Ref Range Status   11/26/2018 11.1 (L) 12.0 - 15.5 g/dL Final     Hematocrit   Date Value Ref Range Status   11/26/2018 32.4 (L) 35.0 - 45.0 % Final     Platelets   Date Value Ref Range Status   11/26/2018 222 150 - 450 10*3/mm3 Final            Lab Results   Component Value Date    GLUCOSE 113 (H) 11/26/2018    BUN 15 11/26/2018    CREATININE 0.72 11/26/2018    EGFRIFNONA 90 11/26/2018    BCR 20.8 11/26/2018    K 3.4 (L) 11/26/2018    CO2 24.0 11/26/2018    CALCIUM 9.1 11/26/2018    ALBUMIN 4.70 11/26/2018    AST 22 11/26/2018    ALT 12 11/26/2018         Diagnosis:   (1) Invasive ductal carcinoma, right   Date of diagnosis: 9/5/18   Stage: IB ([id number]), recurrence score 36   Prior treatment: Right mastectomy with sentinel node biopsy and ALND (9/5/18)  Chemotherapy: Start date (10/15/18)   (2) Encounter for chemotherapy management  (3) Chemotherapy induce nausea (persistent)   (4) Upper back pain   (5) Anxiety (persistent)   (6) Drug rash   (7) Unintentional weight loss     Assessment/Plan       (1) Invasive ductal carcinoma, right, stage IB, ER+/DC-/HER2-   (2) Encounter for chemotherapy management      - Finished dose dense AC well without any major side effects.   - We  will start dose dense paclitaxel today.   - Potential side effects again discussed at length.   - Genetic testing - no heritable mutation identified (panel of 34 genes).     (3) Chemotherapy induce nausea (persistent):  This is persistent even after using zofran, compazin. Encouraged her to use PRN steroid and benzo for additional nausea. She has not tolerated zyprexa well and developed sleepiness/fatigue.     (4) Upper back/chest pain: Chronic, likely musculo-skeletal in nature. This does get worse after chemotherapy. Bone scan negative for any metastases.    Reports chest pain today, which is new. Appears to be musculo-skeletal versus GI (reflux) in nature. Continue to monitor.  Counseled about anti-reflux measures. She was instructed to call if worsening pain or SOB.     (5) Anxiety: Continues to struggle with generalized anxiety. On pristiq. Continue this for now.     (6) Rash: Likely allergic secondary to augmentin. This has resolved.     (7) Unintentional weight loss: Recommend protein shakes/supplements. Weight stable from 2 weeks ago.       Yobani Emery MD     11/26/2018      CC:

## 2018-12-27 ENCOUNTER — OFFICE VISIT (OUTPATIENT)
Dept: ONCOLOGY | Facility: CLINIC | Age: 38
End: 2018-12-27
Attending: INTERNAL MEDICINE

## 2018-12-27 ENCOUNTER — INFUSION (OUTPATIENT)
Dept: ONCOLOGY | Facility: HOSPITAL | Age: 38
End: 2018-12-27
Attending: INTERNAL MEDICINE

## 2018-12-27 VITALS
TEMPERATURE: 98.2 F | SYSTOLIC BLOOD PRESSURE: 122 MMHG | HEART RATE: 114 BPM | WEIGHT: 147.8 LBS | BODY MASS INDEX: 27.94 KG/M2 | DIASTOLIC BLOOD PRESSURE: 80 MMHG | RESPIRATION RATE: 18 BRPM

## 2018-12-27 VITALS — RESPIRATION RATE: 18 BRPM | SYSTOLIC BLOOD PRESSURE: 116 MMHG | HEART RATE: 77 BPM | DIASTOLIC BLOOD PRESSURE: 66 MMHG

## 2018-12-27 DIAGNOSIS — F41.1 GENERALIZED ANXIETY DISORDER: ICD-10-CM

## 2018-12-27 DIAGNOSIS — Z17.0 MALIGNANT NEOPLASM OF UPPER-OUTER QUADRANT OF RIGHT BREAST IN FEMALE, ESTROGEN RECEPTOR POSITIVE (HCC): ICD-10-CM

## 2018-12-27 DIAGNOSIS — C50.411 MALIGNANT NEOPLASM OF UPPER-OUTER QUADRANT OF RIGHT BREAST IN FEMALE, ESTROGEN RECEPTOR POSITIVE (HCC): ICD-10-CM

## 2018-12-27 DIAGNOSIS — Z17.0 MALIGNANT NEOPLASM OF UPPER-OUTER QUADRANT OF RIGHT BREAST IN FEMALE, ESTROGEN RECEPTOR POSITIVE (HCC): Primary | ICD-10-CM

## 2018-12-27 DIAGNOSIS — Z45.2 ENCOUNTER FOR VENOUS ACCESS DEVICE CARE: ICD-10-CM

## 2018-12-27 DIAGNOSIS — Z51.11 ENCOUNTER FOR CHEMOTHERAPY MANAGEMENT: Primary | ICD-10-CM

## 2018-12-27 DIAGNOSIS — IMO0001 OTHER COMPLICATION DUE TO VENOUS ACCESS DEVICE, INITIAL ENCOUNTER: ICD-10-CM

## 2018-12-27 DIAGNOSIS — C50.411 MALIGNANT NEOPLASM OF UPPER-OUTER QUADRANT OF RIGHT BREAST IN FEMALE, ESTROGEN RECEPTOR POSITIVE (HCC): Primary | ICD-10-CM

## 2018-12-27 LAB
ALBUMIN SERPL-MCNC: 4.1 G/DL (ref 3.4–4.8)
ALBUMIN/GLOB SERPL: 1.3 G/DL (ref 1.1–1.8)
ALP SERPL-CCNC: 81 U/L (ref 38–126)
ALT SERPL W P-5'-P-CCNC: 23 U/L (ref 9–52)
ANION GAP SERPL CALCULATED.3IONS-SCNC: 13 MMOL/L (ref 5–15)
AST SERPL-CCNC: 21 U/L (ref 14–36)
BASOPHILS # BLD AUTO: 0.03 10*3/MM3 (ref 0–0.2)
BASOPHILS NFR BLD AUTO: 0.7 % (ref 0–2)
BILIRUB SERPL-MCNC: 0.5 MG/DL (ref 0.2–1.3)
BUN BLD-MCNC: 15 MG/DL (ref 7–21)
BUN/CREAT SERPL: 23.8 (ref 7–25)
CALCIUM SPEC-SCNC: 8.9 MG/DL (ref 8.4–10.2)
CHLORIDE SERPL-SCNC: 104 MMOL/L (ref 95–110)
CO2 SERPL-SCNC: 21 MMOL/L (ref 22–31)
CREAT BLD-MCNC: 0.63 MG/DL (ref 0.5–1)
DEPRECATED RDW RBC AUTO: 56 FL (ref 36.4–46.3)
EOSINOPHIL # BLD AUTO: 0.11 10*3/MM3 (ref 0–0.7)
EOSINOPHIL NFR BLD AUTO: 2.6 % (ref 0–7)
ERYTHROCYTE [DISTWIDTH] IN BLOOD BY AUTOMATED COUNT: 17.4 % (ref 11.5–14.5)
GFR SERPL CREATININE-BSD FRML MDRD: 105 ML/MIN/1.73 (ref 58–135)
GLOBULIN UR ELPH-MCNC: 3.1 GM/DL (ref 2.3–3.5)
GLUCOSE BLD-MCNC: 103 MG/DL (ref 60–100)
HCT VFR BLD AUTO: 29.5 % (ref 35–45)
HGB BLD-MCNC: 10 G/DL (ref 12–15.5)
IMM GRANULOCYTES # BLD AUTO: 0.01 10*3/MM3 (ref 0–0.02)
IMM GRANULOCYTES NFR BLD AUTO: 0.2 % (ref 0–0.5)
LYMPHOCYTES # BLD AUTO: 1.29 10*3/MM3 (ref 0.6–4.2)
LYMPHOCYTES NFR BLD AUTO: 30 % (ref 10–50)
MCH RBC QN AUTO: 30.2 PG (ref 26.5–34)
MCHC RBC AUTO-ENTMCNC: 33.9 G/DL (ref 31.4–36)
MCV RBC AUTO: 89.1 FL (ref 80–98)
MONOCYTES # BLD AUTO: 0.42 10*3/MM3 (ref 0–0.9)
MONOCYTES NFR BLD AUTO: 9.8 % (ref 0–12)
NEUTROPHILS # BLD AUTO: 2.44 10*3/MM3 (ref 2–8.6)
NEUTROPHILS NFR BLD AUTO: 56.7 % (ref 37–80)
NRBC BLD AUTO-RTO: 0 /100 WBC (ref 0–0)
PLATELET # BLD AUTO: 223 10*3/MM3 (ref 150–450)
PMV BLD AUTO: 8.4 FL (ref 8–12)
POTASSIUM BLD-SCNC: 3.6 MMOL/L (ref 3.5–5.1)
PROT SERPL-MCNC: 7.2 G/DL (ref 6.3–8.6)
RBC # BLD AUTO: 3.31 10*6/MM3 (ref 3.77–5.16)
SODIUM BLD-SCNC: 138 MMOL/L (ref 137–145)
WBC NRBC COR # BLD: 4.3 10*3/MM3 (ref 3.2–9.8)

## 2018-12-27 PROCEDURE — 25010000002 PACLITAXEL PER 30 MG: Performed by: INTERNAL MEDICINE

## 2018-12-27 PROCEDURE — 85025 COMPLETE CBC W/AUTO DIFF WBC: CPT

## 2018-12-27 PROCEDURE — 96377 APPLICATON ON-BODY INJECTOR: CPT | Performed by: INTERNAL MEDICINE

## 2018-12-27 PROCEDURE — 25010000003 DEXAMETHASONE SODIUM PHOSPHATE 100 MG/10ML SOLUTION 10 ML VIAL: Performed by: INTERNAL MEDICINE

## 2018-12-27 PROCEDURE — 25010000002 DIPHENHYDRAMINE PER 50 MG: Performed by: INTERNAL MEDICINE

## 2018-12-27 PROCEDURE — 80053 COMPREHEN METABOLIC PANEL: CPT

## 2018-12-27 PROCEDURE — 96415 CHEMO IV INFUSION ADDL HR: CPT | Performed by: INTERNAL MEDICINE

## 2018-12-27 PROCEDURE — 96413 CHEMO IV INFUSION 1 HR: CPT | Performed by: INTERNAL MEDICINE

## 2018-12-27 PROCEDURE — 99214 OFFICE O/P EST MOD 30 MIN: CPT | Performed by: INTERNAL MEDICINE

## 2018-12-27 PROCEDURE — 96375 TX/PRO/DX INJ NEW DRUG ADDON: CPT | Performed by: INTERNAL MEDICINE

## 2018-12-27 PROCEDURE — 25010000002 PEGFILGRASTIM 6 MG/0.6ML PREFILLED SYRINGE KIT: Performed by: INTERNAL MEDICINE

## 2018-12-27 RX ORDER — FAMOTIDINE 10 MG/ML
20 INJECTION, SOLUTION INTRAVENOUS ONCE
Status: COMPLETED | OUTPATIENT
Start: 2018-12-27 | End: 2018-12-27

## 2018-12-27 RX ORDER — FAMOTIDINE 10 MG/ML
20 INJECTION, SOLUTION INTRAVENOUS ONCE
Status: CANCELLED | OUTPATIENT
Start: 2018-12-27

## 2018-12-27 RX ORDER — SODIUM CHLORIDE 9 MG/ML
250 INJECTION, SOLUTION INTRAVENOUS ONCE
Status: CANCELLED | OUTPATIENT
Start: 2018-12-27

## 2018-12-27 RX ORDER — SODIUM CHLORIDE 0.9 % (FLUSH) 0.9 %
10 SYRINGE (ML) INJECTION AS NEEDED
Status: CANCELLED | OUTPATIENT
Start: 2019-01-14

## 2018-12-27 RX ORDER — SODIUM CHLORIDE 0.9 % (FLUSH) 0.9 %
10 SYRINGE (ML) INJECTION AS NEEDED
Status: DISCONTINUED | OUTPATIENT
Start: 2018-12-27 | End: 2018-12-27 | Stop reason: HOSPADM

## 2018-12-27 RX ORDER — TRAMADOL HYDROCHLORIDE 50 MG/1
50 TABLET ORAL EVERY 6 HOURS PRN
Qty: 40 TABLET | Refills: 0 | Status: SHIPPED | OUTPATIENT
Start: 2018-12-27 | End: 2019-10-21 | Stop reason: HOSPADM

## 2018-12-27 RX ORDER — SODIUM CHLORIDE 9 MG/ML
250 INJECTION, SOLUTION INTRAVENOUS ONCE
Status: COMPLETED | OUTPATIENT
Start: 2018-12-27 | End: 2018-12-27

## 2018-12-27 RX ADMIN — SODIUM CHLORIDE, PRESERVATIVE FREE 10 ML: 5 INJECTION INTRAVENOUS at 08:48

## 2018-12-27 RX ADMIN — DEXAMETHASONE SODIUM PHOSPHATE 20 MG: 10 INJECTION, SOLUTION INTRAMUSCULAR; INTRAVENOUS at 10:20

## 2018-12-27 RX ADMIN — DIPHENHYDRAMINE HYDROCHLORIDE 50 MG: 50 INJECTION, SOLUTION INTRAMUSCULAR; INTRAVENOUS at 09:55

## 2018-12-27 RX ADMIN — SODIUM CHLORIDE 250 ML: 9 INJECTION, SOLUTION INTRAVENOUS at 09:55

## 2018-12-27 RX ADMIN — PACLITAXEL 295 MG: 6 INJECTION, SOLUTION INTRAVENOUS at 11:06

## 2018-12-27 RX ADMIN — PEGFILGRASTIM 6 MG: KIT SUBCUTANEOUS at 14:23

## 2018-12-27 RX ADMIN — FAMOTIDINE 20 MG: 10 INJECTION INTRAVENOUS at 10:55

## 2018-12-27 RX ADMIN — SODIUM CHLORIDE, PRESERVATIVE FREE 500 UNITS: 5 INJECTION INTRAVENOUS at 14:23

## 2018-12-27 RX ADMIN — SODIUM CHLORIDE, PRESERVATIVE FREE 10 ML: 5 INJECTION INTRAVENOUS at 14:23

## 2018-12-27 NOTE — PROGRESS NOTES
Moy Sparrow  [id number]  1980    Subjective    Ms Andrews presented for follow up appointment for breast cancer.   She has finished dose dense AC and has started paclitaxel.   No major side effects from treatment.   Had joint pain involving small joints of hands few days after chemo. This has resolved.   Her left chest pain is resolved.   Continues to struggle with anxiety related to her illness.    ROS as below.       History of Present Illness    This is a very pleasant 38-year-old female who was seen in consultation at the request of Dr. Ortega for evaluation of newly diagnosed breast cancer on 9/24/18.  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  Her past medical history is otherwise unremarkable except history of anxiety/depression and gastroesophageal reflux disease.She tells me that she felt a lump in her right breast in August 2018 and subsequently underwent a mammogram and ultrasonographic evaluation on August 27 which showed right breast mass approximately 1.5 cm.  The breast mass was felt to be suspicious for malignancy.  She then underwent ultrasound guided biopsy of right breast on 8/28/18 which showed invasive ductal carcinoma, moderately differentiated.  The carcinoma was tested positive for estrogen receptor and it was tested negative for progesterone receptor as well as HER-2.  Patient underwent right mastectomy and sentinel lymph node biopsy on September 5 which showed invasive ductal carcinoma of 1.5 cm, moderately differentiated which was completely excised.  [place] lymph node showed micrometastasis (less than 2 mm) involving 1 of 7 lymph nodes.    Her recurrence score was high at 36, so we recommended adjuvant chemotherapy with dose dense AC followed by taxane. She was started on chemotherapy on 10/15/18.     Past Medical History, Past Surgical History, Social History, Family History have been reviewed and are without significant changes except as  "mentioned.    Review of Systems   CONSTITUTIONAL: Fatigue+  Weight loss + No  fever, chills, weakness.  HEENT: Eyes: No visual loss, blurred vision, double vision or yellow sclerae. Ears, Nose, Throat: No hearing loss, sneezing, congestion, runny nose or sore throat.  SKIN: No rash or itching.  CARDIOVASCULAR: No chest pain . No palpitations or edema.  RESPIRATORY: chronic exertional SOB + No  cough or sputum.  GASTROINTESTINAL: nausea + No anorexia,  vomiting or diarrhea. No abdominal pain or blood.  GENITOURINARY: Negative for urgency, frequency or dysuria.   NEUROLOGICAL: No headache, dizziness, syncope, paralysis, ataxia, numbness or tingling in the extremities. No change in bowel or bladder control.  MUSCULOSKELETAL: Back pain+  HEMATOLOGIC: No anemia, bleeding or bruising.  LYMPHATICS: No enlarged nodes. No history of splenectomy.  PSYCHIATRIC: Anxiety + depression+  ENDOCRINOLOGIC: No reports of sweating, cold or heat intolerance. No polyuria or polydipsia.  ALLERGIES: No history of asthma, hives, eczema or rhinitis.    Medications:  The current medication list was reviewed in the EMR    ALLERGIES:    Allergies   Allergen Reactions   • Penicillins Rash       Objective      Vitals:    11/26/18 0857   BP: 124/81   Pulse: 100   Resp: 18   Temp: 99.8 °F (37.7 °C)   TempSrc: Temporal   SpO2: 98%   Weight: 65.8 kg (145 lb)   Height: 154.9 cm (60.98\")   PainSc: 0-No pain     Current Status 11/26/2018   ECOG score 0       Physical Exam    General: Alert, awake, oriented.  Well dressed.  Not in apparent distress. Vitals as above.   HEAD: normocephalic, atraumatic.   EYES: PERRL, EOMI. Fundi normal, vision is grossly intact.  Neck: Supple, no adenopathy or thyromegaly.   Throat: normal oral cavity and pharynx. No inflammation, swelling, exudate, or lesions.  CARDIAC: Normal S1 and S2. No S3, S4 or murmurs. Rhythm is regular.Extremities are warm and well perfused.   LUNGS: Clear to auscultation and percussion without " rales, rhonchi, wheezing or diminished breath sounds.  ABDOMEN: Positive bowel sounds. Soft, nondistended, nontender  . No guarding or rebound. No masses.  Back:No bony tenderness.   EXTREMITIES: No significant deformity or joint abnormality.  Peripheral pulses intact. No varicosities.  Skin: No rash or bruising.  Neurological: Grossly non-focal exam. No focal weakness. Gait: Normal.   Psych: Mood and affect normal. No hallucination or suicidal thoughts.   Lymphatics:No adenopathy     RECENT LABS: Independently reviewed and summarized  Hematology WBC   Date Value Ref Range Status   11/26/2018 9.18 3.20 - 9.80 10*3/mm3 Final     RBC   Date Value Ref Range Status   11/26/2018 3.76 (L) 3.77 - 5.16 10*6/mm3 Final     Hemoglobin   Date Value Ref Range Status   11/26/2018 11.1 (L) 12.0 - 15.5 g/dL Final     Hematocrit   Date Value Ref Range Status   11/26/2018 32.4 (L) 35.0 - 45.0 % Final     Platelets   Date Value Ref Range Status   11/26/2018 222 150 - 450 10*3/mm3 Final            Lab Results   Component Value Date    GLUCOSE 113 (H) 11/26/2018    BUN 15 11/26/2018    CREATININE 0.72 11/26/2018    EGFRIFNONA 90 11/26/2018    BCR 20.8 11/26/2018    K 3.4 (L) 11/26/2018    CO2 24.0 11/26/2018    CALCIUM 9.1 11/26/2018    ALBUMIN 4.70 11/26/2018    AST 22 11/26/2018    ALT 12 11/26/2018         Diagnosis:   (1) Invasive ductal carcinoma, right   Date of diagnosis: 9/5/18   Stage: IB ([id number]), recurrence score 36   Prior treatment: Right mastectomy with sentinel node biopsy and ALND (9/5/18)  Chemotherapy: Start date (10/15/18)   (2) Encounter for chemotherapy management  (3) Chemotherapy induce nausea   (4) Upper back pain   (5) Anxiety   (6) Drug rash   (7) Unintentional weight loss     Assessment/Plan       (1) Invasive ductal carcinoma, right, stage IB, ER+/FL-/HER2-   (2) Encounter for chemotherapy management      - Finished dose dense AC well without any major side effects.   - Dose dense paclitaxel cycle 2 today.    - Tolerated cycle 1 fairly well.   - Genetic testing - no heritable mutation identified (panel of 34 genes).     (3) Chemotherapy induce nausea: Improved. Continue PRN anti-nausea medications.     (4) Upper back/chest pain: Chronic, likely musculo-skeletal in nature. This does get worse after chemotherapy. Bone scan negative for any metastases. Prescription of tramadol given to the patient.       (5) Anxiety: Continues to struggle with generalized anxiety. On pristiq. Continue this for now.     (6) Dermatitis: Due to dry skin. Recommended to use lotion.     (7) Unintentional weight loss: Recommend protein shakes/supplements. Weight stable from 2 weeks ago.       Yobani Emery MD     11/26/2018      CC:

## 2018-12-27 NOTE — PROGRESS NOTES
Adult Outpatient Nutrition  Assessment    Patient Name:  Moy Sparrow  YOB: 1980  MRN: [id number]    Assessment Date:  12/27/2018    Comments: Follow-up visit to check nutrition status. Pt stated appetite/intake great at present. Did experience significant N/V post chemo in past, but has finished taking agent that was causing N/V. Wt 147.9. Pt asking how to get reimbursement from insurance for wig purchase. Dr Hilton writing script--that pt will mail to insurance company with receipt from purchase. Pt mentioned that genetic testing bill is quite expensive--plans to call phone number on bill to see if filed with insurance. If insurance has been filed, pt plans to ask if assistance is available.        Anthropometrics     Row Name 12/27/18 0850          Anthropometrics    Weight  67 kg (147 lb 12.8 oz)                       Electronically signed by:  Liza Wei RD  12/27/18 3:31 PM

## 2019-01-04 ENCOUNTER — APPOINTMENT (OUTPATIENT)
Dept: ONCOLOGY | Facility: HOSPITAL | Age: 39
End: 2019-01-04
Attending: INTERNAL MEDICINE

## 2019-01-14 ENCOUNTER — INFUSION (OUTPATIENT)
Dept: ONCOLOGY | Facility: HOSPITAL | Age: 39
End: 2019-01-14
Attending: INTERNAL MEDICINE

## 2019-01-14 ENCOUNTER — OFFICE VISIT (OUTPATIENT)
Dept: ONCOLOGY | Facility: CLINIC | Age: 39
End: 2019-01-14
Attending: INTERNAL MEDICINE

## 2019-01-14 VITALS
DIASTOLIC BLOOD PRESSURE: 76 MMHG | BODY MASS INDEX: 27.94 KG/M2 | RESPIRATION RATE: 18 BRPM | HEART RATE: 95 BPM | HEIGHT: 61 IN | OXYGEN SATURATION: 99 % | WEIGHT: 148 LBS | TEMPERATURE: 98.7 F | SYSTOLIC BLOOD PRESSURE: 119 MMHG

## 2019-01-14 DIAGNOSIS — Z17.0 MALIGNANT NEOPLASM OF UPPER-OUTER QUADRANT OF RIGHT BREAST IN FEMALE, ESTROGEN RECEPTOR POSITIVE (HCC): Primary | ICD-10-CM

## 2019-01-14 DIAGNOSIS — C50.411 MALIGNANT NEOPLASM OF UPPER-OUTER QUADRANT OF RIGHT BREAST IN FEMALE, ESTROGEN RECEPTOR POSITIVE (HCC): Primary | ICD-10-CM

## 2019-01-14 DIAGNOSIS — IMO0001 OTHER COMPLICATION DUE TO VENOUS ACCESS DEVICE, INITIAL ENCOUNTER: ICD-10-CM

## 2019-01-14 DIAGNOSIS — Z45.2 ENCOUNTER FOR VENOUS ACCESS DEVICE CARE: ICD-10-CM

## 2019-01-14 LAB
ALBUMIN SERPL-MCNC: 4.1 G/DL (ref 3.4–4.8)
ALBUMIN/GLOB SERPL: 1.3 G/DL (ref 1.1–1.8)
ALP SERPL-CCNC: 77 U/L (ref 38–126)
ALT SERPL W P-5'-P-CCNC: 23 U/L (ref 9–52)
ANION GAP SERPL CALCULATED.3IONS-SCNC: 11 MMOL/L (ref 5–15)
AST SERPL-CCNC: 22 U/L (ref 14–36)
BASOPHILS # BLD AUTO: 0.02 10*3/MM3 (ref 0–0.2)
BASOPHILS NFR BLD AUTO: 0.3 % (ref 0–2)
BILIRUB SERPL-MCNC: 0.3 MG/DL (ref 0.2–1.3)
BUN BLD-MCNC: 16 MG/DL (ref 7–21)
BUN/CREAT SERPL: 23.9 (ref 7–25)
CALCIUM SPEC-SCNC: 9.2 MG/DL (ref 8.4–10.2)
CHLORIDE SERPL-SCNC: 106 MMOL/L (ref 95–110)
CO2 SERPL-SCNC: 24 MMOL/L (ref 22–31)
CREAT BLD-MCNC: 0.67 MG/DL (ref 0.5–1)
DEPRECATED RDW RBC AUTO: 55.7 FL (ref 36.4–46.3)
EOSINOPHIL # BLD AUTO: 0.09 10*3/MM3 (ref 0–0.7)
EOSINOPHIL NFR BLD AUTO: 1.3 % (ref 0–7)
ERYTHROCYTE [DISTWIDTH] IN BLOOD BY AUTOMATED COUNT: 16.5 % (ref 11.5–14.5)
GFR SERPL CREATININE-BSD FRML MDRD: 97 ML/MIN/1.73 (ref 58–135)
GLOBULIN UR ELPH-MCNC: 3.1 GM/DL (ref 2.3–3.5)
GLUCOSE BLD-MCNC: 103 MG/DL (ref 60–100)
HCT VFR BLD AUTO: 29.2 % (ref 35–45)
HGB BLD-MCNC: 10 G/DL (ref 12–15.5)
IMM GRANULOCYTES # BLD AUTO: 0.01 10*3/MM3 (ref 0–0.02)
IMM GRANULOCYTES NFR BLD AUTO: 0.1 % (ref 0–0.5)
LYMPHOCYTES # BLD AUTO: 0.99 10*3/MM3 (ref 0.6–4.2)
LYMPHOCYTES NFR BLD AUTO: 14.8 % (ref 10–50)
MCH RBC QN AUTO: 31.6 PG (ref 26.5–34)
MCHC RBC AUTO-ENTMCNC: 34.2 G/DL (ref 31.4–36)
MCV RBC AUTO: 92.4 FL (ref 80–98)
MONOCYTES # BLD AUTO: 0.7 10*3/MM3 (ref 0–0.9)
MONOCYTES NFR BLD AUTO: 10.5 % (ref 0–12)
NEUTROPHILS # BLD AUTO: 4.87 10*3/MM3 (ref 2–8.6)
NEUTROPHILS NFR BLD AUTO: 73 % (ref 37–80)
PLATELET # BLD AUTO: 203 10*3/MM3 (ref 150–450)
PMV BLD AUTO: 8.7 FL (ref 8–12)
POTASSIUM BLD-SCNC: 3.6 MMOL/L (ref 3.5–5.1)
PROT SERPL-MCNC: 7.2 G/DL (ref 6.3–8.6)
RBC # BLD AUTO: 3.16 10*6/MM3 (ref 3.77–5.16)
SODIUM BLD-SCNC: 141 MMOL/L (ref 137–145)
WBC NRBC COR # BLD: 6.68 10*3/MM3 (ref 3.2–9.8)

## 2019-01-14 PROCEDURE — 96415 CHEMO IV INFUSION ADDL HR: CPT | Performed by: NURSE PRACTITIONER

## 2019-01-14 PROCEDURE — 25010000002 DIPHENHYDRAMINE PER 50 MG: Performed by: NURSE PRACTITIONER

## 2019-01-14 PROCEDURE — 25010000002 PACLITAXEL PER 30 MG: Performed by: NURSE PRACTITIONER

## 2019-01-14 PROCEDURE — 25010000003 DEXAMETHASONE SODIUM PHOSPHATE 100 MG/10ML SOLUTION 10 ML VIAL: Performed by: NURSE PRACTITIONER

## 2019-01-14 PROCEDURE — 85025 COMPLETE CBC W/AUTO DIFF WBC: CPT

## 2019-01-14 PROCEDURE — 80053 COMPREHEN METABOLIC PANEL: CPT

## 2019-01-14 PROCEDURE — 96375 TX/PRO/DX INJ NEW DRUG ADDON: CPT | Performed by: NURSE PRACTITIONER

## 2019-01-14 PROCEDURE — 99214 OFFICE O/P EST MOD 30 MIN: CPT | Performed by: NURSE PRACTITIONER

## 2019-01-14 PROCEDURE — 96413 CHEMO IV INFUSION 1 HR: CPT | Performed by: NURSE PRACTITIONER

## 2019-01-14 PROCEDURE — 25010000002 PEGFILGRASTIM 6 MG/0.6ML PREFILLED SYRINGE KIT: Performed by: NURSE PRACTITIONER

## 2019-01-14 PROCEDURE — 96377 APPLICATON ON-BODY INJECTOR: CPT | Performed by: NURSE PRACTITIONER

## 2019-01-14 RX ORDER — FAMOTIDINE 10 MG/ML
20 INJECTION, SOLUTION INTRAVENOUS ONCE
Status: CANCELLED | OUTPATIENT
Start: 2019-01-14

## 2019-01-14 RX ORDER — SODIUM CHLORIDE 0.9 % (FLUSH) 0.9 %
10 SYRINGE (ML) INJECTION AS NEEDED
Status: DISCONTINUED | OUTPATIENT
Start: 2019-01-14 | End: 2019-01-14 | Stop reason: HOSPADM

## 2019-01-14 RX ORDER — RANITIDINE 150 MG/1
150 TABLET ORAL 2 TIMES DAILY
Qty: 60 TABLET | Refills: 3 | Status: SHIPPED | OUTPATIENT
Start: 2019-01-14 | End: 2019-08-21

## 2019-01-14 RX ORDER — SODIUM CHLORIDE 0.9 % (FLUSH) 0.9 %
10 SYRINGE (ML) INJECTION AS NEEDED
Status: CANCELLED | OUTPATIENT
Start: 2019-01-28

## 2019-01-14 RX ORDER — SODIUM CHLORIDE 9 MG/ML
250 INJECTION, SOLUTION INTRAVENOUS ONCE
Status: COMPLETED | OUTPATIENT
Start: 2019-01-14 | End: 2019-01-14

## 2019-01-14 RX ORDER — FAMOTIDINE 10 MG/ML
20 INJECTION, SOLUTION INTRAVENOUS ONCE
Status: COMPLETED | OUTPATIENT
Start: 2019-01-14 | End: 2019-01-14

## 2019-01-14 RX ADMIN — SODIUM CHLORIDE 250 ML: 9 INJECTION, SOLUTION INTRAVENOUS at 09:01

## 2019-01-14 RX ADMIN — SODIUM CHLORIDE, PRESERVATIVE FREE 10 ML: 5 INJECTION INTRAVENOUS at 13:51

## 2019-01-14 RX ADMIN — SODIUM CHLORIDE, PRESERVATIVE FREE 500 UNITS: 5 INJECTION INTRAVENOUS at 13:51

## 2019-01-14 RX ADMIN — PEGFILGRASTIM 6 MG: KIT SUBCUTANEOUS at 13:51

## 2019-01-14 RX ADMIN — DIPHENHYDRAMINE HYDROCHLORIDE 50 MG: 50 INJECTION, SOLUTION INTRAMUSCULAR; INTRAVENOUS at 09:46

## 2019-01-14 RX ADMIN — SODIUM CHLORIDE, PRESERVATIVE FREE 10 ML: 5 INJECTION INTRAVENOUS at 08:10

## 2019-01-14 RX ADMIN — DEXAMETHASONE SODIUM PHOSPHATE 20 MG: 10 INJECTION, SOLUTION INTRAMUSCULAR; INTRAVENOUS at 09:18

## 2019-01-14 RX ADMIN — PACLITAXEL 295 MG: 6 INJECTION, SOLUTION INTRAVENOUS at 10:14

## 2019-01-14 RX ADMIN — FAMOTIDINE 20 MG: 10 INJECTION INTRAVENOUS at 09:03

## 2019-01-14 NOTE — PROGRESS NOTES
DATE OF VISIT: 1/14/2019    Ms Andrews presented for follow up appointment for breast cancer.   She has finished dose dense AC and has started paclitaxel.   No major side effects from treatment.   Had joint pain involving small joints of hands few days after chemo. This has resolved.   Her left chest pain is resolved.   Continues to struggle with anxiety related to her illness.    ROS as below.         History of Present Illness    This is a very pleasant 38-year-old female who was seen in consultation at the request of Dr. Ortega for evaluation of newly diagnosed breast cancer on 9/24/18.  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  Her past medical history is otherwise unremarkable except history of anxiety/depression and gastroesophageal reflux disease.She tells me that she felt a lump in her right breast in August 2018 and subsequently underwent a mammogram and ultrasonographic evaluation on August 27 which showed right breast mass approximately 1.5 cm.  The breast mass was felt to be suspicious for malignancy.  She then underwent ultrasound guided biopsy of right breast on 8/28/18 which showed invasive ductal carcinoma, moderately differentiated.  The carcinoma was tested positive for estrogen receptor and it was tested negative for progesterone receptor as well as HER-2.  Patient underwent right mastectomy and sentinel lymph node biopsy on September 5 which showed invasive ductal carcinoma of 1.5 cm, moderately differentiated which was completely excised.  [place] lymph node showed micrometastasis (less than 2 mm) involving 1 of 7 lymph nodes.     Her recurrence score was high at 36, so we recommended adjuvant chemotherapy with dose dense AC followed by taxane. She was started on chemotherapy on 10/15/18.        PAST MEDICAL HISTORY:    Past Medical History:   Diagnosis Date   • Anxiety    • Cancer (CMS/HCC)     breast right   • Depressive disorder    • Encounter for gynecological  examination    • Encounter for vision screening     Vision screen (1)     • Health examination of defined subpopulation     Health examination of sub-group     • Malaise and fatigue    • Palpitations    • Skin cancer      Skin Ca Pre Cancer   • Soft tissue swelling     right side of neck-2 different areas    • Urinary tract infectious disease    • Wears glasses        SOCIAL HISTORY:    Social History     Tobacco Use   • Smoking status: Current Some Day Smoker     Years: 25.00   • Smokeless tobacco: Never Used   • Tobacco comment: smokes 2 cigs/ day   Substance Use Topics   • Alcohol use: No   • Drug use: No       Surgical History :  Past Surgical History:   Procedure Laterality Date   •  SECTION     • COLONOSCOPY N/A 3/1/2018    Procedure: COLONOSCOPY;  Surgeon: Shin Vanessa DO;  Location: [facility] ENDOSCOPY;  Service:    • ENDOSCOPY N/A 10/5/2018    Procedure: ESOPHAGOGASTRODUODENOSCOPY;  Surgeon: Shin Vanessa DO;  Location: [facility] ENDOSCOPY;  Service: Gastroenterology   • MASTECTOMY Right    • MASTECTOMY WITH SENTINEL NODE BIOPSY AND AXILLARY NODE DISSECTION Right 2018    Procedure: INJECT RIGHT BREAST AND THEN PERFORM SENTINEL LYMPH NODE BIOPSY AND THEN  REMOVE RIGHT BREAST AND NIPPLE AND        (INJECTION ON SDS @7:00 A.M.);  Surgeon: Abdoul Ortega MD;  Location: [facility] OR;  Service: General   • PAP SMEAR     • US GUIDED FINE NEEDLE ASPIRATION  2018   • VENOUS ACCESS DEVICE (PORT) INSERTION Left 10/10/2018    Procedure: MEDIPORT PLACEMENT      (LEFT SIDE,UNATTACHED PORT )      (C-ARM);  Surgeon: Abdoul Ortega MD;  Location: [facility] OR;  Service: General       ALLERGIES:    Allergies   Allergen Reactions   • Penicillins Rash       REVIEW OF SYSTEMS:      CONSTITUTIONAL:  No fever, chills, or night sweats.     HEENT:  No epistaxis, mouth sores, or difficulty swallowing.    RESPIRATORY:  No new shortness of breath or cough at present.    CARDIOVASCULAR:  No chest pain  "or palpitations.    GASTROINTESTINAL:  No abdominal pain, nausea, vomiting, or blood in the stool.    GENITOURINARY:  No dysuria or hematuria.    MUSCULOSKELETAL:  No any new back pain or arthralgias.     NEUROLOGICAL:  No tingling or numbness. No new headache or dizziness.     LYMPHATICS:  Denies any abnormal swollen and anywhere in the body.    SKIN:  Denies any new skin rash.    PHYSICAL EXAMINATION:      VITAL SIGNS:  /76   Pulse 95   Temp 98.7 °F (37.1 °C) (Temporal)   Resp 18   Ht 154.9 cm (60.98\")   Wt 67.1 kg (148 lb)   SpO2 99%   BMI 27.98 kg/m²     GENERAL:  Not in any distress.    HEENT:  Normocephalic, Atraumatic.Mild Conjunctival pallor. No icterus. Extraocular Movements Intact. No Facial Asymmetry noted.    NECK:  No adenopathy. No JVD.    RESPIRATORY:  Fair air entry bilateral. No rhonchi or wheezing.    CARDIOVASCULAR:  S1, S2. Regular rate and rhythm. No murmur or gallop appreciated.    ABDOMEN:  Soft, obese, nontender. Bowel sounds present in all four quadrants.  No organomegaly appreciated.    EXTREMITIES:  No edema.No Calf Tenderness.    NEUROLOGIC:  Alert, awake and oriented ×3.  No  Motor or sensory deficit appreciated. Cranial Nerves 2-12 grossly intact.    SKIN : No new skin lesion identified  DIAGNOSTIC DATA:    Glucose   Date Value Ref Range Status   01/14/2019 103 (H) 60 - 100 mg/dL Final     Sodium   Date Value Ref Range Status   01/14/2019 141 137 - 145 mmol/L Final     Potassium   Date Value Ref Range Status   01/14/2019 3.6 3.5 - 5.1 mmol/L Final     CO2   Date Value Ref Range Status   01/14/2019 24.0 22.0 - 31.0 mmol/L Final     Chloride   Date Value Ref Range Status   01/14/2019 106 95 - 110 mmol/L Final     Anion Gap   Date Value Ref Range Status   01/14/2019 11.0 5.0 - 15.0 mmol/L Final     Creatinine   Date Value Ref Range Status   01/14/2019 0.67 0.50 - 1.00 mg/dL Final     BUN   Date Value Ref Range Status   01/14/2019 16 7 - 21 mg/dL Final     BUN/Creatinine Ratio "   Date Value Ref Range Status   01/14/2019 23.9 7.0 - 25.0 Final     Calcium   Date Value Ref Range Status   01/14/2019 9.2 8.4 - 10.2 mg/dL Final     eGFR Non  Amer   Date Value Ref Range Status   01/14/2019 97 58 - 135 mL/min/1.73 Final     Alkaline Phosphatase   Date Value Ref Range Status   01/14/2019 77 38 - 126 U/L Final     Total Protein   Date Value Ref Range Status   01/14/2019 7.2 6.3 - 8.6 g/dL Final     ALT (SGPT)   Date Value Ref Range Status   01/14/2019 23 9 - 52 U/L Final     AST (SGOT)   Date Value Ref Range Status   01/14/2019 22 14 - 36 U/L Final     Total Bilirubin   Date Value Ref Range Status   01/14/2019 0.3 0.2 - 1.3 mg/dL Final     Albumin   Date Value Ref Range Status   01/14/2019 4.10 3.40 - 4.80 g/dL Final     Globulin   Date Value Ref Range Status   01/14/2019 3.1 2.3 - 3.5 gm/dL Final     Lab Results   Component Value Date    WBC 6.68 01/14/2019    HGB 10.0 (L) 01/14/2019    HCT 29.2 (L) 01/14/2019    MCV 92.4 01/14/2019     01/14/2019     Lab Results   Component Value Date    NEUTROABS 4.87 01/14/2019    IRON 126 11/01/2017    LSHCOSRV52 261 11/01/2017     No results found for: , LABCA2, AFPTM, HCGQUANT, , CHROMGRNA, [id number], CEA, REFLABREPO]    Diagnosis:   (1) Invasive ductal carcinoma, right   Date of diagnosis: 9/5/18   Stage: IB ([id number]), recurrence score 36   Prior treatment: Right mastectomy with sentinel node biopsy and ALND (9/5/18)  Chemotherapy: Start date (10/15/18)     Assessment/Plan         (1) Invasive ductal carcinoma, right, stage IB, ER+/VT-/HER2-       - Finished dose dense AC well without any major side effects.   - Dose dense paclitaxel cycle 3 today. SHe will return to clinic in 2 weeks for cycle #4 and will discuss with Dr. Hilton at that time regarding next step in plan of care.   - Tolerated cycle 1 fairly well.   - Genetic testing - no heritable mutation identified (panel of 34 genes).      (2) Chemotherapy induce nausea:  Improved. Continue PRN anti-nausea medications.      (3) Upper back/chest pain: Chronic, likely musculo-skeletal in nature. This does get worse after chemotherapy. Bone scan negative for any metastases. Prescription of tramadol given to the patient in past; denies any new pain today.         (4) GERD; pt is currently taking Pantoprazole but states she has also been using OTC Zantac and is requesting prescription for this; prescription sent to pharmacy.        This document has been signed by CICI White on January 14, 2019 9:48 AM

## 2019-01-15 ENCOUNTER — DOCUMENTATION (OUTPATIENT)
Dept: GENETICS | Facility: HOSPITAL | Age: 39
End: 2019-01-15

## 2019-01-15 NOTE — PROGRESS NOTES
Moy Sparrow, a 38-year-old female, was seen for genetic counseling via telemedicine due to a personal history of breast cancer. Ms. Sparrow was recently diagnosed with an ER positive [place] negative invasive ductal breast cancer of the right breast at age 38. She underwent a right mastectomy and is currently undergoing adjuvant chemotherapy. She is planning for radiation following chemotherapy. She is pre-menopausal and retains her uterus and ovaries. She was interested in discussing her risk for a hereditary cancer syndrome.  Ms. Sparrow was interested in pursuing a multi-gene panel to evaluate her risk of cancer, therefore the CancerNext panel was ordered through [employer] which analyzes BRCA1/2 and 32 additional genes associated with an increased cancer risk. The genes on this panel include APC, JESSICA, BARD1, BMPR1A, BRCA1, BRCA2, BRIP1, CDH1, CDK4, CDKN2A, CHEK2, DICER1, EPCAM, GREM1, HOXB13, MLH1, MRE11A, MSH2, MSH6, MUTYH, NBN, NF1, PALB2, PMS2, POLD1, POLE, PTEN, RAD50, RAD51C, RAD51D, SMAD4, SMARCA4, STK11, and TP53. Genetic testing was negative for known deleterious mutations in BRCA1/2 and 32 additional genes on the CancerNext panel (See attached).  Several attempts by telephone and voicemail were made to contact Ms. Sparrow to discuss these results. Ms. Sparrow is encouraged to contact us once she receives this letter if she has any questions.      PERTINENT FAMILY HISTORY: (See attached pedigree)   Father:    Colon cancer, 50  Mat. Aunt:   Rectal cancer, 40s  Mat. Great Aunt:  Breast cancer  Mat. Great Aunt:  Breast cancer  Mat. Great Aunt:  Stomach cancer  Mat. Great Aunt (x3):  Lung cancer  Mat. Great Grandfather: Colon cancer  Mat. Great Grandfather: Kidney cancer  Mat. Great Grandmother: Throat cancer    We do not have medical records regarding any of these diagnoses.      RISK ASSESSMENT:  Ms. Sparrow’s personal and family history of cancer raises the question of a hereditary cancer syndrome. We discussed  BRCA1/2 testing as well as the option of pursuing a panel that would test for other genes known to impact cancer risk in addition to BRCA1/2.   Ms. Sparrow clearly meets NCCN guidelines criteria for BRCA1/2 testing based on her personal history of breast cancer diagnosed under the age of 45. These risk assessments are based on the family history information provided at the time of the appointment, and could change in the future should new information be obtained.    GENETIC COUNSELING:  We reviewed the family history information in detail. Cases of cancer follow three general patterns: sporadic, familial, and hereditary.  While most cancer is sporadic, some cases appear to occur in family clusters.  These cases are said to be familial and account for 10-20% of cancer cases.  Familial cases may be due to a combination of shared genes and environmental factors among family members.  In even fewer families, the cancer is said to be inherited, and the genes responsible for the cancer are known.      Family histories typical of hereditary cancer syndromes usually include multiple first- and second-degree relatives diagnosed with cancer types that define a syndrome.  These cases tend to be diagnosed at younger-than-expected ages and can be bilateral or multifocal.  The cancer in these families follows an autosomal dominant inheritance pattern, which indicates the likely presence of a mutation in a cancer susceptibility gene.  Children and siblings of an individual believed to carry this mutation have a 50% chance of inheriting that mutation, thereby inheriting the increased risk to develop cancer.  These mutations can be passed down from the maternal or the paternal lineage.    Hereditary breast cancer accounts for 5-10% of all cases of breast cancer.  A significant proportion of hereditary breast and ovarian cancer can be attributed to mutations in the BRCA1 and BRCA2 genes.  Mutations in these genes confer an increased  risk for breast cancer, ovarian cancer, male breast cancer, prostate cancer, and pancreatic cancer. Women with a BRCA1 or BRCA2 mutation who have already been diagnosed with breast cancer have a 40-60% lifetime risk of a second breast cancer. Women with a BRCA1 or BRCA2 mutation have up to a 44% risk of ovarian cancer.      Given her family history of early onset colorectal cancer, we briefly discussed that Sánchez syndrome is the most common cause of hereditary colon cancer. It is caused by mutations in the mismatch repair genes and increases the risk for colon cancer (up to 80%), endometrial cancer (up to 60%), as well as other cancers (ovarian, upper GI, brain, stomach). There are national management guidelines that have been established for individuals known to have Sánchez syndrome.      We discussed that there are other genes that are known to be associated with an increased risk for cancer.  Some of these genes have well defined cancer risks and established management guidelines.  Other genes that can be tested for have been more recently described, and there may be less data regarding the risks and therefore may not have established management guidelines. We discussed these limitations at length.  Based on Ms. Sparrow’s desire to get as much information as possible regarding her personal risks and potential risks for her family, she opted to pursue testing through a panel that would look at several other genes known to increase the risk for cancer.    GENETIC TESTING:  The risks, benefits and limitations of genetic testing and implications for clinical management following testing were reviewed.  DNA test results can influence decisions regarding screening, prevention and surgical management.  Genetic testing can have significant psychological implications for both individuals and families.  Also discussed was the possibility of employment and insurance discrimination based on genetic test results and the laws in  place to prevent this (KARIN).    We discussed panel testing, which would involve testing for BRCA1/2 as well as 32 additional genes that are associated with increased cancer risk. The benefits and limitations of genetic testing were discussed and Ms. Sparrow decided to pursue testing via the panel. The implications of a positive or negative test result were discussed. We discussed the possibility that, in some cases, genetic test results may be informative or may be ambiguous due to the identification of a genetic variant. These variants may or may not be associated with an increased cancer risk.  With multigene panel testing, it is not uncommon for a variant of uncertain significance (VUS) to be identified.  If a VUS is identified, testing family members is typically not recommended and screening recommendations are made based on the family history.  The laboratories that perform genetic testing work to reclassify the VUS and send out an amended report if and when a VUS is reclassified.  The majority of variant findings are ultimately reclassified to a negative result.  Given her personal and family history, a negative test result would not eliminate all cancer risk to her relatives, although the risk would not be as high as it would with positive genetic testing.      TEST RESULTS:  Genetic testing was negative by sequencing and deletion/duplication testing for mutations in BRCA1/2 and the additional 32 genes on the CancerNext panel.   The negative result greatly lowers but does not eliminate the risk of a hereditary cancer syndrome for Ms. Sparrow. It is possible that there is a mutation present in the family that Ms. Sparrow did not happen to inherit that accounts for the family history of early onset colon cancer. If a relative of Ms. Sparrow were to have testing and was found to carry a pathogenic mutation in a gene included on this panel, Ms. Sparrow’s risk assessment would need to be updated.  This assessment is based  on the information provided at the time of the consultation.    CLINICAL MANAGEMENT GUIDELINES: Ms. Sparrow’s management and surveillance should be determined by her oncology team. Per NCCN guidelines, individuals who have a first-degree relative with colon cancer at any age should begin colonoscopy screening at 40 or ten years prior to the youngest diagnosis in the family, and repeat every 5 years or more frequently based on personal clinical findings. Ms. Sparrow reports she had her first colonoscopy last year and will return for her next colonoscopy in three years.    Despite the negative genetic test results, Ms. Sparrow’s female relatives may have a somewhat increased lifetime risk for breast cancer based on family history.  Given the increased risk, options available to individuals with a high lifetime risk for breast cancer were briefly discussed.  This includes increased surveillance and chemoprevention. Surveillance for individuals with a high lifetime risk of breast cancer (>20%, versus the average risk of 12%), based on NCCN guidelines, would consist of semi-annual clinical breast exams and monthly self-breast exams starting by age 18 and annual mammography starting 10 years younger than the earliest diagnosis in a close relative, or starting by age 40.  According to an American Cancer Society expert panel, annual breast MRI should be offered to women whose lifetime risk of breast cancer is 20-25 percent or more, also starting by age 40 or earlier if indicated by family history.  Female relatives could have a risk assessment performed using a family history-based model, such as the Tyrer-Cuzick model, to determine their individual risks.      PLAN: Genetic counseling remains available to Ms. Sparrow. She is encouraged to contact us if she has any questions or concerns at [phone].      Piedad Alberto MS, [facility] [place] [facility]  Licensed Certified Genetic Counselor       Cc: Moy Emery MD

## 2019-01-28 ENCOUNTER — OFFICE VISIT (OUTPATIENT)
Dept: ONCOLOGY | Facility: CLINIC | Age: 39
End: 2019-01-28
Attending: INTERNAL MEDICINE

## 2019-01-28 ENCOUNTER — INFUSION (OUTPATIENT)
Dept: ONCOLOGY | Facility: HOSPITAL | Age: 39
End: 2019-01-28
Attending: INTERNAL MEDICINE

## 2019-01-28 ENCOUNTER — TELEPHONE (OUTPATIENT)
Dept: ONCOLOGY | Facility: CLINIC | Age: 39
End: 2019-01-28

## 2019-01-28 VITALS
DIASTOLIC BLOOD PRESSURE: 75 MMHG | HEIGHT: 61 IN | HEART RATE: 87 BPM | BODY MASS INDEX: 27.9 KG/M2 | OXYGEN SATURATION: 97 % | TEMPERATURE: 99.8 F | SYSTOLIC BLOOD PRESSURE: 117 MMHG | RESPIRATION RATE: 18 BRPM | WEIGHT: 147.8 LBS

## 2019-01-28 DIAGNOSIS — C50.411 MALIGNANT NEOPLASM OF UPPER-OUTER QUADRANT OF RIGHT BREAST IN FEMALE, ESTROGEN RECEPTOR POSITIVE (HCC): ICD-10-CM

## 2019-01-28 DIAGNOSIS — C50.411 MALIGNANT NEOPLASM OF UPPER-OUTER QUADRANT OF RIGHT BREAST IN FEMALE, ESTROGEN RECEPTOR POSITIVE (HCC): Primary | ICD-10-CM

## 2019-01-28 DIAGNOSIS — IMO0001 OTHER COMPLICATION DUE TO VENOUS ACCESS DEVICE, INITIAL ENCOUNTER: ICD-10-CM

## 2019-01-28 DIAGNOSIS — Z17.0 MALIGNANT NEOPLASM OF UPPER-OUTER QUADRANT OF RIGHT BREAST IN FEMALE, ESTROGEN RECEPTOR POSITIVE (HCC): ICD-10-CM

## 2019-01-28 DIAGNOSIS — N63.0 BREAST MASS: ICD-10-CM

## 2019-01-28 DIAGNOSIS — R07.1 CHEST PAIN ON BREATHING: ICD-10-CM

## 2019-01-28 DIAGNOSIS — Z45.2 ENCOUNTER FOR VENOUS ACCESS DEVICE CARE: Primary | ICD-10-CM

## 2019-01-28 DIAGNOSIS — Z17.0 MALIGNANT NEOPLASM OF UPPER-OUTER QUADRANT OF RIGHT BREAST IN FEMALE, ESTROGEN RECEPTOR POSITIVE (HCC): Primary | ICD-10-CM

## 2019-01-28 DIAGNOSIS — Z51.11 ENCOUNTER FOR CHEMOTHERAPY MANAGEMENT: ICD-10-CM

## 2019-01-28 LAB
ALBUMIN SERPL-MCNC: 4.3 G/DL (ref 3.4–4.8)
ALBUMIN/GLOB SERPL: 1.5 G/DL (ref 1.1–1.8)
ALP SERPL-CCNC: 83 U/L (ref 38–126)
ALT SERPL W P-5'-P-CCNC: 24 U/L (ref 9–52)
ANION GAP SERPL CALCULATED.3IONS-SCNC: 9 MMOL/L (ref 5–15)
AST SERPL-CCNC: 34 U/L (ref 14–36)
BASOPHILS # BLD AUTO: 0.03 10*3/MM3 (ref 0–0.2)
BASOPHILS NFR BLD AUTO: 0.4 % (ref 0–2)
BILIRUB SERPL-MCNC: 0.4 MG/DL (ref 0.2–1.3)
BUN BLD-MCNC: 14 MG/DL (ref 7–21)
BUN/CREAT SERPL: 21.2 (ref 7–25)
CALCIUM SPEC-SCNC: 9.2 MG/DL (ref 8.4–10.2)
CHLORIDE SERPL-SCNC: 104 MMOL/L (ref 95–110)
CO2 SERPL-SCNC: 24 MMOL/L (ref 22–31)
CREAT BLD-MCNC: 0.66 MG/DL (ref 0.5–1)
DEPRECATED RDW RBC AUTO: 49.5 FL (ref 36.4–46.3)
EOSINOPHIL # BLD AUTO: 0.1 10*3/MM3 (ref 0–0.7)
EOSINOPHIL NFR BLD AUTO: 1.4 % (ref 0–7)
ERYTHROCYTE [DISTWIDTH] IN BLOOD BY AUTOMATED COUNT: 14.6 % (ref 11.5–14.5)
GFR SERPL CREATININE-BSD FRML MDRD: 99 ML/MIN/1.73 (ref 58–135)
GLOBULIN UR ELPH-MCNC: 2.8 GM/DL (ref 2.3–3.5)
GLUCOSE BLD-MCNC: 100 MG/DL (ref 60–100)
HCT VFR BLD AUTO: 30.7 % (ref 35–45)
HGB BLD-MCNC: 10.5 G/DL (ref 12–15.5)
IMM GRANULOCYTES # BLD AUTO: 0.01 10*3/MM3 (ref 0–0.02)
IMM GRANULOCYTES NFR BLD AUTO: 0.1 % (ref 0–0.5)
LYMPHOCYTES # BLD AUTO: 1.31 10*3/MM3 (ref 0.6–4.2)
LYMPHOCYTES NFR BLD AUTO: 18.6 % (ref 10–50)
MCH RBC QN AUTO: 31.9 PG (ref 26.5–34)
MCHC RBC AUTO-ENTMCNC: 34.2 G/DL (ref 31.4–36)
MCV RBC AUTO: 93.3 FL (ref 80–98)
MONOCYTES # BLD AUTO: 0.51 10*3/MM3 (ref 0–0.9)
MONOCYTES NFR BLD AUTO: 7.2 % (ref 0–12)
NEUTROPHILS # BLD AUTO: 5.08 10*3/MM3 (ref 2–8.6)
NEUTROPHILS NFR BLD AUTO: 72.3 % (ref 37–80)
PLATELET # BLD AUTO: 253 10*3/MM3 (ref 150–450)
PMV BLD AUTO: 9 FL (ref 8–12)
POTASSIUM BLD-SCNC: 3.9 MMOL/L (ref 3.5–5.1)
PROT SERPL-MCNC: 7.1 G/DL (ref 6.3–8.6)
RBC # BLD AUTO: 3.29 10*6/MM3 (ref 3.77–5.16)
SODIUM BLD-SCNC: 137 MMOL/L (ref 137–145)
WBC NRBC COR # BLD: 7.04 10*3/MM3 (ref 3.2–9.8)

## 2019-01-28 PROCEDURE — 96415 CHEMO IV INFUSION ADDL HR: CPT | Performed by: INTERNAL MEDICINE

## 2019-01-28 PROCEDURE — 96413 CHEMO IV INFUSION 1 HR: CPT | Performed by: INTERNAL MEDICINE

## 2019-01-28 PROCEDURE — 85025 COMPLETE CBC W/AUTO DIFF WBC: CPT

## 2019-01-28 PROCEDURE — 25010000003 DEXAMETHASONE SODIUM PHOSPHATE 100 MG/10ML SOLUTION 10 ML VIAL: Performed by: INTERNAL MEDICINE

## 2019-01-28 PROCEDURE — 25010000002 PACLITAXEL PER 30 MG: Performed by: INTERNAL MEDICINE

## 2019-01-28 PROCEDURE — 25010000002 PEGFILGRASTIM 6 MG/0.6ML PREFILLED SYRINGE KIT: Performed by: INTERNAL MEDICINE

## 2019-01-28 PROCEDURE — 96375 TX/PRO/DX INJ NEW DRUG ADDON: CPT | Performed by: INTERNAL MEDICINE

## 2019-01-28 PROCEDURE — 25010000002 DIPHENHYDRAMINE PER 50 MG: Performed by: INTERNAL MEDICINE

## 2019-01-28 PROCEDURE — 80053 COMPREHEN METABOLIC PANEL: CPT

## 2019-01-28 PROCEDURE — 96377 APPLICATON ON-BODY INJECTOR: CPT | Performed by: INTERNAL MEDICINE

## 2019-01-28 PROCEDURE — 99214 OFFICE O/P EST MOD 30 MIN: CPT | Performed by: INTERNAL MEDICINE

## 2019-01-28 RX ORDER — SODIUM CHLORIDE 0.9 % (FLUSH) 0.9 %
10 SYRINGE (ML) INJECTION AS NEEDED
Status: CANCELLED | OUTPATIENT
Start: 2019-02-11

## 2019-01-28 RX ORDER — SODIUM CHLORIDE 9 MG/ML
250 INJECTION, SOLUTION INTRAVENOUS ONCE
Status: COMPLETED | OUTPATIENT
Start: 2019-01-28 | End: 2019-01-28

## 2019-01-28 RX ORDER — FAMOTIDINE 10 MG/ML
20 INJECTION, SOLUTION INTRAVENOUS ONCE
Status: COMPLETED | OUTPATIENT
Start: 2019-01-28 | End: 2019-01-28

## 2019-01-28 RX ORDER — ZOLPIDEM TARTRATE 5 MG/1
5 TABLET ORAL NIGHTLY PRN
Qty: 30 TABLET | Refills: 0 | Status: SHIPPED | OUTPATIENT
Start: 2019-01-28 | End: 2019-10-21

## 2019-01-28 RX ORDER — SODIUM CHLORIDE 9 MG/ML
250 INJECTION, SOLUTION INTRAVENOUS ONCE
Status: CANCELLED | OUTPATIENT
Start: 2019-01-28

## 2019-01-28 RX ORDER — SODIUM CHLORIDE 0.9 % (FLUSH) 0.9 %
10 SYRINGE (ML) INJECTION AS NEEDED
Status: DISCONTINUED | OUTPATIENT
Start: 2019-01-28 | End: 2019-01-28 | Stop reason: HOSPADM

## 2019-01-28 RX ORDER — FAMOTIDINE 10 MG/ML
20 INJECTION, SOLUTION INTRAVENOUS ONCE
Status: CANCELLED | OUTPATIENT
Start: 2019-01-28

## 2019-01-28 RX ADMIN — DIPHENHYDRAMINE HYDROCHLORIDE 50 MG: 50 INJECTION, SOLUTION INTRAMUSCULAR; INTRAVENOUS at 10:03

## 2019-01-28 RX ADMIN — FAMOTIDINE 20 MG: 10 INJECTION INTRAVENOUS at 09:25

## 2019-01-28 RX ADMIN — DEXAMETHASONE SODIUM PHOSPHATE 20 MG: 10 INJECTION, SOLUTION INTRAMUSCULAR; INTRAVENOUS at 09:37

## 2019-01-28 RX ADMIN — SODIUM CHLORIDE 250 ML: 9 INJECTION, SOLUTION INTRAVENOUS at 09:25

## 2019-01-28 RX ADMIN — SODIUM CHLORIDE, PRESERVATIVE FREE 10 ML: 5 INJECTION INTRAVENOUS at 13:49

## 2019-01-28 RX ADMIN — PEGFILGRASTIM 6 MG: KIT SUBCUTANEOUS at 13:49

## 2019-01-28 RX ADMIN — SODIUM CHLORIDE, PRESERVATIVE FREE 10 ML: 5 INJECTION INTRAVENOUS at 08:13

## 2019-01-28 RX ADMIN — SODIUM CHLORIDE, PRESERVATIVE FREE 500 UNITS: 5 INJECTION INTRAVENOUS at 13:49

## 2019-01-28 RX ADMIN — PACLITAXEL 295 MG: 6 INJECTION, SOLUTION INTRAVENOUS at 10:34

## 2019-01-28 NOTE — TELEPHONE ENCOUNTER
Left message for pt letting her know that dr barboza sent her medication into pharmacy and if she has any futher questions to let us know.

## 2019-01-28 NOTE — PATIENT INSTRUCTIONS
CT Angiogram  A CT angiogram is a procedure to look at the blood vessels in various areas of the body. For this procedure, a large X-ray machine, called a CT scanner, takes detailed pictures of blood vessels that have been injected with a dye (contrast material).  A CT angiogram allows your health care provider to see how well blood is flowing to the area of your body that is being checked. Your health care provider will be able to see if there are any problems, such as a blockage.  Tell a health care provider about:  · Any allergies you have.  · All medicines you are taking, including vitamins, herbs, eye drops, creams, and over-the-counter medicines.  · Any problems you or family members have had with anesthetic medicines.  · Any blood disorders you have.  · Any surgeries you have had.  · Any medical conditions you have.  · Whether you are pregnant or may be pregnant.  · Whether you are breastfeeding.  · Any anxiety disorders, chronic pain, or other conditions you have that may increase your stress or prevent you from lying still.  What are the risks?  Generally, this is a safe procedure. However, problems may occur, including:  · Infection.  · Bleeding.  · Allergic reactions to medicines or dyes.  · Damage to other structures or organs.  · Kidney damage from the dye or contrast that is used.  · Increased risk of cancer from radiation exposure. This risk is low. Talk with your health care provider about:  ? The risks and benefits of testing.  ? How you can receive the lowest dose of radiation.    What happens before the procedure?  · Wear comfortable clothing and remove any jewelry.  · Follow instructions from your health care provider about eating and drinking. For most people, instructions may include these actions:  ? For 12 hours before the test, avoid caffeine. This includes tea, coffee, soda, and energy drinks or pills.  ? For 3-4 hours before the test, stop eating or drinking anything but water.  ? Stay  well hydrated by continuing to drink water before the exam. This will help to clear the contrast dye from your body after the test.  · Ask your health care provider about changing or stopping your regular medicines. This is especially important if you are taking diabetes medicines or blood thinners.  What happens during the procedure?  · An IV tube will be inserted into one of your veins.  · You will be asked to lie on an exam table. This table will slide in and out of the CT machine during the procedure.  · Contrast dye will be injected into the IV tube. You might feel warm, or you may get a metallic taste in your mouth.  · The table that you are lying on will move into the CT machine tunnel for the scan.  · The person running the machine will give you instructions while the scans are being done. You may be asked to:  ? Keep your arms above your head.  ? Hold your breath.  ? Stay very still, even if the table is moving.  · When the scanning is complete, you will be moved out of the machine.  · The IV tube will be removed.  The procedure may vary among health care providers and hospitals.  What happens after the procedure?  · You might feel warm, or you may get a metallic taste in your mouth.  · You may be asked to drink water or other fluids to wash (flush) the contrast material out of your body.  · It is up to you to get the results of your procedure. Ask your health care provider, or the department that is doing the procedure, when your results will be ready.  Summary  · A CT angiogram is a procedure to look at the blood vessels in various areas of the body.  · You will need to stay very still during the exam.  · You may be asked to drink water or other fluids to wash (flush) the contrast material out of your body after your scan.  This information is not intended to replace advice given to you by your health care provider. Make sure you discuss any questions you have with your health care provider.  Document  Released: 08/17/2017 Document Revised: 08/17/2017 Document Reviewed: 08/17/2017  [employer] Interactive Patient Education © 2018 [employer]    Angiogram, Care After  This sheet gives you information about how to care for yourself after your procedure. Your doctor may also give you more specific instructions. If you have problems or questions, contact your doctor.  Follow these instructions at home:  Insertion site care  · Follow instructions from your doctor about how to take care of your long, thin tube (catheter) insertion area. Make sure you:  ? Wash your hands with soap and water before you change your bandage (dressing). If you cannot use soap and water, use hand .  ? Change your bandage as told by your doctor.  ? Leave stitches (sutures), skin glue, or skin tape (adhesive) strips in place. They may need to stay in place for 2 weeks or longer. If tape strips get loose and curl up, you may trim the loose edges. Do not remove tape strips completely unless your doctor says it is okay.  · Do not take baths, swim, or use a hot tub until your doctor says it is okay.  · You may shower 24-48 hours after the procedure or as told by your doctor.  ? Gently wash the area with plain soap and water.  ? Pat the area dry with a clean towel.  ? Do not rub the area. This may cause bleeding.  · Do not apply powder or lotion to the area. Keep the area clean and dry.  · Check your insertion area every day for signs of infection. Check for:  ? More redness, swelling, or pain.  ? Fluid or blood.  ? Warmth.  ? Pus or a bad smell.  Activity  · Rest as told by your doctor, usually for 1-2 days.  · Do not lift anything that is heavier than 10 lbs. (4.5 kg) or as told by your doctor.  · Do not drive for 24 hours if you were given a medicine to help you relax (sedative).  · Do not drive or use heavy machinery while taking prescription pain medicine.  General instructions  · Go back to your normal activities as told by your  doctor, usually in about a week. Ask your doctor what activities are safe for you.  · If the insertion area starts to bleed, lie flat and put pressure on the area. If the bleeding does not stop, get help right away. This is an emergency.  · Drink enough fluid to keep your pee (urine) clear or pale yellow.  · Take over-the-counter and prescription medicines only as told by your doctor.  · Keep all follow-up visits as told by your doctor. This is important.  Contact a doctor if:  · You have a fever.  · You have chills.  · You have more redness, swelling, or pain around your insertion area.  · You have fluid or blood coming from your insertion area.  · The insertion area feels warm to the touch.  · You have pus or a bad smell coming from your insertion area.  · You have more bruising around the insertion area.  · Blood collects in the tissue around the insertion area (hematoma) that may be painful to the touch.  Get help right away if:  · You have a lot of pain in the insertion area.  · The insertion area swells very fast.  · The insertion area is bleeding, and the bleeding does not stop after holding steady pressure on the area.  · The area near or just beyond the insertion area becomes pale, cool, tingly, or numb.  These symptoms may be an emergency. Do not wait to see if the symptoms will go away. Get medical help right away. Call your local emergency services (911 in the [place] Do not drive yourself to the hospital.  Summary  · After the procedure, it is common to have bruising and tenderness at the long, thin tube insertion area.  · After the procedure, it is important to rest and drink plenty of fluids.  · Do not take baths, swim, or use a hot tub until your doctor says it is okay to do so. You may shower 24-48 hours after the procedure or as told by your doctor.  · If the insertion area starts to bleed, lie flat and put pressure on the area. If the bleeding does not stop, get help right away. This is an  emergency.  This information is not intended to replace advice given to you by your health care provider. Make sure you discuss any questions you have with your health care provider.  Document Released: 03/16/2010 Document Revised: 12/12/2017 Document Reviewed: 12/12/2017  [employer] Interactive Patient Education © 2017 [employer]

## 2019-01-28 NOTE — PROGRESS NOTES
Moy Sparrow  [id number]  1980    Subjective    Ms Andrews presented for follow up appointment for breast cancer.   She has finished dose dense AC and has started paclitaxel.   No major side effects from treatment.   Had joint pain involving small joints of hands few days after chemo. This has resolved.   Reports persistent left side chest pain. Worse when taking deep breaths in.   Continues to struggle with anxiety related to her illness.    ROS as below.       History of Present Illness    This is a very pleasant 38-year-old female who was seen in consultation at the request of Dr. Ortega for evaluation of newly diagnosed breast cancer on 9/24/18.  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  Her past medical history is otherwise unremarkable except history of anxiety/depression and gastroesophageal reflux disease.She tells me that she felt a lump in her right breast in August 2018 and subsequently underwent a mammogram and ultrasonographic evaluation on August 27 which showed right breast mass approximately 1.5 cm.  The breast mass was felt to be suspicious for malignancy.  She then underwent ultrasound guided biopsy of right breast on 8/28/18 which showed invasive ductal carcinoma, moderately differentiated.  The carcinoma was tested positive for estrogen receptor and it was tested negative for progesterone receptor as well as HER-2.  Patient underwent right mastectomy and sentinel lymph node biopsy on September 5 which showed invasive ductal carcinoma of 1.5 cm, moderately differentiated which was completely excised.  [place] lymph node showed micrometastasis (less than 2 mm) involving 1 of 7 lymph nodes.    Her recurrence score was high at 36, so we recommended adjuvant chemotherapy with dose dense AC followed by taxane. She was started on chemotherapy on 10/15/18.     Past Medical History, Past Surgical History, Social History, Family History have been reviewed and are  "without significant changes except as mentioned.    Review of Systems   CONSTITUTIONAL: Fatigue+  Weight loss + No  fever, chills, weakness.  HEENT: Eyes: No visual loss, blurred vision, double vision or yellow sclerae. Ears, Nose, Throat: No hearing loss, sneezing, congestion, runny nose or sore throat.  SKIN: No rash or itching.  CARDIOVASCULAR: No chest pain . No palpitations or edema.  RESPIRATORY: chronic exertional SOB + No  cough or sputum.  GASTROINTESTINAL: nausea + No anorexia,  vomiting or diarrhea. No abdominal pain or blood.  GENITOURINARY: Negative for urgency, frequency or dysuria.   NEUROLOGICAL: No headache, dizziness, syncope, paralysis, ataxia, numbness or tingling in the extremities. No change in bowel or bladder control.  MUSCULOSKELETAL: Back pain+  HEMATOLOGIC: No anemia, bleeding or bruising.  LYMPHATICS: No enlarged nodes. No history of splenectomy.  PSYCHIATRIC: Anxiety + depression+  ENDOCRINOLOGIC: No reports of sweating, cold or heat intolerance. No polyuria or polydipsia.  ALLERGIES: No history of asthma, hives, eczema or rhinitis.    Medications:  The current medication list was reviewed in the EMR    ALLERGIES:    Allergies   Allergen Reactions   • Penicillins Rash       Objective      Vitals:    11/26/18 0857   BP: 124/81   Pulse: 100   Resp: 18   Temp: 99.8 °F (37.7 °C)   TempSrc: Temporal   SpO2: 98%   Weight: 65.8 kg (145 lb)   Height: 154.9 cm (60.98\")   PainSc: 0-No pain     Current Status 11/26/2018   ECOG score 0       Physical Exam    General: Alert, awake, oriented.  Well dressed.  Not in apparent distress. Vitals as above.   HEAD: normocephalic, atraumatic.   EYES: PERRL, EOMI. Fundi normal, vision is grossly intact.  Neck: Supple, no adenopathy or thyromegaly.   Throat: normal oral cavity and pharynx. No inflammation, swelling, exudate, or lesions.  CARDIAC: Normal S1 and S2. No S3, S4 or murmurs. Rhythm is regular.Extremities are warm and well perfused.   LUNGS: Clear to " auscultation and percussion without rales, rhonchi, wheezing or diminished breath sounds.  ABDOMEN: Positive bowel sounds. Soft, nondistended, nontender  . No guarding or rebound. No masses.  Back:No bony tenderness.   EXTREMITIES: No significant deformity or joint abnormality.  Peripheral pulses intact. No varicosities.  Skin: No rash or bruising.  Neurological: Grossly non-focal exam. No focal weakness. Gait: Normal.   Psych: Mood and affect normal. No hallucination or suicidal thoughts.   Lymphatics:No adenopathy     RECENT LABS: Independently reviewed and summarized  Hematology WBC   Date Value Ref Range Status   11/26/2018 9.18 3.20 - 9.80 10*3/mm3 Final     RBC   Date Value Ref Range Status   11/26/2018 3.76 (L) 3.77 - 5.16 10*6/mm3 Final     Hemoglobin   Date Value Ref Range Status   11/26/2018 11.1 (L) 12.0 - 15.5 g/dL Final     Hematocrit   Date Value Ref Range Status   11/26/2018 32.4 (L) 35.0 - 45.0 % Final     Platelets   Date Value Ref Range Status   11/26/2018 222 150 - 450 10*3/mm3 Final            Lab Results   Component Value Date    GLUCOSE 113 (H) 11/26/2018    BUN 15 11/26/2018    CREATININE 0.72 11/26/2018    EGFRIFNONA 90 11/26/2018    BCR 20.8 11/26/2018    K 3.4 (L) 11/26/2018    CO2 24.0 11/26/2018    CALCIUM 9.1 11/26/2018    ALBUMIN 4.70 11/26/2018    AST 22 11/26/2018    ALT 12 11/26/2018         Diagnosis:   (1) Invasive ductal carcinoma, right   Date of diagnosis: 9/5/18   Stage: IB ([id number]), recurrence score 36   Prior treatment: Right mastectomy with sentinel node biopsy and ALND (9/5/18)  Chemotherapy: Start date (10/15/18)   (2) Encounter for chemotherapy management  (3) Chemotherapy induce nausea   (4) Upper back pain   (5) Anxiety   (6) Drug rash   (7) Unintentional weight loss   (8) Chest pain     Assessment/Plan       (1) Invasive ductal carcinoma, right, stage IB, ER+/[place]-/HER2-   (2) Encounter for chemotherapy management      - Finished dose dense AC well without any major  side effects.   - Dose dense paclitaxel cycle 4 today. Tolerating treatment well.   - Discussed role of post mastectomy RT in her case with micrometastatic in lymph node.   - Genetic testing - no heritable mutation identified (panel of 34 genes).     (3) Chemotherapy induce nausea: Improved. Continue PRN anti-nausea medications.     (4) Upper back/chest pain: Chronic, likely musculo-skeletal in nature. This does get worse after chemotherapy. Bone scan negative for any metastases. Prescription of tramadol given to the patient.       (5) Anxiety: Continues to struggle with generalized anxiety. On pristiq. Continue this for now.     (6) Dermatitis: Due to dry skin. Recommended to use lotion.     (7) Unintentional weight loss: Recommend protein shakes/supplements. Weight stable from 2 weeks ago.     (8) Chest pain (new issue): Reporting persistent left side chest pain. This is on same side as her port. Reports pain is worse with deep inspiration. We will obtain CT angio to rule out PE.       Yobani Emery MD     11/26/2018      CC:

## 2019-01-31 ENCOUNTER — HOSPITAL ENCOUNTER (OUTPATIENT)
Dept: CT IMAGING | Facility: HOSPITAL | Age: 39
Discharge: HOME | End: 2019-01-31
Admitting: INTERNAL MEDICINE

## 2019-01-31 DIAGNOSIS — Z51.11 ENCOUNTER FOR CHEMOTHERAPY MANAGEMENT: ICD-10-CM

## 2019-01-31 DIAGNOSIS — Z17.0 MALIGNANT NEOPLASM OF UPPER-OUTER QUADRANT OF RIGHT BREAST IN FEMALE, ESTROGEN RECEPTOR POSITIVE (HCC): ICD-10-CM

## 2019-01-31 DIAGNOSIS — C50.411 MALIGNANT NEOPLASM OF UPPER-OUTER QUADRANT OF RIGHT BREAST IN FEMALE, ESTROGEN RECEPTOR POSITIVE (HCC): ICD-10-CM

## 2019-01-31 PROCEDURE — 0 IOPAMIDOL PER 1 ML: Performed by: INTERNAL MEDICINE

## 2019-01-31 PROCEDURE — 71275 CT ANGIOGRAPHY CHEST: CPT

## 2019-01-31 RX ORDER — 0.9 % SODIUM CHLORIDE 0.9 %
10 VIAL (ML) INJECTION ONCE
Status: COMPLETED | OUTPATIENT
Start: 2019-01-31 | End: 2019-01-31

## 2019-01-31 RX ADMIN — SODIUM CHLORIDE 10 ML: 9 INJECTION, SOLUTION INTRAMUSCULAR; INTRAVENOUS; SUBCUTANEOUS at 12:02

## 2019-01-31 RX ADMIN — SODIUM CHLORIDE, PRESERVATIVE FREE 500 UNITS: 5 INJECTION INTRAVENOUS at 12:02

## 2019-01-31 RX ADMIN — IOPAMIDOL 56 ML: 755 INJECTION, SOLUTION INTRAVENOUS at 12:05

## 2019-02-11 ENCOUNTER — LAB (OUTPATIENT)
Dept: ONCOLOGY | Facility: HOSPITAL | Age: 39
End: 2019-02-11
Attending: INTERNAL MEDICINE

## 2019-02-11 ENCOUNTER — OFFICE VISIT (OUTPATIENT)
Dept: ONCOLOGY | Facility: CLINIC | Age: 39
End: 2019-02-11
Attending: INTERNAL MEDICINE

## 2019-02-11 VITALS
SYSTOLIC BLOOD PRESSURE: 119 MMHG | WEIGHT: 147.6 LBS | OXYGEN SATURATION: 100 % | HEART RATE: 98 BPM | DIASTOLIC BLOOD PRESSURE: 69 MMHG | HEIGHT: 61 IN | BODY MASS INDEX: 27.87 KG/M2 | TEMPERATURE: 98.2 F | RESPIRATION RATE: 18 BRPM

## 2019-02-11 DIAGNOSIS — C50.411 MALIGNANT NEOPLASM OF UPPER-OUTER QUADRANT OF RIGHT BREAST IN FEMALE, ESTROGEN RECEPTOR POSITIVE (HCC): ICD-10-CM

## 2019-02-11 DIAGNOSIS — Z17.0 MALIGNANT NEOPLASM OF UPPER-OUTER QUADRANT OF RIGHT BREAST IN FEMALE, ESTROGEN RECEPTOR POSITIVE (HCC): ICD-10-CM

## 2019-02-11 DIAGNOSIS — Z45.2 ENCOUNTER FOR VENOUS ACCESS DEVICE CARE: ICD-10-CM

## 2019-02-11 DIAGNOSIS — R07.1 CHEST PAIN ON BREATHING: Primary | ICD-10-CM

## 2019-02-11 DIAGNOSIS — IMO0001 OTHER COMPLICATION DUE TO VENOUS ACCESS DEVICE, INITIAL ENCOUNTER: ICD-10-CM

## 2019-02-11 DIAGNOSIS — F41.1 GENERALIZED ANXIETY DISORDER: Primary | ICD-10-CM

## 2019-02-11 PROBLEM — B37.0 ORAL THRUSH: Status: RESOLVED | Noted: 2017-12-06 | Resolved: 2019-02-11

## 2019-02-11 PROBLEM — K12.1 STOMATITIS: Status: RESOLVED | Noted: 2018-10-22 | Resolved: 2019-02-11

## 2019-02-11 LAB
ALBUMIN SERPL-MCNC: 4.7 G/DL (ref 3.4–4.8)
ALBUMIN/GLOB SERPL: 1.5 G/DL (ref 1.1–1.8)
ALP SERPL-CCNC: 95 U/L (ref 38–126)
ALT SERPL W P-5'-P-CCNC: 36 U/L (ref 9–52)
ANION GAP SERPL CALCULATED.3IONS-SCNC: 10 MMOL/L (ref 5–15)
AST SERPL-CCNC: 33 U/L (ref 14–36)
BASOPHILS # BLD AUTO: 0.06 10*3/MM3 (ref 0–0.2)
BASOPHILS NFR BLD AUTO: 0.4 % (ref 0–1.5)
BILIRUB SERPL-MCNC: 0.3 MG/DL (ref 0.2–1.3)
BUN BLD-MCNC: 17 MG/DL (ref 7–21)
BUN/CREAT SERPL: 28.3 (ref 7–25)
CALCIUM SPEC-SCNC: 9.6 MG/DL (ref 8.4–10.2)
CHLORIDE SERPL-SCNC: 102 MMOL/L (ref 95–110)
CO2 SERPL-SCNC: 25 MMOL/L (ref 22–31)
CREAT BLD-MCNC: 0.6 MG/DL (ref 0.5–1)
DEPRECATED RDW RBC AUTO: 45.5 FL (ref 37–54)
EOSINOPHIL # BLD AUTO: 0.05 10*3/MM3 (ref 0–0.4)
EOSINOPHIL NFR BLD AUTO: 0.4 % (ref 0.3–6.2)
ERYTHROCYTE [DISTWIDTH] IN BLOOD BY AUTOMATED COUNT: 13.4 % (ref 12.3–15.4)
GFR SERPL CREATININE-BSD FRML MDRD: 111 ML/MIN/1.73 (ref 58–135)
GLOBULIN UR ELPH-MCNC: 3.1 GM/DL (ref 2.3–3.5)
GLUCOSE BLD-MCNC: 102 MG/DL (ref 60–100)
HCT VFR BLD AUTO: 31.8 % (ref 34–46.6)
HGB BLD-MCNC: 11 G/DL (ref 12–15.9)
IMM GRANULOCYTES # BLD AUTO: 0.05 10*3/MM3 (ref 0–0.05)
IMM GRANULOCYTES NFR BLD AUTO: 0.4 % (ref 0–0.5)
LYMPHOCYTES # BLD AUTO: 1.3 10*3/MM3 (ref 0.7–3.1)
LYMPHOCYTES NFR BLD AUTO: 9.2 % (ref 19.6–45.3)
MCH RBC QN AUTO: 31.9 PG (ref 26.6–33)
MCHC RBC AUTO-ENTMCNC: 34.6 G/DL (ref 31.5–35.7)
MCV RBC AUTO: 92.2 FL (ref 79–97)
MONOCYTES # BLD AUTO: 0.53 10*3/MM3 (ref 0.1–0.9)
MONOCYTES NFR BLD AUTO: 3.7 % (ref 5–12)
NEUTROPHILS # BLD AUTO: 12.18 10*3/MM3 (ref 1.4–7)
NEUTROPHILS NFR BLD AUTO: 85.9 % (ref 42.7–76)
NRBC BLD AUTO-RTO: 0 /100 WBC (ref 0–0)
PLATELET # BLD AUTO: 259 10*3/MM3 (ref 140–450)
PMV BLD AUTO: 9.2 FL (ref 6–12)
POTASSIUM BLD-SCNC: 3.7 MMOL/L (ref 3.5–5.1)
PROT SERPL-MCNC: 7.8 G/DL (ref 6.3–8.6)
RBC # BLD AUTO: 3.45 10*6/MM3 (ref 3.77–5.28)
SODIUM BLD-SCNC: 137 MMOL/L (ref 137–145)
WBC NRBC COR # BLD: 14.17 10*3/MM3 (ref 3.4–10.8)

## 2019-02-11 PROCEDURE — 99214 OFFICE O/P EST MOD 30 MIN: CPT | Performed by: INTERNAL MEDICINE

## 2019-02-11 PROCEDURE — 85025 COMPLETE CBC W/AUTO DIFF WBC: CPT

## 2019-02-11 PROCEDURE — 80053 COMPREHEN METABOLIC PANEL: CPT

## 2019-02-11 PROCEDURE — G0463 HOSPITAL OUTPT CLINIC VISIT: HCPCS | Performed by: INTERNAL MEDICINE

## 2019-02-11 RX ORDER — SODIUM CHLORIDE 0.9 % (FLUSH) 0.9 %
10 SYRINGE (ML) INJECTION AS NEEDED
Status: CANCELLED | OUTPATIENT
Start: 2019-03-11

## 2019-02-11 RX ORDER — SODIUM CHLORIDE 0.9 % (FLUSH) 0.9 %
10 SYRINGE (ML) INJECTION AS NEEDED
Status: DISCONTINUED | OUTPATIENT
Start: 2019-02-11 | End: 2019-02-11 | Stop reason: HOSPADM

## 2019-02-11 NOTE — PROGRESS NOTES
Moy Sparrow  [id number]  1980    Subjective    Ms Andrews presented for follow up appointment for breast cancer.   She has completed her chemotherapy.  No major side effects from treatment.   Reports persistent left side chest pain. Worse when taking deep breaths in.   Continues to struggle with anxiety related to her illness.    ROS as below.       History of Present Illness    This is a very pleasant 38-year-old female who was seen in consultation at the request of Dr. Ortega for evaluation of newly diagnosed breast cancer on 9/24/18.  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  Her past medical history is otherwise unremarkable except history of anxiety/depression and gastroesophageal reflux disease.She tells me that she felt a lump in her right breast in August 2018 and subsequently underwent a mammogram and ultrasonographic evaluation on August 27 which showed right breast mass approximately 1.5 cm.  The breast mass was felt to be suspicious for malignancy.  She then underwent ultrasound guided biopsy of right breast on 8/28/18 which showed invasive ductal carcinoma, moderately differentiated.  The carcinoma was tested positive for estrogen receptor and it was tested negative for progesterone receptor as well as HER-2.  Patient underwent right mastectomy and sentinel lymph node biopsy on September 5 which showed invasive ductal carcinoma of 1.5 cm, moderately differentiated which was completely excised.  [place] lymph node showed micrometastasis (less than 2 mm) involving 1 of 7 lymph nodes.    Her recurrence score was high at 36, so we recommended adjuvant chemotherapy with dose dense AC followed by taxane. She was started on chemotherapy on 10/15/18.     Past Medical History, Past Surgical History, Social History, Family History have been reviewed and are without significant changes except as mentioned.    Review of Systems   CONSTITUTIONAL: Fatigue+  Weight loss + No   "fever, chills, weakness.  HEENT: Eyes: No visual loss, blurred vision, double vision or yellow sclerae. Ears, Nose, Throat: No hearing loss, sneezing, congestion, runny nose or sore throat.  SKIN: No rash or itching.  CARDIOVASCULAR: No chest pain . No palpitations or edema.  RESPIRATORY: chronic exertional SOB + No  cough or sputum.  GASTROINTESTINAL: nausea + No anorexia,  vomiting or diarrhea. No abdominal pain or blood.  GENITOURINARY: Negative for urgency, frequency or dysuria.   NEUROLOGICAL: No headache, dizziness, syncope, paralysis, ataxia, numbness or tingling in the extremities. No change in bowel or bladder control.  MUSCULOSKELETAL: Back pain+  HEMATOLOGIC: No anemia, bleeding or bruising.  LYMPHATICS: No enlarged nodes. No history of splenectomy.  PSYCHIATRIC: Anxiety + depression+  ENDOCRINOLOGIC: No reports of sweating, cold or heat intolerance. No polyuria or polydipsia.  ALLERGIES: No history of asthma, hives, eczema or rhinitis.    Medications:  The current medication list was reviewed in the EMR    ALLERGIES:    Allergies   Allergen Reactions   • Penicillins Rash       Objective      Vitals:    11/26/18 0857   BP: 124/81   Pulse: 100   Resp: 18   Temp: 99.8 °F (37.7 °C)   TempSrc: Temporal   SpO2: 98%   Weight: 65.8 kg (145 lb)   Height: 154.9 cm (60.98\")   PainSc: 0-No pain     Current Status 11/26/2018   ECOG score 0       Physical Exam    General: Alert, awake, oriented.  Well dressed.  Not in apparent distress. Vitals as above.   HEAD: normocephalic, atraumatic.   EYES: PERRL, EOMI. Fundi normal, vision is grossly intact.  Neck: Supple, no adenopathy or thyromegaly.   Throat: normal oral cavity and pharynx. No inflammation, swelling, exudate, or lesions.  CARDIAC: Normal S1 and S2. No S3, S4 or murmurs. Rhythm is regular.Extremities are warm and well perfused.   LUNGS: Clear to auscultation and percussion without rales, rhonchi, wheezing or diminished breath sounds.  ABDOMEN: Positive bowel " sounds. Soft, nondistended, nontender  . No guarding or rebound. No masses.  Back:No bony tenderness.   EXTREMITIES: No significant deformity or joint abnormality.  Peripheral pulses intact. No varicosities.  Skin: No rash or bruising.  Neurological: Grossly non-focal exam. No focal weakness. Gait: Normal.   Psych: Mood and affect normal. No hallucination or suicidal thoughts.   Lymphatics:No adenopathy     RECENT LABS: Independently reviewed and summarized  Hematology WBC   Date Value Ref Range Status   11/26/2018 9.18 3.20 - 9.80 10*3/mm3 Final     RBC   Date Value Ref Range Status   11/26/2018 3.76 (L) 3.77 - 5.16 10*6/mm3 Final     Hemoglobin   Date Value Ref Range Status   11/26/2018 11.1 (L) 12.0 - 15.5 g/dL Final     Hematocrit   Date Value Ref Range Status   11/26/2018 32.4 (L) 35.0 - 45.0 % Final     Platelets   Date Value Ref Range Status   11/26/2018 222 150 - 450 10*3/mm3 Final            Lab Results   Component Value Date    GLUCOSE 113 (H) 11/26/2018    BUN 15 11/26/2018    CREATININE 0.72 11/26/2018    EGFRIFNONA 90 11/26/2018    BCR 20.8 11/26/2018    K 3.4 (L) 11/26/2018    CO2 24.0 11/26/2018    CALCIUM 9.1 11/26/2018    ALBUMIN 4.70 11/26/2018    AST 22 11/26/2018    ALT 12 11/26/2018         Diagnosis:   (1) Invasive ductal carcinoma, right   Date of diagnosis: 9/5/18   Stage: IB ([id number]), recurrence score 36   Prior treatment: Right mastectomy with sentinel node biopsy and ALND (9/5/18)  Chemotherapy: Start date (10/15/18)   Completed dose dense AC followed by dose dense taxol (2/11/19, last dose 1/28/19).   (3) Chemotherapy induce nausea   (4) Upper back pain   (5) Anxiety   (6) Drug rash   (7) Unintentional weight loss   (8) Chest pain     Assessment/Plan       (1) Invasive ductal carcinoma, right, stage IB, ER+/[place]-/HER2-        - She has completed dose dense AC followed by dose dense taxol.   - No major side effects of treatment. Mild peripheral neuropathy in legs.   - Discussed role of  post mastectomy RT in her case with micrometastatic in lymph node. We will arrange for her to see Dr Amezquita for adjuvant RT.   - I will see her after completion of RT to discuss ovarian suppression and exemestane.   - Genetic testing - no heritable mutation identified (panel of 34 genes).     (2) Chemotherapy induce nausea: Improved. Continue PRN anti-nausea medications.     (3) Upper back/chest pain: Chronic, likely musculo-skeletal in nature. On PRN tramadol.     (4) Anxiety: Continues to struggle with generalized anxiety. On pristiq. Continue this for now.     (5) Dermatitis: Due to dry skin. Recommended to use lotion.     (6) Unintentional weight loss: Recommend protein shakes/supplements.      (7) Chest pain: Likely musculo-skeletal in nature. CT angio chest - negative for PE.     I will see her at the completion of RT to discuss hormonal therapy options.       Yobani Emery MD     11/26/2018      CC:

## 2019-02-14 ENCOUNTER — TELEPHONE (OUTPATIENT)
Dept: ONCOLOGY | Facility: HOSPITAL | Age: 39
End: 2019-02-14

## 2019-02-14 NOTE — TELEPHONE ENCOUNTER
----- Message from Hailey Emery MD sent at 2/14/2019  8:21 AM CST -----  She can get her teeth cleaned. No deep cleaning or extractions.   ----- Message -----  From: AMELIE Acuña RN  Sent: 2/14/2019   8:18 AM  To: Hailey Emery MD    Pt wanting to know if she needs to wait until after chemo complete to get her teeth cleaned?  Please advise.

## 2019-02-21 ENCOUNTER — OFFICE VISIT (OUTPATIENT)
Dept: RADIATION ONCOLOGY | Facility: HOSPITAL | Age: 39
End: 2019-02-21

## 2019-02-21 ENCOUNTER — HOSPITAL ENCOUNTER (OUTPATIENT)
Dept: RADIATION ONCOLOGY | Facility: HOSPITAL | Age: 39
Setting detail: RADIATION/ONCOLOGY SERIES
End: 2019-02-21

## 2019-02-21 VITALS
HEART RATE: 119 BPM | HEIGHT: 61 IN | TEMPERATURE: 99 F | RESPIRATION RATE: 18 BRPM | BODY MASS INDEX: 28.13 KG/M2 | SYSTOLIC BLOOD PRESSURE: 117 MMHG | DIASTOLIC BLOOD PRESSURE: 68 MMHG | WEIGHT: 149 LBS

## 2019-02-21 DIAGNOSIS — Z17.0 MALIGNANT NEOPLASM OF UPPER-OUTER QUADRANT OF RIGHT BREAST IN FEMALE, ESTROGEN RECEPTOR POSITIVE (HCC): Primary | ICD-10-CM

## 2019-02-21 DIAGNOSIS — C50.411 MALIGNANT NEOPLASM OF UPPER-OUTER QUADRANT OF RIGHT BREAST IN FEMALE, ESTROGEN RECEPTOR POSITIVE (HCC): Primary | ICD-10-CM

## 2019-02-21 PROCEDURE — G0463 HOSPITAL OUTPT CLINIC VISIT: HCPCS | Performed by: RADIOLOGY

## 2019-02-21 PROCEDURE — 99213 OFFICE O/P EST LOW 20 MIN: CPT | Performed by: RADIOLOGY

## 2019-02-21 NOTE — PROGRESS NOTES
Established Patient Visit      Patient: Moy Sparrow   YOB: 1980   Medical Record Number: [id number]   Date of Visit: 2019   Primary Diagnosis: Stage IA (pT1c, pN1mi, cM0, G2, ER: Positive, CA: Negative, HER2: Negative, Oncotype DX score: 36) moderately differentiated invasive ductal carcinoma of the upper outer quadrant of the right breast, status post right mastectomy with SNB followed by adjuvant chemotherapy.   ICD 10 Code: C50.411                                              History of Present Illness: Ms. Stevenson returns to our clinic today following completion of adjuvant chemotherapy to discuss initiation of adjuvant radiation therapy. She was last seen in our clinic in consultation on 10/16/18, at which time adjuvant radiation therapy was recommended following the completion of chemotherapy. She has completed 4 cycles of Adriamycin/Cytoxan(10/15/18-18) and 4 cycles of Taxol  (12/10/18-19). She tolerated her chemotherapy without significant problems.    Review of Systems   All other systems reviewed and are negative.          Past Medical History:   Diagnosis Date   • Anxiety    • Cancer (CMS/HCC)     breast right   • Depressive disorder    • Encounter for gynecological examination    • Encounter for vision screening     Vision screen (1)     • Health examination of defined subpopulation     Health examination of sub-group     • Malaise and fatigue    • Palpitations    • Skin cancer      Skin Ca Pre Cancer   • Soft tissue swelling     right side of neck-2 different areas    • Urinary tract infectious disease    • Wears glasses         Past Surgical History:   Procedure Laterality Date   •  SECTION     • COLONOSCOPY N/A 3/1/2018    Procedure: COLONOSCOPY;  Surgeon: Shin Vanessa DO;  Location: [facility] ENDOSCOPY;  Service:    • ENDOSCOPY N/A 10/5/2018    Procedure: ESOPHAGOGASTRODUODENOSCOPY;  Surgeon: Shin Vanessa DO;  Location: [facility]  ENDOSCOPY;  Service: Gastroenterology   • MASTECTOMY Right    • MASTECTOMY WITH SENTINEL NODE BIOPSY AND AXILLARY NODE DISSECTION Right 9/5/2018    Procedure: INJECT RIGHT BREAST AND THEN PERFORM SENTINEL LYMPH NODE BIOPSY AND THEN  REMOVE RIGHT BREAST AND NIPPLE AND        (INJECTION ON SDS @7:00 A.M.);  Surgeon: Abdoul Ortega MD;  Location: [facility];  Service: General   • PAP SMEAR  2010   • US GUIDED FINE NEEDLE ASPIRATION  8/28/2018   • VENOUS ACCESS DEVICE (PORT) INSERTION Left 10/10/2018    Procedure: MEDIPORT PLACEMENT      (LEFT SIDE,UNATTACHED PORT )      (C-ARM);  Surgeon: Abdoul Ortega MD;  Location: [facility];  Service: General      Family History   Problem Relation Age of Onset   • Bipolar disorder Mother    • Schizophrenia Mother    • Colon cancer Father         Colorectal Cancer   • No Known Problems Sister         Social History     Socioeconomic History   • Marital status: Single     Spouse name: Not on file   • Number of children: 2   • Years of education: 14   • Highest education level: Not on file   Social Needs   • Financial resource strain: Not on file   • Food insecurity - worry: Not on file   • Food insecurity - inability: Not on file   • Transportation needs - medical: Not on file   • Transportation needs - non-medical: Not on file   Occupational History   • Occupation: nurse   Tobacco Use   • Smoking status: Current Some Day Smoker     Years: 25.00   • Smokeless tobacco: Never Used   • Tobacco comment: smokes 1-2 cigs/ day   Substance and Sexual Activity   • Alcohol use: No   • Drug use: No   • Sexual activity: Defer   Other Topics Concern   • Not on file   Social History Narrative   • Not on file     Allergies:  Penicillins     Prior to Admission medications    Medication Sig Start Date End Date Taking? Authorizing Provider   desvenlafaxine (PRISTIQ) 50 MG 24 hr tablet Take 1 tablet by mouth Daily. 9/10/18  Yes Sally Campo APRN   diazePAM (VALIUM) 5 MG tablet Take 1  "tablet by mouth Every 6 (Six) Hours As Needed for Anxiety. 10/22/18  Yes Sally Campo APRN   Multiple Vitamins-Minerals (MULTIVITAMIN WITH MINERALS) tablet tablet Take 1 tablet by mouth Daily.   Yes Ranjit Luke MD   pantoprazole (PROTONIX) 40 MG EC tablet Take 1 tablet by mouth Daily. 8/6/18  Yes Sally Campo APRN   raNITIdine (ZANTAC) 150 MG tablet Take 1 tablet by mouth 2 (Two) Times a Day. 1/14/19  Yes Nusrat Murphy APRN   traMADol (ULTRAM) 50 MG tablet Take 1 tablet by mouth Every 6 (Six) Hours As Needed for Moderate Pain. 12/27/18  Yes Hailey Emery MD   zolpidem (AMBIEN) 5 MG tablet Take 1 tablet by mouth At Night As Needed for Sleep. 1/28/19  Yes Hailey Emery MD      Pain:(on a scale of 0-10)  Pain Score    02/21/19 1405   PainSc: 0-No pain        Quality of Life: 100 - Full Activity   (0) Fully active, able to carry on all predisease performance without restriction  Advanced Care Plan: N    Physical Examination:  Vitals:     Vitals:    02/21/19 1405   BP: 117/68   Pulse: 119   Resp: 18   Temp: 99 °F (37.2 °C)       Height: 154.9 cm (60.98\")  Weight: Weight: 67.6 kg (149 lb) Body mass index is 28.17 kg/m².      Constitutional: The patient is a well-developed, well-nourished white female in no acute distress.  Alert and oriented ×3.  Psychiatric: Alert and oriented x3. Normal affect, with no anxiety or depression noted.    Radiographs :     Computed Tomography with CTA  (1/31/19)       REGION:  Chest       INDICATION:  SOB ;  chest pain, SOB, C50.411 Malignant neoplasm  of upper-outer quadrant of right female breast Z17.0 Estrogen  receptor positive status (ER+) Z51.11 Encounter for  antineoplastic chemotherapy    - rule out pulmonary embolism       CLINICAL HISTORY:  CORRELATIVE IMAGING:  None                         TECHNIQUE:     - PE / vascular protocol     - reconstructions:  axial, coronal, sagittal, obliques     - computer-generated 3D " reconstructions (MIPS) were performed.     - contrast:  intravenous ;  Isovue 370,     56 mL                             This exam was performed according to the departmental  dose-optimization program which includes automated exposure  control, adjustment of the mA and/or kV according to patient size  and/or use of iterative reconstruction technique.       COMMENTS:   - Pulmonary arterial system:      - Main pulmonary artery trunk:  negative      - Left, right main pulmonary arteries: negative      - Lobar arteries: negative       - Segmental arteries: negative     - Systemic vascularity (as visualized):        - Aorta:  grossly negative / normal caliber / no dissection        - roots of great vessels:  grossly negative / normal  caliber        - SVC / IVC:  grossly negative / normal caliber   - Misc (limited visualization):      - pulmonary parenchyma:  negative      - pleura:  negative      - mediastinal / nik:  negative      - neck, inferior:  grossly wnl      - subdiaphragmatic structures:  grossly negative (limited  evaluation)       - osseous:      - misc:  Status post right mastectomy     .      IMPRESSION:  CONCLUSION:        1.  No evidence of pulmonary embolism.            2.  No evidence of pathology associated with the visualized  aorta.          Labs:   Lab Results   Component Value Date    GLUCOSE 102 (H) 02/11/2019    BUN 17 02/11/2019    CREATININE 0.60 02/11/2019    EGFRIFNONA 111 02/11/2019    BCR 28.3 (H) 02/11/2019    K 3.7 02/11/2019    CO2 25.0 02/11/2019    CALCIUM 9.6 02/11/2019    ALBUMIN 4.70 02/11/2019    AST 33 02/11/2019    ALT 36 02/11/2019      WBC   Date Value Ref Range Status   02/11/2019 14.17 (H) 3.40 - 10.80 10*3/mm3 Final     Hemoglobin   Date Value Ref Range Status   02/11/2019 11.0 (L) 12.0 - 15.9 g/dL Final     Hematocrit   Date Value Ref Range Status   02/11/2019 31.8 (L) 34.0 - 46.6 % Final     Platelets   Date Value Ref Range Status   02/11/2019 259 140 - 450 10*3/mm3  Final       ASSESSMENT/PLAN: Ms. Moy Sparrow is a 38-year-old white female status post right mastectomy with sentinel node biopsy and adjuvant chemotherapy for Stage IA (pT1c, pN1mi, cM0, G2, ER: Positive, RI: Negative, HER2: Negative, Oncotype DX score: 36) moderately differentiated invasive ductal carcinoma of the upper outer quadrant of the right breast. The NCCN Guidelines for the treatment of breast cancer following mastectomy were again reviewed with the patient. She voices understanding and agrees to proceed with therapy. She has a vacation already scheduled and paid for during the first week of April, and is contemplating delaying the start radiation therapy until after her vacation. We discussed the risks of this, and she is deciding whether or not to proceed with radiation or wait until after her vacation. She plans to notify us of her decision, and we will schedule her return for radiation therapy planning accordingly. We plan to deliver 6040 cGy in 33 fractions to the right chest wall and surrounding lymphatics.    Sincerely,    Claudio Amezquita MD  Radiation Oncology    Electronically signed by Claudio Amezquita MD 2/21/2019  3:34 PM    cc: Dr. Hailey Cunningham, CICI

## 2019-02-22 PROCEDURE — 77263 THER RADIOLOGY TX PLNG CPLX: CPT | Performed by: RADIOLOGY

## 2019-03-11 ENCOUNTER — APPOINTMENT (OUTPATIENT)
Dept: ONCOLOGY | Facility: HOSPITAL | Age: 39
End: 2019-03-11
Attending: INTERNAL MEDICINE

## 2019-03-13 ENCOUNTER — INFUSION (OUTPATIENT)
Dept: ONCOLOGY | Facility: HOSPITAL | Age: 39
End: 2019-03-13
Attending: INTERNAL MEDICINE

## 2019-03-13 DIAGNOSIS — Z45.2 ENCOUNTER FOR VENOUS ACCESS DEVICE CARE: Primary | ICD-10-CM

## 2019-03-13 DIAGNOSIS — C50.411 MALIGNANT NEOPLASM OF UPPER-OUTER QUADRANT OF RIGHT BREAST IN FEMALE, ESTROGEN RECEPTOR POSITIVE (HCC): ICD-10-CM

## 2019-03-13 DIAGNOSIS — IMO0001 OTHER COMPLICATION DUE TO VENOUS ACCESS DEVICE, INITIAL ENCOUNTER: ICD-10-CM

## 2019-03-13 DIAGNOSIS — Z17.0 MALIGNANT NEOPLASM OF UPPER-OUTER QUADRANT OF RIGHT BREAST IN FEMALE, ESTROGEN RECEPTOR POSITIVE (HCC): ICD-10-CM

## 2019-03-13 PROCEDURE — 96523 IRRIG DRUG DELIVERY DEVICE: CPT | Performed by: INTERNAL MEDICINE

## 2019-03-13 RX ORDER — SODIUM CHLORIDE 0.9 % (FLUSH) 0.9 %
10 SYRINGE (ML) INJECTION AS NEEDED
Status: DISCONTINUED | OUTPATIENT
Start: 2019-03-13 | End: 2019-03-13 | Stop reason: HOSPADM

## 2019-03-13 RX ORDER — SODIUM CHLORIDE 0.9 % (FLUSH) 0.9 %
10 SYRINGE (ML) INJECTION AS NEEDED
Status: CANCELLED | OUTPATIENT
Start: 2019-04-22

## 2019-03-13 RX ADMIN — SODIUM CHLORIDE, PRESERVATIVE FREE 10 ML: 5 INJECTION INTRAVENOUS at 15:22

## 2019-03-13 RX ADMIN — Medication 500 UNITS: at 15:22

## 2019-03-14 ENCOUNTER — OFFICE VISIT (OUTPATIENT)
Dept: ONCOLOGY | Facility: CLINIC | Age: 39
End: 2019-03-14
Attending: INTERNAL MEDICINE

## 2019-03-14 ENCOUNTER — DOCUMENTATION (OUTPATIENT)
Dept: ONCOLOGY | Facility: CLINIC | Age: 39
End: 2019-03-14

## 2019-03-14 ENCOUNTER — LAB (OUTPATIENT)
Dept: ONCOLOGY | Facility: HOSPITAL | Age: 39
End: 2019-03-14
Attending: INTERNAL MEDICINE

## 2019-03-14 VITALS
SYSTOLIC BLOOD PRESSURE: 131 MMHG | HEART RATE: 88 BPM | DIASTOLIC BLOOD PRESSURE: 72 MMHG | OXYGEN SATURATION: 100 % | BODY MASS INDEX: 28.51 KG/M2 | HEIGHT: 61 IN | WEIGHT: 151 LBS | RESPIRATION RATE: 18 BRPM | TEMPERATURE: 98.5 F

## 2019-03-14 DIAGNOSIS — Z17.0 MALIGNANT NEOPLASM OF UPPER-OUTER QUADRANT OF RIGHT BREAST IN FEMALE, ESTROGEN RECEPTOR POSITIVE (HCC): Primary | ICD-10-CM

## 2019-03-14 DIAGNOSIS — M54.2 NECK PAIN: ICD-10-CM

## 2019-03-14 DIAGNOSIS — Z17.0 MALIGNANT NEOPLASM OF UPPER-OUTER QUADRANT OF RIGHT BREAST IN FEMALE, ESTROGEN RECEPTOR POSITIVE (HCC): ICD-10-CM

## 2019-03-14 DIAGNOSIS — C50.411 MALIGNANT NEOPLASM OF UPPER-OUTER QUADRANT OF RIGHT BREAST IN FEMALE, ESTROGEN RECEPTOR POSITIVE (HCC): ICD-10-CM

## 2019-03-14 DIAGNOSIS — C50.411 MALIGNANT NEOPLASM OF UPPER-OUTER QUADRANT OF RIGHT BREAST IN FEMALE, ESTROGEN RECEPTOR POSITIVE (HCC): Primary | ICD-10-CM

## 2019-03-14 DIAGNOSIS — F41.1 GENERALIZED ANXIETY DISORDER: ICD-10-CM

## 2019-03-14 DIAGNOSIS — J39.2 THROAT MASS: ICD-10-CM

## 2019-03-14 LAB
FSH SERPL-ACNC: 69.7 MIU/ML
LH SERPL-ACNC: 52 MIU/ML

## 2019-03-14 PROCEDURE — 82670 ASSAY OF TOTAL ESTRADIOL: CPT

## 2019-03-14 PROCEDURE — 99214 OFFICE O/P EST MOD 30 MIN: CPT | Performed by: INTERNAL MEDICINE

## 2019-03-14 PROCEDURE — 83002 ASSAY OF GONADOTROPIN (LH): CPT

## 2019-03-14 PROCEDURE — 36415 COLL VENOUS BLD VENIPUNCTURE: CPT | Performed by: INTERNAL MEDICINE

## 2019-03-14 PROCEDURE — G0463 HOSPITAL OUTPT CLINIC VISIT: HCPCS | Performed by: INTERNAL MEDICINE

## 2019-03-14 PROCEDURE — 83001 ASSAY OF GONADOTROPIN (FSH): CPT

## 2019-03-14 RX ORDER — DIAZEPAM 5 MG/1
5 TABLET ORAL EVERY 6 HOURS PRN
Qty: 60 TABLET | Refills: 0 | Status: SHIPPED | OUTPATIENT
Start: 2019-03-14 | End: 2020-05-26 | Stop reason: SDUPTHER

## 2019-03-14 NOTE — PROGRESS NOTES
Moy Sparrow  [id number]  1980    Subjective    Ms Andrews presented for follow up appointment for breast cancer.   She is reporting throat pain and lump.   States it started 2 weeks ago and she felt a lump in neck. No URI or cough. Does report dysphagia.   She has had similar issue in the past and was evaluated by ENT. She was diagnosed with globus at that time.   No fever or chills.   ROS as below.       History of Present Illness    This is a very pleasant 38-year-old female who was seen in consultation at the request of Dr. Ortega for evaluation of newly diagnosed breast cancer on 9/24/18.  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  Her past medical history is otherwise unremarkable except history of anxiety/depression and gastroesophageal reflux disease.She tells me that she felt a lump in her right breast in August 2018 and subsequently underwent a mammogram and ultrasonographic evaluation on August 27 which showed right breast mass approximately 1.5 cm.  The breast mass was felt to be suspicious for malignancy.  She then underwent ultrasound guided biopsy of right breast on 8/28/18 which showed invasive ductal carcinoma, moderately differentiated.  The carcinoma was tested positive for estrogen receptor and it was tested negative for progesterone receptor as well as HER-2.  Patient underwent right mastectomy and sentinel lymph node biopsy on September 5 which showed invasive ductal carcinoma of 1.5 cm, moderately differentiated which was completely excised.  [place] lymph node showed micrometastasis (less than 2 mm) involving 1 of 7 lymph nodes.    Her recurrence score was high at 36, so we recommended adjuvant chemotherapy with dose dense AC followed by taxane. She was started on chemotherapy on 10/15/18.     Past Medical History, Past Surgical History, Social History, Family History have been reviewed and are without significant changes except as mentioned.    Review of  "Systems   CONSTITUTIONAL: Fatigue+  Weight loss + No  fever, chills, weakness.  HEENT: Eyes: No visual loss, blurred vision, double vision or yellow sclerae. Ears, Nose, Throat: No hearing loss, sneezing, congestion, runny nose or sore throat.  SKIN: No rash or itching.  CARDIOVASCULAR: No chest pain . No palpitations or edema.  RESPIRATORY: chronic exertional SOB + No  cough or sputum.  GASTROINTESTINAL: nausea + No anorexia,  vomiting or diarrhea. No abdominal pain or blood.  GENITOURINARY: Negative for urgency, frequency or dysuria.   NEUROLOGICAL: No headache, dizziness, syncope, paralysis, ataxia, numbness or tingling in the extremities. No change in bowel or bladder control.  MUSCULOSKELETAL: Back pain+  HEMATOLOGIC: No anemia, bleeding or bruising.  LYMPHATICS: No enlarged nodes. No history of splenectomy.  PSYCHIATRIC: Anxiety + depression+  ENDOCRINOLOGIC: No reports of sweating, cold or heat intolerance. No polyuria or polydipsia.  ALLERGIES: No history of asthma, hives, eczema or rhinitis.    Medications:  The current medication list was reviewed in the EMR    ALLERGIES:    Allergies   Allergen Reactions   • Penicillins Rash       Objective      Vitals:    11/26/18 0857   BP: 124/81   Pulse: 100   Resp: 18   Temp: 99.8 °F (37.7 °C)   TempSrc: Temporal   SpO2: 98%   Weight: 65.8 kg (145 lb)   Height: 154.9 cm (60.98\")   PainSc: 0-No pain     Current Status 11/26/2018   ECOG score 0       Physical Exam    General: Alert, awake, oriented.  Well dressed.  Not in apparent distress. Vitals as above.   HEAD: normocephalic, atraumatic.   EYES: PERRL, EOMI. Fundi normal, vision is grossly intact.  Neck: Supple, no adenopathy or thyromegaly.   Throat: normal oral cavity and pharynx. No inflammation, swelling, exudate, or lesions.  CARDIAC: Normal S1 and S2. No S3, S4 or murmurs. Rhythm is regular.Extremities are warm and well perfused.   LUNGS: Clear to auscultation and percussion without rales, rhonchi, wheezing " or diminished breath sounds.  ABDOMEN: Positive bowel sounds. Soft, nondistended, nontender  . No guarding or rebound. No masses.  Back:No bony tenderness.   EXTREMITIES: No significant deformity or joint abnormality.  Peripheral pulses intact. No varicosities.  Skin: No rash or bruising.  Neurological: Grossly non-focal exam. No focal weakness. Gait: Normal.   Psych: Mood and affect normal. No hallucination or suicidal thoughts.   Lymphatics:No adenopathy     RECENT LABS: Independently reviewed and summarized  Hematology WBC   Date Value Ref Range Status   11/26/2018 9.18 3.20 - 9.80 10*3/mm3 Final     RBC   Date Value Ref Range Status   11/26/2018 3.76 (L) 3.77 - 5.16 10*6/mm3 Final     Hemoglobin   Date Value Ref Range Status   11/26/2018 11.1 (L) 12.0 - 15.5 g/dL Final     Hematocrit   Date Value Ref Range Status   11/26/2018 32.4 (L) 35.0 - 45.0 % Final     Platelets   Date Value Ref Range Status   11/26/2018 222 150 - 450 10*3/mm3 Final            Lab Results   Component Value Date    GLUCOSE 113 (H) 11/26/2018    BUN 15 11/26/2018    CREATININE 0.72 11/26/2018    EGFRIFNONA 90 11/26/2018    BCR 20.8 11/26/2018    K 3.4 (L) 11/26/2018    CO2 24.0 11/26/2018    CALCIUM 9.1 11/26/2018    ALBUMIN 4.70 11/26/2018    AST 22 11/26/2018    ALT 12 11/26/2018         Diagnosis:   (1) Invasive ductal carcinoma, right   Date of diagnosis: 9/5/18   Stage: IB ([id number]), recurrence score 36   Prior treatment: Right mastectomy with sentinel node biopsy and ALND (9/5/18)  Chemotherapy: Start date (10/15/18)   Completed dose dense AC followed by dose dense taxol (2/11/19, last dose 1/28/19).   (2) Upper back pain   (3) Anxiety   (4) Throat pain/lump     Assessment/Plan       (1) Invasive ductal carcinoma, right, stage IB, ER+/UT-/HER2-        - She has completed dose dense AC followed by dose dense taxol.   - No major side effects of treatment. Mild peripheral neuropathy in legs.   - Discussed role of post mastectomy RT in  her case with micrometastatic in lymph node.   - She is taking a vacation and wanted to finish her RT after her vacation.   - I will check her estradiol, FSH and LH. We will consider hormonal ovarian suppression and exemestane while she is awaiting RT.   - Genetic testing - no heritable mutation identified (panel of 34 genes).     (2) Upper back/chest pain: Chronic, likely musculo-skeletal in nature. On PRN tramadol.     (3) Anxiety: Continues to struggle with generalized anxiety. On pristiq and PRN valium. Prescription of valium given to the patient.     (4) Throat lump/pain: Exam unremarkable. Small palpable nodes, but none > 1 cm. Reassurance given. She has ENT follow up in a month.         Yobani Emery MD     11/26/2018      CC:

## 2019-03-15 ENCOUNTER — TELEPHONE (OUTPATIENT)
Dept: ONCOLOGY | Facility: HOSPITAL | Age: 39
End: 2019-03-15

## 2019-03-15 ENCOUNTER — DOCUMENTATION (OUTPATIENT)
Dept: NUTRITION | Facility: HOSPITAL | Age: 39
End: 2019-03-15

## 2019-03-15 LAB — ESTRADIOL SERPL HS-MCNC: 5.2 PG/ML

## 2019-03-15 NOTE — PROGRESS NOTES
Adult Outpatient Nutrition  Assessment    Patient Name:  Moy Sparrow  YOB: 1980  MRN: [id number]    Assessment Date:  3/15/2019    Comments:  Follow-up visit to check nutrition. Pt stated appetite/intake good. No nutrition related problems verbalized. Stated is starting rad in future.   Wt 151 lb--up/down (overall stable). Pt agreed to call on RDN as needed.                        Electronically signed by:  Liza Wei RD  03/15/19 11:34 AM

## 2019-03-15 NOTE — TELEPHONE ENCOUNTER
----- Message from Hailey Emery MD sent at 3/15/2019  8:47 AM CDT -----  Please let patient know that her hormone level is in post menopausal range (low). She does not need to get started on hormone shots at present. We will plan shots after her radiation.

## 2019-03-19 NOTE — PROGRESS NOTES
Oncology SW encounter as pt presents for follow up in infusion area.  SW reinforced support and availability and inquired as to pt stress, distress, coping and support.  Pt reports no needs at present and willingness to call if concerns arise.

## 2019-03-21 ENCOUNTER — TELEPHONE (OUTPATIENT)
Dept: ONCOLOGY | Facility: CLINIC | Age: 39
End: 2019-03-21

## 2019-03-21 DIAGNOSIS — M54.2 NECK PAIN: Primary | ICD-10-CM

## 2019-03-21 NOTE — TELEPHONE ENCOUNTER
Informed patient of CT scheduled 3/26 and that we are awaiting peer to peer for approval.  Pt stated understanding.

## 2019-03-22 DIAGNOSIS — M54.2 NECK PAIN: Primary | ICD-10-CM

## 2019-03-25 ENCOUNTER — HOSPITAL ENCOUNTER (OUTPATIENT)
Dept: ULTRASOUND IMAGING | Facility: HOSPITAL | Age: 39
Discharge: HOME | End: 2019-03-25
Admitting: INTERNAL MEDICINE

## 2019-03-25 DIAGNOSIS — M54.2 NECK PAIN: ICD-10-CM

## 2019-03-25 PROCEDURE — 76536 US EXAM OF HEAD AND NECK: CPT

## 2019-03-26 ENCOUNTER — APPOINTMENT (OUTPATIENT)
Dept: CT IMAGING | Facility: HOSPITAL | Age: 39
End: 2019-03-26

## 2019-03-26 ENCOUNTER — HOSPITAL ENCOUNTER (OUTPATIENT)
Dept: RADIATION ONCOLOGY | Facility: HOSPITAL | Age: 39
Discharge: HOME | End: 2019-03-26

## 2019-03-26 ENCOUNTER — HOSPITAL ENCOUNTER (OUTPATIENT)
Dept: RADIATION ONCOLOGY | Facility: HOSPITAL | Age: 39
Setting detail: RADIATION/ONCOLOGY SERIES
End: 2019-03-26

## 2019-03-26 PROCEDURE — 77334 RADIATION TREATMENT AID(S): CPT | Performed by: RADIOLOGY

## 2019-03-26 PROCEDURE — 77290 THER RAD SIMULAJ FIELD CPLX: CPT | Performed by: RADIOLOGY

## 2019-04-02 ENCOUNTER — HOSPITAL ENCOUNTER (OUTPATIENT)
Dept: RADIATION ONCOLOGY | Facility: HOSPITAL | Age: 39
Setting detail: RADIATION/ONCOLOGY SERIES
End: 2019-04-02

## 2019-04-04 PROCEDURE — 77307 TELETHX ISODOSE PLAN CPLX: CPT | Performed by: RADIOLOGY

## 2019-04-04 PROCEDURE — 77334 RADIATION TREATMENT AID(S): CPT | Performed by: RADIOLOGY

## 2019-04-10 ENCOUNTER — HOSPITAL ENCOUNTER (OUTPATIENT)
Dept: RADIATION ONCOLOGY | Facility: HOSPITAL | Age: 39
Discharge: HOME | End: 2019-04-10

## 2019-04-10 ENCOUNTER — RADIATION ONCOLOGY WEEKLY ASSESSMENT (OUTPATIENT)
Dept: RADIATION ONCOLOGY | Facility: HOSPITAL | Age: 39
End: 2019-04-10

## 2019-04-10 VITALS
SYSTOLIC BLOOD PRESSURE: 119 MMHG | BODY MASS INDEX: 27.56 KG/M2 | HEIGHT: 61 IN | DIASTOLIC BLOOD PRESSURE: 72 MMHG | RESPIRATION RATE: 16 BRPM | WEIGHT: 146 LBS | TEMPERATURE: 98.7 F | HEART RATE: 91 BPM

## 2019-04-10 DIAGNOSIS — Z17.0 MALIGNANT NEOPLASM OF UPPER-OUTER QUADRANT OF RIGHT BREAST IN FEMALE, ESTROGEN RECEPTOR POSITIVE (HCC): Primary | ICD-10-CM

## 2019-04-10 DIAGNOSIS — C50.411 MALIGNANT NEOPLASM OF UPPER-OUTER QUADRANT OF RIGHT BREAST IN FEMALE, ESTROGEN RECEPTOR POSITIVE (HCC): Primary | ICD-10-CM

## 2019-04-10 PROCEDURE — 77280 THER RAD SIMULAJ FIELD SMPL: CPT | Performed by: RADIOLOGY

## 2019-04-10 PROCEDURE — 77427 RADIATION TX MANAGEMENT X5: CPT | Performed by: RADIOLOGY

## 2019-04-10 PROCEDURE — G6002 STEREOSCOPIC X-RAY GUIDANCE: HCPCS | Performed by: RADIOLOGY

## 2019-04-10 PROCEDURE — 77412 RADIATION TX DELIVERY LVL 3: CPT | Performed by: RADIOLOGY

## 2019-04-10 NOTE — PROGRESS NOTES
On Treatment Visit       Patient: Moy Sparrow   YOB: 1980   Medical Record Number: [id number]     Date of Visit  April 10, 2019   Primary Diagnosis: Stage IA (pT1c, pN1mi, cM0, G2, ER: Positive, [place]: Negative, HER2: Negative, Oncotype DX score: 36) moderately differentiated invasive ductal carcinoma of the upper outer quadrant of the right breast, status post right mastectomy with SNB followed by adjuvant chemotherapy.   ICD 10 Code: C50.411     was seen today for an on treatment visit.  She is receiving radiation therapy to the right chest wall and surrounding lymphatics. She  has received 180 cGy in 1 fraction out of a planned dose of 6040 cGy in 33 fractions. She is not receiving concurrent chemotherapy.     Today on exam the patient is tolerating radiation therapy well and has no new disease or treatment-related complaints.                                           Vitals:     Vitals:    04/10/19 1339   BP: 119/72   Pulse: 91   Resp: 16   Temp: 98.7 °F (37.1 °C)       Weight:   Wt Readings from Last 3 Encounters:   04/10/19 66.2 kg (146 lb)   03/14/19 68.5 kg (151 lb)   02/21/19 67.6 kg (149 lb)      Pain:    Pain Score    04/10/19 1339   PainSc: 0-No pain         Plan: We plan to continue radiation therapy as prescribed.    Claudio Amezquita MD  Radiation Oncology    Electronically signed by Claudio Amezquita MD 4/10/2019  1:44 PM     cc: Dr. Hailey Cunningham, CICI

## 2019-04-11 ENCOUNTER — HOSPITAL ENCOUNTER (OUTPATIENT)
Dept: RADIATION ONCOLOGY | Facility: HOSPITAL | Age: 39
Discharge: HOME | End: 2019-04-11

## 2019-04-11 PROCEDURE — 77417 THER RADIOLOGY PORT IMAGE(S): CPT | Performed by: RADIOLOGY

## 2019-04-11 PROCEDURE — G6002 STEREOSCOPIC X-RAY GUIDANCE: HCPCS | Performed by: RADIOLOGY

## 2019-04-11 PROCEDURE — 77412 RADIATION TX DELIVERY LVL 3: CPT | Performed by: RADIOLOGY

## 2019-04-12 ENCOUNTER — HOSPITAL ENCOUNTER (OUTPATIENT)
Dept: RADIATION ONCOLOGY | Facility: HOSPITAL | Age: 39
Discharge: HOME | End: 2019-04-12

## 2019-04-12 PROCEDURE — 77412 RADIATION TX DELIVERY LVL 3: CPT | Performed by: RADIOLOGY

## 2019-04-12 PROCEDURE — G6002 STEREOSCOPIC X-RAY GUIDANCE: HCPCS | Performed by: RADIOLOGY

## 2019-04-15 ENCOUNTER — HOSPITAL ENCOUNTER (OUTPATIENT)
Dept: RADIATION ONCOLOGY | Facility: HOSPITAL | Age: 39
Discharge: HOME | End: 2019-04-15

## 2019-04-15 PROCEDURE — G6002 STEREOSCOPIC X-RAY GUIDANCE: HCPCS | Performed by: RADIOLOGY

## 2019-04-15 PROCEDURE — 77412 RADIATION TX DELIVERY LVL 3: CPT | Performed by: RADIOLOGY

## 2019-04-16 ENCOUNTER — HOSPITAL ENCOUNTER (OUTPATIENT)
Dept: RADIATION ONCOLOGY | Facility: HOSPITAL | Age: 39
Discharge: HOME | End: 2019-04-16

## 2019-04-16 PROCEDURE — 77412 RADIATION TX DELIVERY LVL 3: CPT | Performed by: RADIOLOGY

## 2019-04-16 PROCEDURE — 77336 RADIATION PHYSICS CONSULT: CPT | Performed by: RADIOLOGY

## 2019-04-16 PROCEDURE — G6002 STEREOSCOPIC X-RAY GUIDANCE: HCPCS | Performed by: RADIOLOGY

## 2019-04-17 ENCOUNTER — HOSPITAL ENCOUNTER (OUTPATIENT)
Dept: RADIATION ONCOLOGY | Facility: HOSPITAL | Age: 39
Discharge: HOME | End: 2019-04-17

## 2019-04-17 ENCOUNTER — RADIATION ONCOLOGY WEEKLY ASSESSMENT (OUTPATIENT)
Dept: RADIATION ONCOLOGY | Facility: HOSPITAL | Age: 39
End: 2019-04-17

## 2019-04-17 ENCOUNTER — OFFICE VISIT (OUTPATIENT)
Dept: OTOLARYNGOLOGY | Facility: CLINIC | Age: 39
End: 2019-04-17

## 2019-04-17 VITALS
WEIGHT: 145.8 LBS | HEIGHT: 61 IN | RESPIRATION RATE: 16 BRPM | SYSTOLIC BLOOD PRESSURE: 120 MMHG | HEART RATE: 90 BPM | DIASTOLIC BLOOD PRESSURE: 77 MMHG | BODY MASS INDEX: 27.53 KG/M2 | TEMPERATURE: 98.2 F

## 2019-04-17 VITALS — RESPIRATION RATE: 18 BRPM | BODY MASS INDEX: 27.94 KG/M2 | WEIGHT: 148 LBS | HEIGHT: 61 IN

## 2019-04-17 DIAGNOSIS — C50.411 MALIGNANT NEOPLASM OF UPPER-OUTER QUADRANT OF RIGHT BREAST IN FEMALE, ESTROGEN RECEPTOR POSITIVE (HCC): Primary | ICD-10-CM

## 2019-04-17 DIAGNOSIS — R09.89 GLOBUS SENSATION: Primary | ICD-10-CM

## 2019-04-17 DIAGNOSIS — J37.0 CHRONIC LARYNGITIS: ICD-10-CM

## 2019-04-17 DIAGNOSIS — Z17.0 MALIGNANT NEOPLASM OF UPPER-OUTER QUADRANT OF RIGHT BREAST IN FEMALE, ESTROGEN RECEPTOR POSITIVE (HCC): Primary | ICD-10-CM

## 2019-04-17 PROCEDURE — 77427 RADIATION TX MANAGEMENT X5: CPT | Performed by: RADIOLOGY

## 2019-04-17 PROCEDURE — 31575 DIAGNOSTIC LARYNGOSCOPY: CPT | Performed by: OTOLARYNGOLOGY

## 2019-04-17 PROCEDURE — 99213 OFFICE O/P EST LOW 20 MIN: CPT | Performed by: OTOLARYNGOLOGY

## 2019-04-17 PROCEDURE — G6002 STEREOSCOPIC X-RAY GUIDANCE: HCPCS | Performed by: RADIOLOGY

## 2019-04-17 PROCEDURE — 77412 RADIATION TX DELIVERY LVL 3: CPT | Performed by: RADIOLOGY

## 2019-04-18 ENCOUNTER — HOSPITAL ENCOUNTER (OUTPATIENT)
Dept: RADIATION ONCOLOGY | Facility: HOSPITAL | Age: 39
Discharge: HOME | End: 2019-04-18

## 2019-04-18 PROCEDURE — 77417 THER RADIOLOGY PORT IMAGE(S): CPT | Performed by: RADIOLOGY

## 2019-04-18 PROCEDURE — 77412 RADIATION TX DELIVERY LVL 3: CPT | Performed by: RADIOLOGY

## 2019-04-18 PROCEDURE — G6002 STEREOSCOPIC X-RAY GUIDANCE: HCPCS | Performed by: RADIOLOGY

## 2019-04-19 ENCOUNTER — HOSPITAL ENCOUNTER (OUTPATIENT)
Dept: RADIATION ONCOLOGY | Facility: HOSPITAL | Age: 39
Discharge: HOME | End: 2019-04-19

## 2019-04-19 PROCEDURE — G6002 STEREOSCOPIC X-RAY GUIDANCE: HCPCS | Performed by: RADIOLOGY

## 2019-04-19 PROCEDURE — 77412 RADIATION TX DELIVERY LVL 3: CPT | Performed by: RADIOLOGY

## 2019-04-21 NOTE — PROGRESS NOTES
Subjective   Moy Sparrow is a 39 y.o. female.       History of Present Illness   This is a patient I saw back in May 2018 with globus sensation that I felt was due to acid reflux.  She was placed on ranitidine at that time.  In the interim since I saw her she has been diagnosed with breast cancer.  Completed a course of chemotherapy in late January.  In February she felt like she had a lump or mass in her throat.  This persisted for a couple of weeks and then improved and the symptoms recurred.  She is now starting to feel better again with no symptoms for the last week.  Is still taking her ranitidine.  No hemoptysis.  No dysphasia.  She has quit smoking.      The following portions of the patient's history were reviewed and updated as appropriate: allergies, current medications, past family history, past medical history, past social history, past surgical history and problem list.     reports that she quit smoking about 3 months ago. She quit after 25.00 years of use. She has never used smokeless tobacco. She reports that she does not drink alcohol or use drugs.   Patient is not a tobacco user and has not been counseled for use of tobacco products      Review of Systems   Constitutional: Negative for fever.           Objective   Physical Exam  General: Well-developed well-nourished female in no acute distress.  Alert and oriented x-3. Head: Normocephalic. Face: Symmetrical strength and appearance. PERRL. EOMI. Voice:Strong. Speech:Fluent  Ears: External ears no deformity, canals no discharge, tympanic membranes intact clear and mobile bilaterally.  Nose: Nares show no discharge mass polyp or purulence.  Boggy mucosa is present.  No gross external deformity.  Septum: Midline  Oral cavity: Lips and gums without lesions.  Tongue and floor of mouth without lesions.  Parotid and submandibular ducts unobstructed.  No mucosal lesions on the buccal mucosa or vestibule of the mouth.  Pharynx: No erythema exudate  mass or ulcer  Neck: No lymphadenopathy.  No thyromegaly.  Trachea and larynx midline.  No masses in the parotid or submandibular glands.    Procedure Note    Pre-operative Diagnosis: Patient presents with:  Follow-up: dysphagia      Post-operative Diagnosis: same    Anesthesia: topical with xylocaine and neosynephrine    Endoscopy Type:  Flexible Laryngoscopy    Procedure Details:    The patient was placed in the sitting position.  After topical anesthesia and decongestion, the 4 mm laryngoscope was passed.  The nasal cavities, nasopharynx, oropharynx, hypopharynx, and larynx were all examined.  Vocal cords were examined during respiration and phonation.  The following findings were noted:    Findings: Previously noted nasal findings were confirmed. Nasopharynx without mass, hypopharynx and larynx without evidence of neoplasm. Vocal cord mobility intact. There is chronic appearing edema and erythema of the laryngeal structures consistent with chronic laryngitis.  This is particularly notable posteriorly consistent with reflux related changes    Condition:  Stable.  Patient tolerated procedure well.    Complications:  None      Assessment/Plan   Moy was seen today for follow-up.    Diagnoses and all orders for this visit:    Globus sensation    Chronic laryngitis      Plan: Encouraged the patient to continue her ranitidine as well as some general reflux precautions including not eating within 2 hours when she is going to lie down and avoid caffeine and carbonation with the evening meal.  If symptoms remain well controlled may follow-up with me as needed.

## 2019-04-22 ENCOUNTER — HOSPITAL ENCOUNTER (OUTPATIENT)
Dept: RADIATION ONCOLOGY | Facility: HOSPITAL | Age: 39
Discharge: HOME | End: 2019-04-22

## 2019-04-22 ENCOUNTER — INFUSION (OUTPATIENT)
Dept: ONCOLOGY | Facility: HOSPITAL | Age: 39
End: 2019-04-22
Attending: INTERNAL MEDICINE

## 2019-04-22 DIAGNOSIS — IMO0001 OTHER COMPLICATION DUE TO VENOUS ACCESS DEVICE, INITIAL ENCOUNTER: ICD-10-CM

## 2019-04-22 DIAGNOSIS — Z17.0 MALIGNANT NEOPLASM OF UPPER-OUTER QUADRANT OF RIGHT BREAST IN FEMALE, ESTROGEN RECEPTOR POSITIVE (HCC): ICD-10-CM

## 2019-04-22 DIAGNOSIS — Z45.2 ENCOUNTER FOR VENOUS ACCESS DEVICE CARE: Primary | ICD-10-CM

## 2019-04-22 DIAGNOSIS — C50.411 MALIGNANT NEOPLASM OF UPPER-OUTER QUADRANT OF RIGHT BREAST IN FEMALE, ESTROGEN RECEPTOR POSITIVE (HCC): ICD-10-CM

## 2019-04-22 PROCEDURE — G6002 STEREOSCOPIC X-RAY GUIDANCE: HCPCS | Performed by: RADIOLOGY

## 2019-04-22 PROCEDURE — 77412 RADIATION TX DELIVERY LVL 3: CPT | Performed by: RADIOLOGY

## 2019-04-22 PROCEDURE — 96523 IRRIG DRUG DELIVERY DEVICE: CPT | Performed by: INTERNAL MEDICINE

## 2019-04-22 RX ORDER — SODIUM CHLORIDE 0.9 % (FLUSH) 0.9 %
10 SYRINGE (ML) INJECTION AS NEEDED
Status: DISCONTINUED | OUTPATIENT
Start: 2019-04-22 | End: 2019-04-22 | Stop reason: HOSPADM

## 2019-04-22 RX ORDER — SODIUM CHLORIDE 0.9 % (FLUSH) 0.9 %
10 SYRINGE (ML) INJECTION AS NEEDED
Status: CANCELLED | OUTPATIENT
Start: 2019-05-31

## 2019-04-22 RX ADMIN — Medication 500 UNITS: at 08:11

## 2019-04-22 RX ADMIN — SODIUM CHLORIDE, PRESERVATIVE FREE 10 ML: 5 INJECTION INTRAVENOUS at 08:11

## 2019-04-23 ENCOUNTER — APPOINTMENT (OUTPATIENT)
Dept: RADIATION ONCOLOGY | Facility: HOSPITAL | Age: 39
End: 2019-04-23

## 2019-04-23 ENCOUNTER — HOSPITAL ENCOUNTER (OUTPATIENT)
Dept: RADIATION ONCOLOGY | Facility: HOSPITAL | Age: 39
Discharge: HOME | End: 2019-04-23

## 2019-04-23 PROCEDURE — G6002 STEREOSCOPIC X-RAY GUIDANCE: HCPCS | Performed by: RADIOLOGY

## 2019-04-23 PROCEDURE — 77336 RADIATION PHYSICS CONSULT: CPT | Performed by: RADIOLOGY

## 2019-04-23 PROCEDURE — 77412 RADIATION TX DELIVERY LVL 3: CPT | Performed by: RADIOLOGY

## 2019-04-24 ENCOUNTER — HOSPITAL ENCOUNTER (OUTPATIENT)
Dept: RADIATION ONCOLOGY | Facility: HOSPITAL | Age: 39
Discharge: HOME | End: 2019-04-24

## 2019-04-24 ENCOUNTER — RADIATION ONCOLOGY WEEKLY ASSESSMENT (OUTPATIENT)
Dept: RADIATION ONCOLOGY | Facility: HOSPITAL | Age: 39
End: 2019-04-24

## 2019-04-24 VITALS
HEART RATE: 80 BPM | HEIGHT: 61 IN | WEIGHT: 146.7 LBS | DIASTOLIC BLOOD PRESSURE: 78 MMHG | TEMPERATURE: 97.8 F | BODY MASS INDEX: 27.7 KG/M2 | RESPIRATION RATE: 16 BRPM | SYSTOLIC BLOOD PRESSURE: 116 MMHG

## 2019-04-24 DIAGNOSIS — Z17.0 MALIGNANT NEOPLASM OF UPPER-OUTER QUADRANT OF RIGHT BREAST IN FEMALE, ESTROGEN RECEPTOR POSITIVE (HCC): Primary | ICD-10-CM

## 2019-04-24 DIAGNOSIS — C50.411 MALIGNANT NEOPLASM OF UPPER-OUTER QUADRANT OF RIGHT BREAST IN FEMALE, ESTROGEN RECEPTOR POSITIVE (HCC): Primary | ICD-10-CM

## 2019-04-24 PROCEDURE — 77412 RADIATION TX DELIVERY LVL 3: CPT | Performed by: RADIOLOGY

## 2019-04-24 PROCEDURE — 77427 RADIATION TX MANAGEMENT X5: CPT | Performed by: RADIOLOGY

## 2019-04-24 PROCEDURE — G6002 STEREOSCOPIC X-RAY GUIDANCE: HCPCS | Performed by: RADIOLOGY

## 2019-04-24 NOTE — PROGRESS NOTES
On Treatment Visit       Patient: Moy Sparrow   YOB: 1980   Medical Record Number: [id number]     Date of Visit  April 24, 2019   Primary Diagnosis: Stage IA (pT1c, pN1mi, cM0, G2, ER: Positive, [place]: Negative, HER2: Negative, Oncotype DX score: 36) moderately differentiated invasive ductal carcinoma of the upper outer quadrant of the right breast, status post right mastectomy with SNB followed by adjuvant chemotherapy.   ICD 10 Code: C50.411     was seen today for an on treatment visit.  She is receiving radiation therapy to the right chest wall and surrounding lymphatics. She  has received 1980 cGy in 11 fractions out of a planned dose of 6040 cGy in 33 fractions. She is not receiving concurrent chemotherapy.     Today on exam the patient is tolerating radiation therapy well and has no new disease or treatment-related complaints, other than mild fatigue.                                             Vitals:     Vitals:    04/24/19 0949   BP: 116/78   Pulse: 80   Resp: 16   Temp: 97.8 °F (36.6 °C)       Weight:   Wt Readings from Last 3 Encounters:   04/24/19 66.5 kg (146 lb 11.2 oz)   04/17/19 67.1 kg (148 lb)   04/17/19 66.1 kg (145 lb 12.8 oz)      Pain:    Pain Score    04/24/19 0949   PainSc: 0-No pain         Plan: We plan to continue radiation therapy as prescribed.    Claudio Amezquita MD  Radiation Oncology    Electronically signed by Claudio Amezquita MD 4/24/2019  9:54 AM     cc: CICI Barcenas Dr.

## 2019-04-25 ENCOUNTER — HOSPITAL ENCOUNTER (OUTPATIENT)
Dept: RADIATION ONCOLOGY | Facility: HOSPITAL | Age: 39
Discharge: HOME | End: 2019-04-25

## 2019-04-25 PROCEDURE — 77412 RADIATION TX DELIVERY LVL 3: CPT | Performed by: RADIOLOGY

## 2019-04-25 PROCEDURE — 77417 THER RADIOLOGY PORT IMAGE(S): CPT | Performed by: RADIOLOGY

## 2019-04-25 PROCEDURE — G6002 STEREOSCOPIC X-RAY GUIDANCE: HCPCS | Performed by: RADIOLOGY

## 2019-04-26 ENCOUNTER — HOSPITAL ENCOUNTER (OUTPATIENT)
Dept: RADIATION ONCOLOGY | Facility: HOSPITAL | Age: 39
Discharge: HOME | End: 2019-04-26

## 2019-04-26 PROCEDURE — G6002 STEREOSCOPIC X-RAY GUIDANCE: HCPCS | Performed by: RADIOLOGY

## 2019-04-26 PROCEDURE — 77417 THER RADIOLOGY PORT IMAGE(S): CPT | Performed by: RADIOLOGY

## 2019-04-26 PROCEDURE — 77412 RADIATION TX DELIVERY LVL 3: CPT | Performed by: RADIOLOGY

## 2019-04-29 ENCOUNTER — HOSPITAL ENCOUNTER (OUTPATIENT)
Dept: RADIATION ONCOLOGY | Facility: HOSPITAL | Age: 39
Discharge: HOME | End: 2019-04-29

## 2019-04-29 PROCEDURE — G6002 STEREOSCOPIC X-RAY GUIDANCE: HCPCS | Performed by: RADIOLOGY

## 2019-04-29 PROCEDURE — 77412 RADIATION TX DELIVERY LVL 3: CPT | Performed by: RADIOLOGY

## 2019-04-29 NOTE — PROGRESS NOTES
Adult Outpatient Nutrition  Assessment    Patient Name:  Moy Sparrow  YOB: 1980  MRN: [id number]    Assessment Date:  4/29/2019    Comments: Follow-up visit to check nutrition since started radiation to chest due to breast cancer. 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 Skin pink--no breakdown. Nausea last week--pt understands that this would not be radiation related. Overall, eating well. Wt 146.9 lb (stable). Pt agreed to call on RDN as needed.                        Electronically signed by:  Liza Wei RD  04/29/19 12:24 PM

## 2019-04-30 ENCOUNTER — HOSPITAL ENCOUNTER (OUTPATIENT)
Dept: RADIATION ONCOLOGY | Facility: HOSPITAL | Age: 39
Discharge: HOME | End: 2019-04-30

## 2019-04-30 PROCEDURE — G6002 STEREOSCOPIC X-RAY GUIDANCE: HCPCS | Performed by: RADIOLOGY

## 2019-04-30 PROCEDURE — 77412 RADIATION TX DELIVERY LVL 3: CPT | Performed by: RADIOLOGY

## 2019-04-30 PROCEDURE — 77336 RADIATION PHYSICS CONSULT: CPT | Performed by: RADIOLOGY

## 2019-05-01 ENCOUNTER — RADIATION ONCOLOGY WEEKLY ASSESSMENT (OUTPATIENT)
Dept: RADIATION ONCOLOGY | Facility: HOSPITAL | Age: 39
End: 2019-05-01

## 2019-05-01 ENCOUNTER — HOSPITAL ENCOUNTER (OUTPATIENT)
Dept: RADIATION ONCOLOGY | Facility: HOSPITAL | Age: 39
Setting detail: RADIATION/ONCOLOGY SERIES
End: 2019-05-01

## 2019-05-01 ENCOUNTER — HOSPITAL ENCOUNTER (OUTPATIENT)
Dept: RADIATION ONCOLOGY | Facility: HOSPITAL | Age: 39
Discharge: HOME | End: 2019-05-01

## 2019-05-01 VITALS
TEMPERATURE: 97.2 F | RESPIRATION RATE: 16 BRPM | HEIGHT: 61 IN | HEART RATE: 77 BPM | WEIGHT: 147 LBS | DIASTOLIC BLOOD PRESSURE: 75 MMHG | SYSTOLIC BLOOD PRESSURE: 103 MMHG | BODY MASS INDEX: 27.75 KG/M2

## 2019-05-01 DIAGNOSIS — C50.411 MALIGNANT NEOPLASM OF UPPER-OUTER QUADRANT OF RIGHT BREAST IN FEMALE, ESTROGEN RECEPTOR POSITIVE (HCC): Primary | ICD-10-CM

## 2019-05-01 DIAGNOSIS — Z17.0 MALIGNANT NEOPLASM OF UPPER-OUTER QUADRANT OF RIGHT BREAST IN FEMALE, ESTROGEN RECEPTOR POSITIVE (HCC): Primary | ICD-10-CM

## 2019-05-01 PROCEDURE — 77427 RADIATION TX MANAGEMENT X5: CPT | Performed by: RADIOLOGY

## 2019-05-01 PROCEDURE — 77412 RADIATION TX DELIVERY LVL 3: CPT | Performed by: RADIOLOGY

## 2019-05-01 PROCEDURE — G6002 STEREOSCOPIC X-RAY GUIDANCE: HCPCS | Performed by: RADIOLOGY

## 2019-05-01 NOTE — PROGRESS NOTES
On Treatment Visit       Patient: Moy Sparrow   YOB: 1980   Medical Record Number: [id number]     Date of Visit  May 1, 2019   Primary Diagnosis: Stage IA (pT1c, pN1mi, cM0, G2, ER: Positive, [place]: Negative, HER2: Negative, Oncotype DX score: 36) moderately differentiated invasive ductal carcinoma of the upper outer quadrant of the right breast, status post right mastectomy with SNB followed by adjuvant chemotherapy.   ICD 10 Code: C50.411     was seen today for an on treatment visit.  She is receiving radiation therapy to the right chest wall and surrounding lymphatics. She  has received 2880 cGy in 16 fractions out of a planned dose of 6040 cGy in 33 fractions. She is not receiving concurrent chemotherapy.     Today on exam the patient is tolerating radiation therapy well and has no new disease or treatment-related complaints.  She continues to complain of mild fatigue.    On exam, her skin looks good, with very mild erythema and no desquamation.                                             Vitals:     Vitals:    05/01/19 0911   BP: 103/75   Pulse: 77   Resp: 16   Temp: 97.2 °F (36.2 °C)       Weight:   Wt Readings from Last 3 Encounters:   05/01/19 66.7 kg (147 lb)   04/24/19 66.5 kg (146 lb 11.2 oz)   04/17/19 67.1 kg (148 lb)      Pain:    Pain Score    05/01/19 0911   PainSc: 0-No pain         Plan: We plan to continue radiation therapy as prescribed.    Claudio Amezquita MD  Radiation Oncology    Electronically signed by Claudio Amezquita MD 5/1/2019  9:22 AM     cc: Dr. Hailey Cunningham, APRN

## 2019-05-02 ENCOUNTER — HOSPITAL ENCOUNTER (OUTPATIENT)
Dept: RADIATION ONCOLOGY | Facility: HOSPITAL | Age: 39
Discharge: HOME | End: 2019-05-02

## 2019-05-02 PROCEDURE — 77412 RADIATION TX DELIVERY LVL 3: CPT | Performed by: RADIOLOGY

## 2019-05-02 PROCEDURE — 77417 THER RADIOLOGY PORT IMAGE(S): CPT | Performed by: RADIOLOGY

## 2019-05-02 PROCEDURE — G6002 STEREOSCOPIC X-RAY GUIDANCE: HCPCS | Performed by: RADIOLOGY

## 2019-05-03 ENCOUNTER — HOSPITAL ENCOUNTER (OUTPATIENT)
Dept: RADIATION ONCOLOGY | Facility: HOSPITAL | Age: 39
Discharge: HOME | End: 2019-05-03

## 2019-05-03 PROCEDURE — G6002 STEREOSCOPIC X-RAY GUIDANCE: HCPCS | Performed by: RADIOLOGY

## 2019-05-03 PROCEDURE — 77412 RADIATION TX DELIVERY LVL 3: CPT | Performed by: RADIOLOGY

## 2019-05-06 ENCOUNTER — HOSPITAL ENCOUNTER (OUTPATIENT)
Dept: RADIATION ONCOLOGY | Facility: HOSPITAL | Age: 39
Discharge: HOME | End: 2019-05-06

## 2019-05-06 PROCEDURE — 77412 RADIATION TX DELIVERY LVL 3: CPT | Performed by: RADIOLOGY

## 2019-05-06 PROCEDURE — G6002 STEREOSCOPIC X-RAY GUIDANCE: HCPCS | Performed by: RADIOLOGY

## 2019-05-07 ENCOUNTER — HOSPITAL ENCOUNTER (OUTPATIENT)
Dept: RADIATION ONCOLOGY | Facility: HOSPITAL | Age: 39
Discharge: HOME | End: 2019-05-07

## 2019-05-07 PROCEDURE — G6002 STEREOSCOPIC X-RAY GUIDANCE: HCPCS | Performed by: RADIOLOGY

## 2019-05-07 PROCEDURE — 77336 RADIATION PHYSICS CONSULT: CPT | Performed by: RADIOLOGY

## 2019-05-07 PROCEDURE — 77412 RADIATION TX DELIVERY LVL 3: CPT | Performed by: RADIOLOGY

## 2019-05-08 ENCOUNTER — RADIATION ONCOLOGY WEEKLY ASSESSMENT (OUTPATIENT)
Dept: RADIATION ONCOLOGY | Facility: HOSPITAL | Age: 39
End: 2019-05-08

## 2019-05-08 ENCOUNTER — HOSPITAL ENCOUNTER (OUTPATIENT)
Dept: RADIATION ONCOLOGY | Facility: HOSPITAL | Age: 39
Discharge: HOME | End: 2019-05-08

## 2019-05-08 VITALS
BODY MASS INDEX: 27.58 KG/M2 | TEMPERATURE: 98.1 F | RESPIRATION RATE: 16 BRPM | DIASTOLIC BLOOD PRESSURE: 83 MMHG | WEIGHT: 146.1 LBS | HEIGHT: 61 IN | HEART RATE: 86 BPM | SYSTOLIC BLOOD PRESSURE: 113 MMHG

## 2019-05-08 DIAGNOSIS — Z17.0 MALIGNANT NEOPLASM OF UPPER-OUTER QUADRANT OF RIGHT BREAST IN FEMALE, ESTROGEN RECEPTOR POSITIVE (HCC): Primary | ICD-10-CM

## 2019-05-08 DIAGNOSIS — C50.411 MALIGNANT NEOPLASM OF UPPER-OUTER QUADRANT OF RIGHT BREAST IN FEMALE, ESTROGEN RECEPTOR POSITIVE (HCC): Primary | ICD-10-CM

## 2019-05-08 PROCEDURE — 77412 RADIATION TX DELIVERY LVL 3: CPT | Performed by: RADIOLOGY

## 2019-05-08 PROCEDURE — 77427 RADIATION TX MANAGEMENT X5: CPT | Performed by: RADIOLOGY

## 2019-05-08 PROCEDURE — G6002 STEREOSCOPIC X-RAY GUIDANCE: HCPCS | Performed by: RADIOLOGY

## 2019-05-08 NOTE — PROGRESS NOTES
On Treatment Visit       Patient: Moy Sparrow   YOB: 1980   Medical Record Number: [id number]     Date of Visit  May 8, 2019   Primary Diagnosis: Stage IA (pT1c, pN1mi, cM0, G2, ER: Positive, [place]: Negative, HER2: Negative, Oncotype DX score: 36) moderately differentiated invasive ductal carcinoma of the upper outer quadrant of the right breast, status post right mastectomy with SNB followed by adjuvant chemotherapy.   ICD 10 Code: C50.411     was seen today for an on treatment visit.  She is receiving radiation therapy to the right chest wall and surrounding lymphatics. She  has received 3780 cGy in 21 fractions out of a planned dose of 6040 cGy in 33 fractions. She is not receiving concurrent chemotherapy.     Today on exam the patient complains of itching on the medial aspect of her right chest wall treatment field.  She has tried hydrocortisone cream with little improvement.  She has recently ordered a cream off the Internet, which she plans to start today.  She is otherwise tolerating radiation therapy well and has no other disease or treatment-related complaints.  She continues to complain of mild fatigue.    On exam, her skin looks good, with very mild erythema and no desquamation.                                             Vitals:     Vitals:    05/08/19 0919   BP: 113/83   Pulse: 86   Resp: 16   Temp: 98.1 °F (36.7 °C)       Weight:   Wt Readings from Last 3 Encounters:   05/08/19 66.3 kg (146 lb 1.6 oz)   05/01/19 66.7 kg (147 lb)   04/24/19 66.5 kg (146 lb 11.2 oz)      Pain:    Pain Score    05/08/19 0919   PainSc: 0-No pain         Plan: We plan to continue radiation therapy as prescribed.    Claudio Amezquita MD  Radiation Oncology    Electronically signed by Claudio Amezquita MD 5/8/2019  9:30 AM     cc: CICI Barcenas Dr.

## 2019-05-09 ENCOUNTER — HOSPITAL ENCOUNTER (OUTPATIENT)
Dept: RADIATION ONCOLOGY | Facility: HOSPITAL | Age: 39
Discharge: HOME | End: 2019-05-09

## 2019-05-09 PROCEDURE — 77417 THER RADIOLOGY PORT IMAGE(S): CPT | Performed by: RADIOLOGY

## 2019-05-09 PROCEDURE — G6002 STEREOSCOPIC X-RAY GUIDANCE: HCPCS | Performed by: RADIOLOGY

## 2019-05-09 PROCEDURE — 77412 RADIATION TX DELIVERY LVL 3: CPT | Performed by: RADIOLOGY

## 2019-05-10 ENCOUNTER — HOSPITAL ENCOUNTER (OUTPATIENT)
Dept: RADIATION ONCOLOGY | Facility: HOSPITAL | Age: 39
Discharge: HOME | End: 2019-05-10

## 2019-05-10 PROCEDURE — G6002 STEREOSCOPIC X-RAY GUIDANCE: HCPCS | Performed by: RADIOLOGY

## 2019-05-10 PROCEDURE — 77412 RADIATION TX DELIVERY LVL 3: CPT | Performed by: RADIOLOGY

## 2019-05-10 NOTE — PROGRESS NOTES
Adult Outpatient Nutrition  Assessment    Patient Name:  Moy Sparrow  YOB: 1980  MRN: [id number]    Assessment Date:  Entry from 5/8/19 visit    Comments: Follow-up visit to check nutrition. Wt 146.1 lb--stable. Eating well. Skin pink--no breakdown. Reinforced nutrition needs.                        Electronically signed by:  Liza Wei RD  05/10/19 11:15 AM

## 2019-05-13 ENCOUNTER — HOSPITAL ENCOUNTER (OUTPATIENT)
Dept: RADIATION ONCOLOGY | Facility: HOSPITAL | Age: 39
Discharge: HOME | End: 2019-05-13

## 2019-05-13 PROCEDURE — 77334 RADIATION TREATMENT AID(S): CPT | Performed by: RADIOLOGY

## 2019-05-13 PROCEDURE — 77290 THER RAD SIMULAJ FIELD CPLX: CPT | Performed by: RADIOLOGY

## 2019-05-13 PROCEDURE — G6002 STEREOSCOPIC X-RAY GUIDANCE: HCPCS | Performed by: RADIOLOGY

## 2019-05-13 PROCEDURE — 77412 RADIATION TX DELIVERY LVL 3: CPT | Performed by: RADIOLOGY

## 2019-05-14 ENCOUNTER — HOSPITAL ENCOUNTER (OUTPATIENT)
Dept: RADIATION ONCOLOGY | Facility: HOSPITAL | Age: 39
Discharge: HOME | End: 2019-05-14

## 2019-05-14 PROCEDURE — 77336 RADIATION PHYSICS CONSULT: CPT | Performed by: RADIOLOGY

## 2019-05-14 PROCEDURE — 77412 RADIATION TX DELIVERY LVL 3: CPT | Performed by: RADIOLOGY

## 2019-05-14 PROCEDURE — G6002 STEREOSCOPIC X-RAY GUIDANCE: HCPCS | Performed by: RADIOLOGY

## 2019-05-15 ENCOUNTER — RADIATION ONCOLOGY WEEKLY ASSESSMENT (OUTPATIENT)
Dept: RADIATION ONCOLOGY | Facility: HOSPITAL | Age: 39
End: 2019-05-15

## 2019-05-15 ENCOUNTER — HOSPITAL ENCOUNTER (OUTPATIENT)
Dept: RADIATION ONCOLOGY | Facility: HOSPITAL | Age: 39
Discharge: HOME | End: 2019-05-15

## 2019-05-15 VITALS
WEIGHT: 147.2 LBS | HEIGHT: 61 IN | RESPIRATION RATE: 16 BRPM | HEART RATE: 80 BPM | DIASTOLIC BLOOD PRESSURE: 76 MMHG | TEMPERATURE: 98.6 F | BODY MASS INDEX: 27.79 KG/M2 | SYSTOLIC BLOOD PRESSURE: 108 MMHG

## 2019-05-15 DIAGNOSIS — Z17.0 MALIGNANT NEOPLASM OF UPPER-OUTER QUADRANT OF RIGHT BREAST IN FEMALE, ESTROGEN RECEPTOR POSITIVE (HCC): Primary | ICD-10-CM

## 2019-05-15 DIAGNOSIS — C50.411 MALIGNANT NEOPLASM OF UPPER-OUTER QUADRANT OF RIGHT BREAST IN FEMALE, ESTROGEN RECEPTOR POSITIVE (HCC): Primary | ICD-10-CM

## 2019-05-15 PROCEDURE — G6002 STEREOSCOPIC X-RAY GUIDANCE: HCPCS | Performed by: RADIOLOGY

## 2019-05-15 PROCEDURE — 77427 RADIATION TX MANAGEMENT X5: CPT | Performed by: RADIOLOGY

## 2019-05-15 PROCEDURE — 77321 SPECIAL TELETX PORT PLAN: CPT | Performed by: RADIOLOGY

## 2019-05-15 PROCEDURE — 77412 RADIATION TX DELIVERY LVL 3: CPT | Performed by: RADIOLOGY

## 2019-05-15 PROCEDURE — 77331 SPECIAL RADIATION DOSIMETRY: CPT | Performed by: RADIOLOGY

## 2019-05-15 NOTE — PROGRESS NOTES
On Treatment Visit       Patient: Myo Sparrow   YOB: 1980   Medical Record Number: [id number]     Date of Visit  May 15, 2019   Primary Diagnosis: Stage IA (pT1c, pN1mi, cM0, G2, ER: Positive, [place]: Negative, HER2: Negative, Oncotype DX score: 36) moderately differentiated invasive ductal carcinoma of the upper outer quadrant of the right breast, status post right mastectomy with SNB followed by adjuvant chemotherapy.   ICD 10 Code: C50.411     was seen today for an on treatment visit.  She is receiving radiation therapy to the right chest wall and surrounding lymphatics. She  has received 4680 cGy in 26 fractions out of a planned dose of 6040 cGy in 33 fractions. She is not receiving concurrent chemotherapy.     Today on exam the patient complains of increased tenderness in the right axilla.  She is otherwise tolerating radiation therapy well and has no other disease or treatment-related complaints.  She continues to complain of mild fatigue.    On exam, mild–moderate erythema is noted in the treatment field, with a small area of dry desquamation in the right axilla.                                             Vitals:     Vitals:    05/15/19 0919   BP: 108/76   Pulse: 80   Resp: 16   Temp: 98.6 °F (37 °C)       Weight:   Wt Readings from Last 3 Encounters:   05/15/19 66.8 kg (147 lb 3.2 oz)   05/08/19 66.3 kg (146 lb 1.6 oz)   05/01/19 66.7 kg (147 lb)      Pain:    Pain Score    05/15/19 0919   PainSc: 0-No pain   PainLoc: Breast  Comment: Under Rt Arm         Plan: We plan to continue radiation therapy as prescribed.  A prescription for Silvadene was sent to her pharmacy.  Ms. Sparrow is scheduled to complete radiation therapy next week.  She has follow-up scheduled with Dr. Hilton on 5/31/2019.  She has plans to follow with Dr. Ortega as well (follow-up appointment pending).  We plan to see the patient back in our clinic for routine follow-up in 2 months.      Claudio Amezquita  MD  Radiation Oncology    Electronically signed by Claudio Amezquita MD 5/15/2019  10:23 AM     cc: Dr. Hailey Cunningham, APRN

## 2019-05-16 ENCOUNTER — HOSPITAL ENCOUNTER (OUTPATIENT)
Dept: RADIATION ONCOLOGY | Facility: HOSPITAL | Age: 39
Discharge: HOME | End: 2019-05-16

## 2019-05-16 PROCEDURE — 77417 THER RADIOLOGY PORT IMAGE(S): CPT

## 2019-05-16 PROCEDURE — 77412 RADIATION TX DELIVERY LVL 3: CPT | Performed by: RADIOLOGY

## 2019-05-16 PROCEDURE — G6002 STEREOSCOPIC X-RAY GUIDANCE: HCPCS | Performed by: RADIOLOGY

## 2019-05-17 ENCOUNTER — HOSPITAL ENCOUNTER (OUTPATIENT)
Dept: RADIATION ONCOLOGY | Facility: HOSPITAL | Age: 39
Discharge: HOME | End: 2019-05-17

## 2019-05-17 PROCEDURE — 77412 RADIATION TX DELIVERY LVL 3: CPT | Performed by: RADIOLOGY

## 2019-05-17 PROCEDURE — G6002 STEREOSCOPIC X-RAY GUIDANCE: HCPCS | Performed by: RADIOLOGY

## 2019-05-20 ENCOUNTER — HOSPITAL ENCOUNTER (OUTPATIENT)
Dept: RADIATION ONCOLOGY | Facility: HOSPITAL | Age: 39
Discharge: HOME | End: 2019-05-20

## 2019-05-20 PROCEDURE — G6002 STEREOSCOPIC X-RAY GUIDANCE: HCPCS | Performed by: RADIOLOGY

## 2019-05-20 PROCEDURE — 77412 RADIATION TX DELIVERY LVL 3: CPT | Performed by: RADIOLOGY

## 2019-05-21 ENCOUNTER — HOSPITAL ENCOUNTER (OUTPATIENT)
Dept: RADIATION ONCOLOGY | Facility: HOSPITAL | Age: 39
Discharge: HOME | End: 2019-05-21

## 2019-05-21 PROCEDURE — 77336 RADIATION PHYSICS CONSULT: CPT | Performed by: RADIOLOGY

## 2019-05-21 PROCEDURE — 77412 RADIATION TX DELIVERY LVL 3: CPT | Performed by: RADIOLOGY

## 2019-05-22 ENCOUNTER — HOSPITAL ENCOUNTER (OUTPATIENT)
Dept: RADIATION ONCOLOGY | Facility: HOSPITAL | Age: 39
Discharge: HOME | End: 2019-05-22

## 2019-05-22 ENCOUNTER — RADIATION ONCOLOGY WEEKLY ASSESSMENT (OUTPATIENT)
Dept: RADIATION ONCOLOGY | Facility: HOSPITAL | Age: 39
End: 2019-05-22

## 2019-05-22 VITALS
RESPIRATION RATE: 16 BRPM | SYSTOLIC BLOOD PRESSURE: 105 MMHG | DIASTOLIC BLOOD PRESSURE: 72 MMHG | HEART RATE: 76 BPM | WEIGHT: 148.7 LBS | BODY MASS INDEX: 28.07 KG/M2 | HEIGHT: 61 IN | TEMPERATURE: 98.3 F

## 2019-05-22 DIAGNOSIS — Z17.0 MALIGNANT NEOPLASM OF UPPER-OUTER QUADRANT OF RIGHT BREAST IN FEMALE, ESTROGEN RECEPTOR POSITIVE (HCC): Primary | ICD-10-CM

## 2019-05-22 DIAGNOSIS — C50.411 MALIGNANT NEOPLASM OF UPPER-OUTER QUADRANT OF RIGHT BREAST IN FEMALE, ESTROGEN RECEPTOR POSITIVE (HCC): Primary | ICD-10-CM

## 2019-05-22 PROCEDURE — 77427 RADIATION TX MANAGEMENT X5: CPT | Performed by: RADIOLOGY

## 2019-05-22 PROCEDURE — G6002 STEREOSCOPIC X-RAY GUIDANCE: HCPCS | Performed by: RADIOLOGY

## 2019-05-22 PROCEDURE — 77412 RADIATION TX DELIVERY LVL 3: CPT | Performed by: RADIOLOGY

## 2019-05-22 NOTE — PROGRESS NOTES
On Treatment Visit       Patient: Moy Sparrow   YOB: 1980   Medical Record Number: [id number]     Date of Visit  May 22, 2019   Primary Diagnosis: Cancer Staging  Malignant neoplasm of upper-outer quadrant of right breast in female, estrogen receptor positive (CMS/HCC)  Staging form: Breast, AJCC 8th Edition  - Pathologic: Stage IA (pT1c, pN1mi, cM0, G2, ER: Positive, [place]: Negative, HER2: Negative, Oncotype DX score: 36) - Signed by Claudio Amezquita MD on 10/16/2018    ICD 10 Code: C50.411       was seen today for an on treatment visit.  She is receiving radiation therapy to the right chest wall. She  has received 5640 cGy in 31 fractions out of a planned dose of 6040 cGy in 33 fractions. She is not receiving concurrent chemotherapy.     Today on exam the patient is tolerating radiation therapy well with the exception of desquamation changes on the right chest wall for which she is using Silvadene.                                             Vitals:     Vitals:    05/22/19 1445   BP: 105/72   Pulse: 76   Resp: 16   Temp: 98.3 °F (36.8 °C)       Weight:   Wt Readings from Last 3 Encounters:   05/22/19 67.4 kg (148 lb 11.2 oz)   05/15/19 66.8 kg (147 lb 3.2 oz)   05/08/19 66.3 kg (146 lb 1.6 oz)      Pain:    Pain Score    05/22/19 1445   PainSc: 3  Comment: chest wall skin         Plan: We plan to continue radiation therapy as prescribed. She will complete her radiotherapy course on 5/24/2019.          Radiation Oncology    Electronically signed by Jah Giles MD 5/22/2019  3:06 PM

## 2019-05-23 ENCOUNTER — HOSPITAL ENCOUNTER (OUTPATIENT)
Dept: RADIATION ONCOLOGY | Facility: HOSPITAL | Age: 39
Discharge: HOME | End: 2019-05-23

## 2019-05-23 PROCEDURE — 77412 RADIATION TX DELIVERY LVL 3: CPT | Performed by: RADIOLOGY

## 2019-05-23 PROCEDURE — G6002 STEREOSCOPIC X-RAY GUIDANCE: HCPCS | Performed by: RADIOLOGY

## 2019-05-24 ENCOUNTER — HOSPITAL ENCOUNTER (OUTPATIENT)
Dept: RADIATION ONCOLOGY | Facility: HOSPITAL | Age: 39
Discharge: HOME | End: 2019-05-24

## 2019-05-24 PROCEDURE — 77336 RADIATION PHYSICS CONSULT: CPT | Performed by: RADIOLOGY

## 2019-05-24 PROCEDURE — G6002 STEREOSCOPIC X-RAY GUIDANCE: HCPCS | Performed by: RADIOLOGY

## 2019-05-24 PROCEDURE — 77412 RADIATION TX DELIVERY LVL 3: CPT | Performed by: RADIOLOGY

## 2019-05-31 ENCOUNTER — HOSPITAL ENCOUNTER (OUTPATIENT)
Dept: GENERAL RADIOLOGY | Facility: HOSPITAL | Age: 39
Discharge: HOME | End: 2019-05-31
Admitting: INTERNAL MEDICINE

## 2019-05-31 ENCOUNTER — LAB (OUTPATIENT)
Dept: ONCOLOGY | Facility: HOSPITAL | Age: 39
End: 2019-05-31

## 2019-05-31 ENCOUNTER — OFFICE VISIT (OUTPATIENT)
Dept: ONCOLOGY | Facility: CLINIC | Age: 39
End: 2019-05-31

## 2019-05-31 VITALS
HEART RATE: 113 BPM | TEMPERATURE: 98.7 F | HEIGHT: 61 IN | WEIGHT: 151.8 LBS | SYSTOLIC BLOOD PRESSURE: 122 MMHG | BODY MASS INDEX: 28.66 KG/M2 | RESPIRATION RATE: 18 BRPM | OXYGEN SATURATION: 100 % | DIASTOLIC BLOOD PRESSURE: 75 MMHG

## 2019-05-31 DIAGNOSIS — Z17.0 MALIGNANT NEOPLASM OF UPPER-OUTER QUADRANT OF RIGHT BREAST IN FEMALE, ESTROGEN RECEPTOR POSITIVE (HCC): Primary | ICD-10-CM

## 2019-05-31 DIAGNOSIS — F41.1 GENERALIZED ANXIETY DISORDER: ICD-10-CM

## 2019-05-31 DIAGNOSIS — IMO0001 OTHER COMPLICATION DUE TO VENOUS ACCESS DEVICE, INITIAL ENCOUNTER: ICD-10-CM

## 2019-05-31 DIAGNOSIS — Z17.0 MALIGNANT NEOPLASM OF UPPER-OUTER QUADRANT OF RIGHT BREAST IN FEMALE, ESTROGEN RECEPTOR POSITIVE (HCC): ICD-10-CM

## 2019-05-31 DIAGNOSIS — M54.50 ACUTE LOW BACK PAIN WITHOUT SCIATICA, UNSPECIFIED BACK PAIN LATERALITY: ICD-10-CM

## 2019-05-31 DIAGNOSIS — C50.411 MALIGNANT NEOPLASM OF UPPER-OUTER QUADRANT OF RIGHT BREAST IN FEMALE, ESTROGEN RECEPTOR POSITIVE (HCC): Primary | ICD-10-CM

## 2019-05-31 DIAGNOSIS — Z45.2 ENCOUNTER FOR VENOUS ACCESS DEVICE CARE: ICD-10-CM

## 2019-05-31 DIAGNOSIS — C50.411 MALIGNANT NEOPLASM OF UPPER-OUTER QUADRANT OF RIGHT BREAST IN FEMALE, ESTROGEN RECEPTOR POSITIVE (HCC): ICD-10-CM

## 2019-05-31 LAB
ALBUMIN SERPL-MCNC: 4.2 G/DL (ref 3.5–5.2)
ALBUMIN/GLOB SERPL: 1.4 G/DL
ALP SERPL-CCNC: 88 U/L (ref 39–117)
ALT SERPL W P-5'-P-CCNC: 18 U/L (ref 1–33)
ANION GAP SERPL CALCULATED.3IONS-SCNC: 13 MMOL/L
AST SERPL-CCNC: 20 U/L (ref 1–32)
BASOPHILS # BLD AUTO: 0.03 10*3/MM3 (ref 0–0.2)
BASOPHILS NFR BLD AUTO: 0.6 % (ref 0–1.5)
BILIRUB SERPL-MCNC: 0.4 MG/DL (ref 0.2–1.2)
BUN BLD-MCNC: 14 MG/DL (ref 6–20)
BUN/CREAT SERPL: 21.5 (ref 7–25)
CALCIUM SPEC-SCNC: 9.1 MG/DL (ref 8.6–10.5)
CHLORIDE SERPL-SCNC: 103 MMOL/L (ref 98–107)
CO2 SERPL-SCNC: 25 MMOL/L (ref 22–29)
CREAT BLD-MCNC: 0.65 MG/DL (ref 0.57–1)
DEPRECATED RDW RBC AUTO: 41.9 FL (ref 37–54)
EOSINOPHIL # BLD AUTO: 0.07 10*3/MM3 (ref 0–0.4)
EOSINOPHIL NFR BLD AUTO: 1.4 % (ref 0.3–6.2)
ERYTHROCYTE [DISTWIDTH] IN BLOOD BY AUTOMATED COUNT: 13.7 % (ref 12.3–15.4)
GFR SERPL CREATININE-BSD FRML MDRD: 100 ML/MIN/1.73
GLOBULIN UR ELPH-MCNC: 3.1 GM/DL
GLUCOSE BLD-MCNC: 100 MG/DL (ref 65–99)
HCT VFR BLD AUTO: 34.3 % (ref 34–46.6)
HGB BLD-MCNC: 11.7 G/DL (ref 12–15.9)
IMM GRANULOCYTES # BLD AUTO: 0.02 10*3/MM3 (ref 0–0.05)
IMM GRANULOCYTES NFR BLD AUTO: 0.4 % (ref 0–0.5)
LYMPHOCYTES # BLD AUTO: 0.44 10*3/MM3 (ref 0.7–3.1)
LYMPHOCYTES NFR BLD AUTO: 8.7 % (ref 19.6–45.3)
MCH RBC QN AUTO: 28.4 PG (ref 26.6–33)
MCHC RBC AUTO-ENTMCNC: 34.1 G/DL (ref 31.5–35.7)
MCV RBC AUTO: 83.3 FL (ref 79–97)
MONOCYTES # BLD AUTO: 0.34 10*3/MM3 (ref 0.1–0.9)
MONOCYTES NFR BLD AUTO: 6.7 % (ref 5–12)
NEUTROPHILS # BLD AUTO: 4.18 10*3/MM3 (ref 1.7–7)
NEUTROPHILS NFR BLD AUTO: 82.2 % (ref 42.7–76)
NRBC BLD AUTO-RTO: 0 /100 WBC (ref 0–0.2)
PLATELET # BLD AUTO: 193 10*3/MM3 (ref 140–450)
PMV BLD AUTO: 8.8 FL (ref 6–12)
POTASSIUM BLD-SCNC: 3.7 MMOL/L (ref 3.5–5.2)
PROT SERPL-MCNC: 7.3 G/DL (ref 6–8.5)
RBC # BLD AUTO: 4.12 10*6/MM3 (ref 3.77–5.28)
SODIUM BLD-SCNC: 141 MMOL/L (ref 136–145)
WBC NRBC COR # BLD: 5.08 10*3/MM3 (ref 3.4–10.8)

## 2019-05-31 PROCEDURE — 80053 COMPREHEN METABOLIC PANEL: CPT | Performed by: INTERNAL MEDICINE

## 2019-05-31 PROCEDURE — 82670 ASSAY OF TOTAL ESTRADIOL: CPT | Performed by: INTERNAL MEDICINE

## 2019-05-31 PROCEDURE — 36591 DRAW BLOOD OFF VENOUS DEVICE: CPT | Performed by: INTERNAL MEDICINE

## 2019-05-31 PROCEDURE — 83002 ASSAY OF GONADOTROPIN (LH): CPT | Performed by: INTERNAL MEDICINE

## 2019-05-31 PROCEDURE — 72100 X-RAY EXAM L-S SPINE 2/3 VWS: CPT

## 2019-05-31 PROCEDURE — 85025 COMPLETE CBC W/AUTO DIFF WBC: CPT | Performed by: INTERNAL MEDICINE

## 2019-05-31 PROCEDURE — 83001 ASSAY OF GONADOTROPIN (FSH): CPT | Performed by: INTERNAL MEDICINE

## 2019-05-31 PROCEDURE — 99214 OFFICE O/P EST MOD 30 MIN: CPT | Performed by: INTERNAL MEDICINE

## 2019-05-31 RX ORDER — SODIUM CHLORIDE 0.9 % (FLUSH) 0.9 %
10 SYRINGE (ML) INJECTION AS NEEDED
Status: CANCELLED | OUTPATIENT
Start: 2019-06-13

## 2019-05-31 RX ORDER — BENZOYL PEROXIDE 50 MG/ML
LIQUID TOPICAL
Refills: 2 | COMMUNITY
Start: 2019-05-08 | End: 2021-05-04

## 2019-05-31 RX ORDER — SODIUM CHLORIDE 0.9 % (FLUSH) 0.9 %
10 SYRINGE (ML) INJECTION AS NEEDED
Status: DISCONTINUED | OUTPATIENT
Start: 2019-05-31 | End: 2019-05-31 | Stop reason: HOSPADM

## 2019-05-31 RX ADMIN — SODIUM CHLORIDE, PRESERVATIVE FREE 10 ML: 5 INJECTION INTRAVENOUS at 13:58

## 2019-05-31 RX ADMIN — SODIUM CHLORIDE, PRESERVATIVE FREE 10 ML: 5 INJECTION INTRAVENOUS at 14:37

## 2019-05-31 RX ADMIN — Medication 500 UNITS: at 14:39

## 2019-06-01 LAB
ESTRADIOL SERPL HS-MCNC: <5 PG/ML
FSH SERPL-ACNC: 81.1 MIU/ML
LH SERPL-ACNC: 40.3 MIU/ML

## 2019-06-03 ENCOUNTER — TELEPHONE (OUTPATIENT)
Dept: ONCOLOGY | Facility: HOSPITAL | Age: 39
End: 2019-06-03

## 2019-06-03 NOTE — TELEPHONE ENCOUNTER
----- Message from Hailey Emery MD sent at 6/3/2019  8:17 AM CDT -----  Please let patient know that her x ray was normal. Her pain is likely muscular in nature.  
Pt called and informed. Pt voiced understanding.   
Home

## 2019-06-06 ENCOUNTER — TELEPHONE (OUTPATIENT)
Dept: ONCOLOGY | Facility: HOSPITAL | Age: 39
End: 2019-06-06

## 2019-06-06 NOTE — TELEPHONE ENCOUNTER
----- Message from Hailey Emery MD sent at 6/6/2019  1:12 AM CDT -----  Please arrange her lupron injection starting next week, every 4 weeks.

## 2019-06-07 NOTE — PROGRESS NOTES
Moy Sparrow  [id number]  1980    Subjective    Ms Andrews presented for follow up appointment for breast cancer.   She has completed adjuvant RT.   Tolerated it well.   Reporting new lower back pain. She is very anxious of cancer recurrence. Reassurance given.   We discussed role of ovarian suppression and hormonal therapy in her case. Side effects discussed at length. She was in agreement with proceeding.   ROS as below.       History of Present Illness    This is a very pleasant 38-year-old female who was seen in consultation at the request of Dr. Ortega for evaluation of newly diagnosed breast cancer on 9/24/18.  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  Her past medical history is otherwise unremarkable except history of anxiety/depression and gastroesophageal reflux disease.She tells me that she felt a lump in her right breast in August 2018 and subsequently underwent a mammogram and ultrasonographic evaluation on August 27 which showed right breast mass approximately 1.5 cm.  The breast mass was felt to be suspicious for malignancy.  She then underwent ultrasound guided biopsy of right breast on 8/28/18 which showed invasive ductal carcinoma, moderately differentiated.  The carcinoma was tested positive for estrogen receptor and it was tested negative for progesterone receptor as well as HER-2.  Patient underwent right mastectomy and sentinel lymph node biopsy on September 5 which showed invasive ductal carcinoma of 1.5 cm, moderately differentiated which was completely excised.  [place] lymph node showed micrometastasis (less than 2 mm) involving 1 of 7 lymph nodes.    Her recurrence score was high at 36, so we recommended adjuvant chemotherapy with dose dense AC followed by taxane. She was started on chemotherapy on 10/15/18.     Past Medical History, Past Surgical History, Social History, Family History have been reviewed and are without significant changes except as  "mentioned.    Review of Systems   CONSTITUTIONAL: Fatigue+  Weight loss + No  fever, chills, weakness.  HEENT: Eyes: No visual loss, blurred vision, double vision or yellow sclerae. Ears, Nose, Throat: No hearing loss, sneezing, congestion, runny nose or sore throat.  SKIN: No rash or itching.  CARDIOVASCULAR: No chest pain . No palpitations or edema.  RESPIRATORY: chronic exertional SOB + No  cough or sputum.  GASTROINTESTINAL: nausea + No anorexia,  vomiting or diarrhea. No abdominal pain or blood.  GENITOURINARY: Negative for urgency, frequency or dysuria.   NEUROLOGICAL: No headache, dizziness, syncope, paralysis, ataxia, numbness or tingling in the extremities. No change in bowel or bladder control.  MUSCULOSKELETAL: Back pain+ lower back pain is new   HEMATOLOGIC: No anemia, bleeding or bruising.  LYMPHATICS: No enlarged nodes. No history of splenectomy.  PSYCHIATRIC: Anxiety + depression+  ENDOCRINOLOGIC: No reports of sweating, cold or heat intolerance. No polyuria or polydipsia.  ALLERGIES: No history of asthma, hives, eczema or rhinitis.    Medications:  The current medication list was reviewed in the EMR    ALLERGIES:    Allergies   Allergen Reactions   • Penicillins Rash       Objective      Vitals:    11/26/18 0857   BP: 124/81   Pulse: 100   Resp: 18   Temp: 99.8 °F (37.7 °C)   TempSrc: Temporal   SpO2: 98%   Weight: 65.8 kg (145 lb)   Height: 154.9 cm (60.98\")   PainSc: 0-No pain     Current Status 11/26/2018   ECOG score 0       Physical Exam    General: Alert, awake, oriented.  Well dressed.  Not in apparent distress. Vitals as above.   HEAD: normocephalic, atraumatic.   EYES: PERRL, EOMI. Fundi normal, vision is grossly intact.  Neck: Supple, no adenopathy or thyromegaly.   Throat: normal oral cavity and pharynx. No inflammation, swelling, exudate, or lesions.  CARDIAC: Normal S1 and S2. No S3, S4 or murmurs. Rhythm is regular.Extremities are warm and well perfused.   LUNGS: Clear to auscultation " and percussion without rales, rhonchi, wheezing or diminished breath sounds.  ABDOMEN: Positive bowel sounds. Soft, nondistended, nontender  . No guarding or rebound. No masses.  Back:No bony tenderness.   EXTREMITIES: No significant deformity or joint abnormality.  Peripheral pulses intact. No varicosities.  Skin: No rash or bruising.  Neurological: Grossly non-focal exam. No focal weakness. Gait: Normal.   Psych: Mood and affect normal. No hallucination or suicidal thoughts.   Lymphatics:No adenopathy     RECENT LABS: Independently reviewed and summarized  Hematology WBC   Date Value Ref Range Status   11/26/2018 9.18 3.20 - 9.80 10*3/mm3 Final     RBC   Date Value Ref Range Status   11/26/2018 3.76 (L) 3.77 - 5.16 10*6/mm3 Final     Hemoglobin   Date Value Ref Range Status   11/26/2018 11.1 (L) 12.0 - 15.5 g/dL Final     Hematocrit   Date Value Ref Range Status   11/26/2018 32.4 (L) 35.0 - 45.0 % Final     Platelets   Date Value Ref Range Status   11/26/2018 222 150 - 450 10*3/mm3 Final            Lab Results   Component Value Date    GLUCOSE 113 (H) 11/26/2018    BUN 15 11/26/2018    CREATININE 0.72 11/26/2018    EGFRIFNONA 90 11/26/2018    BCR 20.8 11/26/2018    K 3.4 (L) 11/26/2018    CO2 24.0 11/26/2018    CALCIUM 9.1 11/26/2018    ALBUMIN 4.70 11/26/2018    AST 22 11/26/2018    ALT 12 11/26/2018     X ray lumbar spine reviewed. Showed no fracture or mets. No prior comparison available.     Diagnosis:   (1) Invasive ductal carcinoma, right   Date of diagnosis: 9/5/18   Stage: IB ([id number]), recurrence score 36   Prior treatment:   Right mastectomy with sentinel node biopsy and ALND (9/5/18)  Chemotherapy: Start date (10/15/18)   Completed dose dense AC followed by dose dense taxol (2/11/19, last dose 1/28/19).   Adjuvant RT: 5040 cGy right chest wall and axilla     Current therapy:   Consideration of Lupron and AI.       (2) Upper back pain   (3) Anxiety       Assessment/Plan       (1) Invasive ductal  carcinoma, right, stage IB, ER+/CA-/HER2-        - She has completed dose dense AC followed by dose dense taxol.   - No major side effects of treatment. Mild peripheral neuropathy in legs.   - She has completed her adjuvant RT.   -  We discussed role of ovarian suppression and hormonal therapy.   - Her labs showed post menopausal status; however, this could be chemotherapy induce.   - We will start her on lupron every month and start exemestane. Discussed risks associated with hormonal therapy at length.     (2) Upper back/chest pain: Chronic, likely musculo-skeletal in nature. On PRN tramadol.     (3) Anxiety: Continues to struggle with generalized anxiety. On pristiq and PRN valium. Prescription of valium given to the patient.     (4) Lower back pain: New problem. X lower spine ordered and reviewd - negative for fracture or mets. Reassurance given. Recommend topical cream.       Yobani Emery MD     11/26/2018      CC:

## 2019-06-13 ENCOUNTER — INFUSION (OUTPATIENT)
Dept: ONCOLOGY | Facility: HOSPITAL | Age: 39
End: 2019-06-13
Attending: INTERNAL MEDICINE

## 2019-06-13 DIAGNOSIS — C50.411 MALIGNANT NEOPLASM OF UPPER-OUTER QUADRANT OF RIGHT BREAST IN FEMALE, ESTROGEN RECEPTOR POSITIVE (HCC): Primary | ICD-10-CM

## 2019-06-13 DIAGNOSIS — Z17.0 MALIGNANT NEOPLASM OF UPPER-OUTER QUADRANT OF RIGHT BREAST IN FEMALE, ESTROGEN RECEPTOR POSITIVE (HCC): Primary | ICD-10-CM

## 2019-06-13 PROCEDURE — 25010000002 LEUPROLIDE ACETATE PER 7.5 MG: Performed by: INTERNAL MEDICINE

## 2019-06-13 PROCEDURE — 96402 CHEMO HORMON ANTINEOPL SQ/IM: CPT | Performed by: INTERNAL MEDICINE

## 2019-06-13 RX ADMIN — LEUPROLIDE ACETATE 7.5 MG: KIT at 10:24

## 2019-06-13 NOTE — PATIENT INSTRUCTIONS
Leuprolide injection  What is this medicine?  LEUPROLIDE (loo PROE lide) is a man-made hormone. It is used to treat the symptoms of prostate cancer. This medicine may also be used to treat children with early onset of puberty. It may be used for other hormonal conditions.  This medicine may be used for other purposes; ask your health care provider or pharmacist if you have questions.  COMMON BRAND NAME(S): Aayush  What should I tell my health care provider before I take this medicine?  They need to know if you have any of these conditions:  -diabetes  -heart disease or previous heart attack  -high blood pressure  -high cholesterol  -pain or difficulty passing urine  -spinal cord metastasis  -stroke  -tobacco smoker  -an unusual or allergic reaction to leuprolide, benzyl alcohol, other medicines, foods, dyes, or preservatives  -pregnant or trying to get pregnant  -breast-feeding  How should I use this medicine?  This medicine is for injection under the skin or into a muscle. You will be taught how to prepare and give this medicine. Use exactly as directed. Take your medicine at regular intervals. Do not take your medicine more often than directed.  It is important that you put your used needles and syringes in a special sharps container. Do not put them in a trash can. If you do not have a sharps container, call your pharmacist or healthcare provider to get one.  A special MedGuide will be given to you by the pharmacist with each prescription and refill. Be sure to read this information carefully each time.  Talk to your pediatrician regarding the use of this medicine in children. While this medicine may be prescribed for children as young as 8 years for selected conditions, precautions do apply.  Overdosage: If you think you have taken too much of this medicine contact a poison control center or emergency room at once.  NOTE: This medicine is only for you. Do not share this medicine with others.  What if I miss a  dose?  If you miss a dose, take it as soon as you can. If it is almost time for your next dose, take only that dose. Do not take double or extra doses.  What may interact with this medicine?  Do not take this medicine with any of the following medications:  -chasteberry  This medicine may also interact with the following medications:  -herbal or dietary supplements, like black cohosh or DHEA  -female hormones, like estrogens or progestins and birth control pills, patches, rings, or injections  -male hormones, like testosterone  This list may not describe all possible interactions. Give your health care provider a list of all the medicines, herbs, non-prescription drugs, or dietary supplements you use. Also tell them if you smoke, drink alcohol, or use illegal drugs. Some items may interact with your medicine.  What should I watch for while using this medicine?  Visit your doctor or health care professional for regular checks on your progress. During the first week, your symptoms may get worse, but then will improve as you continue your treatment. You may get hot flashes, increased bone pain, increased difficulty passing urine, or an aggravation of nerve symptoms. Discuss these effects with your doctor or health care professional, some of them may improve with continued use of this medicine.  Female patients may experience a menstrual cycle or spotting during the first 2 months of therapy with this medicine. If this continues, contact your doctor or health care professional.  What side effects may I notice from receiving this medicine?  Side effects that you should report to your doctor or health care professional as soon as possible:  -allergic reactions like skin rash, itching or hives, swelling of the face, lips, or tongue  -breathing problems  -chest pain  -depression or memory disorders  -pain in your legs or groin  -pain at site where injected  -severe headache  -swelling of the feet and legs  -visual  changes  -vomiting  Side effects that usually do not require medical attention (report to your doctor or health care professional if they continue or are bothersome):  -breast swelling or tenderness  -decrease in sex drive or performance  -diarrhea  -hot flashes  -loss of appetite  -muscle, joint, or bone pains  -nausea  -redness or irritation at site where injected  -skin problems or acne  This list may not describe all possible side effects. Call your doctor for medical advice about side effects. You may report side effects to FDA at 4-932-WJD-6967.  Where should I keep my medicine?  Keep out of the reach of children.  Store below 25 degrees C (77 degrees F). Do not freeze. Protect from light. Do not use if it is not clear or if there are particles present. Throw away any unused medicine after the expiration date.  NOTE: This sheet is a summary. It may not cover all possible information. If you have questions about this medicine, talk to your doctor, pharmacist, or health care provider.  © 2019 [employer] (2017-08-07 10:54:35)

## 2019-06-25 ENCOUNTER — OFFICE VISIT (OUTPATIENT)
Dept: SURGERY | Facility: CLINIC | Age: 39
End: 2019-06-25

## 2019-06-25 VITALS
HEIGHT: 61 IN | SYSTOLIC BLOOD PRESSURE: 120 MMHG | TEMPERATURE: 97.7 F | BODY MASS INDEX: 28.32 KG/M2 | DIASTOLIC BLOOD PRESSURE: 62 MMHG | HEART RATE: 68 BPM | WEIGHT: 150 LBS

## 2019-06-25 DIAGNOSIS — Z12.39 SCREENING BREAST EXAMINATION: Primary | ICD-10-CM

## 2019-06-25 PROCEDURE — 99212 OFFICE O/P EST SF 10 MIN: CPT | Performed by: SURGERY

## 2019-06-25 NOTE — PROGRESS NOTES
39-year-old female who is now 9 months out from a right mastectomy and now subsequent chemo and radiation for T1 invasive ductal carcinoma with one micro met that was estrogen positive.  Patient had a high Oncotype score so she recommended to receive chemotherapy which she has completed as well as radiation therapy.  Patient is doing well no complaints.  She stopped smoking 4 months ago.  Denies any arm swelling or any symptoms.  Last mammogram was prior to her treatments.    Right chest wall is unremarkable there is no palpable masses no wounds or ulcerations.  No appreciable arm swelling and no adenopathy.  Left breast is unremarkable    Port is in place      Doing well.  Will obtain a mammogram in 6 months and return to clinic after the study or sooner if she has any other concerns or questions.  She will continue to follow-up with the [facility] as she is already doing.

## 2019-06-25 NOTE — PATIENT INSTRUCTIONS

## 2019-07-01 ENCOUNTER — APPOINTMENT (OUTPATIENT)
Dept: ONCOLOGY | Facility: HOSPITAL | Age: 39
End: 2019-07-01
Attending: INTERNAL MEDICINE

## 2019-07-01 ENCOUNTER — APPOINTMENT (OUTPATIENT)
Dept: ONCOLOGY | Facility: CLINIC | Age: 39
End: 2019-07-01
Attending: INTERNAL MEDICINE

## 2019-07-08 ENCOUNTER — INFUSION (OUTPATIENT)
Dept: ONCOLOGY | Facility: HOSPITAL | Age: 39
End: 2019-07-08
Attending: INTERNAL MEDICINE

## 2019-07-08 ENCOUNTER — OFFICE VISIT (OUTPATIENT)
Dept: ONCOLOGY | Facility: CLINIC | Age: 39
End: 2019-07-08
Attending: INTERNAL MEDICINE

## 2019-07-08 VITALS
SYSTOLIC BLOOD PRESSURE: 119 MMHG | RESPIRATION RATE: 18 BRPM | BODY MASS INDEX: 28.62 KG/M2 | WEIGHT: 151.6 LBS | HEIGHT: 61 IN | HEART RATE: 79 BPM | OXYGEN SATURATION: 100 % | DIASTOLIC BLOOD PRESSURE: 78 MMHG | TEMPERATURE: 98.4 F

## 2019-07-08 DIAGNOSIS — C50.411 MALIGNANT NEOPLASM OF UPPER-OUTER QUADRANT OF RIGHT BREAST IN FEMALE, ESTROGEN RECEPTOR POSITIVE (HCC): ICD-10-CM

## 2019-07-08 DIAGNOSIS — IMO0001 OTHER COMPLICATION DUE TO VENOUS ACCESS DEVICE, INITIAL ENCOUNTER: ICD-10-CM

## 2019-07-08 DIAGNOSIS — Z45.2 ENCOUNTER FOR VENOUS ACCESS DEVICE CARE: Primary | ICD-10-CM

## 2019-07-08 DIAGNOSIS — Z17.0 MALIGNANT NEOPLASM OF UPPER-OUTER QUADRANT OF RIGHT BREAST IN FEMALE, ESTROGEN RECEPTOR POSITIVE (HCC): Primary | ICD-10-CM

## 2019-07-08 DIAGNOSIS — G89.29 CHRONIC LEFT-SIDED LOW BACK PAIN WITHOUT SCIATICA: ICD-10-CM

## 2019-07-08 DIAGNOSIS — C50.411 MALIGNANT NEOPLASM OF UPPER-OUTER QUADRANT OF RIGHT BREAST IN FEMALE, ESTROGEN RECEPTOR POSITIVE (HCC): Primary | ICD-10-CM

## 2019-07-08 DIAGNOSIS — M54.50 CHRONIC LEFT-SIDED LOW BACK PAIN WITHOUT SCIATICA: ICD-10-CM

## 2019-07-08 DIAGNOSIS — Z17.0 MALIGNANT NEOPLASM OF UPPER-OUTER QUADRANT OF RIGHT BREAST IN FEMALE, ESTROGEN RECEPTOR POSITIVE (HCC): ICD-10-CM

## 2019-07-08 DIAGNOSIS — F41.1 GENERALIZED ANXIETY DISORDER: ICD-10-CM

## 2019-07-08 LAB
ALBUMIN SERPL-MCNC: 4.1 G/DL (ref 3.5–5.2)
ALBUMIN/GLOB SERPL: 1.3 G/DL
ALP SERPL-CCNC: 91 U/L (ref 39–117)
ALT SERPL W P-5'-P-CCNC: 17 U/L (ref 1–33)
ANION GAP SERPL CALCULATED.3IONS-SCNC: 12 MMOL/L (ref 5–15)
AST SERPL-CCNC: 18 U/L (ref 1–32)
BASOPHILS # BLD AUTO: 0.03 10*3/MM3 (ref 0–0.2)
BASOPHILS NFR BLD AUTO: 0.4 % (ref 0–1.5)
BILIRUB SERPL-MCNC: 0.3 MG/DL (ref 0.2–1.2)
BUN BLD-MCNC: 14 MG/DL (ref 6–20)
BUN/CREAT SERPL: 21.2 (ref 7–25)
CALCIUM SPEC-SCNC: 8.9 MG/DL (ref 8.6–10.5)
CHLORIDE SERPL-SCNC: 103 MMOL/L (ref 98–107)
CO2 SERPL-SCNC: 25 MMOL/L (ref 22–29)
CREAT BLD-MCNC: 0.66 MG/DL (ref 0.57–1)
DEPRECATED RDW RBC AUTO: 43 FL (ref 37–54)
EOSINOPHIL # BLD AUTO: 0.18 10*3/MM3 (ref 0–0.4)
EOSINOPHIL NFR BLD AUTO: 2.4 % (ref 0.3–6.2)
ERYTHROCYTE [DISTWIDTH] IN BLOOD BY AUTOMATED COUNT: 13.5 % (ref 12.3–15.4)
ESTRADIOL SERPL HS-MCNC: <5 PG/ML
GFR SERPL CREATININE-BSD FRML MDRD: 99 ML/MIN/1.73
GLOBULIN UR ELPH-MCNC: 3.1 GM/DL
GLUCOSE BLD-MCNC: 93 MG/DL (ref 65–99)
HCT VFR BLD AUTO: 34.3 % (ref 34–46.6)
HGB BLD-MCNC: 11.3 G/DL (ref 12–15.9)
IMM GRANULOCYTES # BLD AUTO: 0.02 10*3/MM3 (ref 0–0.05)
IMM GRANULOCYTES NFR BLD AUTO: 0.3 % (ref 0–0.5)
LYMPHOCYTES # BLD AUTO: 1.12 10*3/MM3 (ref 0.7–3.1)
LYMPHOCYTES NFR BLD AUTO: 15.1 % (ref 19.6–45.3)
MCH RBC QN AUTO: 28.7 PG (ref 26.6–33)
MCHC RBC AUTO-ENTMCNC: 32.9 G/DL (ref 31.5–35.7)
MCV RBC AUTO: 87.1 FL (ref 79–97)
MONOCYTES # BLD AUTO: 0.51 10*3/MM3 (ref 0.1–0.9)
MONOCYTES NFR BLD AUTO: 6.9 % (ref 5–12)
NEUTROPHILS # BLD AUTO: 5.57 10*3/MM3 (ref 1.7–7)
NEUTROPHILS NFR BLD AUTO: 74.9 % (ref 42.7–76)
NRBC BLD AUTO-RTO: 0 /100 WBC (ref 0–0.2)
PLATELET # BLD AUTO: 202 10*3/MM3 (ref 140–450)
PMV BLD AUTO: 8.9 FL (ref 6–12)
POTASSIUM BLD-SCNC: 3.9 MMOL/L (ref 3.5–5.2)
PROT SERPL-MCNC: 7.2 G/DL (ref 6–8.5)
RBC # BLD AUTO: 3.94 10*6/MM3 (ref 3.77–5.28)
SODIUM BLD-SCNC: 140 MMOL/L (ref 136–145)
WBC NRBC COR # BLD: 7.43 10*3/MM3 (ref 3.4–10.8)

## 2019-07-08 PROCEDURE — 82670 ASSAY OF TOTAL ESTRADIOL: CPT

## 2019-07-08 PROCEDURE — 80053 COMPREHEN METABOLIC PANEL: CPT

## 2019-07-08 PROCEDURE — 85025 COMPLETE CBC W/AUTO DIFF WBC: CPT

## 2019-07-08 PROCEDURE — 36591 DRAW BLOOD OFF VENOUS DEVICE: CPT | Performed by: INTERNAL MEDICINE

## 2019-07-08 PROCEDURE — 99213 OFFICE O/P EST LOW 20 MIN: CPT | Performed by: INTERNAL MEDICINE

## 2019-07-08 RX ORDER — SODIUM CHLORIDE 0.9 % (FLUSH) 0.9 %
10 SYRINGE (ML) INJECTION AS NEEDED
Status: DISCONTINUED | OUTPATIENT
Start: 2019-07-08 | End: 2019-07-08 | Stop reason: HOSPADM

## 2019-07-08 RX ORDER — EXEMESTANE 25 MG/1
25 TABLET ORAL DAILY
Qty: 90 TABLET | Refills: 2 | Status: SHIPPED | OUTPATIENT
Start: 2019-07-08 | End: 2020-05-04

## 2019-07-08 RX ORDER — SODIUM CHLORIDE 0.9 % (FLUSH) 0.9 %
10 SYRINGE (ML) INJECTION AS NEEDED
Status: CANCELLED | OUTPATIENT
Start: 2019-07-11

## 2019-07-08 RX ADMIN — SODIUM CHLORIDE, PRESERVATIVE FREE 10 ML: 5 INJECTION INTRAVENOUS at 08:28

## 2019-07-08 RX ADMIN — Medication 500 UNITS: at 08:53

## 2019-07-08 RX ADMIN — SODIUM CHLORIDE, PRESERVATIVE FREE 10 ML: 5 INJECTION INTRAVENOUS at 08:53

## 2019-07-08 NOTE — PROGRESS NOTES
Moy Sparrow  [id number]  1980    Subjective    Ms Andrews presented for follow up appointment for breast cancer.   She is currently receiving Lupron without any new problems.  Started on exemestane today.  Discussed side effects of exemestane at length with the patient.  She is reporting some hot flashes however this is stable.  Continues to report left lower back pain.  This is likely musculoskeletal in nature.  Reassurance given.  ROS as below.       History of Present Illness    This is a very pleasant 38-year-old female who was seen in consultation at the request of Dr. Ortega for evaluation of newly diagnosed breast cancer on 9/24/18.  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  Her past medical history is otherwise unremarkable except history of anxiety/depression and gastroesophageal reflux disease.She tells me that she felt a lump in her right breast in August 2018 and subsequently underwent a mammogram and ultrasonographic evaluation on August 27 which showed right breast mass approximately 1.5 cm.  The breast mass was felt to be suspicious for malignancy.  She then underwent ultrasound guided biopsy of right breast on 8/28/18 which showed invasive ductal carcinoma, moderately differentiated.  The carcinoma was tested positive for estrogen receptor and it was tested negative for progesterone receptor as well as HER-2.  Patient underwent right mastectomy and sentinel lymph node biopsy on September 5 which showed invasive ductal carcinoma of 1.5 cm, moderately differentiated which was completely excised.  [place] lymph node showed micrometastasis (less than 2 mm) involving 1 of 7 lymph nodes.    Her recurrence score was high at 36, so we recommended adjuvant chemotherapy with dose dense AC followed by taxane.     She completed her adjuvant chemotherapy uneventfully and subsequently received adjuvant radiation therapy.    She is currently on Lupron and exemestane.    Past  "Medical History, Past Surgical History, Social History, Family History have been reviewed and are without significant changes except as mentioned.    Review of Systems   CONSTITUTIONAL: Fatigue+  Weight loss + No  fever, chills, weakness.  HEENT: Eyes: No visual loss, blurred vision, double vision or yellow sclerae. Ears, Nose, Throat: No hearing loss, sneezing, congestion, runny nose or sore throat.  SKIN: No rash or itching.  CARDIOVASCULAR: No chest pain . No palpitations or edema.  RESPIRATORY: chronic exertional SOB + No  cough or sputum.  GASTROINTESTINAL: nausea + No anorexia,  vomiting or diarrhea. No abdominal pain or blood.  GENITOURINARY: Negative for urgency, frequency or dysuria.   NEUROLOGICAL: No headache, dizziness, syncope, paralysis, ataxia, numbness or tingling in the extremities. No change in bowel or bladder control.  MUSCULOSKELETAL: Back pain+   HEMATOLOGIC: No anemia, bleeding or bruising.  LYMPHATICS: No enlarged nodes. No history of splenectomy.  PSYCHIATRIC: Anxiety + depression+  ENDOCRINOLOGIC: No reports of sweating, cold or heat intolerance. No polyuria or polydipsia.  ALLERGIES: No history of asthma, hives, eczema or rhinitis.    Medications:  The current medication list was reviewed in the EMR    ALLERGIES:    Allergies   Allergen Reactions   • Penicillins Rash       Objective      Vitals:    11/26/18 0857   BP: 124/81   Pulse: 100   Resp: 18   Temp: 99.8 °F (37.7 °C)   TempSrc: Temporal   SpO2: 98%   Weight: 65.8 kg (145 lb)   Height: 154.9 cm (60.98\")   PainSc: 0-No pain     Current Status 11/26/2018   ECOG score 0       Physical Exam    General: Alert, awake, oriented.  Well dressed.  Not in apparent distress. Vitals as above.   HEAD: normocephalic, atraumatic.   EYES: PERRL, EOMI. Fundi normal, vision is grossly intact.  Neck: Supple, no adenopathy or thyromegaly.   Throat: normal oral cavity and pharynx. No inflammation, swelling, exudate, or lesions.  CARDIAC: Normal S1 and S2. " No S3, S4 or murmurs. Rhythm is regular.Extremities are warm and well perfused.   LUNGS: Clear to auscultation and percussion without rales, rhonchi, wheezing or diminished breath sounds.  ABDOMEN: Positive bowel sounds. Soft, nondistended, nontender  . No guarding or rebound. No masses.  Back:No bony tenderness.   EXTREMITIES: No significant deformity or joint abnormality.  Peripheral pulses intact. No varicosities.  Skin: No rash or bruising.  Neurological: Grossly non-focal exam. No focal weakness. Gait: Normal.   Psych: Mood and affect normal. No hallucination or suicidal thoughts.   Lymphatics:No adenopathy     RECENT LABS: Independently reviewed and summarized  Hematology WBC   Date Value Ref Range Status   11/26/2018 9.18 3.20 - 9.80 10*3/mm3 Final     RBC   Date Value Ref Range Status   11/26/2018 3.76 (L) 3.77 - 5.16 10*6/mm3 Final     Hemoglobin   Date Value Ref Range Status   11/26/2018 11.1 (L) 12.0 - 15.5 g/dL Final     Hematocrit   Date Value Ref Range Status   11/26/2018 32.4 (L) 35.0 - 45.0 % Final     Platelets   Date Value Ref Range Status   11/26/2018 222 150 - 450 10*3/mm3 Final            Lab Results   Component Value Date    GLUCOSE 113 (H) 11/26/2018    BUN 15 11/26/2018    CREATININE 0.72 11/26/2018    EGFRIFNONA 90 11/26/2018    BCR 20.8 11/26/2018    K 3.4 (L) 11/26/2018    CO2 24.0 11/26/2018    CALCIUM 9.1 11/26/2018    ALBUMIN 4.70 11/26/2018    AST 22 11/26/2018    ALT 12 11/26/2018     X ray lumbar spine reviewed. Showed no fracture or mets. No prior comparison available.     Diagnosis:   (1) Invasive ductal carcinoma, right   Date of diagnosis: 9/5/18   Stage: IB ([id number]), recurrence score 36   Prior treatment:   Right mastectomy with sentinel node biopsy and ALND (9/5/18)  Chemotherapy: Start date (10/15/18)   Completed dose dense AC followed by dose dense taxol (2/11/19, last dose 1/28/19).   Adjuvant RT: 5040 cGy right chest wall and axilla     Current therapy:   Lupron and  exemestane.       (2) Upper back pain   (3) Anxiety   (4) Low back pain       Assessment/Plan       (1) Invasive ductal carcinoma, right, stage IB, ER+/WI-/HER2-        - She has completed dose dense AC followed by dose dense taxol.   - No major side effects of treatment. Mild peripheral neuropathy in legs.   - She has completed her adjuvant RT.   -  We discussed role of ovarian suppression and hormonal therapy.   - Her labs showed post menopausal status; however, this could be chemotherapy induce.   - We will start her on lupron every month and start exemestane. Discussed risks associated with hormonal therapy at length.   - Prescription of exemestane sent to her pharmacy.   - Mammogram in 12/2019.   - Annual DEXA scan while on exemestane.     (2) Upper back/chest pain: Chronic, likely musculo-skeletal in nature. On PRN tramadol.     (3) Anxiety: Continues to struggle with generalized anxiety. On pristiq and PRN valium.     (4) Lower back pain: Persistent low back pain, left side. X ray negative. Likely MSK in nature. Reassurance given.       Yobani Emery MD     11/26/2018      CC:

## 2019-07-09 ENCOUNTER — DOCUMENTATION (OUTPATIENT)
Dept: NUTRITION | Facility: HOSPITAL | Age: 39
End: 2019-07-09

## 2019-07-09 NOTE — PROGRESS NOTES
Adult Outpatient Nutrition  Assessment    Patient Name:  Moy Sparrow  YOB: 1980  MRN: [id number]    Assessment Date:  Entry from 7/8/19 visit    Comments: Brief follow-up to check nutrition. No problems verbalized. Starting to feel more like self (post rad). Wt 151.8 lb (stable).                        Electronically signed by:  Liza Wei RD  07/09/19 10:56 AM

## 2019-07-11 ENCOUNTER — APPOINTMENT (OUTPATIENT)
Dept: ONCOLOGY | Facility: HOSPITAL | Age: 39
End: 2019-07-11
Attending: INTERNAL MEDICINE

## 2019-07-12 ENCOUNTER — INFUSION (OUTPATIENT)
Dept: ONCOLOGY | Facility: HOSPITAL | Age: 39
End: 2019-07-12
Attending: INTERNAL MEDICINE

## 2019-07-12 DIAGNOSIS — C50.411 MALIGNANT NEOPLASM OF UPPER-OUTER QUADRANT OF RIGHT BREAST IN FEMALE, ESTROGEN RECEPTOR POSITIVE (HCC): Primary | ICD-10-CM

## 2019-07-12 DIAGNOSIS — Z17.0 MALIGNANT NEOPLASM OF UPPER-OUTER QUADRANT OF RIGHT BREAST IN FEMALE, ESTROGEN RECEPTOR POSITIVE (HCC): Primary | ICD-10-CM

## 2019-07-12 PROCEDURE — 96402 CHEMO HORMON ANTINEOPL SQ/IM: CPT | Performed by: INTERNAL MEDICINE

## 2019-07-12 PROCEDURE — 25010000002 LEUPROLIDE ACETATE PER 7.5 MG: Performed by: INTERNAL MEDICINE

## 2019-07-12 RX ADMIN — LEUPROLIDE ACETATE 7.5 MG: KIT at 08:37

## 2019-07-25 ENCOUNTER — APPOINTMENT (OUTPATIENT)
Dept: RADIATION ONCOLOGY | Facility: HOSPITAL | Age: 39
End: 2019-07-25

## 2019-08-09 ENCOUNTER — APPOINTMENT (OUTPATIENT)
Dept: ONCOLOGY | Facility: HOSPITAL | Age: 39
End: 2019-08-09
Attending: INTERNAL MEDICINE

## 2019-08-12 ENCOUNTER — INFUSION (OUTPATIENT)
Dept: ONCOLOGY | Facility: HOSPITAL | Age: 39
End: 2019-08-12
Attending: INTERNAL MEDICINE

## 2019-08-12 DIAGNOSIS — C50.411 MALIGNANT NEOPLASM OF UPPER-OUTER QUADRANT OF RIGHT BREAST IN FEMALE, ESTROGEN RECEPTOR POSITIVE (HCC): Primary | ICD-10-CM

## 2019-08-12 DIAGNOSIS — IMO0001 OTHER COMPLICATION DUE TO VENOUS ACCESS DEVICE, INITIAL ENCOUNTER: ICD-10-CM

## 2019-08-12 DIAGNOSIS — Z45.2 ENCOUNTER FOR VENOUS ACCESS DEVICE CARE: ICD-10-CM

## 2019-08-12 DIAGNOSIS — Z17.0 MALIGNANT NEOPLASM OF UPPER-OUTER QUADRANT OF RIGHT BREAST IN FEMALE, ESTROGEN RECEPTOR POSITIVE (HCC): Primary | ICD-10-CM

## 2019-08-12 PROCEDURE — 96523 IRRIG DRUG DELIVERY DEVICE: CPT | Performed by: INTERNAL MEDICINE

## 2019-08-12 PROCEDURE — 96402 CHEMO HORMON ANTINEOPL SQ/IM: CPT | Performed by: INTERNAL MEDICINE

## 2019-08-12 PROCEDURE — 25010000002 LEUPROLIDE ACETATE PER 7.5 MG: Performed by: INTERNAL MEDICINE

## 2019-08-12 RX ORDER — SODIUM CHLORIDE 0.9 % (FLUSH) 0.9 %
10 SYRINGE (ML) INJECTION AS NEEDED
Status: CANCELLED | OUTPATIENT
Start: 2019-09-09

## 2019-08-12 RX ORDER — SODIUM CHLORIDE 0.9 % (FLUSH) 0.9 %
10 SYRINGE (ML) INJECTION AS NEEDED
Status: DISCONTINUED | OUTPATIENT
Start: 2019-08-12 | End: 2019-08-12 | Stop reason: HOSPADM

## 2019-08-12 RX ADMIN — LEUPROLIDE ACETATE 7.5 MG: KIT at 08:34

## 2019-08-12 RX ADMIN — SODIUM CHLORIDE, PRESERVATIVE FREE 10 ML: 5 INJECTION INTRAVENOUS at 08:38

## 2019-08-12 RX ADMIN — Medication 500 UNITS: at 08:39

## 2019-08-12 NOTE — PATIENT INSTRUCTIONS
Leuprolide injection  What is this medicine?  LEUPROLIDE (loo PROE lide) is a man-made hormone. It is used to treat the symptoms of prostate cancer. This medicine may also be used to treat children with early onset of puberty. It may be used for other hormonal conditions.  This medicine may be used for other purposes; ask your health care provider or pharmacist if you have questions.  COMMON BRAND NAME(S): Aayush  What should I tell my health care provider before I take this medicine?  They need to know if you have any of these conditions:  -diabetes  -heart disease or previous heart attack  -high blood pressure  -high cholesterol  -pain or difficulty passing urine  -spinal cord metastasis  -stroke  -tobacco smoker  -an unusual or allergic reaction to leuprolide, benzyl alcohol, other medicines, foods, dyes, or preservatives  -pregnant or trying to get pregnant  -breast-feeding  How should I use this medicine?  This medicine is for injection under the skin or into a muscle. You will be taught how to prepare and give this medicine. Use exactly as directed. Take your medicine at regular intervals. Do not take your medicine more often than directed.  It is important that you put your used needles and syringes in a special sharps container. Do not put them in a trash can. If you do not have a sharps container, call your pharmacist or healthcare provider to get one.  A special MedGuide will be given to you by the pharmacist with each prescription and refill. Be sure to read this information carefully each time.  Talk to your pediatrician regarding the use of this medicine in children. While this medicine may be prescribed for children as young as 8 years for selected conditions, precautions do apply.  Overdosage: If you think you have taken too much of this medicine contact a poison control center or emergency room at once.  NOTE: This medicine is only for you. Do not share this medicine with others.  What if I miss a  dose?  If you miss a dose, take it as soon as you can. If it is almost time for your next dose, take only that dose. Do not take double or extra doses.  What may interact with this medicine?  Do not take this medicine with any of the following medications:  -chasteberry  This medicine may also interact with the following medications:  -herbal or dietary supplements, like black cohosh or DHEA  -female hormones, like estrogens or progestins and birth control pills, patches, rings, or injections  -male hormones, like testosterone  This list may not describe all possible interactions. Give your health care provider a list of all the medicines, herbs, non-prescription drugs, or dietary supplements you use. Also tell them if you smoke, drink alcohol, or use illegal drugs. Some items may interact with your medicine.  What should I watch for while using this medicine?  Visit your doctor or health care professional for regular checks on your progress. During the first week, your symptoms may get worse, but then will improve as you continue your treatment. You may get hot flashes, increased bone pain, increased difficulty passing urine, or an aggravation of nerve symptoms. Discuss these effects with your doctor or health care professional, some of them may improve with continued use of this medicine.  Female patients may experience a menstrual cycle or spotting during the first 2 months of therapy with this medicine. If this continues, contact your doctor or health care professional.  What side effects may I notice from receiving this medicine?  Side effects that you should report to your doctor or health care professional as soon as possible:  -allergic reactions like skin rash, itching or hives, swelling of the face, lips, or tongue  -breathing problems  -chest pain  -depression or memory disorders  -pain in your legs or groin  -pain at site where injected  -severe headache  -swelling of the feet and legs  -visual  changes  -vomiting  Side effects that usually do not require medical attention (report to your doctor or health care professional if they continue or are bothersome):  -breast swelling or tenderness  -decrease in sex drive or performance  -diarrhea  -hot flashes  -loss of appetite  -muscle, joint, or bone pains  -nausea  -redness or irritation at site where injected  -skin problems or acne  This list may not describe all possible side effects. Call your doctor for medical advice about side effects. You may report side effects to FDA at 3-434-PUN-4485.  Where should I keep my medicine?  Keep out of the reach of children.  Store below 25 degrees C (77 degrees F). Do not freeze. Protect from light. Do not use if it is not clear or if there are particles present. Throw away any unused medicine after the expiration date.  NOTE: This sheet is a summary. It may not cover all possible information. If you have questions about this medicine, talk to your doctor, pharmacist, or health care provider.  © 2019 [employer]/Gold Standard (2017-08-07 10:54:35)

## 2019-08-21 RX ORDER — RANITIDINE 150 MG/1
150 TABLET ORAL 2 TIMES DAILY
Qty: 60 TABLET | Refills: 3 | Status: SHIPPED | OUTPATIENT
Start: 2019-08-21 | End: 2019-10-21

## 2019-09-09 ENCOUNTER — INFUSION (OUTPATIENT)
Dept: ONCOLOGY | Facility: HOSPITAL | Age: 39
End: 2019-09-09
Attending: INTERNAL MEDICINE

## 2019-09-09 DIAGNOSIS — Z17.0 MALIGNANT NEOPLASM OF UPPER-OUTER QUADRANT OF RIGHT BREAST IN FEMALE, ESTROGEN RECEPTOR POSITIVE (HCC): Primary | ICD-10-CM

## 2019-09-09 DIAGNOSIS — C50.411 MALIGNANT NEOPLASM OF UPPER-OUTER QUADRANT OF RIGHT BREAST IN FEMALE, ESTROGEN RECEPTOR POSITIVE (HCC): Primary | ICD-10-CM

## 2019-09-09 PROCEDURE — 96402 CHEMO HORMON ANTINEOPL SQ/IM: CPT | Performed by: INTERNAL MEDICINE

## 2019-09-09 PROCEDURE — 25010000002 LEUPROLIDE ACETATE PER 7.5 MG: Performed by: INTERNAL MEDICINE

## 2019-09-09 RX ADMIN — LEUPROLIDE ACETATE 7.5 MG: KIT at 15:34

## 2019-10-07 ENCOUNTER — TELEPHONE (OUTPATIENT)
Dept: ONCOLOGY | Facility: HOSPITAL | Age: 39
End: 2019-10-07

## 2019-10-07 ENCOUNTER — INFUSION (OUTPATIENT)
Dept: ONCOLOGY | Facility: HOSPITAL | Age: 39
End: 2019-10-07
Attending: INTERNAL MEDICINE

## 2019-10-07 ENCOUNTER — OFFICE VISIT (OUTPATIENT)
Dept: ONCOLOGY | Facility: CLINIC | Age: 39
End: 2019-10-07
Attending: INTERNAL MEDICINE

## 2019-10-07 VITALS
HEIGHT: 61 IN | BODY MASS INDEX: 28.92 KG/M2 | TEMPERATURE: 98.2 F | DIASTOLIC BLOOD PRESSURE: 73 MMHG | WEIGHT: 153.2 LBS | SYSTOLIC BLOOD PRESSURE: 128 MMHG | HEART RATE: 75 BPM | RESPIRATION RATE: 18 BRPM

## 2019-10-07 DIAGNOSIS — G89.29 CHRONIC LEFT-SIDED LOW BACK PAIN WITHOUT SCIATICA: ICD-10-CM

## 2019-10-07 DIAGNOSIS — C50.411 MALIGNANT NEOPLASM OF UPPER-OUTER QUADRANT OF RIGHT BREAST IN FEMALE, ESTROGEN RECEPTOR POSITIVE (HCC): Primary | ICD-10-CM

## 2019-10-07 DIAGNOSIS — Z17.0 MALIGNANT NEOPLASM OF UPPER-OUTER QUADRANT OF RIGHT BREAST IN FEMALE, ESTROGEN RECEPTOR POSITIVE (HCC): Primary | ICD-10-CM

## 2019-10-07 DIAGNOSIS — Z45.2 ENCOUNTER FOR VENOUS ACCESS DEVICE CARE: ICD-10-CM

## 2019-10-07 DIAGNOSIS — F41.1 GENERALIZED ANXIETY DISORDER: ICD-10-CM

## 2019-10-07 DIAGNOSIS — M54.50 CHRONIC LEFT-SIDED LOW BACK PAIN WITHOUT SCIATICA: ICD-10-CM

## 2019-10-07 PROBLEM — K62.89 RECTAL PAIN: Status: RESOLVED | Noted: 2017-12-13 | Resolved: 2019-10-07

## 2019-10-07 PROBLEM — R63.4 UNINTENTIONAL WEIGHT LOSS: Status: RESOLVED | Noted: 2018-11-26 | Resolved: 2019-10-07

## 2019-10-07 PROBLEM — R11.0 CHEMOTHERAPY-INDUCED NAUSEA: Status: RESOLVED | Noted: 2018-11-26 | Resolved: 2019-10-07

## 2019-10-07 PROBLEM — Z51.11 ENCOUNTER FOR CHEMOTHERAPY MANAGEMENT: Status: RESOLVED | Noted: 2018-11-26 | Resolved: 2019-10-07

## 2019-10-07 PROBLEM — L27.0 ALLERGIC DRUG RASH: Status: RESOLVED | Noted: 2018-11-26 | Resolved: 2019-10-07

## 2019-10-07 PROBLEM — T45.1X5A CHEMOTHERAPY-INDUCED NAUSEA: Status: RESOLVED | Noted: 2018-11-26 | Resolved: 2019-10-07

## 2019-10-07 PROBLEM — N63.0 BREAST MASS: Status: RESOLVED | Noted: 2018-08-27 | Resolved: 2019-10-07

## 2019-10-07 LAB
ALBUMIN SERPL-MCNC: 4.2 G/DL (ref 3.5–5.2)
ALBUMIN/GLOB SERPL: 1.3 G/DL
ALP SERPL-CCNC: 91 U/L (ref 39–117)
ALT SERPL W P-5'-P-CCNC: 12 U/L (ref 1–33)
ANION GAP SERPL CALCULATED.3IONS-SCNC: 10 MMOL/L (ref 5–15)
AST SERPL-CCNC: 16 U/L (ref 1–32)
BASOPHILS # BLD AUTO: 0.03 10*3/MM3 (ref 0–0.2)
BASOPHILS NFR BLD AUTO: 0.5 % (ref 0–1.5)
BILIRUB SERPL-MCNC: 0.3 MG/DL (ref 0.2–1.2)
BUN BLD-MCNC: 12 MG/DL (ref 6–20)
BUN/CREAT SERPL: 18.8 (ref 7–25)
CALCIUM SPEC-SCNC: 9.2 MG/DL (ref 8.6–10.5)
CHLORIDE SERPL-SCNC: 105 MMOL/L (ref 98–107)
CO2 SERPL-SCNC: 26 MMOL/L (ref 22–29)
CREAT BLD-MCNC: 0.64 MG/DL (ref 0.57–1)
DEPRECATED RDW RBC AUTO: 39.4 FL (ref 37–54)
EOSINOPHIL # BLD AUTO: 0.18 10*3/MM3 (ref 0–0.4)
EOSINOPHIL NFR BLD AUTO: 3 % (ref 0.3–6.2)
ERYTHROCYTE [DISTWIDTH] IN BLOOD BY AUTOMATED COUNT: 12.3 % (ref 12.3–15.4)
ESTRADIOL SERPL HS-MCNC: <5 PG/ML
GFR SERPL CREATININE-BSD FRML MDRD: 102 ML/MIN/1.73
GLOBULIN UR ELPH-MCNC: 3.2 GM/DL
GLUCOSE BLD-MCNC: 123 MG/DL (ref 65–99)
HCT VFR BLD AUTO: 35.3 % (ref 34–46.6)
HGB BLD-MCNC: 12 G/DL (ref 12–15.9)
IMM GRANULOCYTES # BLD AUTO: 0.01 10*3/MM3 (ref 0–0.05)
IMM GRANULOCYTES NFR BLD AUTO: 0.2 % (ref 0–0.5)
LYMPHOCYTES # BLD AUTO: 1.41 10*3/MM3 (ref 0.7–3.1)
LYMPHOCYTES NFR BLD AUTO: 23.7 % (ref 19.6–45.3)
MCH RBC QN AUTO: 29.6 PG (ref 26.6–33)
MCHC RBC AUTO-ENTMCNC: 34 G/DL (ref 31.5–35.7)
MCV RBC AUTO: 87.2 FL (ref 79–97)
MONOCYTES # BLD AUTO: 0.48 10*3/MM3 (ref 0.1–0.9)
MONOCYTES NFR BLD AUTO: 8.1 % (ref 5–12)
NEUTROPHILS # BLD AUTO: 3.83 10*3/MM3 (ref 1.7–7)
NEUTROPHILS NFR BLD AUTO: 64.5 % (ref 42.7–76)
NRBC BLD AUTO-RTO: 0 /100 WBC (ref 0–0.2)
PLATELET # BLD AUTO: 201 10*3/MM3 (ref 140–450)
PMV BLD AUTO: 8.8 FL (ref 6–12)
POTASSIUM BLD-SCNC: 3.7 MMOL/L (ref 3.5–5.2)
PROT SERPL-MCNC: 7.4 G/DL (ref 6–8.5)
RBC # BLD AUTO: 4.05 10*6/MM3 (ref 3.77–5.28)
SODIUM BLD-SCNC: 141 MMOL/L (ref 136–145)
WBC NRBC COR # BLD: 5.94 10*3/MM3 (ref 3.4–10.8)

## 2019-10-07 PROCEDURE — 82670 ASSAY OF TOTAL ESTRADIOL: CPT

## 2019-10-07 PROCEDURE — 80053 COMPREHEN METABOLIC PANEL: CPT

## 2019-10-07 PROCEDURE — 96402 CHEMO HORMON ANTINEOPL SQ/IM: CPT | Performed by: INTERNAL MEDICINE

## 2019-10-07 PROCEDURE — 36591 DRAW BLOOD OFF VENOUS DEVICE: CPT | Performed by: INTERNAL MEDICINE

## 2019-10-07 PROCEDURE — 99213 OFFICE O/P EST LOW 20 MIN: CPT | Performed by: INTERNAL MEDICINE

## 2019-10-07 PROCEDURE — 85025 COMPLETE CBC W/AUTO DIFF WBC: CPT

## 2019-10-07 PROCEDURE — 25010000002 LEUPROLIDE ACETATE PER 7.5 MG: Performed by: INTERNAL MEDICINE

## 2019-10-07 RX ORDER — SODIUM CHLORIDE 0.9 % (FLUSH) 0.9 %
10 SYRINGE (ML) INJECTION AS NEEDED
Status: CANCELLED | OUTPATIENT
Start: 2019-11-04

## 2019-10-07 RX ORDER — SODIUM CHLORIDE 0.9 % (FLUSH) 0.9 %
10 SYRINGE (ML) INJECTION AS NEEDED
Status: DISCONTINUED | OUTPATIENT
Start: 2019-10-07 | End: 2019-10-07 | Stop reason: HOSPADM

## 2019-10-07 RX ADMIN — LEUPROLIDE ACETATE 7.5 MG: KIT at 09:36

## 2019-10-07 RX ADMIN — SODIUM CHLORIDE, PRESERVATIVE FREE 10 ML: 5 INJECTION INTRAVENOUS at 09:39

## 2019-10-07 RX ADMIN — Medication 500 UNITS: at 09:39

## 2019-10-07 NOTE — PROGRESS NOTES
Moy Sparrow  [id number]  1980    Subjective    Ms Andrews presented for follow up appointment for breast cancer.   She is currently receiving Lupron and exemestane.   She is reporting some hot flashes however this is stable.  Continues to report left lower back pain.  This is likely musculoskeletal in nature.  Reassurance given.  Continue to struggle with depression and anxiety related to her cancer diagnosis.   ROS as below.       History of Present Illness    This is a very pleasant 38-year-old female who was seen in consultation at the request of Dr. Ortega for evaluation of newly diagnosed breast cancer on 9/24/18.  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  Her past medical history is otherwise unremarkable except history of anxiety/depression and gastroesophageal reflux disease.She tells me that she felt a lump in her right breast in August 2018 and subsequently underwent a mammogram and ultrasonographic evaluation on August 27 which showed right breast mass approximately 1.5 cm.  The breast mass was felt to be suspicious for malignancy.  She then underwent ultrasound guided biopsy of right breast on 8/28/18 which showed invasive ductal carcinoma, moderately differentiated.  The carcinoma was tested positive for estrogen receptor and it was tested negative for progesterone receptor as well as HER-2.  Patient underwent right mastectomy and sentinel lymph node biopsy on September 5 which showed invasive ductal carcinoma of 1.5 cm, moderately differentiated which was completely excised.  [place] lymph node showed micrometastasis (less than 2 mm) involving 1 of 7 lymph nodes.    Her recurrence score was high at 36, so we recommended adjuvant chemotherapy with dose dense AC followed by taxane.     She completed her adjuvant chemotherapy uneventfully and subsequently received adjuvant radiation therapy.    She is currently on Lupron and exemestane.    Past Medical History, Past  "Surgical History, Social History, Family History have been reviewed and are without significant changes except as mentioned.    Review of Systems   CONSTITUTIONAL: Fatigue+  Weight loss + No  fever, chills, weakness.  HEENT: Eyes: No visual loss, blurred vision, double vision or yellow sclerae. Ears, Nose, Throat: No hearing loss, sneezing, congestion, runny nose or sore throat.  SKIN: No rash or itching.  CARDIOVASCULAR: No chest pain . No palpitations or edema.  RESPIRATORY: chronic exertional SOB + No  cough or sputum.  GASTROINTESTINAL: nausea + No anorexia,  vomiting or diarrhea. No abdominal pain or blood.  GENITOURINARY: Negative for urgency, frequency or dysuria.   NEUROLOGICAL: No headache, dizziness, syncope, paralysis, ataxia, numbness or tingling in the extremities. No change in bowel or bladder control.  MUSCULOSKELETAL: Back pain+   HEMATOLOGIC: No anemia, bleeding or bruising.  LYMPHATICS: No enlarged nodes. No history of splenectomy.  PSYCHIATRIC: Anxiety + depression+  ENDOCRINOLOGIC: No reports of sweating, cold or heat intolerance. No polyuria or polydipsia.  ALLERGIES: No history of asthma, hives, eczema or rhinitis.    Medications:  The current medication list was reviewed in the EMR    ALLERGIES:    Allergies   Allergen Reactions   • Penicillins Rash       Objective      Vitals:    11/26/18 0857   BP: 124/81   Pulse: 100   Resp: 18   Temp: 99.8 °F (37.7 °C)   TempSrc: Temporal   SpO2: 98%   Weight: 65.8 kg (145 lb)   Height: 154.9 cm (60.98\")   PainSc: 0-No pain     Current Status 11/26/2018   ECOG score 0       Physical Exam    General: Alert, awake, oriented.  Well dressed.  Not in apparent distress. Vitals as above.   HEAD: normocephalic, atraumatic.   EYES: PERRL, EOMI.  vision is grossly intact.  Neck: Supple, no adenopathy or thyromegaly.   Throat: normal oral cavity and pharynx. No inflammation, swelling, exudate, or lesions.  CARDIAC: Normal S1 and S2. No S3, S4 or murmurs. Rhythm is " regular.Extremities are warm and well perfused.   LUNGS: Clear to auscultation and percussion without rales, rhonchi, wheezing or diminished breath sounds.  ABDOMEN: Positive bowel sounds. Soft, nondistended, nontender  . No guarding or rebound. No masses.  Back:No bony tenderness.   EXTREMITIES: No significant deformity or joint abnormality.  Peripheral pulses intact. No varicosities.  Skin: No rash or bruising.  Neurological: Grossly non-focal exam. No focal weakness. Gait: Normal.   Psych: Mood and affect normal. No hallucination or suicidal thoughts.   Lymphatics:No adenopathy     RECENT LABS: Independently reviewed and summarized  Hematology WBC   Date Value Ref Range Status   11/26/2018 9.18 3.20 - 9.80 10*3/mm3 Final     RBC   Date Value Ref Range Status   11/26/2018 3.76 (L) 3.77 - 5.16 10*6/mm3 Final     Hemoglobin   Date Value Ref Range Status   11/26/2018 11.1 (L) 12.0 - 15.5 g/dL Final     Hematocrit   Date Value Ref Range Status   11/26/2018 32.4 (L) 35.0 - 45.0 % Final     Platelets   Date Value Ref Range Status   11/26/2018 222 150 - 450 10*3/mm3 Final            Lab Results   Component Value Date    GLUCOSE 113 (H) 11/26/2018    BUN 15 11/26/2018    CREATININE 0.72 11/26/2018    EGFRIFNONA 90 11/26/2018    BCR 20.8 11/26/2018    K 3.4 (L) 11/26/2018    CO2 24.0 11/26/2018    CALCIUM 9.1 11/26/2018    ALBUMIN 4.70 11/26/2018    AST 22 11/26/2018    ALT 12 11/26/2018     X ray lumbar spine reviewed. Showed no fracture or mets. No prior comparison available.     Diagnosis:   (1) Invasive ductal carcinoma, right   Date of diagnosis: 9/5/18   Stage: IB ([id number]), recurrence score 36   Prior treatment:   Right mastectomy with sentinel node biopsy and ALND (9/5/18)  Chemotherapy: Start date (10/15/18)   Completed dose dense AC followed by dose dense taxol (2/11/19, last dose 1/28/19).   Adjuvant RT: 5040 cGy right chest wall and axilla     Current therapy:   Lupron and exemestane (start 7/2019)       (2)  Upper back pain   (3) Anxiety   (4) Low back pain       Assessment/Plan       (1) Invasive ductal carcinoma, right, stage IB, ER+/CA-/HER2-        - She is on adjuvant hormonal therapy.   - No clinical evidence of recurrence.   - Mammogram due in December 2019.     (2) Upper back/chest pain: Chronic, likely musculo-skeletal in nature. On PRN tramadol.     (3) Anxiety: Continues to struggle with generalized anxiety. On pristiq and PRN valium.     (4) Lower back pain: Persistent low back pain, left side. X ray negative. Likely MSK in nature. Reassurance given.     Body mass index is 28.96 kg/m².  Counseled about diet, exercise and weight loss.       Yobani Emery MD     11/26/2018      CC:

## 2019-10-07 NOTE — TELEPHONE ENCOUNTER
----- Message from Hailey Emery MD sent at 10/7/2019 12:06 PM CDT -----  Please let patient know that her blood tests were normal.

## 2019-10-21 ENCOUNTER — OFFICE VISIT (OUTPATIENT)
Dept: OTOLARYNGOLOGY | Facility: CLINIC | Age: 39
End: 2019-10-21

## 2019-10-21 VITALS — HEART RATE: 109 BPM | BODY MASS INDEX: 28.7 KG/M2 | HEIGHT: 61 IN | OXYGEN SATURATION: 98 % | WEIGHT: 152 LBS

## 2019-10-21 DIAGNOSIS — R07.0 THROAT PAIN IN ADULT: Primary | ICD-10-CM

## 2019-10-21 DIAGNOSIS — J37.0 CHRONIC LARYNGITIS: ICD-10-CM

## 2019-10-21 DIAGNOSIS — K21.9 GASTROESOPHAGEAL REFLUX DISEASE, ESOPHAGITIS PRESENCE NOT SPECIFIED: ICD-10-CM

## 2019-10-21 PROCEDURE — 31575 DIAGNOSTIC LARYNGOSCOPY: CPT | Performed by: OTOLARYNGOLOGY

## 2019-10-21 PROCEDURE — 99213 OFFICE O/P EST LOW 20 MIN: CPT | Performed by: OTOLARYNGOLOGY

## 2019-10-21 RX ORDER — FAMOTIDINE 40 MG/1
40 TABLET, FILM COATED ORAL DAILY
Qty: 30 TABLET | Refills: 11 | Status: SHIPPED | OUTPATIENT
Start: 2019-10-21 | End: 2020-05-08

## 2019-10-23 NOTE — PROGRESS NOTES
Subjective   Moy Sparrow is a 39 y.o. female.       History of Present Illness   This is a patient I saw previously with globus sensation thought to be due to acid reflux who was placed on ranitidine.  Was seen in April of this year and was still having globus sensation.  Ranitidine was continued along with general reflux precautions.  Returns today stating that her ranitidine was recalled and she is been placed on pantoprazole.  Says she is somewhat reluctant to take the pantoprazole because she was previously found to have a gastric polyp on EGD.  She is been having some intermittent pain in her throat and discomfort on swallowing.  She says it feels dry in her throat.  No hemoptysis.      The following portions of the patient's history were reviewed and updated as appropriate: allergies, current medications, past family history, past medical history, past social history, past surgical history and problem list.     reports that she quit smoking about 9 months ago. She quit after 25.00 years of use. She has never used smokeless tobacco. She reports that she does not drink alcohol or use drugs.   Patient is not a tobacco user and has not been counseled for use of tobacco products      Review of Systems   Constitutional: Negative for fever.   HENT: Positive for sore throat.            Objective   Physical Exam  General: Well-developed well-nourished female in no acute distress.  Alert and oriented x-3. Head: Normocephalic. Face: Symmetrical strength and appearance. PERRL. EOMI. Voice:Strong. Speech:Fluent  Ears: External ears no deformity, canals no discharge, tympanic membranes intact clear and mobile bilaterally.  Nose: Nares show no discharge mass polyp or purulence.  Boggy mucosa is present.  No gross external deformity.  Septum: Midline  Oral cavity: Lips and gums without lesions.  Tongue and floor of mouth without lesions.  Parotid and submandibular ducts unobstructed.  No mucosal lesions on the buccal  mucosa or vestibule of the mouth.  Pharynx: No erythema exudate mass or ulcer.  Atrophic tonsils.  Neck: No lymphadenopathy.  No thyromegaly.  Trachea and larynx midline.  No masses in the parotid or submandibular glands.    Procedure Note    Pre-operative Diagnosis: Patient presents with:  Difficulty Swallowing      Post-operative Diagnosis: Chronic laryngitis    Anesthesia: topical with xylocaine and neosynephrine    Endoscopy Type:  Flexible Laryngoscopy    Procedure Details:    The patient was placed in the sitting position.  After topical anesthesia and decongestion, the 4 mm laryngoscope was passed.  The nasal cavities, nasopharynx, oropharynx, hypopharynx, and larynx were all examined.  Vocal cords were examined during respiration and phonation.  The following findings were noted:    Findings: Previously noted nasal findings were confirmed. Nasopharynx without mass, hypopharynx and larynx without evidence of neoplasm. Vocal cord mobility intact. There is chronic appearing edema and erythema of the laryngeal structures consistent with chronic laryngitis.    Condition:  Stable.  Patient tolerated procedure well.    Complications:  None      Assessment/Plan   Moy was seen today for difficulty swallowing.    Diagnoses and all orders for this visit:    Throat pain in adult    Chronic laryngitis    Gastroesophageal reflux disease, esophagitis presence not specified    Other orders  -     famotidine (PEPCID) 40 MG tablet; Take 1 tablet by mouth Daily.      Reassured the patient that I saw no evidence of infection or neoplasm.  Since she had good results with ranitidine I am going to change her pantoprazole to famotidine 40 mg a day.  If symptoms return to baseline may follow-up with me as needed otherwise call for worsening.

## 2019-11-04 ENCOUNTER — INFUSION (OUTPATIENT)
Dept: ONCOLOGY | Facility: HOSPITAL | Age: 39
End: 2019-11-04
Attending: INTERNAL MEDICINE

## 2019-11-04 VITALS
SYSTOLIC BLOOD PRESSURE: 120 MMHG | TEMPERATURE: 97.7 F | RESPIRATION RATE: 18 BRPM | HEART RATE: 70 BPM | DIASTOLIC BLOOD PRESSURE: 60 MMHG

## 2019-11-04 DIAGNOSIS — Z17.0 MALIGNANT NEOPLASM OF UPPER-OUTER QUADRANT OF RIGHT BREAST IN FEMALE, ESTROGEN RECEPTOR POSITIVE (HCC): ICD-10-CM

## 2019-11-04 DIAGNOSIS — Z45.2 ENCOUNTER FOR VENOUS ACCESS DEVICE CARE: Primary | ICD-10-CM

## 2019-11-04 DIAGNOSIS — C50.411 MALIGNANT NEOPLASM OF UPPER-OUTER QUADRANT OF RIGHT BREAST IN FEMALE, ESTROGEN RECEPTOR POSITIVE (HCC): ICD-10-CM

## 2019-11-04 PROCEDURE — 96523 IRRIG DRUG DELIVERY DEVICE: CPT | Performed by: INTERNAL MEDICINE

## 2019-11-04 PROCEDURE — 25010000002 LEUPROLIDE ACETATE PER 7.5 MG: Performed by: INTERNAL MEDICINE

## 2019-11-04 PROCEDURE — 96402 CHEMO HORMON ANTINEOPL SQ/IM: CPT | Performed by: INTERNAL MEDICINE

## 2019-11-04 RX ORDER — SODIUM CHLORIDE 0.9 % (FLUSH) 0.9 %
10 SYRINGE (ML) INJECTION AS NEEDED
Status: CANCELLED | OUTPATIENT
Start: 2019-12-02

## 2019-11-04 RX ORDER — SODIUM CHLORIDE 0.9 % (FLUSH) 0.9 %
10 SYRINGE (ML) INJECTION AS NEEDED
Status: DISCONTINUED | OUTPATIENT
Start: 2019-11-04 | End: 2019-11-04 | Stop reason: HOSPADM

## 2019-11-04 RX ADMIN — LEUPROLIDE ACETATE 7.5 MG: KIT at 08:52

## 2019-11-04 RX ADMIN — SODIUM CHLORIDE, PRESERVATIVE FREE 10 ML: 5 INJECTION INTRAVENOUS at 08:53

## 2019-11-04 RX ADMIN — Medication 500 UNITS: at 08:53

## 2019-11-18 RX ORDER — DESVENLAFAXINE SUCCINATE 50 MG/1
50 TABLET, EXTENDED RELEASE ORAL DAILY
Qty: 30 TABLET | Refills: 11 | Status: SHIPPED | OUTPATIENT
Start: 2019-11-18 | End: 2020-11-20 | Stop reason: SDUPTHER

## 2019-12-02 ENCOUNTER — INFUSION (OUTPATIENT)
Dept: ONCOLOGY | Facility: HOSPITAL | Age: 39
End: 2019-12-02
Attending: INTERNAL MEDICINE

## 2019-12-02 DIAGNOSIS — Z17.0 MALIGNANT NEOPLASM OF UPPER-OUTER QUADRANT OF RIGHT BREAST IN FEMALE, ESTROGEN RECEPTOR POSITIVE (HCC): ICD-10-CM

## 2019-12-02 DIAGNOSIS — Z45.2 ENCOUNTER FOR VENOUS ACCESS DEVICE CARE: Primary | ICD-10-CM

## 2019-12-02 DIAGNOSIS — C50.411 MALIGNANT NEOPLASM OF UPPER-OUTER QUADRANT OF RIGHT BREAST IN FEMALE, ESTROGEN RECEPTOR POSITIVE (HCC): ICD-10-CM

## 2019-12-02 PROCEDURE — 25010000002 LEUPROLIDE ACETATE PER 7.5 MG: Performed by: INTERNAL MEDICINE

## 2019-12-02 PROCEDURE — 96372 THER/PROPH/DIAG INJ SC/IM: CPT | Performed by: INTERNAL MEDICINE

## 2019-12-02 PROCEDURE — 96523 IRRIG DRUG DELIVERY DEVICE: CPT | Performed by: INTERNAL MEDICINE

## 2019-12-02 RX ORDER — SODIUM CHLORIDE 0.9 % (FLUSH) 0.9 %
10 SYRINGE (ML) INJECTION AS NEEDED
Status: DISCONTINUED | OUTPATIENT
Start: 2019-12-02 | End: 2019-12-02 | Stop reason: HOSPADM

## 2019-12-02 RX ORDER — HEPARIN SODIUM (PORCINE) LOCK FLUSH IV SOLN 100 UNIT/ML 100 UNIT/ML
500 SOLUTION INTRAVENOUS AS NEEDED
Status: CANCELLED | OUTPATIENT
Start: 2019-12-02

## 2019-12-02 RX ORDER — SODIUM CHLORIDE 0.9 % (FLUSH) 0.9 %
10 SYRINGE (ML) INJECTION AS NEEDED
Status: CANCELLED | OUTPATIENT
Start: 2019-12-02

## 2019-12-02 RX ADMIN — SODIUM CHLORIDE, PRESERVATIVE FREE 10 ML: 5 INJECTION INTRAVENOUS at 08:19

## 2019-12-02 RX ADMIN — LEUPROLIDE ACETATE 7.5 MG: KIT at 08:20

## 2019-12-02 RX ADMIN — Medication 500 UNITS: at 08:20

## 2019-12-03 RX ORDER — SODIUM CHLORIDE 0.9 % (FLUSH) 0.9 %
10 SYRINGE (ML) INJECTION
Qty: 100 ML | Refills: 0 | Status: SHIPPED | OUTPATIENT
Start: 2019-12-03 | End: 2020-03-11 | Stop reason: HOSPADM

## 2019-12-04 ENCOUNTER — TELEPHONE (OUTPATIENT)
Dept: ONCOLOGY | Facility: HOSPITAL | Age: 39
End: 2019-12-04

## 2019-12-04 NOTE — TELEPHONE ENCOUNTER
Let pt know that dr barboza called in lupron, heparin, and saline flushes for her to do at home so we will just cancel her 12/30 appt and then he will see her in January. She states understanding.

## 2019-12-10 ENCOUNTER — TELEPHONE (OUTPATIENT)
Dept: ONCOLOGY | Facility: HOSPITAL | Age: 39
End: 2019-12-10

## 2019-12-10 NOTE — TELEPHONE ENCOUNTER
----- Message from Hailey Emery MD sent at 12/10/2019  1:46 PM CST -----    zoladex prescription sent     ----- Message -----  From: Mis Baxter RN  Sent: 12/10/2019  10:43 AM CST  To: Hailey Emery MD    What do you want to do with this patient?     ----- Message -----  From: Barbara Self  Sent: 12/9/2019   3:11 PM CST  To: Kandy Vieyra RN, #    Lupron denied per insurance. Per denial letter states zoladex can be used for advanced breast cancer perimenopausal women. Thanks

## 2020-01-06 ENCOUNTER — OFFICE VISIT (OUTPATIENT)
Dept: OBSTETRICS AND GYNECOLOGY | Facility: CLINIC | Age: 40
End: 2020-01-06

## 2020-01-06 VITALS
WEIGHT: 159.2 LBS | SYSTOLIC BLOOD PRESSURE: 130 MMHG | BODY MASS INDEX: 30.06 KG/M2 | DIASTOLIC BLOOD PRESSURE: 80 MMHG | HEIGHT: 61 IN

## 2020-01-06 DIAGNOSIS — C50.411 MALIGNANT NEOPLASM OF UPPER-OUTER QUADRANT OF RIGHT BREAST IN FEMALE, ESTROGEN RECEPTOR POSITIVE (HCC): ICD-10-CM

## 2020-01-06 DIAGNOSIS — R10.2 PELVIC PAIN: ICD-10-CM

## 2020-01-06 DIAGNOSIS — Z87.42 HISTORY OF ABNORMAL CERVICAL PAP SMEAR: Primary | ICD-10-CM

## 2020-01-06 DIAGNOSIS — Z17.0 MALIGNANT NEOPLASM OF UPPER-OUTER QUADRANT OF RIGHT BREAST IN FEMALE, ESTROGEN RECEPTOR POSITIVE (HCC): ICD-10-CM

## 2020-01-06 DIAGNOSIS — Z12.4 PAPANICOLAOU SMEAR FOR CERVICAL CANCER SCREENING: ICD-10-CM

## 2020-01-06 DIAGNOSIS — N80.03 ADENOMYOSIS: ICD-10-CM

## 2020-01-06 PROBLEM — Z01.419 ENCOUNTER FOR GYNECOLOGICAL EXAMINATION: Status: ACTIVE | Noted: 2020-01-06

## 2020-01-06 PROCEDURE — 99386 PREV VISIT NEW AGE 40-64: CPT | Performed by: OBSTETRICS & GYNECOLOGY

## 2020-01-06 PROCEDURE — 88142 CYTOPATH C/V THIN LAYER: CPT | Performed by: OBSTETRICS & GYNECOLOGY

## 2020-01-06 PROCEDURE — 88141 CYTOPATH C/V INTERPRET: CPT | Performed by: PATHOLOGY

## 2020-01-06 PROCEDURE — 87624 HPV HI-RISK TYP POOLED RSLT: CPT | Performed by: OBSTETRICS & GYNECOLOGY

## 2020-01-07 NOTE — PROGRESS NOTES
Chief complaint here for Pap smear history of abnormal Pap smears pelvic pain being treated for breast cancer    SUBJECTIVE:   39 y.o. female for annual routine Pap and checkup.  Patient here for Pap smear has not had one in over a year.  Last Pap smear showed ASCUS is understand and HPV negative.  Patient is being treated for breast cancer with GnRH agonist antagonist and aromasin.  We obtained an ultrasound today that showed the uterus to be heterogeneous consistent with adenomyosis possible etiology for her pain discussed hysterectomy as I do not think hormonal treatment would be appropriate in this situation.  Would want ovaries removed if has hysterectomy because would allow to discontinue medication  Current Outpatient Medications   Medication Sig Dispense Refill   • benzoyl peroxide 5 % external liquid Apply topically to the appropriate area once daily as directed for acne. 237 g 2   • desvenlafaxine (PRISTIQ) 50 MG 24 hr tablet Take 1 tablet by mouth Daily. 30 tablet 11   • diazePAM (VALIUM) 5 MG tablet Take 1 tablet by mouth Every 6 (Six) Hours As Needed for Anxiety. 60 tablet 0   • exemestane (AROMASIN) 25 MG chemo tablet Take 1 tablet by mouth Daily. 90 tablet 2   • famotidine (PEPCID) 40 MG tablet Take 1 tablet by mouth Daily. 30 tablet 11   • goserelin (ZOLADEX) 3.6 MG injection Inject 3.6 mg under the skin into the appropriate area as directed Every 28 (Twenty-Eight) Days. 43.2 mg 0   • Heparin Lock Flush (HEPARIN FLUSH, PORCINE,) 100 UNIT/ML injection Infuse 5 mL by Intracatheter route Every 30 (Thirty) Days as directed. 60 mL 0   • sodium chloride 0.9 % flush Infuse 10 mL into a venous catheter Every 30 (Thirty) Days as directed. 100 mL 0   • BENZOYL PEROXIDE 5 % external wash   2   • leuprolide (LUPRON DEPOT, 1-MONTH,) 7.5 MG injection Inject 7.5 mg into the appropriate muscle as directed by prescriber Every 28 (Twenty-Eight) Days. 12 each 0   • Multiple Vitamins-Minerals (MULTIVITAMIN WITH  "MINERALS) tablet tablet Take 1 tablet by mouth Daily.       No current facility-administered medications for this visit.      Allergies: Penicillins   Patient's last menstrual period was 11/01/2019 (approximate).    ROS:  Feeling well. No dyspnea or chest pain on exertion.  No abdominal pain, change in bowel habits, black or bloody stools.  No urinary tract symptoms. GYN ROS: she complains of pain in the pelvis at worst 6 of 10 worse by movement and intercourse sharp in nature.. No neurological complaints.    OBJECTIVE:   The patient appears well, alert, oriented x 3, in no distress.  /80   Ht 154.9 cm (61\")   Wt 72.2 kg (159 lb 3.2 oz)   LMP 11/01/2019 (Approximate)   Breastfeeding No   BMI 30.08 kg/m²   ENT normal.  Neck supple. No adenopathy or thyromegaly. JOSEE. Lungs are clear, good air entry, no wheezes, rhonchi or rales. S1 and S2 normal, no murmurs, regular rate and rhythm. Abdomen soft without tenderness, guarding, mass or organomegaly. Extremities show no edema, normal peripheral pulses. Neurological is normal, no focal findings.    BREAST EXAM: Per oncology    PELVIC EXAM: normal external genitalia, vulva, vagina, cervix, uterus and adnexa    ASSESSMENT:   well woman    PLAN:   pap smear                 Problem List     No episode was linked to this visit.                   Diagnosis Plan   1. History of abnormal cervical Pap smear  Liquid-based Pap Smear, Screening   2. Pelvic pain  US Non-ob Transvaginal   3. Papanicolaou smear for cervical cancer screening     4. Malignant neoplasm of upper-outer quadrant of right breast in female, estrogen receptor positive (CMS/HCC)   she has bilateral salpingo-oophorectomy she has been told she can discontinue some of her chemotherapeutic agent   5. Adenomyosis   ultrasound heterogeneous echotexture to my interpretation consistent with adenomyosis which is I think etiology of her pain.  She is considering hysterectomy risks benefits alternatives reviewed " at length a cog pamphlet given that I do not think hormonal options are viable and she is already been on NRH agonist     Evans Syed MD  1/6/2020  8:05 PM

## 2020-01-08 LAB
GEN CATEG CVX/VAG CYTO-IMP: NORMAL
LAB AP CASE REPORT: NORMAL
LAB AP GYN ADDITIONAL INFORMATION: NORMAL
LAB AP GYN OTHER FINDINGS: NORMAL
PATH INTERP SPEC-IMP: NORMAL
STAT OF ADQ CVX/VAG CYTO-IMP: NORMAL

## 2020-01-14 LAB — HPV I/H RISK 4 DNA CVX QL PROBE+SIG AMP: NEGATIVE

## 2020-01-27 ENCOUNTER — INFUSION (OUTPATIENT)
Dept: ONCOLOGY | Facility: HOSPITAL | Age: 40
End: 2020-01-27
Attending: INTERNAL MEDICINE

## 2020-01-27 ENCOUNTER — OFFICE VISIT (OUTPATIENT)
Dept: ONCOLOGY | Facility: CLINIC | Age: 40
End: 2020-01-27
Attending: INTERNAL MEDICINE

## 2020-01-27 ENCOUNTER — APPOINTMENT (OUTPATIENT)
Dept: ONCOLOGY | Facility: HOSPITAL | Age: 40
End: 2020-01-27
Attending: INTERNAL MEDICINE

## 2020-01-27 VITALS
BODY MASS INDEX: 29.91 KG/M2 | WEIGHT: 158.4 LBS | SYSTOLIC BLOOD PRESSURE: 136 MMHG | RESPIRATION RATE: 18 BRPM | HEIGHT: 61 IN | HEART RATE: 85 BPM | TEMPERATURE: 98.5 F | DIASTOLIC BLOOD PRESSURE: 79 MMHG

## 2020-01-27 DIAGNOSIS — C50.411 MALIGNANT NEOPLASM OF UPPER-OUTER QUADRANT OF RIGHT BREAST IN FEMALE, ESTROGEN RECEPTOR POSITIVE (HCC): ICD-10-CM

## 2020-01-27 DIAGNOSIS — F32.89 OTHER DEPRESSION: ICD-10-CM

## 2020-01-27 DIAGNOSIS — E66.9 OBESITY (BMI 30-39.9): ICD-10-CM

## 2020-01-27 DIAGNOSIS — C50.411 MALIGNANT NEOPLASM OF UPPER-OUTER QUADRANT OF RIGHT BREAST IN FEMALE, ESTROGEN RECEPTOR POSITIVE (HCC): Primary | ICD-10-CM

## 2020-01-27 DIAGNOSIS — Z45.2 ENCOUNTER FOR VENOUS ACCESS DEVICE CARE: Primary | ICD-10-CM

## 2020-01-27 DIAGNOSIS — Z17.0 MALIGNANT NEOPLASM OF UPPER-OUTER QUADRANT OF RIGHT BREAST IN FEMALE, ESTROGEN RECEPTOR POSITIVE (HCC): ICD-10-CM

## 2020-01-27 DIAGNOSIS — Z17.0 MALIGNANT NEOPLASM OF UPPER-OUTER QUADRANT OF RIGHT BREAST IN FEMALE, ESTROGEN RECEPTOR POSITIVE (HCC): Primary | ICD-10-CM

## 2020-01-27 DIAGNOSIS — F41.1 GENERALIZED ANXIETY DISORDER: ICD-10-CM

## 2020-01-27 LAB
ALBUMIN SERPL-MCNC: 4.2 G/DL (ref 3.5–5.2)
ALBUMIN/GLOB SERPL: 1.3 G/DL
ALP SERPL-CCNC: 107 U/L (ref 39–117)
ALT SERPL W P-5'-P-CCNC: 17 U/L (ref 1–33)
ANION GAP SERPL CALCULATED.3IONS-SCNC: 13 MMOL/L (ref 5–15)
AST SERPL-CCNC: 19 U/L (ref 1–32)
BASOPHILS # BLD AUTO: 0.04 10*3/MM3 (ref 0–0.2)
BASOPHILS NFR BLD AUTO: 0.6 % (ref 0–1.5)
BILIRUB SERPL-MCNC: 0.3 MG/DL (ref 0.2–1.2)
BUN BLD-MCNC: 14 MG/DL (ref 6–20)
BUN/CREAT SERPL: 17.1 (ref 7–25)
CALCIUM SPEC-SCNC: 9.3 MG/DL (ref 8.6–10.5)
CHLORIDE SERPL-SCNC: 101 MMOL/L (ref 98–107)
CO2 SERPL-SCNC: 25 MMOL/L (ref 22–29)
CREAT BLD-MCNC: 0.82 MG/DL (ref 0.57–1)
DEPRECATED RDW RBC AUTO: 38.8 FL (ref 37–54)
EOSINOPHIL # BLD AUTO: 0.18 10*3/MM3 (ref 0–0.4)
EOSINOPHIL NFR BLD AUTO: 2.9 % (ref 0.3–6.2)
ERYTHROCYTE [DISTWIDTH] IN BLOOD BY AUTOMATED COUNT: 12 % (ref 12.3–15.4)
ESTRADIOL SERPL HS-MCNC: <5 PG/ML
GFR SERPL CREATININE-BSD FRML MDRD: 77 ML/MIN/1.73
GLOBULIN UR ELPH-MCNC: 3.2 GM/DL
GLUCOSE BLD-MCNC: 88 MG/DL (ref 65–99)
HCT VFR BLD AUTO: 37.4 % (ref 34–46.6)
HGB BLD-MCNC: 12.7 G/DL (ref 12–15.9)
IMM GRANULOCYTES # BLD AUTO: 0.01 10*3/MM3 (ref 0–0.05)
IMM GRANULOCYTES NFR BLD AUTO: 0.2 % (ref 0–0.5)
LYMPHOCYTES # BLD AUTO: 1.11 10*3/MM3 (ref 0.7–3.1)
LYMPHOCYTES NFR BLD AUTO: 18 % (ref 19.6–45.3)
MCH RBC QN AUTO: 30 PG (ref 26.6–33)
MCHC RBC AUTO-ENTMCNC: 34 G/DL (ref 31.5–35.7)
MCV RBC AUTO: 88.4 FL (ref 79–97)
MONOCYTES # BLD AUTO: 0.47 10*3/MM3 (ref 0.1–0.9)
MONOCYTES NFR BLD AUTO: 7.6 % (ref 5–12)
NEUTROPHILS # BLD AUTO: 4.36 10*3/MM3 (ref 1.7–7)
NEUTROPHILS NFR BLD AUTO: 70.7 % (ref 42.7–76)
NRBC BLD AUTO-RTO: 0 /100 WBC (ref 0–0.2)
PLATELET # BLD AUTO: 225 10*3/MM3 (ref 140–450)
PMV BLD AUTO: 8.6 FL (ref 6–12)
POTASSIUM BLD-SCNC: 4 MMOL/L (ref 3.5–5.2)
PROT SERPL-MCNC: 7.4 G/DL (ref 6–8.5)
RBC # BLD AUTO: 4.23 10*6/MM3 (ref 3.77–5.28)
SODIUM BLD-SCNC: 139 MMOL/L (ref 136–145)
WBC NRBC COR # BLD: 6.17 10*3/MM3 (ref 3.4–10.8)

## 2020-01-27 PROCEDURE — 82670 ASSAY OF TOTAL ESTRADIOL: CPT

## 2020-01-27 PROCEDURE — 80053 COMPREHEN METABOLIC PANEL: CPT

## 2020-01-27 PROCEDURE — 99214 OFFICE O/P EST MOD 30 MIN: CPT | Performed by: INTERNAL MEDICINE

## 2020-01-27 PROCEDURE — 85025 COMPLETE CBC W/AUTO DIFF WBC: CPT

## 2020-01-27 PROCEDURE — G0463 HOSPITAL OUTPT CLINIC VISIT: HCPCS | Performed by: INTERNAL MEDICINE

## 2020-01-27 RX ORDER — SODIUM CHLORIDE 0.9 % (FLUSH) 0.9 %
10 SYRINGE (ML) INJECTION AS NEEDED
Status: DISCONTINUED | OUTPATIENT
Start: 2020-01-27 | End: 2020-01-27 | Stop reason: HOSPADM

## 2020-01-27 RX ORDER — HEPARIN SODIUM (PORCINE) LOCK FLUSH IV SOLN 100 UNIT/ML 100 UNIT/ML
500 SOLUTION INTRAVENOUS AS NEEDED
Status: CANCELLED | OUTPATIENT
Start: 2020-05-26

## 2020-01-27 RX ORDER — HEPARIN SODIUM (PORCINE) LOCK FLUSH IV SOLN 100 UNIT/ML 100 UNIT/ML
500 SOLUTION INTRAVENOUS AS NEEDED
Status: DISCONTINUED | OUTPATIENT
Start: 2020-01-27 | End: 2020-01-27 | Stop reason: HOSPADM

## 2020-01-27 RX ORDER — SODIUM CHLORIDE 0.9 % (FLUSH) 0.9 %
10 SYRINGE (ML) INJECTION AS NEEDED
Status: CANCELLED | OUTPATIENT
Start: 2020-05-26

## 2020-01-27 NOTE — PROGRESS NOTES
Moy Sparrow  [id number]  1980    Subjective    Ms Andrews presented for follow up appointment for breast cancer.   She is currently receiving Lupron and exemestane.   Results of mammogram reviewed which showed no evidence of recurrent cancer.  She is reporting some hot flashes however this is stable.  Continues to report left lower back pain.  This is likely musculoskeletal in nature.  Reassurance given.  Continue to struggle with depression and anxiety related to her cancer diagnosis.  Referral to behavioral therapist offered which she refused.  She has gained 10 pounds since last visit.  Discussed about diet and exercise which is proven to decrease the chances of breast cancer recurrence.  Counseled and educated.  ROS as below.       History of Present Illness    This is a very pleasant 38-year-old female who was seen in consultation at the request of Dr. Ortega for evaluation of newly diagnosed breast cancer on 9/24/18.  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  Her past medical history is otherwise unremarkable except history of anxiety/depression and gastroesophageal reflux disease.She tells me that she felt a lump in her right breast in August 2018 and subsequently underwent a mammogram and ultrasonographic evaluation on August 27 which showed right breast mass approximately 1.5 cm.  The breast mass was felt to be suspicious for malignancy.  She then underwent ultrasound guided biopsy of right breast on 8/28/18 which showed invasive ductal carcinoma, moderately differentiated.  The carcinoma was tested positive for estrogen receptor and it was tested negative for progesterone receptor as well as HER-2.  Patient underwent right mastectomy and sentinel lymph node biopsy on September 5 which showed invasive ductal carcinoma of 1.5 cm, moderately differentiated which was completely excised.  Hector lymph node showed micrometastasis (less than 2 mm) involving 1 of 7 lymph  "nodes.    Her recurrence score was high at 36, so we recommended adjuvant chemotherapy with dose dense AC followed by taxane.     She completed her adjuvant chemotherapy uneventfully and subsequently received adjuvant radiation therapy.    She is currently on Lupron and exemestane.    Past Medical History, Past Surgical History, Social History, Family History have been reviewed and are without significant changes except as mentioned.    Review of Systems   CONSTITUTIONAL: Fatigue+  Weight loss + No  fever, chills, weakness.  HEENT: Eyes: No visual loss, blurred vision, double vision or yellow sclerae. Ears, Nose, Throat: No hearing loss, sneezing, congestion, runny nose or sore throat.  SKIN: No rash or itching.  CARDIOVASCULAR: No chest pain . No palpitations or edema.  RESPIRATORY: chronic exertional SOB + No  cough or sputum.  GASTROINTESTINAL: nausea + No anorexia,  vomiting or diarrhea. No abdominal pain or blood.  GENITOURINARY: Negative for urgency, frequency or dysuria.   NEUROLOGICAL: No headache, dizziness, syncope, paralysis, ataxia, numbness or tingling in the extremities. No change in bowel or bladder control.  MUSCULOSKELETAL: Back pain+   HEMATOLOGIC: No anemia, bleeding or bruising.  LYMPHATICS: No enlarged nodes. No history of splenectomy.  PSYCHIATRIC: Anxiety + depression+  ENDOCRINOLOGIC: No reports of sweating, cold or heat intolerance. No polyuria or polydipsia.  ALLERGIES: No history of asthma, hives, eczema or rhinitis.    Medications:  The current medication list was reviewed in the EMR    ALLERGIES:    Allergies   Allergen Reactions   • Penicillins Rash       Objective      Vitals:    11/26/18 0857   BP: 124/81   Pulse: 100   Resp: 18   Temp: 99.8 °F (37.7 °C)   TempSrc: Temporal   SpO2: 98%   Weight: 65.8 kg (145 lb)   Height: 154.9 cm (60.98\")   PainSc: 0-No pain     Current Status 11/26/2018   ECOG score 0       Physical Exam    General: Alert, awake, oriented.  Well dressed.  Not in " apparent distress. Vitals as above.   HEAD: normocephalic, atraumatic.   EYES: PERRL, EOMI.  vision is grossly intact.  Neck: Supple, no adenopathy or thyromegaly.   Throat: normal oral cavity and pharynx. No inflammation, swelling, exudate, or lesions.  CARDIAC: Normal S1 and S2. No S3, S4 or murmurs. Rhythm is regular.Extremities are warm and well perfused.   LUNGS: Clear to auscultation and percussion without rales, rhonchi, wheezing or diminished breath sounds.  ABDOMEN: Positive bowel sounds. Soft, nondistended, nontender  . No guarding or rebound. No masses.  Back:No bony tenderness.   EXTREMITIES: No significant deformity or joint abnormality.  Peripheral pulses intact. No varicosities.  Skin: No rash or bruising.  Neurological: Grossly non-focal exam. No focal weakness. Gait: Normal.   Psych: Mood and affect normal. No hallucination or suicidal thoughts.   Lymphatics:No adenopathy     RECENT LABS: Independently reviewed and summarized  Hematology WBC   Date Value Ref Range Status   11/26/2018 9.18 3.20 - 9.80 10*3/mm3 Final     RBC   Date Value Ref Range Status   11/26/2018 3.76 (L) 3.77 - 5.16 10*6/mm3 Final     Hemoglobin   Date Value Ref Range Status   11/26/2018 11.1 (L) 12.0 - 15.5 g/dL Final     Hematocrit   Date Value Ref Range Status   11/26/2018 32.4 (L) 35.0 - 45.0 % Final     Platelets   Date Value Ref Range Status   11/26/2018 222 150 - 450 10*3/mm3 Final            Lab Results   Component Value Date    GLUCOSE 113 (H) 11/26/2018    BUN 15 11/26/2018    CREATININE 0.72 11/26/2018    EGFRIFNONA 90 11/26/2018    BCR 20.8 11/26/2018    K 3.4 (L) 11/26/2018    CO2 24.0 11/26/2018    CALCIUM 9.1 11/26/2018    ALBUMIN 4.70 11/26/2018    AST 22 11/26/2018    ALT 12 11/26/2018     X ray lumbar spine reviewed. Showed no fracture or mets. No prior comparison available.     Imaging (independently reviewed and summarized):   Mammogram result from December 2030, 2019 reviewed.  Showed no mammographic evidence  of malignancy.    Moy Sparrow reports a pain score of 0.  Given her pain assessment as noted, treatment options were discussed and the following options were decided upon as a follow-up plan to address the patient's pain: continuation of current treatment plan for pain.    PHQ-9 Depression Screening  Little interest or pleasure in doing things? 0   Feeling down, depressed, or hopeless? 0   Trouble falling or staying asleep, or sleeping too much? 0   Feeling tired or having little energy? 0   Poor appetite or overeating? 0   Feeling bad about yourself - or that you are a failure or have let yourself or your family down? 0   Trouble concentrating on things, such as reading the newspaper or watching television? 0   Moving or speaking so slowly that other people could have noticed? Or the opposite - being so fidgety or restless that you have been moving around a lot more than usual? 0   Thoughts that you would be better off dead, or of hurting yourself in some way? 0   PHQ-9 Total Score 0   If you checked off any problems, how difficult have these problems made it for you to do your work, take care of things at home, or get along with other people?       Continue current antidepressants.        Diagnosis:   (1) Invasive ductal carcinoma, right   Date of diagnosis: 9/5/18   Stage: IB ([id number]), recurrence score 36   Prior treatment:   Right mastectomy with sentinel node biopsy and ALND (9/5/18)  Chemotherapy: Start date (10/15/18)   Completed dose dense AC followed by dose dense taxol (2/11/19, last dose 1/28/19).   Adjuvant RT: 5040 cGy right chest wall and axilla     Current therapy:   Lupron and exemestane (start 7/2019)       (2) Upper back pain   (3) Anxiety   (4) Low back pain   (5) Obesity       Assessment/Plan       (1) Invasive ductal carcinoma, right, stage IB, ER+/MS-/HER2-        - She is on adjuvant hormonal therapy.   - No clinical evidence of recurrence.   -Results of mammogram reviewed which  showed no evidence of recurrence.  Continue exemestane with ovarian suppression.  She is scheduled to undergo hysterectomy and oophorectomy with gynecology.  I have instructed her to continue exemestane after her surgery.    (2) Upper back/chest pain: Chronic, likely musculo-skeletal in nature. On PRN tramadol.     (3) Anxiety: Continues to struggle with generalized anxiety. On pristiq and PRN valium.     (4) Lower back pain: Persistent low back pain, left side. X ray negative. Likely MSK in nature. Reassurance given.     (5) Obesity   Body mass index is 29.95 kg/m².  Counseled about diet, exercise and weight loss.       Yobani Emery MD     11/26/2018      CC:

## 2020-02-27 ENCOUNTER — OFFICE VISIT (OUTPATIENT)
Dept: OBSTETRICS AND GYNECOLOGY | Facility: CLINIC | Age: 40
End: 2020-02-27

## 2020-02-27 VITALS
DIASTOLIC BLOOD PRESSURE: 76 MMHG | BODY MASS INDEX: 30.25 KG/M2 | HEIGHT: 61 IN | HEART RATE: 90 BPM | WEIGHT: 160.2 LBS | SYSTOLIC BLOOD PRESSURE: 134 MMHG | OXYGEN SATURATION: 99 %

## 2020-02-27 DIAGNOSIS — N80.03 ADENOMYOSIS: Primary | ICD-10-CM

## 2020-02-27 DIAGNOSIS — R10.2 FEMALE PELVIC PAIN: ICD-10-CM

## 2020-02-27 PROCEDURE — 99214 OFFICE O/P EST MOD 30 MIN: CPT | Performed by: OBSTETRICS & GYNECOLOGY

## 2020-02-27 RX ORDER — BUPIVACAINE HCL/0.9 % NACL/PF 0.1 %
2 PLASTIC BAG, INJECTION (ML) EPIDURAL ONCE
Status: CANCELLED | OUTPATIENT
Start: 2020-03-10 | End: 2020-02-27

## 2020-02-27 RX ORDER — SODIUM CHLORIDE, SODIUM LACTATE, POTASSIUM CHLORIDE, CALCIUM CHLORIDE 600; 310; 30; 20 MG/100ML; MG/100ML; MG/100ML; MG/100ML
125 INJECTION, SOLUTION INTRAVENOUS CONTINUOUS
Status: CANCELLED | OUTPATIENT
Start: 2020-03-10

## 2020-02-27 NOTE — H&P
History and Physical    Moy Sparrow is a 39 y.o. y/o female.     Chief Complaint: Pelvic pain likely adenomyosis     HPI:   39 y.o. .  No LMP recorded..  Patient with pelvic pain central throbbing in nature made worse by intercourse somewhat better by nonsteroidal anti-anti-inflammatory.  At worst 6 of 10.  Work-up has showed an ultrasound showing the uterus to be heterogeneous consistent with adenomyosis I think if this is the etiology of the pain.  I discussed options with the patient including further work-up with MRI versus following conservatively.  She is already on Lupron for treatment of breast cancer with some improvement but the improvement has stopped.  I reviewed with her at length the risk benefits alternatives to hysterectomy.  Patient requests laparoscopic hysterectomy. She understands the risk up to and including death. She understands the risk of serious urologic morbidity such as vesico vaginal fistula, damage to the ureter possible nephrectomy and/or extended morbidity. She understands the risk of damage to bowel possible colostomy. She understands the risk of damage to bowel undetected at time of procedure with sepsis and/or need returned OR. She understands the risk of damage to blood vessels with hemorrhage to require blood transfusion and its risk. She understands the risk of delayed hemorrhage possible need to return. She understands the risk of hematoma formation. She understands the risk infection in the abdominal wall and in the pelvis and abdomen and other parts of the body. She understands the alternatives of medical therapy and the risks and alternatives. Her questions are answered at length. She understands that there is a significant possibility that we may need to convert this to a total abdominal hysterectomy and she accepts this.    She is adamant that she wishes to proceed with bilateral salpingo-oophorectomy.  She does have a history of breast cancer which was  estrogen receptor positive Patient requests bilateral salpingo-oophorectomy after reviewing the risks, benefits, and alternatives at length. She understands that this will place her through surgical menopause and the implications of this. She understands that there is some work to indicate that bilateral salpingo-oophorectomy may increase mortality. She understands the risk of ovarian remnant syndrome. She understands that bilateral salpingo-oophorectomy does not completely remove the risk of ovarian like cancer in the peritoneal cavity. She understands the risk of bleeding and damage to the ureter associated with removal of the ovaries. She understands the alternatives of only removing the fallopian tubes and the advantages and risk associated with this.        Review of Systems   ROS:  CNS: No history of brain malignancy.  HEENT: No history of throat cancer.  Eye: No history of retinal cancer.  Pulmonary: No history of lung cancer.                                                                                 Cardiovascular: No history of cardiac tumors.  Gastrointestinal: No history of small bowel tumors.  Renal: No history of kidney malignancy.  Musculoskeletal: No history of smooth muscle tumors.  Lymphatics: No history of Hodgkin's disease.  Endocrine: No history of thyroid malignancy.    The following portions of the patient's history were reviewed and updated as appropriate: allergies, current medications, past family history, past medical history, past social history, past surgical history and problem list.    Allergies   Allergen Reactions   • Penicillins Rash        Prior to Admission medications    Medication Sig Start Date End Date Taking? Authorizing Provider   benzoyl peroxide 5 % external liquid Apply topically to the appropriate area once daily as directed for acne. 10/23/19  Yes Rachel Case APRN   BENZOYL PEROXIDE 5 % external wash  5/8/19  Yes Provider, MD Ranjit   desvenlafaxine  (PRISTIQ) 50 MG 24 hr tablet Take 1 tablet by mouth Daily. 11/18/19  Yes Sally Campo APRN   diazePAM (VALIUM) 5 MG tablet Take 1 tablet by mouth Every 6 (Six) Hours As Needed for Anxiety. 3/14/19  Yes Hailey Emery MD   exemestane (AROMASIN) 25 MG chemo tablet Take 1 tablet by mouth Daily. 7/8/19  Yes Hailey Emery MD   famotidine (PEPCID) 40 MG tablet Take 1 tablet by mouth Daily. 10/21/19  Yes Jah Arita MD   goserelin (ZOLADEX) 3.6 MG injection Inject 3.6 mg under the skin into the appropriate area as directed Every 28 (Twenty-Eight) Days. 12/10/19  Yes Hailey Emery MD   Heparin Lock Flush (HEPARIN FLUSH, PORCINE,) 100 UNIT/ML injection Infuse 5 mL by Intracatheter route Every 30 (Thirty) Days as directed. 12/3/19  Yes Hailey Emery MD   leuprolide (LUPRON DEPOT, 1-MONTH,) 7.5 MG injection Inject 7.5 mg into the appropriate muscle as directed by prescriber Every 28 (Twenty-Eight) Days. 12/3/19  Yes Hailey Emery MD   Multiple Vitamins-Minerals (MULTIVITAMIN WITH MINERALS) tablet tablet Take 1 tablet by mouth Daily.   Yes Ranjit Luke MD   sodium chloride 0.9 % flush Infuse 10 mL into a venous catheter Every 30 (Thirty) Days as directed. 12/3/19  Yes Hailey Emery MD       The patient has a family history of   Family History   Problem Relation Age of Onset   • Bipolar disorder Mother    • Schizophrenia Mother    • Colon cancer Father         Colorectal Cancer   • No Known Problems Sister         Past Medical History:   Diagnosis Date   • Anxiety    • Cancer (CMS/HCC)     breast right   • Depressive disorder    • Encounter for gynecological examination    • Encounter for vision screening 2005    Vision screen (1)     • Health examination of defined subpopulation     Health examination of sub-group     • Malaise and fatigue    • Palpitations    • Skin cancer      Skin Ca Pre Cancer   • Soft tissue swelling     right  side of neck-2 different areas    • Urinary tract infectious disease    • Wears glasses         OB History        2    Para   2    Term   2            AB        Living           SAB        TAB        Ectopic        Molar        Multiple        Live Births                     Social History     Socioeconomic History   • Marital status: Single     Spouse name: Not on file   • Number of children: 2   • Years of education: 14   • Highest education level: Not on file   Occupational History   • Occupation: nurse   Tobacco Use   • Smoking status: Former Smoker     Years: 25.00     Last attempt to quit: 2019     Years since quittin.1   • Smokeless tobacco: Never Used   • Tobacco comment: smokes 1-2 cigs/ day   Substance and Sexual Activity   • Alcohol use: No   • Drug use: No   • Sexual activity: Defer        Past Surgical History:   Procedure Laterality Date   •  SECTION     • COLONOSCOPY N/A 3/1/2018    Procedure: COLONOSCOPY;  Surgeon: Shin Vanessa DO;  Location: [facility] ENDOSCOPY;  Service:    • ENDOSCOPY N/A 10/5/2018    Procedure: ESOPHAGOGASTRODUODENOSCOPY;  Surgeon: Shin Vanessa DO;  Location: [facility] ENDOSCOPY;  Service: Gastroenterology   • MASTECTOMY Right    • MASTECTOMY WITH SENTINEL NODE BIOPSY AND AXILLARY NODE DISSECTION Right 2018    Procedure: INJECT RIGHT BREAST AND THEN PERFORM SENTINEL LYMPH NODE BIOPSY AND THEN  REMOVE RIGHT BREAST AND NIPPLE AND        (INJECTION ON SDS @7:00 A.M.);  Surgeon: Abdoul Ortega MD;  Location: [facility] OR;  Service: General   • PAP SMEAR     • US GUIDED FINE NEEDLE ASPIRATION  2018   • VENOUS ACCESS DEVICE (PORT) INSERTION Left 10/10/2018    Procedure: MEDIPORT PLACEMENT      (LEFT SIDE,UNATTACHED PORT )      (C-ARM);  Surgeon: Abdoul Ortega MD;  Location: [facility] OR;  Service: General        Patient Active Problem List   Diagnosis   • Vertigo   • Vision changes   • Acute non-recurrent maxillary sinusitis   •  "Papanicolaou smear for cervical cancer screening   • General medical exam   • Anxiety   • Acute vaginitis   • Encounter for Papanicolaou smear for cervical cancer screening   • External hemorrhoids   • Depression   • Sore throat   • Cough   • Neck pain   • Encounter for screening mammogram for malignant neoplasm of breast   • Other dysphagia   • Malignant neoplasm of upper-outer quadrant of right breast in female, estrogen receptor positive (CMS/HCC)   • Throat mass   • Encounter for venous access device care   • Encounter for venous access device care   • Flu vaccine need   • Vaccine for streptococcus pneumoniae and influenza   • Generalized anxiety disorder   • Chronic bilateral thoracic back pain   • Chest pain on breathing   • Acute low back pain without sciatica   • Chronic left-sided low back pain without sciatica   • History of abnormal cervical Pap smear   • Pelvic pain   • Encounter for gynecological examination   • Adenomyosis   • Obesity (BMI 30-39.9)   • Female pelvic pain        Documented Vitals    02/27/20 0910 02/27/20 0930   BP: 134/76    Pulse: 90    SpO2: 99%    Weight: 72.7 kg (160 lb 3.2 oz)    Height: 154.9 cm (60.98\")    PainSc: 0-No pain   4   PainLoc:  Back        Body mass index is 30.29 kg/m².    Physical Exam  Constitutional: Appears to be in no acute distress; Eyes: Sclera normal; Endocrine system: Thyroid palpate is normal; Pulmonary system: Lungs clear; Cardiovascular system: Heart regular rate and rhythm; Gastrointestinal system: Abdomen soft, nontender, active bowel sounds; Urologic system: CVA negative; Psychiatric: Appropriate insight; Neurologic: Gait within normal limits.      Last Labs  Lab Results   Component Value Date    WBC 6.17 01/27/2020    RBC 4.23 01/27/2020    HGB 12.7 01/27/2020    HCT 37.4 01/27/2020    MCV 88.4 01/27/2020    MCH 30.0 01/27/2020    MCHC 34.0 01/27/2020    RDW 12.0 (L) 01/27/2020    RDWSD 38.8 01/27/2020    MPV 8.6 01/27/2020     01/27/2020    "     Lab Results   Component Value Date    GLUCOSE 88 01/27/2020    BUN 14 01/27/2020    CREATININE 0.82 01/27/2020     01/27/2020    K 4.0 01/27/2020     01/27/2020    CO2 25.0 01/27/2020    CALCIUM 9.3 01/27/2020    PROTEINTOT 7.4 01/27/2020    ALBUMIN 4.20 01/27/2020    ALT 17 01/27/2020    AST 19 01/27/2020    ALKPHOS 107 01/27/2020    BILITOT 0.3 01/27/2020    EGFRIFNONA 77 01/27/2020    GLOB 3.2 01/27/2020    AGRATIO 1.3 01/27/2020    BCR 17.1 01/27/2020    ANIONGAP 13.0 01/27/2020       No results found for: HCGQUAL    Assessment &Plan: Pelvic pain felt secondary to adenomyosis after reviewing the risk benefits alternatives were gone plan for LAVH with BSO possible LARRY/BSO possible cystoscopy.  Risk benefits alternatives reviewed at length    Plan of care has been reviewed with staff, patient and family.  Risks, benefits of treatment plan have been discussed.  All questions have been answered.     Moy Feng Andrews and I have discussed pain goals for this hospitalization after reviewing her current clinical condition, medical history and prior pain experiences.  The goal is to keep her pain level 3.  To help achieve this, I plan to with multimodal pain management.        This document has been electronically signed by Evans Syed MD on February 27, 2020 5:46 PM    Please note that portions of this note were completed with a voice recognition program.

## 2020-03-03 ENCOUNTER — APPOINTMENT (OUTPATIENT)
Dept: PREADMISSION TESTING | Facility: HOSPITAL | Age: 40
End: 2020-03-03

## 2020-03-03 VITALS
HEIGHT: 61 IN | RESPIRATION RATE: 18 BRPM | BODY MASS INDEX: 29.64 KG/M2 | OXYGEN SATURATION: 98 % | SYSTOLIC BLOOD PRESSURE: 117 MMHG | HEART RATE: 90 BPM | DIASTOLIC BLOOD PRESSURE: 76 MMHG | WEIGHT: 157 LBS

## 2020-03-03 DIAGNOSIS — N80.03 ADENOMYOSIS: ICD-10-CM

## 2020-03-03 LAB
ABO GROUP BLD: NORMAL
ANION GAP SERPL CALCULATED.3IONS-SCNC: 12 MMOL/L (ref 5–15)
BLD GP AB SCN SERPL QL: NEGATIVE
BUN BLD-MCNC: 16 MG/DL (ref 6–20)
BUN/CREAT SERPL: 19.5 (ref 7–25)
CALCIUM SPEC-SCNC: 9.3 MG/DL (ref 8.6–10.5)
CHLORIDE SERPL-SCNC: 103 MMOL/L (ref 98–107)
CO2 SERPL-SCNC: 23 MMOL/L (ref 22–29)
CREAT BLD-MCNC: 0.82 MG/DL (ref 0.57–1)
DEPRECATED RDW RBC AUTO: 38.6 FL (ref 37–54)
ERYTHROCYTE [DISTWIDTH] IN BLOOD BY AUTOMATED COUNT: 12 % (ref 12.3–15.4)
GFR SERPL CREATININE-BSD FRML MDRD: 77 ML/MIN/1.73
GLUCOSE BLD-MCNC: 150 MG/DL (ref 65–99)
HCT VFR BLD AUTO: 34.2 % (ref 34–46.6)
HGB BLD-MCNC: 11.5 G/DL (ref 12–15.9)
Lab: NORMAL
MCH RBC QN AUTO: 29.8 PG (ref 26.6–33)
MCHC RBC AUTO-ENTMCNC: 33.6 G/DL (ref 31.5–35.7)
MCV RBC AUTO: 88.6 FL (ref 79–97)
PLATELET # BLD AUTO: 227 10*3/MM3 (ref 140–450)
PMV BLD AUTO: 9.1 FL (ref 6–12)
POTASSIUM BLD-SCNC: 4.1 MMOL/L (ref 3.5–5.2)
RBC # BLD AUTO: 3.86 10*6/MM3 (ref 3.77–5.28)
RH BLD: NEGATIVE
SODIUM BLD-SCNC: 138 MMOL/L (ref 136–145)
T&S EXPIRATION DATE: NORMAL
WBC NRBC COR # BLD: 5.96 10*3/MM3 (ref 3.4–10.8)

## 2020-03-03 PROCEDURE — 80048 BASIC METABOLIC PNL TOTAL CA: CPT | Performed by: OBSTETRICS & GYNECOLOGY

## 2020-03-03 PROCEDURE — 36415 COLL VENOUS BLD VENIPUNCTURE: CPT

## 2020-03-03 PROCEDURE — 86850 RBC ANTIBODY SCREEN: CPT | Performed by: ANESTHESIOLOGY

## 2020-03-03 PROCEDURE — 86900 BLOOD TYPING SEROLOGIC ABO: CPT | Performed by: ANESTHESIOLOGY

## 2020-03-03 PROCEDURE — 85027 COMPLETE CBC AUTOMATED: CPT | Performed by: ANESTHESIOLOGY

## 2020-03-03 PROCEDURE — 86901 BLOOD TYPING SEROLOGIC RH(D): CPT | Performed by: ANESTHESIOLOGY

## 2020-03-03 NOTE — PAT
Patient given chlorhexidine instructions, pre-op instructions given, chlorhexidine wash given to patient. Patient verbalized understanding of all instructions given.

## 2020-03-09 ENCOUNTER — ANESTHESIA EVENT (OUTPATIENT)
Dept: PERIOP | Facility: HOSPITAL | Age: 40
End: 2020-03-09

## 2020-03-10 ENCOUNTER — HOSPITAL ENCOUNTER (INPATIENT)
Facility: HOSPITAL | Age: 40
LOS: 1 days | Discharge: HOME | End: 2020-03-11
Attending: OBSTETRICS & GYNECOLOGY | Admitting: OBSTETRICS & GYNECOLOGY

## 2020-03-10 ENCOUNTER — ANESTHESIA (OUTPATIENT)
Dept: PERIOP | Facility: HOSPITAL | Age: 40
End: 2020-03-10

## 2020-03-10 DIAGNOSIS — N80.03 ADENOMYOSIS: ICD-10-CM

## 2020-03-10 LAB
ABO GROUP BLD: NORMAL
BLD GP AB SCN SERPL QL: NEGATIVE
Lab: NORMAL
RH BLD: NEGATIVE
T&S EXPIRATION DATE: NORMAL

## 2020-03-10 PROCEDURE — 25010000002 HYDROMORPHONE 1 MG/ML SOLUTION: Performed by: OBSTETRICS & GYNECOLOGY

## 2020-03-10 PROCEDURE — 25010000002 PROMETHAZINE PER 50 MG: Performed by: OBSTETRICS & GYNECOLOGY

## 2020-03-10 PROCEDURE — 25010000002 ONDANSETRON PER 1 MG: Performed by: OBSTETRICS & GYNECOLOGY

## 2020-03-10 PROCEDURE — 25010000002 MIDAZOLAM PER 1 MG: Performed by: NURSE ANESTHETIST, CERTIFIED REGISTERED

## 2020-03-10 PROCEDURE — 25010000002 NEOSTIGMINE 4 MG/4ML SOLUTION PREFILLED SYRINGE: Performed by: NURSE ANESTHETIST, CERTIFIED REGISTERED

## 2020-03-10 PROCEDURE — 25010000002 FENTANYL CITRATE (PF) 100 MCG/2ML SOLUTION: Performed by: NURSE ANESTHETIST, CERTIFIED REGISTERED

## 2020-03-10 PROCEDURE — 94799 UNLISTED PULMONARY SVC/PX: CPT

## 2020-03-10 PROCEDURE — 0UJD4ZZ INSPECTION OF UTERUS AND CERVIX, PERCUTANEOUS ENDOSCOPIC APPROACH: ICD-10-PCS | Performed by: OBSTETRICS & GYNECOLOGY

## 2020-03-10 PROCEDURE — 0UT20ZZ RESECTION OF BILATERAL OVARIES, OPEN APPROACH: ICD-10-PCS | Performed by: OBSTETRICS & GYNECOLOGY

## 2020-03-10 PROCEDURE — 0UT70ZZ RESECTION OF BILATERAL FALLOPIAN TUBES, OPEN APPROACH: ICD-10-PCS | Performed by: OBSTETRICS & GYNECOLOGY

## 2020-03-10 PROCEDURE — 86901 BLOOD TYPING SEROLOGIC RH(D): CPT | Performed by: OBSTETRICS & GYNECOLOGY

## 2020-03-10 PROCEDURE — 25010000002 HYDROMORPHONE 1 MG/ML SOLUTION: Performed by: NURSE ANESTHETIST, CERTIFIED REGISTERED

## 2020-03-10 PROCEDURE — 58150 TOTAL HYSTERECTOMY: CPT | Performed by: OBSTETRICS & GYNECOLOGY

## 2020-03-10 PROCEDURE — 25010000002 DEXAMETHASONE PER 1 MG: Performed by: NURSE ANESTHETIST, CERTIFIED REGISTERED

## 2020-03-10 PROCEDURE — 0TJB8ZZ INSPECTION OF BLADDER, VIA NATURAL OR ARTIFICIAL OPENING ENDOSCOPIC: ICD-10-PCS | Performed by: OBSTETRICS & GYNECOLOGY

## 2020-03-10 PROCEDURE — 86850 RBC ANTIBODY SCREEN: CPT | Performed by: OBSTETRICS & GYNECOLOGY

## 2020-03-10 PROCEDURE — 88307 TISSUE EXAM BY PATHOLOGIST: CPT | Performed by: OBSTETRICS & GYNECOLOGY

## 2020-03-10 PROCEDURE — 0UT90ZZ RESECTION OF UTERUS, OPEN APPROACH: ICD-10-PCS | Performed by: OBSTETRICS & GYNECOLOGY

## 2020-03-10 PROCEDURE — 25010000002 ONDANSETRON PER 1 MG: Performed by: NURSE ANESTHETIST, CERTIFIED REGISTERED

## 2020-03-10 PROCEDURE — 25010000002 PROPOFOL 10 MG/ML EMULSION: Performed by: NURSE ANESTHETIST, CERTIFIED REGISTERED

## 2020-03-10 PROCEDURE — 25010000003 MEPERIDINE 100 MG/2ML SOLUTION: Performed by: NURSE ANESTHETIST, CERTIFIED REGISTERED

## 2020-03-10 PROCEDURE — 88307 TISSUE EXAM BY PATHOLOGIST: CPT | Performed by: PATHOLOGY

## 2020-03-10 PROCEDURE — 86900 BLOOD TYPING SEROLOGIC ABO: CPT | Performed by: OBSTETRICS & GYNECOLOGY

## 2020-03-10 RX ORDER — DEXAMETHASONE SODIUM PHOSPHATE 4 MG/ML
INJECTION, SOLUTION INTRA-ARTICULAR; INTRALESIONAL; INTRAMUSCULAR; INTRAVENOUS; SOFT TISSUE AS NEEDED
Status: DISCONTINUED | OUTPATIENT
Start: 2020-03-10 | End: 2020-03-10 | Stop reason: SURG

## 2020-03-10 RX ORDER — PROMETHAZINE HYDROCHLORIDE 25 MG/ML
12.5 INJECTION, SOLUTION INTRAMUSCULAR; INTRAVENOUS ONCE AS NEEDED
Status: DISCONTINUED | OUTPATIENT
Start: 2020-03-10 | End: 2020-03-10 | Stop reason: HOSPADM

## 2020-03-10 RX ORDER — VENLAFAXINE HYDROCHLORIDE 37.5 MG/1
37.5 CAPSULE, EXTENDED RELEASE ORAL NIGHTLY
Status: DISCONTINUED | OUTPATIENT
Start: 2020-03-10 | End: 2020-03-11 | Stop reason: HOSPADM

## 2020-03-10 RX ORDER — FAMOTIDINE 10 MG
40 TABLET ORAL DAILY
Status: DISCONTINUED | OUTPATIENT
Start: 2020-03-11 | End: 2020-03-11 | Stop reason: HOSPADM

## 2020-03-10 RX ORDER — LIDOCAINE HYDROCHLORIDE 20 MG/ML
INJECTION, SOLUTION INFILTRATION; PERINEURAL AS NEEDED
Status: DISCONTINUED | OUTPATIENT
Start: 2020-03-10 | End: 2020-03-10 | Stop reason: SURG

## 2020-03-10 RX ORDER — BISACODYL 10 MG
10 SUPPOSITORY, RECTAL RECTAL DAILY PRN
Status: DISCONTINUED | OUTPATIENT
Start: 2020-03-10 | End: 2020-03-11 | Stop reason: HOSPADM

## 2020-03-10 RX ORDER — MIDAZOLAM HYDROCHLORIDE 1 MG/ML
INJECTION INTRAMUSCULAR; INTRAVENOUS AS NEEDED
Status: DISCONTINUED | OUTPATIENT
Start: 2020-03-10 | End: 2020-03-10 | Stop reason: SURG

## 2020-03-10 RX ORDER — PROPOFOL 10 MG/ML
VIAL (ML) INTRAVENOUS AS NEEDED
Status: DISCONTINUED | OUTPATIENT
Start: 2020-03-10 | End: 2020-03-10 | Stop reason: SURG

## 2020-03-10 RX ORDER — SODIUM CHLORIDE, SODIUM LACTATE, POTASSIUM CHLORIDE, CALCIUM CHLORIDE 600; 310; 30; 20 MG/100ML; MG/100ML; MG/100ML; MG/100ML
125 INJECTION, SOLUTION INTRAVENOUS CONTINUOUS
Status: DISCONTINUED | OUTPATIENT
Start: 2020-03-10 | End: 2020-03-11 | Stop reason: HOSPADM

## 2020-03-10 RX ORDER — PROMETHAZINE HYDROCHLORIDE 25 MG/1
25 TABLET ORAL ONCE AS NEEDED
Status: DISCONTINUED | OUTPATIENT
Start: 2020-03-10 | End: 2020-03-10 | Stop reason: HOSPADM

## 2020-03-10 RX ORDER — NALOXONE HCL 0.4 MG/ML
0.4 VIAL (ML) INJECTION AS NEEDED
Status: DISCONTINUED | OUTPATIENT
Start: 2020-03-10 | End: 2020-03-10 | Stop reason: HOSPADM

## 2020-03-10 RX ORDER — FENTANYL CITRATE 50 UG/ML
INJECTION, SOLUTION INTRAMUSCULAR; INTRAVENOUS AS NEEDED
Status: DISCONTINUED | OUTPATIENT
Start: 2020-03-10 | End: 2020-03-10 | Stop reason: SURG

## 2020-03-10 RX ORDER — PROMETHAZINE HYDROCHLORIDE 12.5 MG/1
12.5 TABLET ORAL EVERY 6 HOURS PRN
Status: DISCONTINUED | OUTPATIENT
Start: 2020-03-10 | End: 2020-03-11 | Stop reason: HOSPADM

## 2020-03-10 RX ORDER — OXYCODONE AND ACETAMINOPHEN 7.5; 325 MG/1; MG/1
1 TABLET ORAL EVERY 4 HOURS PRN
Status: DISCONTINUED | OUTPATIENT
Start: 2020-03-10 | End: 2020-03-11 | Stop reason: HOSPADM

## 2020-03-10 RX ORDER — PROMETHAZINE HYDROCHLORIDE 25 MG/ML
6.25 INJECTION, SOLUTION INTRAMUSCULAR; INTRAVENOUS
Status: DISCONTINUED | OUTPATIENT
Start: 2020-03-10 | End: 2020-03-11 | Stop reason: HOSPADM

## 2020-03-10 RX ORDER — SODIUM CHLORIDE, SODIUM GLUCONATE, SODIUM ACETATE, POTASSIUM CHLORIDE, AND MAGNESIUM CHLORIDE 526; 502; 368; 37; 30 MG/100ML; MG/100ML; MG/100ML; MG/100ML; MG/100ML
INJECTION, SOLUTION INTRAVENOUS CONTINUOUS PRN
Status: DISCONTINUED | OUTPATIENT
Start: 2020-03-10 | End: 2020-03-10 | Stop reason: SURG

## 2020-03-10 RX ORDER — ONDANSETRON 4 MG/1
4 TABLET, FILM COATED ORAL EVERY 6 HOURS PRN
Status: DISCONTINUED | OUTPATIENT
Start: 2020-03-10 | End: 2020-03-11 | Stop reason: HOSPADM

## 2020-03-10 RX ORDER — NALOXONE HCL 0.4 MG/ML
0.1 VIAL (ML) INJECTION
Status: DISCONTINUED | OUTPATIENT
Start: 2020-03-10 | End: 2020-03-11 | Stop reason: HOSPADM

## 2020-03-10 RX ORDER — NEOSTIGMINE METHYLSULFATE 4 MG/4 ML
SYRINGE (ML) INTRAVENOUS AS NEEDED
Status: DISCONTINUED | OUTPATIENT
Start: 2020-03-10 | End: 2020-03-10 | Stop reason: SURG

## 2020-03-10 RX ORDER — SODIUM CHLORIDE, SODIUM LACTATE, POTASSIUM CHLORIDE, CALCIUM CHLORIDE 600; 310; 30; 20 MG/100ML; MG/100ML; MG/100ML; MG/100ML
125 INJECTION, SOLUTION INTRAVENOUS CONTINUOUS
Status: DISCONTINUED | OUTPATIENT
Start: 2020-03-10 | End: 2020-03-10

## 2020-03-10 RX ORDER — FLUMAZENIL 0.1 MG/ML
0.2 INJECTION INTRAVENOUS AS NEEDED
Status: DISCONTINUED | OUTPATIENT
Start: 2020-03-10 | End: 2020-03-10 | Stop reason: HOSPADM

## 2020-03-10 RX ORDER — ACETAMINOPHEN 650 MG/1
650 SUPPOSITORY RECTAL ONCE AS NEEDED
Status: DISCONTINUED | OUTPATIENT
Start: 2020-03-10 | End: 2020-03-10 | Stop reason: HOSPADM

## 2020-03-10 RX ORDER — ACETAMINOPHEN 325 MG/1
650 TABLET ORAL ONCE AS NEEDED
Status: DISCONTINUED | OUTPATIENT
Start: 2020-03-10 | End: 2020-03-10 | Stop reason: HOSPADM

## 2020-03-10 RX ORDER — ONDANSETRON 2 MG/ML
4 INJECTION INTRAMUSCULAR; INTRAVENOUS EVERY 6 HOURS PRN
Status: DISCONTINUED | OUTPATIENT
Start: 2020-03-10 | End: 2020-03-11 | Stop reason: HOSPADM

## 2020-03-10 RX ORDER — ROCURONIUM BROMIDE 10 MG/ML
INJECTION, SOLUTION INTRAVENOUS AS NEEDED
Status: DISCONTINUED | OUTPATIENT
Start: 2020-03-10 | End: 2020-03-10 | Stop reason: SURG

## 2020-03-10 RX ORDER — PROMETHAZINE HYDROCHLORIDE 12.5 MG/1
12.5 SUPPOSITORY RECTAL EVERY 6 HOURS PRN
Status: DISCONTINUED | OUTPATIENT
Start: 2020-03-10 | End: 2020-03-11 | Stop reason: HOSPADM

## 2020-03-10 RX ORDER — DIPHENHYDRAMINE HYDROCHLORIDE 50 MG/ML
12.5 INJECTION INTRAMUSCULAR; INTRAVENOUS
Status: DISCONTINUED | OUTPATIENT
Start: 2020-03-10 | End: 2020-03-10 | Stop reason: HOSPADM

## 2020-03-10 RX ORDER — PROMETHAZINE HYDROCHLORIDE 25 MG/1
25 SUPPOSITORY RECTAL ONCE AS NEEDED
Status: DISCONTINUED | OUTPATIENT
Start: 2020-03-10 | End: 2020-03-10 | Stop reason: HOSPADM

## 2020-03-10 RX ORDER — BUPIVACAINE HCL/0.9 % NACL/PF 0.1 %
2 PLASTIC BAG, INJECTION (ML) EPIDURAL ONCE
Status: COMPLETED | OUTPATIENT
Start: 2020-03-10 | End: 2020-03-10

## 2020-03-10 RX ORDER — EPHEDRINE SULFATE 50 MG/ML
5 INJECTION, SOLUTION INTRAVENOUS ONCE AS NEEDED
Status: DISCONTINUED | OUTPATIENT
Start: 2020-03-10 | End: 2020-03-10 | Stop reason: HOSPADM

## 2020-03-10 RX ORDER — ONDANSETRON 2 MG/ML
4 INJECTION INTRAMUSCULAR; INTRAVENOUS ONCE AS NEEDED
Status: DISCONTINUED | OUTPATIENT
Start: 2020-03-10 | End: 2020-03-10 | Stop reason: HOSPADM

## 2020-03-10 RX ORDER — LABETALOL HYDROCHLORIDE 5 MG/ML
5 INJECTION, SOLUTION INTRAVENOUS
Status: DISCONTINUED | OUTPATIENT
Start: 2020-03-10 | End: 2020-03-10 | Stop reason: HOSPADM

## 2020-03-10 RX ORDER — BUPIVACAINE HYDROCHLORIDE AND EPINEPHRINE 5; 5 MG/ML; UG/ML
INJECTION, SOLUTION EPIDURAL; INTRACAUDAL; PERINEURAL AS NEEDED
Status: DISCONTINUED | OUTPATIENT
Start: 2020-03-10 | End: 2020-03-10 | Stop reason: HOSPADM

## 2020-03-10 RX ORDER — SODIUM CHLORIDE 0.9 % (FLUSH) 0.9 %
10 SYRINGE (ML) INJECTION
Status: DISCONTINUED | OUTPATIENT
Start: 2020-03-10 | End: 2020-03-11 | Stop reason: HOSPADM

## 2020-03-10 RX ORDER — NAPROXEN 500 MG/1
500 TABLET ORAL 2 TIMES DAILY PRN
Status: DISCONTINUED | OUTPATIENT
Start: 2020-03-10 | End: 2020-03-11 | Stop reason: HOSPADM

## 2020-03-10 RX ORDER — DIAZEPAM 5 MG/1
5 TABLET ORAL EVERY 6 HOURS PRN
Status: DISCONTINUED | OUTPATIENT
Start: 2020-03-10 | End: 2020-03-11 | Stop reason: HOSPADM

## 2020-03-10 RX ORDER — ONDANSETRON 2 MG/ML
INJECTION INTRAMUSCULAR; INTRAVENOUS AS NEEDED
Status: DISCONTINUED | OUTPATIENT
Start: 2020-03-10 | End: 2020-03-10 | Stop reason: SURG

## 2020-03-10 RX ORDER — HEPARIN SODIUM (PORCINE) LOCK FLUSH IV SOLN 100 UNIT/ML 100 UNIT/ML
500 SOLUTION INTRAVENOUS
Status: DISCONTINUED | OUTPATIENT
Start: 2020-03-10 | End: 2020-03-10

## 2020-03-10 RX ORDER — EXEMESTANE 25 MG/1
25 TABLET ORAL DAILY
Status: DISCONTINUED | OUTPATIENT
Start: 2020-03-10 | End: 2020-03-11 | Stop reason: HOSPADM

## 2020-03-10 RX ORDER — ZOLPIDEM TARTRATE 5 MG/1
5 TABLET ORAL NIGHTLY PRN
Status: DISCONTINUED | OUTPATIENT
Start: 2020-03-10 | End: 2020-03-11 | Stop reason: HOSPADM

## 2020-03-10 RX ORDER — KETOROLAC TROMETHAMINE 30 MG/ML
30 INJECTION, SOLUTION INTRAMUSCULAR; INTRAVENOUS EVERY 6 HOURS PRN
Status: DISCONTINUED | OUTPATIENT
Start: 2020-03-10 | End: 2020-03-11 | Stop reason: HOSPADM

## 2020-03-10 RX ADMIN — ROCURONIUM BROMIDE 10 MG: 10 INJECTION INTRAVENOUS at 08:38

## 2020-03-10 RX ADMIN — SODIUM CHLORIDE, POTASSIUM CHLORIDE, SODIUM LACTATE AND CALCIUM CHLORIDE 125 ML/HR: 600; 310; 30; 20 INJECTION, SOLUTION INTRAVENOUS at 12:58

## 2020-03-10 RX ADMIN — HYDROMORPHONE HYDROCHLORIDE 0.5 MG: 1 INJECTION, SOLUTION INTRAMUSCULAR; INTRAVENOUS; SUBCUTANEOUS at 10:07

## 2020-03-10 RX ADMIN — MIDAZOLAM HYDROCHLORIDE 2 MG: 2 INJECTION, SOLUTION INTRAMUSCULAR; INTRAVENOUS at 07:12

## 2020-03-10 RX ADMIN — FENTANYL CITRATE 50 MCG: 50 INJECTION, SOLUTION INTRAMUSCULAR; INTRAVENOUS at 08:25

## 2020-03-10 RX ADMIN — LIDOCAINE HYDROCHLORIDE 100 MG: 20 INJECTION, SOLUTION INFILTRATION; PERINEURAL at 07:20

## 2020-03-10 RX ADMIN — HYDROMORPHONE HYDROCHLORIDE 0.5 MG: 1 INJECTION, SOLUTION INTRAMUSCULAR; INTRAVENOUS; SUBCUTANEOUS at 19:47

## 2020-03-10 RX ADMIN — PROMETHAZINE HYDROCHLORIDE 6.25 MG: 25 INJECTION INTRAMUSCULAR; INTRAVENOUS at 17:03

## 2020-03-10 RX ADMIN — Medication 2 G: at 07:25

## 2020-03-10 RX ADMIN — MEPERIDINE HYDROCHLORIDE 12.5 MG: 100 INJECTION, SOLUTION INTRAMUSCULAR; INTRAVENOUS; SUBCUTANEOUS at 10:20

## 2020-03-10 RX ADMIN — ONDANSETRON 4 MG: 2 INJECTION INTRAMUSCULAR; INTRAVENOUS at 09:16

## 2020-03-10 RX ADMIN — EXEMESTANE 25 MG: 25 TABLET ORAL at 15:57

## 2020-03-10 RX ADMIN — Medication 2 MG: at 09:22

## 2020-03-10 RX ADMIN — PROPOFOL 150 MG: 10 INJECTION, EMULSION INTRAVENOUS at 07:20

## 2020-03-10 RX ADMIN — ROCURONIUM BROMIDE 40 MG: 10 INJECTION INTRAVENOUS at 07:20

## 2020-03-10 RX ADMIN — DEXAMETHASONE SODIUM PHOSPHATE 4 MG: 4 INJECTION, SOLUTION INTRAMUSCULAR; INTRAVENOUS at 08:40

## 2020-03-10 RX ADMIN — HYDROMORPHONE HYDROCHLORIDE 0.5 MG: 1 INJECTION, SOLUTION INTRAMUSCULAR; INTRAVENOUS; SUBCUTANEOUS at 10:27

## 2020-03-10 RX ADMIN — MEPERIDINE HYDROCHLORIDE 12.5 MG: 100 INJECTION, SOLUTION INTRAMUSCULAR; INTRAVENOUS; SUBCUTANEOUS at 10:15

## 2020-03-10 RX ADMIN — FENTANYL CITRATE 50 MCG: 50 INJECTION, SOLUTION INTRAMUSCULAR; INTRAVENOUS at 08:04

## 2020-03-10 RX ADMIN — SODIUM CHLORIDE, POTASSIUM CHLORIDE, SODIUM LACTATE AND CALCIUM CHLORIDE: 600; 310; 30; 20 INJECTION, SOLUTION INTRAVENOUS at 07:12

## 2020-03-10 RX ADMIN — SODIUM CHLORIDE, SODIUM GLUCONATE, SODIUM ACETATE, POTASSIUM CHLORIDE, AND MAGNESIUM CHLORIDE: 526; 502; 368; 37; 30 INJECTION, SOLUTION INTRAVENOUS at 08:47

## 2020-03-10 RX ADMIN — GLYCOPYRROLATE 0.4 MG: 0.2 INJECTION, SOLUTION INTRAMUSCULAR; INTRAVITREAL at 09:22

## 2020-03-10 RX ADMIN — ONDANSETRON 4 MG: 2 INJECTION INTRAMUSCULAR; INTRAVENOUS at 13:24

## 2020-03-10 RX ADMIN — HYDROMORPHONE HYDROCHLORIDE 0.5 MG: 1 INJECTION, SOLUTION INTRAMUSCULAR; INTRAVENOUS; SUBCUTANEOUS at 15:30

## 2020-03-10 RX ADMIN — FLUORESCEIN SODIUM 25 MG: 100 INJECTION INTRAVENOUS at 09:16

## 2020-03-10 RX ADMIN — HYDROMORPHONE HYDROCHLORIDE 0.5 MG: 1 INJECTION, SOLUTION INTRAMUSCULAR; INTRAVENOUS; SUBCUTANEOUS at 10:17

## 2020-03-10 RX ADMIN — OXYCODONE HYDROCHLORIDE AND ACETAMINOPHEN 1 TABLET: 7.5; 325 TABLET ORAL at 21:03

## 2020-03-10 RX ADMIN — FENTANYL CITRATE 100 MCG: 50 INJECTION, SOLUTION INTRAMUSCULAR; INTRAVENOUS at 07:20

## 2020-03-10 RX ADMIN — HYDROMORPHONE HYDROCHLORIDE 0.5 MG: 1 INJECTION, SOLUTION INTRAMUSCULAR; INTRAVENOUS; SUBCUTANEOUS at 10:51

## 2020-03-10 RX ADMIN — FENTANYL CITRATE 50 MCG: 50 INJECTION, SOLUTION INTRAMUSCULAR; INTRAVENOUS at 09:15

## 2020-03-10 NOTE — INTERVAL H&P NOTE
H&P updated. The patient was examined and Procedure risks benefits alternatives again reviewed ?s answereed

## 2020-03-10 NOTE — ANESTHESIA POSTPROCEDURE EVALUATION
Patient: Moy Sparrow    Procedure Summary     Date:  03/10/20 Room / Location:  [facility] OR  / [facility] OR    Anesthesia Start:  0713 Anesthesia Stop:  1003    Procedure:  ABDOMIAL HYSTERECTOMY BILATERAL SALPINGO-OOPHERECTOMY CYSTOSCOPY (N/A Abdomen) Diagnosis:       Adenomyosis      (Adenomyosis [N80.9])    Surgeon:  Evans Syed MD Provider:  Sorin Duckworth MD    Anesthesia Type:  general ASA Status:  3          Anesthesia Type: general    Vitals  No vitals data found for the desired time range.          Post Anesthesia Care and Evaluation    Patient location during evaluation: PACU  Patient participation: complete - patient participated  Level of consciousness: sleepy but conscious  Pain management: adequate  Airway patency: patent  Anesthetic complications: No anesthetic complications    Cardiovascular status: acceptable  Respiratory status: acceptable  Hydration status: acceptable

## 2020-03-10 NOTE — PLAN OF CARE
Problem: Patient Care Overview  Goal: Plan of Care Review  Outcome: Ongoing (interventions implemented as appropriate)  Flowsheets  Taken 3/10/2020 1813  Progress: improving  Outcome Summary: patient is a new admission during this shift, VSS, pain controlled with medication, nausea and vomiting controlled with medication, diet tolerated, will continue to monitor.  Taken 3/10/2020 1115  Plan of Care Reviewed With: patient     Problem: Hysterectomy (Adult)  Goal: Signs and Symptoms of Listed Potential Problems Will be Absent, Minimized or Managed (Hysterectomy)  Outcome: Ongoing (interventions implemented as appropriate)  Flowsheets (Taken 3/10/2020 1813)  Problems Assessed (Hysterectomy): all  Problems Present (Hysterectomy): pain; postoperative nausea and vomiting; situational response     Problem: Infection, Risk/Actual (Adult)  Goal: Infection Prevention/Resolution  Outcome: Ongoing (interventions implemented as appropriate)  Flowsheets (Taken 3/10/2020 1813)  Infection Prevention/Resolution: making progress toward outcome

## 2020-03-10 NOTE — NURSING NOTE
Discussed pregnancy test with patient.  States is refusing pregnancy test.  States she is post menopausal and has not had sex in two years.

## 2020-03-10 NOTE — BRIEF OP NOTE
Gyn LAVHBSO converted ABDOMIAL HYSTERECTOMY BILATERAL SALPINGO-OOPHERECTOMY CYSTOSCOPY  Brief Op Note    Moy Sparrow  3/10/2020    Pre-op Diagnosis:   Adenomyosis [N80.9] pelvic pain         Post-Op Diagnosis Codes:     * Adenomyosis [N80.9] pelvic pain           Procedure(s): LAVH BSO converted to  ABDOMIAL HYSTERECTOMY BILATERAL SALPINGO-OOPHERECTOMY CYSTOSCOPY    Surgeon(s):  Evans Syed MD    Anesthesia:  General    Staff:   Circulator: Vilma Lee RN  Scrub Person: Jannet Loving  Assistant: Shazia Luong CSA    Estimated Blood Loss:  350 mL    Specimens:  ID Type Source Tests Collected by Time   A (Not marked as sent) : tagged left Tissue Uterus with Cervix, Bilateral Tubes and Ovaries TISSUE PATHOLOGY EXAM Evans Syed MD 3/10/2020 0905         Drains:  Urinary catheter           Findings Marked genitourinary cancer cavity cervix was extremely stenotic unable to dilate past 5 and even with this 1 is unable to adequately place V care device.  Because of this converted to open procedure.  There were significant adhesions between bladder and uterus from prior  section and significant pelvic adhesions on the left obscuring anatomy  Complications:  None noted findings    Tourniquet:  None    Evans Syed MD     Date: 3/10/2020  Time: 10:06

## 2020-03-10 NOTE — ANESTHESIA PROCEDURE NOTES
Airway  Urgency: elective    Date/Time: 3/10/2020 7:23 AM  Airway not difficult    General Information and Staff    Patient location during procedure: OR  CRNA: Felice Millard CRNA    Indications and Patient Condition  Indications for airway management: airway protection    Preoxygenated: yes  Mask difficulty assessment: 1 - vent by mask    Final Airway Details  Final airway type: endotracheal airway      Successful airway: ETT  Cuffed: yes   Successful intubation technique: direct laryngoscopy  Facilitating devices/methods: intubating stylet and cricoid pressure  Endotracheal tube insertion site: oral  Blade: Mino (gmac)  Blade size: 3  ETT size (mm): 7.0  Cormack-Lehane Classification: grade IIb - view of arytenoids or posterior of glottis only  Placement verified by: chest auscultation and capnometry   Measured from: teeth  ETT/EBT  to teeth (cm): 21  Number of attempts at approach: 1  Assessment: lips, teeth, and gum same as pre-op and atraumatic intubation

## 2020-03-10 NOTE — ANESTHESIA PREPROCEDURE EVALUATION
Anesthesia Evaluation     Patient summary reviewed   no history of anesthetic complications:  NPO Solid Status: > 8 hours  NPO Liquid Status: > 2 hours           Airway   Mallampati: II  TM distance: >3 FB  Neck ROM: full  Possible difficult intubation  Dental - normal exam     Pulmonary - normal exam    breath sounds clear to auscultation  (+) a smoker (quit 2 months ago) Current,     ROS comment: FINDINGS:             - lines/tubes:    none     - cardiac:         size within normal limits         - mediastinum: contour within normal limits         - lungs:         no focal air space process, pulmonary  interstitial edema, nodule(s)/mass             - pleura:         no evidence of  fluid                  - osseous:         unremarkable for age                  - misc.:       IMPRESSION:  CONCLUSION:        1. No evidence of an active cardiopulmonary process.                                                  Electronically signed by:  KENDRA Sparrow MD  7/2/2018 4:01 PM  Cardiovascular - negative cardio ROS and normal exam  Exercise tolerance: good (4-7 METS)    Rhythm: regular  Rate: normal    (-) angina, murmur      Neuro/Psych  (+) dizziness/light headedness (Vertigo.), psychiatric history Anxiety and Depression,     GI/Hepatic/Renal/Endo    (+)  GERD well controlled,    (-)  obesity, morbid obesity    Musculoskeletal     (+) neck pain (Cervicalgia.),       ROS comment: Procedure: Cervical spine     Indication:  Neck pain        Technique:  Three    views     Prior relevant exam:  None       No evidence of acute bone abnormality is noted. No prevertebral  soft tissue swelling or widening of interspinous process is  noted. Disc heights are well maintained at all levels. Bone  mineralization is overall within normal limits.     IMPRESSION:  CONCLUSION:   1.  Normal examination of the cervical spine.     Electronically signed by:  Dilan Fabian MD  7/2/2018 4:08 PM CDT  Abdominal    Substance History - negative  use     OB/GYN negative ob/gyn ROS   (-)  Pregnant        Other      history of cancer (breast)                      Anesthesia Plan    ASA 3     general     intravenous induction     Anesthetic plan, all risks, benefits, and alternatives have been provided, discussed and informed consent has been obtained with: patient and spouse/significant other.

## 2020-03-10 NOTE — ANESTHESIA POSTPROCEDURE EVALUATION
Patient: Moy Sparrow    Procedure Summary     Date:  03/10/20 Room / Location:  [facility] OR  / [facility] OR    Anesthesia Start:  0713 Anesthesia Stop:  1003    Procedure:  ABDOMIAL HYSTERECTOMY BILATERAL SALPINGO-OOPHERECTOMY CYSTOSCOPY (N/A Abdomen) Diagnosis:       Adenomyosis      (Adenomyosis [N80.9])    Surgeon:  Evans Syed MD Provider:  Sorin Duckworth MD    Anesthesia Type:  general ASA Status:  3          Anesthesia Type: general    Vitals  Vitals Value Taken Time   /80 3/10/2020 10:05 AM   Temp 97.9 °F (36.6 °C) 3/10/2020 10:05 AM   Pulse 101 3/10/2020 10:05 AM   Resp 16 3/10/2020 10:05 AM   SpO2 98 % 3/10/2020 10:05 AM           Anesthesia Post Evaluation

## 2020-03-10 NOTE — PAYOR COMM NOTE
"Chloé Macdonald  [facility]  [phone])[phone]  (F)[phone]    Ref#[id number]        Reece Saxena (39 y.o. Female)     Date of Birth Social Security Number Address Home Phone MRN    1980  [id number] [address] [phone] [id number]    Jehovah's witness Marital Status          Protestant Single       Admission Date Admission Type Admitting Provider Attending Provider Department, Room/Bed    3/10/20 Elective Evans Syed MD Neely, Thomas S, MD [facility] 3 EAST, 381/1    Discharge Date Discharge Disposition Discharge Destination                       Attending Provider:  Evans Syed MD    Allergies:  Penicillins    Isolation:  None   Infection:  None   Code Status:  CPR    Ht:  154.9 cm (61\")   Wt:  71.3 kg (157 lb 3 oz)    Admission Cmt:  None   Principal Problem:  Adenomyosis [N80.9]                 Active Insurance as of 3/10/2020     Primary Coverage     Payor Plan Insurance Group Employer/Plan Group    [facility] [employer] BL[employer] [id number]     Payor Plan Address Payor Plan Phone Number Payor Plan Fax Number Effective Dates    [address] [phone]  1/1/2018 - None Entered    [place] [address]       Subscriber Name Subscriber Birth Date Member ID       REECE SAXENA 1980 [id number]                 Emergency Contacts      (Rel.) Home Phone Work Phone Mobile Phone    AminahAnna Marie (Friend) [phone] -- [phone]    Jerrica Tesfaye (Friend) [phone] -- [phone]               History & Physical      Evans Syed MD at 03/10/20 0617        H&P updated. The patient was examined and Procedure risks benefits alternatives again reviewed ?s answereed    Electronically signed by Evans Syed MD at 03/10/20 0619   Source Note            History and Physical    Reece Saxena is a 39 y.o. y/o female.     Chief Complaint: Pelvic pain likely adenomyosis     HPI:   39 " y.o. .  No LMP recorded..  Patient with pelvic pain central throbbing in nature made worse by intercourse somewhat better by nonsteroidal anti-anti-inflammatory.  At worst 6 of 10.  Work-up has showed an ultrasound showing the uterus to be heterogeneous consistent with adenomyosis I think if this is the etiology of the pain.  I discussed options with the patient including further work-up with MRI versus following conservatively.  She is already on Lupron for treatment of breast cancer with some improvement but the improvement has stopped.  I reviewed with her at length the risk benefits alternatives to hysterectomy.  Patient requests laparoscopic hysterectomy. She understands the risk up to and including death. She understands the risk of serious urologic morbidity such as vesico vaginal fistula, damage to the ureter possible nephrectomy and/or extended morbidity. She understands the risk of damage to bowel possible colostomy. She understands the risk of damage to bowel undetected at time of procedure with sepsis and/or need returned OR. She understands the risk of damage to blood vessels with hemorrhage to require blood transfusion and its risk. She understands the risk of delayed hemorrhage possible need to return. She understands the risk of hematoma formation. She understands the risk infection in the abdominal wall and in the pelvis and abdomen and other parts of the body. She understands the alternatives of medical therapy and the risks and alternatives. Her questions are answered at length. She understands that there is a significant possibility that we may need to convert this to a total abdominal hysterectomy and she accepts this.    She is adamant that she wishes to proceed with bilateral salpingo-oophorectomy.  She does have a history of breast cancer which was estrogen receptor positive Patient requests bilateral salpingo-oophorectomy after reviewing the risks, benefits, and alternatives at  length. She understands that this will place her through surgical menopause and the implications of this. She understands that there is some work to indicate that bilateral salpingo-oophorectomy may increase mortality. She understands the risk of ovarian remnant syndrome. She understands that bilateral salpingo-oophorectomy does not completely remove the risk of ovarian like cancer in the peritoneal cavity. She understands the risk of bleeding and damage to the ureter associated with removal of the ovaries. She understands the alternatives of only removing the fallopian tubes and the advantages and risk associated with this.        Review of Systems   ROS:  CNS: No history of brain malignancy.  HEENT: No history of throat cancer.  Eye: No history of retinal cancer.  Pulmonary: No history of lung cancer.                                                                                 Cardiovascular: No history of cardiac tumors.  Gastrointestinal: No history of small bowel tumors.  Renal: No history of kidney malignancy.  Musculoskeletal: No history of smooth muscle tumors.  Lymphatics: No history of Hodgkin's disease.  Endocrine: No history of thyroid malignancy.    The following portions of the patient's history were reviewed and updated as appropriate: allergies, current medications, past family history, past medical history, past social history, past surgical history and problem list.    Allergies   Allergen Reactions   • Penicillins Rash        Prior to Admission medications    Medication Sig Start Date End Date Taking? Authorizing Provider   benzoyl peroxide 5 % external liquid Apply topically to the appropriate area once daily as directed for acne. 10/23/19  Yes Rachel Case APRN   BENZOYL PEROXIDE 5 % external wash  5/8/19  Yes Provider, MD Ranjit   desvenlafaxine (PRISTIQ) 50 MG 24 hr tablet Take 1 tablet by mouth Daily. 11/18/19  Yes Sally Campo APRN   diazePAM (VALIUM) 5 MG tablet  Take 1 tablet by mouth Every 6 (Six) Hours As Needed for Anxiety. 3/14/19  Yes Hailey Emery MD   exemestane (AROMASIN) 25 MG chemo tablet Take 1 tablet by mouth Daily. 19  Yes Hailey Emery MD   famotidine (PEPCID) 40 MG tablet Take 1 tablet by mouth Daily. 10/21/19  Yes Jah Arita MD   goserelin (ZOLADEX) 3.6 MG injection Inject 3.6 mg under the skin into the appropriate area as directed Every 28 (Twenty-Eight) Days. 12/10/19  Yes Hailey Emery MD   Heparin Lock Flush (HEPARIN FLUSH, PORCINE,) 100 UNIT/ML injection Infuse 5 mL by Intracatheter route Every 30 (Thirty) Days as directed. 12/3/19  Yes Hailey Emery MD   leuprolide (LUPRON DEPOT, 1-MONTH,) 7.5 MG injection Inject 7.5 mg into the appropriate muscle as directed by prescriber Every 28 (Twenty-Eight) Days. 12/3/19  Yes Hailey Emery MD   Multiple Vitamins-Minerals (MULTIVITAMIN WITH MINERALS) tablet tablet Take 1 tablet by mouth Daily.   Yes Ranjit Luke MD   sodium chloride 0.9 % flush Infuse 10 mL into a venous catheter Every 30 (Thirty) Days as directed. 12/3/19  Yes Hailey Emery MD       The patient has a family history of   Family History   Problem Relation Age of Onset   • Bipolar disorder Mother    • Schizophrenia Mother    • Colon cancer Father         Colorectal Cancer   • No Known Problems Sister         Past Medical History:   Diagnosis Date   • Anxiety    • Cancer (CMS/HCC)     breast right   • Depressive disorder    • Encounter for gynecological examination    • Encounter for vision screening     Vision screen (1)     • Health examination of defined subpopulation     Health examination of sub-group     • Malaise and fatigue    • Palpitations    • Skin cancer      Skin Ca Pre Cancer   • Soft tissue swelling     right side of neck-2 different areas    • Urinary tract infectious disease    • Wears glasses         OB History        2    Para      2    Term   2            AB        Living           SAB        TAB        Ectopic        Molar        Multiple        Live Births                     Social History     Socioeconomic History   • Marital status: Single     Spouse name: Not on file   • Number of children: 2   • Years of education: 14   • Highest education level: Not on file   Occupational History   • Occupation: nurse   Tobacco Use   • Smoking status: Former Smoker     Years: 25.00     Last attempt to quit: 2019     Years since quittin.1   • Smokeless tobacco: Never Used   • Tobacco comment: smokes 1-2 cigs/ day   Substance and Sexual Activity   • Alcohol use: No   • Drug use: No   • Sexual activity: Defer        Past Surgical History:   Procedure Laterality Date   •  SECTION     • COLONOSCOPY N/A 3/1/2018    Procedure: COLONOSCOPY;  Surgeon: Shin Vanessa DO;  Location: [facility] ENDOSCOPY;  Service:    • ENDOSCOPY N/A 10/5/2018    Procedure: ESOPHAGOGASTRODUODENOSCOPY;  Surgeon: Shin Vanessa DO;  Location: [facility] ENDOSCOPY;  Service: Gastroenterology   • MASTECTOMY Right    • MASTECTOMY WITH SENTINEL NODE BIOPSY AND AXILLARY NODE DISSECTION Right 2018    Procedure: INJECT RIGHT BREAST AND THEN PERFORM SENTINEL LYMPH NODE BIOPSY AND THEN  REMOVE RIGHT BREAST AND NIPPLE AND        (INJECTION ON SDS @7:00 A.M.);  Surgeon: Abdoul Ortega MD;  Location: [facility] OR;  Service: General   • PAP SMEAR     • US GUIDED FINE NEEDLE ASPIRATION  2018   • VENOUS ACCESS DEVICE (PORT) INSERTION Left 10/10/2018    Procedure: MEDIPORT PLACEMENT      (LEFT SIDE,UNATTACHED PORT )      (C-ARM);  Surgeon: Abdoul Ortega MD;  Location: [facility] OR;  Service: General        Patient Active Problem List   Diagnosis   • Vertigo   • Vision changes   • Acute non-recurrent maxillary sinusitis   • Papanicolaou smear for cervical cancer screening   • General medical exam   • Anxiety   • Acute vaginitis   • Encounter for  "Papanicolaou smear for cervical cancer screening   • External hemorrhoids   • Depression   • Sore throat   • Cough   • Neck pain   • Encounter for screening mammogram for malignant neoplasm of breast   • Other dysphagia   • Malignant neoplasm of upper-outer quadrant of right breast in female, estrogen receptor positive (CMS/HCC)   • Throat mass   • Encounter for venous access device care   • Encounter for venous access device care   • Flu vaccine need   • Vaccine for streptococcus pneumoniae and influenza   • Generalized anxiety disorder   • Chronic bilateral thoracic back pain   • Chest pain on breathing   • Acute low back pain without sciatica   • Chronic left-sided low back pain without sciatica   • History of abnormal cervical Pap smear   • Pelvic pain   • Encounter for gynecological examination   • Adenomyosis   • Obesity (BMI 30-39.9)   • Female pelvic pain        Documented Vitals    02/27/20 0910 02/27/20 0930   BP: 134/76    Pulse: 90    SpO2: 99%    Weight: 72.7 kg (160 lb 3.2 oz)    Height: 154.9 cm (60.98\")    PainSc: 0-No pain   4   PainLoc:  Back        Body mass index is 30.29 kg/m².    Physical Exam  Constitutional: Appears to be in no acute distress; Eyes: Sclera normal; Endocrine system: Thyroid palpate is normal; Pulmonary system: Lungs clear; Cardiovascular system: Heart regular rate and rhythm; Gastrointestinal system: Abdomen soft, nontender, active bowel sounds; Urologic system: CVA negative; Psychiatric: Appropriate insight; Neurologic: Gait within normal limits.      Last Labs  Lab Results   Component Value Date    WBC 6.17 01/27/2020    RBC 4.23 01/27/2020    HGB 12.7 01/27/2020    HCT 37.4 01/27/2020    MCV 88.4 01/27/2020    MCH 30.0 01/27/2020    MCHC 34.0 01/27/2020    RDW 12.0 (L) 01/27/2020    RDWSD 38.8 01/27/2020    MPV 8.6 01/27/2020     01/27/2020        Lab Results   Component Value Date    GLUCOSE 88 01/27/2020    BUN 14 01/27/2020    CREATININE 0.82 01/27/2020     " 2020    K 4.0 2020     2020    CO2 25.0 2020    CALCIUM 9.3 2020    PROTEINTOT 7.4 2020    ALBUMIN 4.20 2020    ALT 17 2020    AST 19 2020    ALKPHOS 107 2020    BILITOT 0.3 2020    EGFRIFNONA 77 2020    GLOB 3.2 2020    AGRATIO 1.3 2020    BCR 17.1 2020    ANIONGAP 13.0 2020       No results found for: HCGQUAL    Assessment &Plan: Pelvic pain felt secondary to adenomyosis after reviewing the risk benefits alternatives were gone plan for LAVH with BSO possible LARRY/BSO possible cystoscopy.  Risk benefits alternatives reviewed at length    Plan of care has been reviewed with staff, patient and family.  Risks, benefits of treatment plan have been discussed.  All questions have been answered.     Moy Sparrow and I have discussed pain goals for this hospitalization after reviewing her current clinical condition, medical history and prior pain experiences.  The goal is to keep her pain level 3.  To help achieve this, I plan to with multimodal pain management.        This document has been electronically signed by Evans Syed MD on 2020 5:46 PM    Please note that portions of this note were completed with a voice recognition program.           Electronically signed by Evans Syed MD at 20 [phone]             Evans Syed MD at 20 0985            History and Physical    Moy Sparrow is a 39 y.o. y/o female.     Chief Complaint: Pelvic pain likely adenomyosis     HPI:   39 y.o. .  No LMP recorded..  Patient with pelvic pain central throbbing in nature made worse by intercourse somewhat better by nonsteroidal anti-anti-inflammatory.  At worst 6 of 10.  Work-up has showed an ultrasound showing the uterus to be heterogeneous consistent with adenomyosis I think if this is the etiology of the pain.  I discussed options with the patient including further work-up with MRI  versus following conservatively.  She is already on Lupron for treatment of breast cancer with some improvement but the improvement has stopped.  I reviewed with her at length the risk benefits alternatives to hysterectomy.  Patient requests laparoscopic hysterectomy. She understands the risk up to and including death. She understands the risk of serious urologic morbidity such as vesico vaginal fistula, damage to the ureter possible nephrectomy and/or extended morbidity. She understands the risk of damage to bowel possible colostomy. She understands the risk of damage to bowel undetected at time of procedure with sepsis and/or need returned OR. She understands the risk of damage to blood vessels with hemorrhage to require blood transfusion and its risk. She understands the risk of delayed hemorrhage possible need to return. She understands the risk of hematoma formation. She understands the risk infection in the abdominal wall and in the pelvis and abdomen and other parts of the body. She understands the alternatives of medical therapy and the risks and alternatives. Her questions are answered at length. She understands that there is a significant possibility that we may need to convert this to a total abdominal hysterectomy and she accepts this.    She is adamant that she wishes to proceed with bilateral salpingo-oophorectomy.  She does have a history of breast cancer which was estrogen receptor positive Patient requests bilateral salpingo-oophorectomy after reviewing the risks, benefits, and alternatives at length. She understands that this will place her through surgical menopause and the implications of this. She understands that there is some work to indicate that bilateral salpingo-oophorectomy may increase mortality. She understands the risk of ovarian remnant syndrome. She understands that bilateral salpingo-oophorectomy does not completely remove the risk of ovarian like cancer in the peritoneal cavity.  She understands the risk of bleeding and damage to the ureter associated with removal of the ovaries. She understands the alternatives of only removing the fallopian tubes and the advantages and risk associated with this.        Review of Systems   ROS:  CNS: No history of brain malignancy.  HEENT: No history of throat cancer.  Eye: No history of retinal cancer.  Pulmonary: No history of lung cancer.                                                                                 Cardiovascular: No history of cardiac tumors.  Gastrointestinal: No history of small bowel tumors.  Renal: No history of kidney malignancy.  Musculoskeletal: No history of smooth muscle tumors.  Lymphatics: No history of Hodgkin's disease.  Endocrine: No history of thyroid malignancy.    The following portions of the patient's history were reviewed and updated as appropriate: allergies, current medications, past family history, past medical history, past social history, past surgical history and problem list.    Allergies   Allergen Reactions   • Penicillins Rash        Prior to Admission medications    Medication Sig Start Date End Date Taking? Authorizing Provider   benzoyl peroxide 5 % external liquid Apply topically to the appropriate area once daily as directed for acne. 10/23/19  Yes Rachel Case APRN   BENZOYL PEROXIDE 5 % external wash  5/8/19  Yes Ranjit Luke MD   desvenlafaxine (PRISTIQ) 50 MG 24 hr tablet Take 1 tablet by mouth Daily. 11/18/19  Yes Sally Campo APRN   diazePAM (VALIUM) 5 MG tablet Take 1 tablet by mouth Every 6 (Six) Hours As Needed for Anxiety. 3/14/19  Yes Hailey Emery MD   exemestane (AROMASIN) 25 MG chemo tablet Take 1 tablet by mouth Daily. 7/8/19  Yes Hailey Emery MD   famotidine (PEPCID) 40 MG tablet Take 1 tablet by mouth Daily. 10/21/19  Yes Jah Arita MD   goserelin (ZOLADEX) 3.6 MG injection Inject 3.6 mg under the skin into the  appropriate area as directed Every 28 (Twenty-Eight) Days. 12/10/19  Yes Hailey Emery MD   Heparin Lock Flush (HEPARIN FLUSH, PORCINE,) 100 UNIT/ML injection Infuse 5 mL by Intracatheter route Every 30 (Thirty) Days as directed. 12/3/19  Yes Hailey Emery MD   leuprolide (LUPRON DEPOT, 1-MONTH,) 7.5 MG injection Inject 7.5 mg into the appropriate muscle as directed by prescriber Every 28 (Twenty-Eight) Days. 12/3/19  Yes Hailey Emery MD   Multiple Vitamins-Minerals (MULTIVITAMIN WITH MINERALS) tablet tablet Take 1 tablet by mouth Daily.   Yes Provider, MD Ranjit   sodium chloride 0.9 % flush Infuse 10 mL into a venous catheter Every 30 (Thirty) Days as directed. 12/3/19  Yes Hailey Emery MD       The patient has a family history of   Family History   Problem Relation Age of Onset   • Bipolar disorder Mother    • Schizophrenia Mother    • Colon cancer Father         Colorectal Cancer   • No Known Problems Sister         Past Medical History:   Diagnosis Date   • Anxiety    • Cancer (CMS/HCC)     breast right   • Depressive disorder    • Encounter for gynecological examination    • Encounter for vision screening     Vision screen (1)     • Health examination of defined subpopulation     Health examination of sub-group     • Malaise and fatigue    • Palpitations    • Skin cancer      Skin Ca Pre Cancer   • Soft tissue swelling     right side of neck-2 different areas    • Urinary tract infectious disease    • Wears glasses         OB History        2    Para   2    Term   2            AB        Living           SAB        TAB        Ectopic        Molar        Multiple        Live Births                     Social History     Socioeconomic History   • Marital status: Single     Spouse name: Not on file   • Number of children: 2   • Years of education: 14   • Highest education level: Not on file   Occupational History   • Occupation: nurse   Tobacco  Use   • Smoking status: Former Smoker     Years: 25.00     Last attempt to quit: 2019     Years since quittin.1   • Smokeless tobacco: Never Used   • Tobacco comment: smokes 1-2 cigs/ day   Substance and Sexual Activity   • Alcohol use: No   • Drug use: No   • Sexual activity: Defer        Past Surgical History:   Procedure Laterality Date   •  SECTION     • COLONOSCOPY N/A 3/1/2018    Procedure: COLONOSCOPY;  Surgeon: Shin Vanessa DO;  Location: [facility] ENDOSCOPY;  Service:    • ENDOSCOPY N/A 10/5/2018    Procedure: ESOPHAGOGASTRODUODENOSCOPY;  Surgeon: Shin Vanessa DO;  Location: [facility] ENDOSCOPY;  Service: Gastroenterology   • MASTECTOMY Right    • MASTECTOMY WITH SENTINEL NODE BIOPSY AND AXILLARY NODE DISSECTION Right 2018    Procedure: INJECT RIGHT BREAST AND THEN PERFORM SENTINEL LYMPH NODE BIOPSY AND THEN  REMOVE RIGHT BREAST AND NIPPLE AND        (INJECTION ON SDS @7:00 A.M.);  Surgeon: Abdoul Ortega MD;  Location: [facility] OR;  Service: General   • PAP SMEAR     • US GUIDED FINE NEEDLE ASPIRATION  2018   • VENOUS ACCESS DEVICE (PORT) INSERTION Left 10/10/2018    Procedure: MEDIPORT PLACEMENT      (LEFT SIDE,UNATTACHED PORT )      (C-ARM);  Surgeon: Abdoul Ortega MD;  Location: [facility] OR;  Service: General        Patient Active Problem List   Diagnosis   • Vertigo   • Vision changes   • Acute non-recurrent maxillary sinusitis   • Papanicolaou smear for cervical cancer screening   • General medical exam   • Anxiety   • Acute vaginitis   • Encounter for Papanicolaou smear for cervical cancer screening   • External hemorrhoids   • Depression   • Sore throat   • Cough   • Neck pain   • Encounter for screening mammogram for malignant neoplasm of breast   • Other dysphagia   • Malignant neoplasm of upper-outer quadrant of right breast in female, estrogen receptor positive (CMS/HCC)   • Throat mass   • Encounter for venous access device care   • Encounter for  "venous access device care   • Flu vaccine need   • Vaccine for streptococcus pneumoniae and influenza   • Generalized anxiety disorder   • Chronic bilateral thoracic back pain   • Chest pain on breathing   • Acute low back pain without sciatica   • Chronic left-sided low back pain without sciatica   • History of abnormal cervical Pap smear   • Pelvic pain   • Encounter for gynecological examination   • Adenomyosis   • Obesity (BMI 30-39.9)   • Female pelvic pain        Documented Vitals    02/27/20 0910 02/27/20 0930   BP: 134/76    Pulse: 90    SpO2: 99%    Weight: 72.7 kg (160 lb 3.2 oz)    Height: 154.9 cm (60.98\")    PainSc: 0-No pain   4   PainLoc:  Back        Body mass index is 30.29 kg/m².    Physical Exam  Constitutional: Appears to be in no acute distress; Eyes: Sclera normal; Endocrine system: Thyroid palpate is normal; Pulmonary system: Lungs clear; Cardiovascular system: Heart regular rate and rhythm; Gastrointestinal system: Abdomen soft, nontender, active bowel sounds; Urologic system: CVA negative; Psychiatric: Appropriate insight; Neurologic: Gait within normal limits.      Last Labs  Lab Results   Component Value Date    WBC 6.17 01/27/2020    RBC 4.23 01/27/2020    HGB 12.7 01/27/2020    HCT 37.4 01/27/2020    MCV 88.4 01/27/2020    MCH 30.0 01/27/2020    MCHC 34.0 01/27/2020    RDW 12.0 (L) 01/27/2020    RDWSD 38.8 01/27/2020    MPV 8.6 01/27/2020     01/27/2020        Lab Results   Component Value Date    GLUCOSE 88 01/27/2020    BUN 14 01/27/2020    CREATININE 0.82 01/27/2020     01/27/2020    K 4.0 01/27/2020     01/27/2020    CO2 25.0 01/27/2020    CALCIUM 9.3 01/27/2020    PROTEINTOT 7.4 01/27/2020    ALBUMIN 4.20 01/27/2020    ALT 17 01/27/2020    AST 19 01/27/2020    ALKPHOS 107 01/27/2020    BILITOT 0.3 01/27/2020    EGFRIFNONA 77 01/27/2020    GLOB 3.2 01/27/2020    AGRATIO 1.3 01/27/2020    BCR 17.1 01/27/2020    ANIONGAP 13.0 01/27/2020       No results found for: " HCGQUAL    Assessment &Plan: Pelvic pain felt secondary to adenomyosis after reviewing the risk benefits alternatives were gone plan for LAVH with BSO possible LARRY/BSO possible cystoscopy.  Risk benefits alternatives reviewed at length    Plan of care has been reviewed with staff, patient and family.  Risks, benefits of treatment plan have been discussed.  All questions have been answered.     Moy Feng Andrews and I have discussed pain goals for this hospitalization after reviewing her current clinical condition, medical history and prior pain experiences.  The goal is to keep her pain level 3.  To help achieve this, I plan to with multimodal pain management.        This document has been electronically signed by Evans Syed MD on February 27, 2020 5:46 PM    Please note that portions of this note were completed with a voice recognition program.           Electronically signed by Evans Syed MD at 02/27/20 175         Facility-Administered Medications as of 3/10/2020   Medication Dose Route Frequency Provider Last Rate Last Dose   • bisacodyl (DULCOLAX) suppository 10 mg  10 mg Rectal Daily PRN Evans Syed MD       • [COMPLETED] ceFAZolin in 0.9% normal saline (ANCEF) IVPB solution 2 g  2 g Intravenous Once Evans Syed MD   2 g at 03/10/20 0725   • diazePAM (VALIUM) tablet 5 mg  5 mg Oral Q6H PRN Evans Syed MD       • [START ON 3/11/2020] famotidine (PEPCID) tablet 40 mg  40 mg Oral Daily Evans Syed MD       • HYDROmorphone (DILAUDID) injection 0.5 mg  0.5 mg Intravenous Q2H PRN Evans Syed MD        And   • naloxone (NARCAN) injection 0.1 mg  0.1 mg Intravenous Q5 Min PRN Evans Syed MD       • ketorolac (TORADOL) injection 30 mg  30 mg Intravenous Q6H PRN Evans Syed MD       • lactated ringers infusion  125 mL/hr Intravenous Continuous Evans Syed  mL/hr at 03/10/20 1258 125 mL/hr at 03/10/20 1258   • magnesium hydroxide (MILK OF MAGNESIA) suspension  2400 mg/10mL 10 mL  10 mL Oral Daily PRN Evans Syed MD       • [COMPLETED] meperidine (DEMEROL) injection 12.5 mg  12.5 mg Intravenous Q5 Min PRN Felice Millard CRNA   12.5 mg at 03/10/20 1020   • naproxen (NAPROSYN) tablet 500 mg  500 mg Oral BID PRN Evans Syed MD       • NON FORMULARY  25 mg Oral Daily Evans Syed MD       • ondansetron (ZOFRAN) tablet 4 mg  4 mg Oral Q6H PRN Evans Syed MD        Or   • ondansetron (ZOFRAN) injection 4 mg  4 mg Intravenous Q6H PRN Evans Syed MD   4 mg at 03/10/20 1324   • oxyCODONE-acetaminophen (PERCOCET) 7.5-325 MG per tablet 1 tablet  1 tablet Oral Q4H PRN Evans Syed MD       • polyethylene glycol 3350 powder (packet)  17 g Oral Daily PRN Evans Syed MD       • promethazine (PHENERGAN) injection 6.25 mg  6.25 mg Intravenous Q2H PRN Evans Syed MD        Or   • promethazine (PHENERGAN) injection 6.25 mg  6.25 mg Intramuscular Q2H PRN Evans Syed MD        Or   • promethazine (PHENERGAN) suppository 12.5 mg  12.5 mg Rectal Q6H PRN Evans Syed MD        Or   • promethazine (PHENERGAN) tablet 12.5 mg  12.5 mg Oral Q6H PRN Evans Syed MD       • sodium chloride 0.9 % flush 10 mL  10 mL Intravenous Q30 Days Evans Syed MD       • venlafaxine XR (EFFEXOR-XR) 24 hr capsule 37.5 mg  37.5 mg Oral Nightly Evans Syed MD       • zolpidem (AMBIEN) tablet 5 mg  5 mg Oral Nightly PRN Evans Syed MD           Lab Results (last 24 hours)     Procedure Component Value Units Date/Time    Tissue Pathology Exam [[id number]] Collected:  03/10/20 0905    Specimen:  Tissue from Uterus with Cervix, Bilateral Tubes and Ovaries Updated:  03/10/20 1109        Imaging Results (Last 24 Hours)     ** No results found for the last 24 hours. **        ECG/EMG Results (last 24 hours)     ** No results found for the last 24 hours. **           Operative/Procedure Notes (last 24 hours) (Notes from 03/09/20 1446 through 03/10/20  1446)      Evans Syed MD at 03/10/20 0647          Gyn LAVHBSO converted ABDOMIAL HYSTERECTOMY BILATERAL SALPINGO-OOPHERECTOMY CYSTOSCOPY  Brief Op Note    Moy Feng Andrews  3/10/2020    Pre-op Diagnosis:   Adenomyosis [N80.9] pelvic pain         Post-Op Diagnosis Codes:     * Adenomyosis [N80.9] pelvic pain           Procedure(s): LAVH BSO converted to  ABDOMIAL HYSTERECTOMY BILATERAL SALPINGO-OOPHERECTOMY CYSTOSCOPY    Surgeon(s):  Evans Syed MD    Anesthesia:  General    Staff:   Circulator: Vilma Lee RN  Scrub Person: Jannet Loving  Assistant: Shazia Luong CSA    Estimated Blood Loss:  350 mL    Specimens:  ID Type Source Tests Collected by Time   A (Not marked as sent) : tagged left Tissue Uterus with Cervix, Bilateral Tubes and Ovaries TISSUE PATHOLOGY EXAM Evans Syed MD 3/10/2020 0905         Drains:  Urinary catheter           Findings Marked genitourinary cancer cavity cervix was extremely stenotic unable to dilate past 5 and even with this 1 is unable to adequately place V care device.  Because of this converted to open procedure.  There were significant adhesions between bladder and uterus from prior  section and significant pelvic adhesions on the left obscuring anatomy  Complications:  None noted findings    Tourniquet:  None    Evans Syed MD     Date: 3/10/2020  Time: 10:06    Electronically signed by Evans Syed MD at 03/10/20 1013       Physician Progress Notes (last 24 hours) (Notes from 20 1446 through 03/10/20 1446)    No notes of this type exist for this encounter.         Consult Notes (last 24 hours) (Notes from 20 1446 through 03/10/20 1446)    No notes of this type exist for this encounter.

## 2020-03-11 ENCOUNTER — TELEPHONE (OUTPATIENT)
Dept: OBSTETRICS AND GYNECOLOGY | Facility: CLINIC | Age: 40
End: 2020-03-11

## 2020-03-11 VITALS
DIASTOLIC BLOOD PRESSURE: 66 MMHG | HEIGHT: 61 IN | SYSTOLIC BLOOD PRESSURE: 112 MMHG | RESPIRATION RATE: 18 BRPM | HEART RATE: 95 BPM | TEMPERATURE: 99.5 F | BODY MASS INDEX: 29.68 KG/M2 | WEIGHT: 157.19 LBS | OXYGEN SATURATION: 97 %

## 2020-03-11 LAB
ANION GAP SERPL CALCULATED.3IONS-SCNC: 13 MMOL/L (ref 5–15)
BUN BLD-MCNC: 11 MG/DL (ref 6–20)
BUN/CREAT SERPL: 15.5 (ref 7–25)
CALCIUM SPEC-SCNC: 8.7 MG/DL (ref 8.6–10.5)
CHLORIDE SERPL-SCNC: 103 MMOL/L (ref 98–107)
CO2 SERPL-SCNC: 23 MMOL/L (ref 22–29)
CREAT BLD-MCNC: 0.71 MG/DL (ref 0.57–1)
DEPRECATED RDW RBC AUTO: 39.2 FL (ref 37–54)
ERYTHROCYTE [DISTWIDTH] IN BLOOD BY AUTOMATED COUNT: 12.2 % (ref 12.3–15.4)
GFR SERPL CREATININE-BSD FRML MDRD: 91 ML/MIN/1.73
GLUCOSE BLD-MCNC: 114 MG/DL (ref 65–99)
HCT VFR BLD AUTO: 28.8 % (ref 34–46.6)
HGB BLD-MCNC: 9.8 G/DL (ref 12–15.9)
LAB AP CASE REPORT: NORMAL
MCH RBC QN AUTO: 29.8 PG (ref 26.6–33)
MCHC RBC AUTO-ENTMCNC: 34 G/DL (ref 31.5–35.7)
MCV RBC AUTO: 87.5 FL (ref 79–97)
PATH REPORT.FINAL DX SPEC: NORMAL
PATH REPORT.GROSS SPEC: NORMAL
PLATELET # BLD AUTO: 209 10*3/MM3 (ref 140–450)
PMV BLD AUTO: 9 FL (ref 6–12)
POTASSIUM BLD-SCNC: 3.5 MMOL/L (ref 3.5–5.2)
RBC # BLD AUTO: 3.29 10*6/MM3 (ref 3.77–5.28)
SODIUM BLD-SCNC: 139 MMOL/L (ref 136–145)
WBC NRBC COR # BLD: 9.32 10*3/MM3 (ref 3.4–10.8)

## 2020-03-11 PROCEDURE — 25010000003 HEPARIN LOCK FLUCH PER 10 UNITS: Performed by: OBSTETRICS & GYNECOLOGY

## 2020-03-11 PROCEDURE — 99024 POSTOP FOLLOW-UP VISIT: CPT | Performed by: OBSTETRICS & GYNECOLOGY

## 2020-03-11 PROCEDURE — 85027 COMPLETE CBC AUTOMATED: CPT | Performed by: OBSTETRICS & GYNECOLOGY

## 2020-03-11 PROCEDURE — 80048 BASIC METABOLIC PNL TOTAL CA: CPT | Performed by: OBSTETRICS & GYNECOLOGY

## 2020-03-11 PROCEDURE — 25010000002 KETOROLAC TROMETHAMINE PER 15 MG: Performed by: OBSTETRICS & GYNECOLOGY

## 2020-03-11 RX ORDER — ONDANSETRON 4 MG/1
4 TABLET, FILM COATED ORAL EVERY 6 HOURS PRN
Qty: 30 TABLET | Refills: 0 | Status: SHIPPED | OUTPATIENT
Start: 2020-03-11 | End: 2020-03-16

## 2020-03-11 RX ORDER — SODIUM CHLORIDE 0.9 % (FLUSH) 0.9 %
10 SYRINGE (ML) INJECTION AS NEEDED
Status: DISCONTINUED | OUTPATIENT
Start: 2020-03-11 | End: 2020-03-11 | Stop reason: HOSPADM

## 2020-03-11 RX ORDER — HEPARIN SODIUM (PORCINE) LOCK FLUSH IV SOLN 100 UNIT/ML 100 UNIT/ML
5 SOLUTION INTRAVENOUS AS NEEDED
Status: DISCONTINUED | OUTPATIENT
Start: 2020-03-11 | End: 2020-03-11 | Stop reason: HOSPADM

## 2020-03-11 RX ORDER — OXYCODONE HYDROCHLORIDE 5 MG/1
5 TABLET ORAL EVERY 4 HOURS PRN
Qty: 18 TABLET | Refills: 0 | Status: SHIPPED | OUTPATIENT
Start: 2020-03-11 | End: 2020-03-18

## 2020-03-11 RX ORDER — IBUPROFEN 600 MG/1
600 TABLET ORAL EVERY 6 HOURS
Qty: 60 TABLET | Refills: 0 | Status: SHIPPED | OUTPATIENT
Start: 2020-03-11 | End: 2020-03-16

## 2020-03-11 RX ORDER — ACETAMINOPHEN 325 MG/1
650 TABLET ORAL EVERY 6 HOURS
Qty: 100 TABLET | Refills: 2 | Status: SHIPPED | OUTPATIENT
Start: 2020-03-11 | End: 2021-03-11

## 2020-03-11 RX ADMIN — SODIUM CHLORIDE, PRESERVATIVE FREE 10 ML: 5 INJECTION INTRAVENOUS at 13:48

## 2020-03-11 RX ADMIN — SODIUM CHLORIDE, POTASSIUM CHLORIDE, SODIUM LACTATE AND CALCIUM CHLORIDE 125 ML/HR: 600; 310; 30; 20 INJECTION, SOLUTION INTRAVENOUS at 01:08

## 2020-03-11 RX ADMIN — OXYCODONE HYDROCHLORIDE AND ACETAMINOPHEN 1 TABLET: 7.5; 325 TABLET ORAL at 06:26

## 2020-03-11 RX ADMIN — FAMOTIDINE 40 MG: 10 TABLET ORAL at 09:22

## 2020-03-11 RX ADMIN — HEPARIN 500 UNITS: 100 SYRINGE at 13:48

## 2020-03-11 RX ADMIN — OXYCODONE HYDROCHLORIDE AND ACETAMINOPHEN 1 TABLET: 7.5; 325 TABLET ORAL at 02:00

## 2020-03-11 RX ADMIN — KETOROLAC TROMETHAMINE 30 MG: 30 INJECTION, SOLUTION INTRAMUSCULAR; INTRAVENOUS at 07:30

## 2020-03-11 RX ADMIN — KETOROLAC TROMETHAMINE 30 MG: 30 INJECTION, SOLUTION INTRAMUSCULAR; INTRAVENOUS at 01:09

## 2020-03-11 RX ADMIN — EXEMESTANE 25 MG: 25 TABLET ORAL at 09:22

## 2020-03-11 NOTE — PROGRESS NOTES
[facility]  [facility] 1980  MRN: [id number]  CSN: [id number]    Post-operative Day #1  Subjective   Her pain is well controlled.  Vaginal bleeding is scant staining.  She is not passing gas and has not had a bowel movement.     Objective     Min/max vitals past 24 hours:   Temp  Min: 96.1 °F (35.6 °C)  Max: 98.6 °F (37 °C)  BP  Min: 111/61  Max: 149/71  Pulse  Min: 87  Max: 111  Pulse  Min: 87  Max: 111        General: well developed; well nourished  no acute distress   Abdomen: soft, non-tender; no masses  no umbilical or inguinal hernias are present  no hepato-splenomegaly   Pelvic: Not performed   Ext: Calves NT     Lab Results   Component Value Date    WBC 9.32 2020    HGB 9.8 (L) 2020    HCT 28.8 (L) 2020    MCV 87.5 2020     2020    RH Negative 03/10/2020    HEPBSAG Negative 10/01/2018        Assessment   1. S/P TAHBSO     Plan   1. Ambulate IV to lock          This document has been electronically signed by Evans Syed MD on 2020 06:20

## 2020-03-11 NOTE — NURSING NOTE
Talked to Nikki in pharmacy regarding exemestane pill. It's ok to give per nursing staff that's not chemo certified.

## 2020-03-11 NOTE — PLAN OF CARE
Problem: Patient Care Overview  Goal: Plan of Care Review  Outcome: Ongoing (interventions implemented as appropriate)  Flowsheets (Taken 3/11/2020 0214)  Progress: improving  Plan of Care Reviewed With: patient  Outcome Summary: VSS, pain controlled. Tolerating diet. Ambulating halls. Will continue to monitor.

## 2020-03-12 ENCOUNTER — TELEPHONE (OUTPATIENT)
Dept: OBSTETRICS AND GYNECOLOGY | Facility: CLINIC | Age: 40
End: 2020-03-12

## 2020-03-12 NOTE — PAYOR COMM NOTE
"Magi Martinez   [facility]  phone [phone]  fax [phone]    Auth# [id number]    Reece Saxena (39 y.o. Female)     Date of Birth Social Security Number Address Home Phone MRN    1980  [id number] [facility] [id number] [phone] [id number]    [employer] Marital Status          Mandaeism Single       Admission Date Admission Type Admitting Provider Attending Provider Department, Room/Bed    3/10/20 Elective Evans Syed MD  [facility] 3 [facility] [place], [facility]/1    Discharge Date Discharge Disposition Discharge Destination        3/11/2020 Home or Self Care              Attending Provider:  (none)   Allergies:  Penicillins    Isolation:  None   Infection:  None   Code Status:  Prior    Ht:  154.9 cm (61\")   Wt:  71.3 kg (157 lb 3 oz)    Admission Cmt:  None   Principal Problem:  Adenomyosis [N80.9]                 Active Insurance as of 3/10/2020     Primary Coverage     Payor Plan Insurance Group Employer/Plan Group    [facility] [employer] [id number]     Payor Plan Address Payor Plan Phone Number Payor Plan Fax Number Effective Dates    [address] [phone]  2018 - None Entered    [place] [address]       Subscriber Name Subscriber Birth Date Member ID       REECE SAXENA 1980 [id number]                 Emergency Contacts      (Rel.) Home Phone Work Phone Mobile Phone    AminahAnna Marie (Friend) [phone] -- [phone]    Jerrica Tesfaye (Friend) [phone] -- [phone]               Discharge Summary      Evans Syed MD at 20 1431          [facility]  Reece Feng Andrews  : 1980  MRN: [id number]  CSN: [id number]    Discharge Summary      Date of Admission: 3/10/2020   Date of Discharge: 3/11/2020   Discharge Diagnosis:  Adenomyosis [N80.9]pelvic pain      Procedures Performed: Procedure(s):  ABDOMIAL HYSTERECTOMY BILATERAL SALPINGO-OOPHERECTOMY CYSTOSCOPY   "    Brief History: Patient is a 39 y.o.who presented for hysterectomy because of pelvic pain felt to have adenomyosis.  History of breast cancer.  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  Underwent LARRY/BSO pathology report reviewed with patient before discharge.   Hospital Course: Her hospital course has been uneventful.  Today, on postoperative day # 1, she is tolerating a regular diet, ambulating without assistance, and desires to go home.  Wang catheter was removed this morning, and she has urinated without difficulty. She has had no fever, and her pain is controlled.  She has had no nausea or vomiting.   Pending Studies: Tissue Pathology   Condition at Discharge: Stable   Discharge Diet: Diet Instructions     Diet: Regular      Discharge Diet:  Regular         Discharge Activity: Activity Instructions     Pelvic Rest           Discharge Medications:    Your medication list      START taking these medications      Instructions Last Dose Given Next Dose Due   acetaminophen 325 MG tablet  Commonly known as:  TYLENOL      Take 2 tablets by mouth Every 6 (Six) Hours.       ibuprofen 600 MG tablet  Commonly known as:  ADVIL,MOTRIN      Take 1 tablet by mouth Every 6 (Six) Hours **Take with food**       ondansetron 4 MG tablet  Commonly known as:  ZOFRAN      Take 1 tablet by mouth Every 6 (Six) Hours As Needed for Nausea or Vomiting.       oxyCODONE 5 MG immediate release tablet  Commonly known as:  ROXICODONE      Take 1 tablet by mouth Every 4 (Four) Hours As Needed for Moderate Pain for up to 7 days.          CHANGE how you take these medications      Instructions Last Dose Given Next Dose Due   benzoyl peroxide 5 % external liquid  Generic drug:  benzoyl peroxide  What changed:  Another medication with the same name was changed. Make sure you understand how and when to take each.           benzoyl peroxide 5 % external liquid  What  changed:  when to take this      Apply topically to the appropriate area once daily as directed for acne.          CONTINUE taking these medications      Instructions Last Dose Given Next Dose Due   desvenlafaxine 50 MG 24 hr tablet  Commonly known as:  PRISTIQ      Take 1 tablet by mouth Daily.       diazePAM 5 MG tablet  Commonly known as:  VALIUM      Take 1 tablet by mouth Every 6 (Six) Hours As Needed for Anxiety.       exemestane 25 MG chemo tablet  Commonly known as:  AROMASIN      Take 1 tablet by mouth Daily.       famotidine 40 MG tablet  Commonly known as:  PEPCID      Take 1 tablet by mouth Daily.       ZOLADEX 3.6 MG injection  Generic drug:  goserelin      Inject 3.6 mg under the skin into the appropriate area as directed Every 28 (Twenty-Eight) Days.          STOP taking these medications    heparin flush (porcine) 100 UNIT/ML injection        sodium chloride 0.9 % flush 0.9 % solution              Where to Get Your Medications      These medications were sent to [facility] [place] [address]    Hours:  Monday through Friday 7:00am to 5:00pm Phone:  [phone] ·   acetaminophen 325 MG tablet  · ibuprofen 600 MG tablet  · ondansetron 4 MG tablet  · oxyCODONE 5 MG immediate release tablet        Discharge Disposition: home   Follow-up: Future Appointments   Date Time Provider Department Center   3/13/2020  9:45 AM Evans Syed MD [facility] OBG MAD None   3/19/2020 10:00 AM Sally Campo APRN [facility] FM MAD3 None   4/27/2020  7:45 AM NURSE [facility] MAD OPI MAD   4/27/2020  8:15 AM Hailey Emery MD [facility] ONC [facility]            This note has been electronically signed.    Evans Syed MD  March 11, 2020  9:44 PM            Electronically signed by Evans Syed MD at 03/11/20 2146       Discharge Order (From admission, onward)     Start     Ordered    03/11/20 1250  Discharge patient  Once     Expected Discharge Date:  03/11/20    Expected  Discharge Time:  Afternoon    Discharge Disposition:  Home or Self Care    Physician of Record for Attribution - Please select from Treatment Team:  SATISH HAYES [[id number]]    Review needed by CMO to determine Physician of Record:  No       Question Answer Comment   Physician of Record for Attribution - Please select from Treatment Team SATISH HAYES    Review needed by CMO to determine Physician of Record No        03/11/20 7839

## 2020-03-12 NOTE — DISCHARGE SUMMARY
[facility]  Moy Feng Andrews  : 1980  MRN: [id number]  CSN: [id number]    Discharge Summary      Date of Admission: 3/10/2020   Date of Discharge: 3/11/2020   Discharge Diagnosis:  Adenomyosis [N80.9]pelvic pain      Procedures Performed: Procedure(s):  ABDOMIAL HYSTERECTOMY BILATERAL SALPINGO-OOPHERECTOMY CYSTOSCOPY      Brief History: Patient is a 39 y.o.who presented for hysterectomy because of pelvic pain felt to have adenomyosis.  History of breast cancer.  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  Underwent LARRY/BSO pathology report reviewed with patient before discharge.   Hospital Course: Her hospital course has been uneventful.  Today, on postoperative day # 1, she is tolerating a regular diet, ambulating without assistance, and desires to go home.  Wang catheter was removed this morning, and she has urinated without difficulty. She has had no fever, and her pain is controlled.  She has had no nausea or vomiting.   Pending Studies: Tissue Pathology   Condition at Discharge: Stable   Discharge Diet: Diet Instructions     Diet: Regular      Discharge Diet:  Regular         Discharge Activity: Activity Instructions     Pelvic Rest           Discharge Medications:    Your medication list      START taking these medications      Instructions Last Dose Given Next Dose Due   acetaminophen 325 MG tablet  Commonly known as:  TYLENOL      Take 2 tablets by mouth Every 6 (Six) Hours.       ibuprofen 600 MG tablet  Commonly known as:  ADVIL,MOTRIN      Take 1 tablet by mouth Every 6 (Six) Hours **Take with food**       ondansetron 4 MG tablet  Commonly known as:  ZOFRAN      Take 1 tablet by mouth Every 6 (Six) Hours As Needed for Nausea or Vomiting.       oxyCODONE 5 MG immediate release tablet  Commonly known as:  ROXICODONE      Take 1 tablet by mouth Every 4 (Four) Hours As Needed for Moderate Pain for up to 7  days.          CHANGE how you take these medications      Instructions Last Dose Given Next Dose Due   benzoyl peroxide 5 % external liquid  Generic drug:  benzoyl peroxide  What changed:  Another medication with the same name was changed. Make sure you understand how and when to take each.           benzoyl peroxide 5 % external liquid  What changed:  when to take this      Apply topically to the appropriate area once daily as directed for acne.          CONTINUE taking these medications      Instructions Last Dose Given Next Dose Due   desvenlafaxine 50 MG 24 hr tablet  Commonly known as:  PRISTIQ      Take 1 tablet by mouth Daily.       diazePAM 5 MG tablet  Commonly known as:  VALIUM      Take 1 tablet by mouth Every 6 (Six) Hours As Needed for Anxiety.       exemestane 25 MG chemo tablet  Commonly known as:  AROMASIN      Take 1 tablet by mouth Daily.       famotidine 40 MG tablet  Commonly known as:  PEPCID      Take 1 tablet by mouth Daily.       ZOLADEX 3.6 MG injection  Generic drug:  goserelin      Inject 3.6 mg under the skin into the appropriate area as directed Every 28 (Twenty-Eight) Days.          STOP taking these medications    heparin flush (porcine) 100 UNIT/ML injection        sodium chloride 0.9 % flush 0.9 % solution              Where to Get Your Medications      These medications were sent to [facility] [place] [id number]    Hours:  Monday through Friday 7:00am to 5:00pm Phone:  [phone] ·   acetaminophen 325 MG tablet  · ibuprofen 600 MG tablet  · ondansetron 4 MG tablet  · oxyCODONE 5 MG immediate release tablet        Discharge Disposition: home   Follow-up: Future Appointments   Date Time Provider Department Center   3/13/2020  9:45 AM Evans Syed MD [facility] OBG MAD None   3/19/2020 10:00 AM Sally Campo APRN [facility] FM MAD3 None   4/27/2020  7:45 AM NURSE [facility] MAD OPI MAD   4/27/2020  8:15 AM Hailey Emery MD [facility] ONC  [facility]            This note has been electronically signed.    Evans Syed MD  March 11, 2020  9:44 PM

## 2020-03-12 NOTE — TELEPHONE ENCOUNTER
VICENTE FROM [facility] CALLED AND IS REQUESTING THAT YOU CALL HER REGARDING SURGERY INFO FOR [facility].  [phone].      Called patient to see how she was doing postop day #2 says she is doing very well no complaints no fevers or other difficulties

## 2020-03-13 ENCOUNTER — OFFICE VISIT (OUTPATIENT)
Dept: OBSTETRICS AND GYNECOLOGY | Facility: CLINIC | Age: 40
End: 2020-03-13

## 2020-03-13 VITALS
BODY MASS INDEX: 31 KG/M2 | SYSTOLIC BLOOD PRESSURE: 134 MMHG | HEIGHT: 61 IN | WEIGHT: 164.2 LBS | DIASTOLIC BLOOD PRESSURE: 92 MMHG

## 2020-03-13 DIAGNOSIS — Z98.890 POSTOPERATIVE STATE: Primary | ICD-10-CM

## 2020-03-13 PROCEDURE — 99024 POSTOP FOLLOW-UP VISIT: CPT | Performed by: OBSTETRICS & GYNECOLOGY

## 2020-03-14 NOTE — PROGRESS NOTES
Moy Sparrow is a 39 y.o. y/o female.     Chief Complaint: Follow-up    HPI:   39 y.o. .  Patient's last menstrual period was 2019 (approximate)..  Patient's postop doing well.  No complaints no fever chills or sweats          Review of Systems   ROS:  CNS: No history of brain malignancy.  HEENT: No history of throat cancer.  Eye: No history of retinal cancer.  Pulmonary: No history of lung cancer.                                                                                 Cardiovascular: No history of cardiac tumors.  Gastrointestinal: No history of small bowel tumors.  Renal: No history of kidney malignancy.  Musculoskeletal: No history of smooth muscle tumors.  Lymphatics: No history of Hodgkin's disease.  Endocrine: No history of thyroid malignancy.    The following portions of the patient's history were reviewed and updated as appropriate: allergies, current medications, past family history, past medical history, past social history, past surgical history and problem list.    Allergies   Allergen Reactions   • Penicillins Rash        Outpatient Medications Prior to Visit   Medication Sig Dispense Refill   • acetaminophen (TYLENOL) 325 MG tablet Take 2 tablets by mouth Every 6 (Six) Hours. 100 tablet 2   • benzoyl peroxide 5 % external liquid Apply topically to the appropriate area once daily as directed for acne. (Patient taking differently: Apply  topically to the appropriate area as directed Daily.) 237 g 2   • BENZOYL PEROXIDE 5 % external wash   2   • desvenlafaxine (PRISTIQ) 50 MG 24 hr tablet Take 1 tablet by mouth Daily. 30 tablet 11   • diazePAM (VALIUM) 5 MG tablet Take 1 tablet by mouth Every 6 (Six) Hours As Needed for Anxiety. 60 tablet 0   • exemestane (AROMASIN) 25 MG chemo tablet Take 1 tablet by mouth Daily. 90 tablet 2   • famotidine (PEPCID) 40 MG tablet Take 1 tablet by mouth Daily. 30 tablet 11   • goserelin (ZOLADEX) 3.6 MG injection Inject 3.6 mg under the skin into  the appropriate area as directed Every 28 (Twenty-Eight) Days. 43.2 mg 0   • ibuprofen (ADVIL,MOTRIN) 600 MG tablet Take 1 tablet by mouth Every 6 (Six) Hours **Take with food** 60 tablet 0   • ondansetron (ZOFRAN) 4 MG tablet Take 1 tablet by mouth Every 6 (Six) Hours As Needed for Nausea or Vomiting. 30 tablet 0   • oxyCODONE (ROXICODONE) 5 MG immediate release tablet Take 1 tablet by mouth Every 4 (Four) Hours As Needed for Moderate Pain for up to 7 days. 18 tablet 0     No facility-administered medications prior to visit.         The patient has a family history of   Family History   Problem Relation Age of Onset   • Bipolar disorder Mother    • Schizophrenia Mother    • Colon cancer Father         Colorectal Cancer   • No Known Problems Sister         Past Medical History:   Diagnosis Date   • Anxiety    • Cancer (CMS/HCC)     breast right   • Depressive disorder    • Encounter for gynecological examination    • Encounter for vision screening     Vision screen (1)     • GERD (gastroesophageal reflux disease)    • Health examination of defined subpopulation     Health examination of sub-group     • Malaise and fatigue    • Palpitations    • Skin cancer      Skin Ca Pre Cancer   • Soft tissue swelling     right side of neck-2 different areas    • Urinary tract infectious disease    • Wears glasses         OB History        2    Para   2    Term   2            AB        Living           SAB        TAB        Ectopic        Molar        Multiple        Live Births                     Social History     Socioeconomic History   • Marital status: Single     Spouse name: Not on file   • Number of children: 2   • Years of education: 14   • Highest education level: Not on file   Occupational History   • Occupation: nurse   Tobacco Use   • Smoking status: Former Smoker     Years: 25.00     Last attempt to quit: 2019     Years since quittin.1   • Smokeless tobacco: Never Used   • Tobacco comment:  smokes 1-2 cigs/ day   Substance and Sexual Activity   • Alcohol use: No   • Drug use: No   • Sexual activity: Defer        Past Surgical History:   Procedure Laterality Date   •  SECTION     • COLONOSCOPY N/A 3/1/2018    Procedure: COLONOSCOPY;  Surgeon: Shin Vanessa DO;  Location: [facility] ENDOSCOPY;  Service:    • ENDOSCOPY N/A 10/5/2018    Procedure: ESOPHAGOGASTRODUODENOSCOPY;  Surgeon: Shin Vanessa DO;  Location: [facility] ENDOSCOPY;  Service: Gastroenterology   • LAPAROSCOPIC ASSISTED VAGINAL HYSTERECTOMY SALPINGO OOPHORECTOMY N/A 3/10/2020    Procedure: ABDOMIAL HYSTERECTOMY BILATERAL SALPINGO-OOPHERECTOMY CYSTOSCOPY;  Surgeon: Evans Syed MD;  Location: [facility] OR;  Service: Obstetrics/Gynecology;  Laterality: N/A;   • MASTECTOMY Right    • MASTECTOMY WITH SENTINEL NODE BIOPSY AND AXILLARY NODE DISSECTION Right 2018    Procedure: INJECT RIGHT BREAST AND THEN PERFORM SENTINEL LYMPH NODE BIOPSY AND THEN  REMOVE RIGHT BREAST AND NIPPLE AND        (INJECTION ON SDS @7:00 A.M.);  Surgeon: Abdoul Ortega MD;  Location: [facility] OR;  Service: General   • PAP SMEAR     • US GUIDED FINE NEEDLE ASPIRATION  2018   • VENOUS ACCESS DEVICE (PORT) INSERTION Left 10/10/2018    Procedure: MEDIPORT PLACEMENT      (LEFT SIDE,UNATTACHED PORT )      (C-ARM);  Surgeon: Abdoul Ortega MD;  Location: [facility];  Service: General        Patient Active Problem List   Diagnosis   • Vertigo   • Vision changes   • Acute non-recurrent maxillary sinusitis   • Papanicolaou smear for cervical cancer screening   • General medical exam   • Anxiety   • Acute vaginitis   • Encounter for Papanicolaou smear for cervical cancer screening   • External hemorrhoids   • Depression   • Sore throat   • Cough   • Neck pain   • Encounter for screening mammogram for malignant neoplasm of breast   • Other dysphagia   • Malignant neoplasm of upper-outer quadrant of right breast in female, estrogen receptor positive  "(CMS/[facility])   • Throat mass   • Encounter for venous access device care   • Encounter for venous access device care   • Flu vaccine need   • Vaccine for streptococcus pneumoniae and influenza   • Generalized anxiety disorder   • Chronic bilateral thoracic back pain   • Chest pain on breathing   • Acute low back pain without sciatica   • Chronic left-sided low back pain without sciatica   • History of abnormal cervical Pap smear   • Pelvic pain   • Encounter for gynecological examination   • Adenomyosis   • Obesity (BMI 30-39.9)   • Female pelvic pain   • Postoperative state        Documented Vitals    03/13/20 0912   BP: 134/92   Weight: 74.5 kg (164 lb 3.2 oz)   Height: 154.9 cm (61\")   PainSc: 0-No pain        Body mass index is 31.03 kg/m².    Physical Exam  Constitutional: Appears to be in no acute distress; Eyes: sclera normal; Endocrine system: thyroid palpate is normal; Pulmonary system: lungs clear; Cardiovascular system: heart regular rate and rhythm; Gastrointestinal system: abdomen soft nontender, active bowel sounds; Urologic system: CVA negative; Psychiatric: appropriate insight; Neurologic: gait within normal limits there is some bruising around the lower part of the incision and left vulva    Laboratory Data:   Lab Results - Last 18 Months   Lab Units 03/11/20  0353 03/03/20  0922 01/27/20  0832 10/07/19  0819 07/08/19  0813 05/31/19  1343 02/11/19  1058   GLUCOSE mg/dL 114* 150* 88 123* 93 100* 102*   BUN mg/dL 11 16 14 12 14 14 17   CREATININE mg/dL 0.71 0.82 0.82 0.64 0.66 0.65 0.60   SODIUM mmol/L 139 138 139 141 140 141 137   POTASSIUM mmol/L 3.5 4.1 4.0 3.7 3.9 3.7 3.7   CHLORIDE mmol/L 103 103 101 105 103 103 102   CO2 mmol/L 23.0 23.0 25.0 26.0 25.0 25.0 25.0   CALCIUM mg/dL 8.7 9.3 9.3 9.2 8.9 9.1 9.6   TOTAL PROTEIN g/dL  --   --  7.4 7.4 7.2 7.3 7.8   ALBUMIN g/dL  --   --  4.20 4.20 4.10 4.20 4.70   ALT (SGPT) U/L  --   --  17 12 17 18 36   AST (SGOT) U/L  --   --  19 16 18 20 33   ALK PHOS " U/L  --   --  107 91 91 88 95   BILIRUBIN mg/dL  --   --  0.3 0.3 0.3 0.4 0.3   EGFR IF NONAFRICN AM mL/min/1.73 91 77 77 102 99 100 111   GLOBULIN gm/dL  --   --  3.2 3.2 3.1 3.1 3.1   A/G RATIO g/dL  --   --  1.3 1.3 1.3 1.4 1.5   BUN / CREAT RATIO  15.5 19.5 17.1 18.8 21.2 21.5 28.3*   ANION GAP mmol/L 13.0 12.0 13.0 10.0 12.0 13.0 10.0     Lab Results - Last 18 Months   Lab Units 03/11/20  0353 03/03/20  0924 01/27/20  0832 10/07/19  0819 07/08/19  0813   WBC 10*3/mm3 9.32 5.96 6.17 5.94 7.43   RBC 10*6/mm3 3.29* 3.86 4.23 4.05 3.94   HEMOGLOBIN g/dL 9.8* 11.5* 12.7 12.0 11.3*   HEMATOCRIT % 28.8* 34.2 37.4 35.3 34.3   MCV fL 87.5 88.6 88.4 87.2 87.1   MCH pg 29.8 29.8 30.0 29.6 28.7   MCHC g/dL 34.0 33.6 34.0 34.0 32.9   RDW % 12.2* 12.0* 12.0* 12.3 13.5   RDW-SD fl 39.2 38.6 38.8 39.4 43.0   MPV fL 9.0 9.1 8.6 8.8 8.9   PLATELETS 10*3/mm3 209 227 225 201 202     No results for input(s): HCGQUAL in the last 29720 hours.    Assessment        Diagnosis Plan   1. Postoperative state   postoperative wound management discussed         Plan       No orders of the defined types were placed in this encounter.            This document has been electronically signed by Evans Syed MD on March 13, 2020 21:26    Please note that portions of this note were completed with a voice recognition program.

## 2020-03-16 ENCOUNTER — OFFICE VISIT (OUTPATIENT)
Dept: OBSTETRICS AND GYNECOLOGY | Facility: CLINIC | Age: 40
End: 2020-03-16

## 2020-03-16 ENCOUNTER — TELEPHONE (OUTPATIENT)
Dept: ONCOLOGY | Facility: HOSPITAL | Age: 40
End: 2020-03-16

## 2020-03-16 VITALS
SYSTOLIC BLOOD PRESSURE: 130 MMHG | DIASTOLIC BLOOD PRESSURE: 80 MMHG | BODY MASS INDEX: 30.17 KG/M2 | HEIGHT: 61 IN | WEIGHT: 159.8 LBS

## 2020-03-16 DIAGNOSIS — Z98.890 POSTOPERATIVE STATE: Primary | ICD-10-CM

## 2020-03-16 PROCEDURE — 99024 POSTOP FOLLOW-UP VISIT: CPT | Performed by: OBSTETRICS & GYNECOLOGY

## 2020-03-16 NOTE — TELEPHONE ENCOUNTER
----- Message from Hailey Emery MD sent at 3/16/2020  1:53 PM CDT -----  Yes  Continue exemestane   ----- Message -----  From: AMELIE Acuña RN  Sent: 3/16/2020   1:50 PM CDT  To: Hailey Emery MD, Mis Baxter RN    Patient stated she had hysterectomy/oophrectomy last Tuesday.  She wanted to know if it was okay to stop taking her zoladex?  Thanks!

## 2020-03-16 NOTE — PROGRESS NOTES
Moy Sparrow is a 40 y.o. y/o female.     Chief Complaint: Postoperative follow-up    HPI:   40 y.o. .  Patient's last menstrual period was 2019 (approximate)..  She is seen in postoperative follow-up she is generally doing well.          Review of Systems   ROS:  CNS: No history of brain malignancy.  HEENT: No history of throat cancer.  Eye: No history of retinal cancer.  Pulmonary: No history of lung cancer.                                                                                 Cardiovascular: No history of cardiac tumors.  Gastrointestinal: No history of small bowel tumors.  Renal: No history of kidney malignancy.  Musculoskeletal: No history of smooth muscle tumors.  Lymphatics: No history of Hodgkin's disease.  Endocrine: No history of thyroid malignancy.    The following portions of the patient's history were reviewed and updated as appropriate: allergies, current medications, past family history, past medical history, past social history, past surgical history and problem list.    Allergies   Allergen Reactions   • Penicillins Rash        Outpatient Medications Prior to Visit   Medication Sig Dispense Refill   • acetaminophen (TYLENOL) 325 MG tablet Take 2 tablets by mouth Every 6 (Six) Hours. 100 tablet 2   • benzoyl peroxide 5 % external liquid Apply topically to the appropriate area once daily as directed for acne. (Patient taking differently: Apply  topically to the appropriate area as directed Daily.) 237 g 2   • BENZOYL PEROXIDE 5 % external wash   2   • desvenlafaxine (PRISTIQ) 50 MG 24 hr tablet Take 1 tablet by mouth Daily. 30 tablet 11   • diazePAM (VALIUM) 5 MG tablet Take 1 tablet by mouth Every 6 (Six) Hours As Needed for Anxiety. 60 tablet 0   • exemestane (AROMASIN) 25 MG chemo tablet Take 1 tablet by mouth Daily. 90 tablet 2   • famotidine (PEPCID) 40 MG tablet Take 1 tablet by mouth Daily. 30 tablet 11   • goserelin (ZOLADEX) 3.6 MG injection Inject 3.6 mg under  the skin into the appropriate area as directed Every 28 (Twenty-Eight) Days. 43.2 mg 0   • ibuprofen (ADVIL,MOTRIN) 600 MG tablet Take 1 tablet by mouth Every 6 (Six) Hours **Take with food** 60 tablet 0   • ondansetron (ZOFRAN) 4 MG tablet Take 1 tablet by mouth Every 6 (Six) Hours As Needed for Nausea or Vomiting. 30 tablet 0   • oxyCODONE (ROXICODONE) 5 MG immediate release tablet Take 1 tablet by mouth Every 4 (Four) Hours As Needed for Moderate Pain for up to 7 days. 18 tablet 0     No facility-administered medications prior to visit.         The patient has a family history of   Family History   Problem Relation Age of Onset   • Bipolar disorder Mother    • Schizophrenia Mother    • Colon cancer Father         Colorectal Cancer   • No Known Problems Sister         Past Medical History:   Diagnosis Date   • Anxiety    • Cancer (CMS/HCC)     breast right   • Depressive disorder    • Encounter for gynecological examination    • Encounter for vision screening     Vision screen (1)     • GERD (gastroesophageal reflux disease)    • Health examination of defined subpopulation     Health examination of sub-group     • Malaise and fatigue    • Palpitations    • Skin cancer      Skin Ca Pre Cancer   • Soft tissue swelling     right side of neck-2 different areas    • Urinary tract infectious disease    • Wears glasses         OB History        2    Para   2    Term   2            AB        Living           SAB        TAB        Ectopic        Molar        Multiple        Live Births                     Social History     Socioeconomic History   • Marital status: Single     Spouse name: Not on file   • Number of children: 2   • Years of education: 14   • Highest education level: Not on file   Occupational History   • Occupation: nurse   Tobacco Use   • Smoking status: Former Smoker     Years: 25.00     Last attempt to quit: 2019     Years since quittin.1   • Smokeless tobacco: Never Used   •  Tobacco comment: smokes 1-2 cigs/ day   Substance and Sexual Activity   • Alcohol use: No   • Drug use: No   • Sexual activity: Defer        Past Surgical History:   Procedure Laterality Date   •  SECTION     • COLONOSCOPY N/A 3/1/2018    Procedure: COLONOSCOPY;  Surgeon: Shin Vanessa DO;  Location: [facility] ENDOSCOPY;  Service:    • ENDOSCOPY N/A 10/5/2018    Procedure: ESOPHAGOGASTRODUODENOSCOPY;  Surgeon: Shin Vanessa DO;  Location: [facility] ENDOSCOPY;  Service: Gastroenterology   • LAPAROSCOPIC ASSISTED VAGINAL HYSTERECTOMY SALPINGO OOPHORECTOMY N/A 3/10/2020    Procedure: ABDOMIAL HYSTERECTOMY BILATERAL SALPINGO-OOPHERECTOMY CYSTOSCOPY;  Surgeon: Evans Syed MD;  Location: [facility] OR;  Service: Obstetrics/Gynecology;  Laterality: N/A;   • MASTECTOMY Right    • MASTECTOMY WITH SENTINEL NODE BIOPSY AND AXILLARY NODE DISSECTION Right 2018    Procedure: INJECT RIGHT BREAST AND THEN PERFORM SENTINEL LYMPH NODE BIOPSY AND THEN  REMOVE RIGHT BREAST AND NIPPLE AND        (INJECTION ON SDS @7:00 A.M.);  Surgeon: Abdoul Ortega MD;  Location: [facility] OR;  Service: General   • PAP SMEAR     • US GUIDED FINE NEEDLE ASPIRATION  2018   • VENOUS ACCESS DEVICE (PORT) INSERTION Left 10/10/2018    Procedure: MEDIPORT PLACEMENT      (LEFT SIDE,UNATTACHED PORT )      (C-ARM);  Surgeon: Abdoul Ortega MD;  Location: [facility];  Service: General        Patient Active Problem List   Diagnosis   • Vertigo   • Vision changes   • Acute non-recurrent maxillary sinusitis   • Papanicolaou smear for cervical cancer screening   • General medical exam   • Anxiety   • Acute vaginitis   • Encounter for Papanicolaou smear for cervical cancer screening   • External hemorrhoids   • Depression   • Sore throat   • Cough   • Neck pain   • Encounter for screening mammogram for malignant neoplasm of breast   • Other dysphagia   • Malignant neoplasm of upper-outer quadrant of right breast in female, estrogen  "receptor positive (CMS/HCC)   • Throat mass   • Encounter for venous access device care   • Encounter for venous access device care   • Flu vaccine need   • Vaccine for streptococcus pneumoniae and influenza   • Generalized anxiety disorder   • Chronic bilateral thoracic back pain   • Chest pain on breathing   • Acute low back pain without sciatica   • Chronic left-sided low back pain without sciatica   • History of abnormal cervical Pap smear   • Pelvic pain   • Encounter for gynecological examination   • Adenomyosis   • Obesity (BMI 30-39.9)   • Female pelvic pain   • Postoperative state        Documented Vitals    03/16/20 1350   BP: 130/80   Weight: 72.5 kg (159 lb 12.8 oz)   Height: 154.9 cm (61\")   PainSc:   3        Body mass index is 30.19 kg/m².    Physical Exam  Constitutional: Appears to be in no acute distress; Eyes: sclera normal; Endocrine system: thyroid palpate is normal; Pulmonary system: lungs clear; Cardiovascular system: heart regular rate and rhythm; Gastrointestinal system: abdomen soft nontender, active bowel sounds; Urologic system: CVA negative; Psychiatric: appropriate insight; Neurologic: gait within normal limits there is decreased ecchymosis around the incision and the vulva she is having some tenderness at the left portion of the incision without erythema or discharge instructed on heat and wound care    Laboratory Data:   Lab Results - Last 18 Months   Lab Units 03/11/20  0353 03/03/20  0922 01/27/20  0832 10/07/19  0819 07/08/19  0813 05/31/19  1343 02/11/19  1058   GLUCOSE mg/dL 114* 150* 88 123* 93 100* 102*   BUN mg/dL 11 16 14 12 14 14 17   CREATININE mg/dL 0.71 0.82 0.82 0.64 0.66 0.65 0.60   SODIUM mmol/L 139 138 139 141 140 141 137   POTASSIUM mmol/L 3.5 4.1 4.0 3.7 3.9 3.7 3.7   CHLORIDE mmol/L 103 103 101 105 103 103 102   CO2 mmol/L 23.0 23.0 25.0 26.0 25.0 25.0 25.0   CALCIUM mg/dL 8.7 9.3 9.3 9.2 8.9 9.1 9.6   TOTAL PROTEIN g/dL  --   --  7.4 7.4 7.2 7.3 7.8   ALBUMIN g/dL "  --   --  4.20 4.20 4.10 4.20 4.70   ALT (SGPT) U/L  --   --  17 12 17 18 36   AST (SGOT) U/L  --   --  19 16 18 20 33   ALK PHOS U/L  --   --  107 91 91 88 95   BILIRUBIN mg/dL  --   --  0.3 0.3 0.3 0.4 0.3   EGFR IF NONAFRICN AM mL/min/1.73 91 77 77 102 99 100 111   GLOBULIN gm/dL  --   --  3.2 3.2 3.1 3.1 3.1   A/G RATIO g/dL  --   --  1.3 1.3 1.3 1.4 1.5   BUN / CREAT RATIO  15.5 19.5 17.1 18.8 21.2 21.5 28.3*   ANION GAP mmol/L 13.0 12.0 13.0 10.0 12.0 13.0 10.0     Lab Results - Last 18 Months   Lab Units 03/11/20  0353 03/03/20  0924 01/27/20  0832 10/07/19  0819 07/08/19  0813   WBC 10*3/mm3 9.32 5.96 6.17 5.94 7.43   RBC 10*6/mm3 3.29* 3.86 4.23 4.05 3.94   HEMOGLOBIN g/dL 9.8* 11.5* 12.7 12.0 11.3*   HEMATOCRIT % 28.8* 34.2 37.4 35.3 34.3   MCV fL 87.5 88.6 88.4 87.2 87.1   MCH pg 29.8 29.8 30.0 29.6 28.7   MCHC g/dL 34.0 33.6 34.0 34.0 32.9   RDW % 12.2* 12.0* 12.0* 12.3 13.5   RDW-SD fl 39.2 38.6 38.8 39.4 43.0   MPV fL 9.0 9.1 8.6 8.8 8.9   PLATELETS 10*3/mm3 209 227 225 201 202     No results for input(s): HCGQUAL in the last 72321 hours.    Assessment        Diagnosis Plan   1. Postoperative state           Plan       No orders of the defined types were placed in this encounter.            This document has been electronically signed by Evans Syed MD on March 16, 2020 16:18    Please note that portions of this note were completed with a voice recognition program.

## 2020-03-19 ENCOUNTER — OFFICE VISIT (OUTPATIENT)
Dept: OBSTETRICS AND GYNECOLOGY | Facility: CLINIC | Age: 40
End: 2020-03-19

## 2020-03-19 VITALS
SYSTOLIC BLOOD PRESSURE: 128 MMHG | WEIGHT: 160.4 LBS | HEIGHT: 61 IN | BODY MASS INDEX: 30.29 KG/M2 | DIASTOLIC BLOOD PRESSURE: 76 MMHG

## 2020-03-19 DIAGNOSIS — Z98.890 POSTOPERATIVE STATE: Primary | ICD-10-CM

## 2020-03-19 PROCEDURE — 99024 POSTOP FOLLOW-UP VISIT: CPT | Performed by: OBSTETRICS & GYNECOLOGY

## 2020-03-19 RX ORDER — IBUPROFEN 600 MG/1
600 TABLET ORAL EVERY 6 HOURS
Qty: 60 TABLET | Refills: 0 | OUTPATIENT
Start: 2020-03-19 | End: 2020-06-30

## 2020-03-19 RX ORDER — ONDANSETRON 4 MG/1
4 TABLET, FILM COATED ORAL EVERY 6 HOURS PRN
Qty: 30 TABLET | Refills: 0 | OUTPATIENT
Start: 2020-03-19 | End: 2020-06-30

## 2020-03-19 NOTE — PROGRESS NOTES
Moy Sparrow is a 40 y.o. y/o female.     Chief Complaint: Postop follow-up    HPI:   40 y.o. .  Patient's last menstrual period was 2019 (approximate)..  Postop doing well no complaints          Review of Systems   ROS:  CNS: No history of brain malignancy.  HEENT: No history of throat cancer.  Eye: No history of retinal cancer.  Pulmonary: No history of lung cancer.                                                                                 Cardiovascular: No history of cardiac tumors.  Gastrointestinal: No history of small bowel tumors.  Renal: No history of kidney malignancy.  Musculoskeletal: No history of smooth muscle tumors.  Lymphatics: No history of Hodgkin's disease.  Endocrine: No history of thyroid malignancy.    The following portions of the patient's history were reviewed and updated as appropriate: allergies, current medications, past family history, past medical history, past social history, past surgical history and problem list.    Allergies   Allergen Reactions   • Penicillins Rash        Outpatient Medications Prior to Visit   Medication Sig Dispense Refill   • acetaminophen (TYLENOL) 325 MG tablet Take 2 tablets by mouth Every 6 (Six) Hours. 100 tablet 2   • benzoyl peroxide 5 % external liquid Apply topically to the appropriate area once daily as directed for acne. (Patient taking differently: Apply  topically to the appropriate area as directed Daily.) 237 g 2   • BENZOYL PEROXIDE 5 % external wash   2   • desvenlafaxine (PRISTIQ) 50 MG 24 hr tablet Take 1 tablet by mouth Daily. 30 tablet 11   • diazePAM (VALIUM) 5 MG tablet Take 1 tablet by mouth Every 6 (Six) Hours As Needed for Anxiety. 60 tablet 0   • exemestane (AROMASIN) 25 MG chemo tablet Take 1 tablet by mouth Daily. 90 tablet 2   • famotidine (PEPCID) 40 MG tablet Take 1 tablet by mouth Daily. 30 tablet 11   • goserelin (ZOLADEX) 3.6 MG injection Inject 3.6 mg under the skin into the appropriate area as  directed Every 28 (Twenty-Eight) Days. 43.2 mg 0   • ibuprofen (ADVIL,MOTRIN) 600 MG tablet Take 1 tablet by mouth Every 6 (Six) Hours **Take with food** 60 tablet 0   • ondansetron (ZOFRAN) 4 MG tablet Take 1 tablet by mouth Every 6 (Six) Hours As Needed for Nausea or Vomiting. 30 tablet 0     No facility-administered medications prior to visit.         The patient has a family history of   Family History   Problem Relation Age of Onset   • Bipolar disorder Mother    • Schizophrenia Mother    • Colon cancer Father         Colorectal Cancer   • No Known Problems Sister         Past Medical History:   Diagnosis Date   • Anxiety    • Cancer (CMS/HCC)     breast right   • Depressive disorder    • Encounter for gynecological examination    • Encounter for vision screening     Vision screen (1)     • GERD (gastroesophageal reflux disease)    • Health examination of defined subpopulation     Health examination of sub-group     • Malaise and fatigue    • Palpitations    • Skin cancer      Skin Ca Pre Cancer   • Soft tissue swelling     right side of neck-2 different areas    • Urinary tract infectious disease    • Wears glasses         OB History        2    Para   2    Term   2            AB        Living           SAB        TAB        Ectopic        Molar        Multiple        Live Births                     Social History     Socioeconomic History   • Marital status: Single     Spouse name: Not on file   • Number of children: 2   • Years of education: 14   • Highest education level: Not on file   Occupational History   • Occupation: nurse   Tobacco Use   • Smoking status: Former Smoker     Years: 25.00     Last attempt to quit: 2019     Years since quittin.1   • Smokeless tobacco: Never Used   • Tobacco comment: smokes 1-2 cigs/ day   Substance and Sexual Activity   • Alcohol use: No   • Drug use: No   • Sexual activity: Defer        Past Surgical History:   Procedure Laterality Date   •   SECTION     • COLONOSCOPY N/A 3/1/2018    Procedure: COLONOSCOPY;  Surgeon: Shin Vanessa DO;  Location: [facility] ENDOSCOPY;  Service:    • ENDOSCOPY N/A 10/5/2018    Procedure: ESOPHAGOGASTRODUODENOSCOPY;  Surgeon: Shin Vanessa DO;  Location: [facility] ENDOSCOPY;  Service: Gastroenterology   • LAPAROSCOPIC ASSISTED VAGINAL HYSTERECTOMY SALPINGO OOPHORECTOMY N/A 3/10/2020    Procedure: ABDOMIAL HYSTERECTOMY BILATERAL SALPINGO-OOPHERECTOMY CYSTOSCOPY;  Surgeon: Evans Syed MD;  Location: [facility] OR;  Service: Obstetrics/Gynecology;  Laterality: N/A;   • MASTECTOMY Right    • MASTECTOMY WITH SENTINEL NODE BIOPSY AND AXILLARY NODE DISSECTION Right 2018    Procedure: INJECT RIGHT BREAST AND THEN PERFORM SENTINEL LYMPH NODE BIOPSY AND THEN  REMOVE RIGHT BREAST AND NIPPLE AND        (INJECTION ON SDS @7:00 A.M.);  Surgeon: Abdoul Ortega MD;  Location: [facility];  Service: General   • PAP SMEAR     • US GUIDED FINE NEEDLE ASPIRATION  2018   • VENOUS ACCESS DEVICE (PORT) INSERTION Left 10/10/2018    Procedure: MEDIPORT PLACEMENT      (LEFT SIDE,UNATTACHED PORT )      (C-ARM);  Surgeon: Abdoul Ortega MD;  Location: [facility];  Service: General        Patient Active Problem List   Diagnosis   • Vertigo   • Vision changes   • Acute non-recurrent maxillary sinusitis   • Papanicolaou smear for cervical cancer screening   • General medical exam   • Anxiety   • Acute vaginitis   • Encounter for Papanicolaou smear for cervical cancer screening   • External hemorrhoids   • Depression   • Sore throat   • Cough   • Neck pain   • Encounter for screening mammogram for malignant neoplasm of breast   • Other dysphagia   • Malignant neoplasm of upper-outer quadrant of right breast in female, estrogen receptor positive (CMS/HCC)   • Throat mass   • Encounter for venous access device care   • Encounter for venous access device care   • Flu vaccine need   • Vaccine for streptococcus pneumoniae and  "influenza   • Generalized anxiety disorder   • Chronic bilateral thoracic back pain   • Chest pain on breathing   • Acute low back pain without sciatica   • Chronic left-sided low back pain without sciatica   • History of abnormal cervical Pap smear   • Pelvic pain   • Encounter for gynecological examination   • Adenomyosis   • Obesity (BMI 30-39.9)   • Female pelvic pain   • Postoperative state        Documented Vitals    03/19/20 1347   BP: 128/76   Weight: 72.8 kg (160 lb 6.4 oz)   Height: 154.9 cm (61\")   PainSc: 0-No pain        Body mass index is 30.31 kg/m².    Physical Exam  Constitutional: Appears to be in no acute distress; Eyes: sclera normal; Endocrine system: thyroid palpate is normal; Pulmonary system: lungs clear; Cardiovascular system: heart regular rate and rhythm; Gastrointestinal system: abdomen soft nontender, active bowel sounds; Urologic system: CVA negative; Psychiatric: appropriate insight; Neurologic: gait within normal limits incision appears to be healing well staples removed Mastisol Steri-Strips applied    Laboratory Data:   Lab Results - Last 18 Months   Lab Units 03/11/20  0353 03/03/20  0922 01/27/20  0832 10/07/19  0819 07/08/19  0813 05/31/19  1343 02/11/19  1058   GLUCOSE mg/dL 114* 150* 88 123* 93 100* 102*   BUN mg/dL 11 16 14 12 14 14 17   CREATININE mg/dL 0.71 0.82 0.82 0.64 0.66 0.65 0.60   SODIUM mmol/L 139 138 139 141 140 141 137   POTASSIUM mmol/L 3.5 4.1 4.0 3.7 3.9 3.7 3.7   CHLORIDE mmol/L 103 103 101 105 103 103 102   CO2 mmol/L 23.0 23.0 25.0 26.0 25.0 25.0 25.0   CALCIUM mg/dL 8.7 9.3 9.3 9.2 8.9 9.1 9.6   TOTAL PROTEIN g/dL  --   --  7.4 7.4 7.2 7.3 7.8   ALBUMIN g/dL  --   --  4.20 4.20 4.10 4.20 4.70   ALT (SGPT) U/L  --   --  17 12 17 18 36   AST (SGOT) U/L  --   --  19 16 18 20 33   ALK PHOS U/L  --   --  107 91 91 88 95   BILIRUBIN mg/dL  --   --  0.3 0.3 0.3 0.4 0.3   EGFR IF NONAFRICN AM mL/min/1.73 91 77 77 102 99 100 111   GLOBULIN gm/dL  --   --  3.2 3.2 " 3.1 3.1 3.1   A/G RATIO g/dL  --   --  1.3 1.3 1.3 1.4 1.5   BUN / CREAT RATIO  15.5 19.5 17.1 18.8 21.2 21.5 28.3*   ANION GAP mmol/L 13.0 12.0 13.0 10.0 12.0 13.0 10.0     Lab Results - Last 18 Months   Lab Units 03/11/20  0353 03/03/20  0924 01/27/20  0832 10/07/19  0819 07/08/19  0813   WBC 10*3/mm3 9.32 5.96 6.17 5.94 7.43   RBC 10*6/mm3 3.29* 3.86 4.23 4.05 3.94   HEMOGLOBIN g/dL 9.8* 11.5* 12.7 12.0 11.3*   HEMATOCRIT % 28.8* 34.2 37.4 35.3 34.3   MCV fL 87.5 88.6 88.4 87.2 87.1   MCH pg 29.8 29.8 30.0 29.6 28.7   MCHC g/dL 34.0 33.6 34.0 34.0 32.9   RDW % 12.2* 12.0* 12.0* 12.3 13.5   RDW-SD fl 39.2 38.6 38.8 39.4 43.0   MPV fL 9.0 9.1 8.6 8.8 8.9   PLATELETS 10*3/mm3 209 227 225 201 202     No results for input(s): HCGQUAL in the last 02945 hours.    Assessment        Diagnosis Plan   1. Postoperative state           Plan       No orders of the defined types were placed in this encounter.            This document has been electronically signed by Evans Syed MD on March 19, 2020 15:19    Please note that portions of this note were completed with a voice recognition program.

## 2020-03-30 ENCOUNTER — TELEPHONE (OUTPATIENT)
Dept: OBSTETRICS AND GYNECOLOGY | Facility: CLINIC | Age: 40
End: 2020-03-30

## 2020-03-30 NOTE — TELEPHONE ENCOUNTER
I called the patient to check on her but didn't get an answer. I left her a message that if she was doing okay then she can call and reschedule. If she is having trouble we will see her at her appt time today.

## 2020-04-27 ENCOUNTER — APPOINTMENT (OUTPATIENT)
Dept: ONCOLOGY | Facility: HOSPITAL | Age: 40
End: 2020-04-27
Attending: INTERNAL MEDICINE

## 2020-05-04 RX ORDER — EXEMESTANE 25 MG/1
25 TABLET ORAL DAILY
Qty: 90 TABLET | Refills: 2 | Status: SHIPPED | OUTPATIENT
Start: 2020-05-04 | End: 2021-05-10 | Stop reason: SDUPTHER

## 2020-05-26 ENCOUNTER — INFUSION (OUTPATIENT)
Dept: ONCOLOGY | Facility: HOSPITAL | Age: 40
End: 2020-05-26
Attending: INTERNAL MEDICINE

## 2020-05-26 ENCOUNTER — OFFICE VISIT (OUTPATIENT)
Dept: ONCOLOGY | Facility: CLINIC | Age: 40
End: 2020-05-26
Attending: INTERNAL MEDICINE

## 2020-05-26 VITALS
RESPIRATION RATE: 18 BRPM | WEIGHT: 158.3 LBS | BODY MASS INDEX: 29.89 KG/M2 | HEART RATE: 82 BPM | SYSTOLIC BLOOD PRESSURE: 124 MMHG | HEIGHT: 61 IN | TEMPERATURE: 98 F | DIASTOLIC BLOOD PRESSURE: 76 MMHG

## 2020-05-26 DIAGNOSIS — F41.1 GENERALIZED ANXIETY DISORDER: ICD-10-CM

## 2020-05-26 DIAGNOSIS — Z17.0 MALIGNANT NEOPLASM OF UPPER-OUTER QUADRANT OF RIGHT BREAST IN FEMALE, ESTROGEN RECEPTOR POSITIVE (HCC): Primary | ICD-10-CM

## 2020-05-26 DIAGNOSIS — Z45.2 ENCOUNTER FOR VENOUS ACCESS DEVICE CARE: ICD-10-CM

## 2020-05-26 DIAGNOSIS — Z13.820 SCREENING FOR OSTEOPOROSIS: ICD-10-CM

## 2020-05-26 DIAGNOSIS — C50.411 MALIGNANT NEOPLASM OF UPPER-OUTER QUADRANT OF RIGHT BREAST IN FEMALE, ESTROGEN RECEPTOR POSITIVE (HCC): Primary | ICD-10-CM

## 2020-05-26 LAB
ALBUMIN SERPL-MCNC: 4.2 G/DL (ref 3.5–5.2)
ALBUMIN/GLOB SERPL: 1.5 G/DL
ALP SERPL-CCNC: 121 U/L (ref 39–117)
ALT SERPL W P-5'-P-CCNC: 17 U/L (ref 1–33)
ANION GAP SERPL CALCULATED.3IONS-SCNC: 12 MMOL/L (ref 5–15)
AST SERPL-CCNC: 20 U/L (ref 1–32)
BASOPHILS # BLD AUTO: 0.04 10*3/MM3 (ref 0–0.2)
BASOPHILS NFR BLD AUTO: 0.7 % (ref 0–1.5)
BILIRUB SERPL-MCNC: 0.2 MG/DL (ref 0.2–1.2)
BUN BLD-MCNC: 17 MG/DL (ref 6–20)
BUN/CREAT SERPL: 26.6 (ref 7–25)
CALCIUM SPEC-SCNC: 8.8 MG/DL (ref 8.6–10.5)
CHLORIDE SERPL-SCNC: 105 MMOL/L (ref 98–107)
CO2 SERPL-SCNC: 22 MMOL/L (ref 22–29)
CREAT BLD-MCNC: 0.64 MG/DL (ref 0.57–1)
DEPRECATED RDW RBC AUTO: 39.3 FL (ref 37–54)
EOSINOPHIL # BLD AUTO: 0.19 10*3/MM3 (ref 0–0.4)
EOSINOPHIL NFR BLD AUTO: 3.5 % (ref 0.3–6.2)
ERYTHROCYTE [DISTWIDTH] IN BLOOD BY AUTOMATED COUNT: 12.3 % (ref 12.3–15.4)
ESTRADIOL SERPL HS-MCNC: <5 PG/ML
GFR SERPL CREATININE-BSD FRML MDRD: 102 ML/MIN/1.73
GLOBULIN UR ELPH-MCNC: 2.8 GM/DL
GLUCOSE BLD-MCNC: 97 MG/DL (ref 65–99)
HCT VFR BLD AUTO: 34.1 % (ref 34–46.6)
HGB BLD-MCNC: 11.5 G/DL (ref 12–15.9)
IMM GRANULOCYTES # BLD AUTO: 0.02 10*3/MM3 (ref 0–0.05)
IMM GRANULOCYTES NFR BLD AUTO: 0.4 % (ref 0–0.5)
LYMPHOCYTES # BLD AUTO: 1.51 10*3/MM3 (ref 0.7–3.1)
LYMPHOCYTES NFR BLD AUTO: 27.7 % (ref 19.6–45.3)
MCH RBC QN AUTO: 29.5 PG (ref 26.6–33)
MCHC RBC AUTO-ENTMCNC: 33.7 G/DL (ref 31.5–35.7)
MCV RBC AUTO: 87.4 FL (ref 79–97)
MONOCYTES # BLD AUTO: 0.4 10*3/MM3 (ref 0.1–0.9)
MONOCYTES NFR BLD AUTO: 7.3 % (ref 5–12)
NEUTROPHILS # BLD AUTO: 3.29 10*3/MM3 (ref 1.7–7)
NEUTROPHILS NFR BLD AUTO: 60.4 % (ref 42.7–76)
NRBC BLD AUTO-RTO: 0 /100 WBC (ref 0–0.2)
PLATELET # BLD AUTO: 242 10*3/MM3 (ref 140–450)
PMV BLD AUTO: 8.6 FL (ref 6–12)
POTASSIUM BLD-SCNC: 4.1 MMOL/L (ref 3.5–5.2)
PROT SERPL-MCNC: 7 G/DL (ref 6–8.5)
RBC # BLD AUTO: 3.9 10*6/MM3 (ref 3.77–5.28)
SODIUM BLD-SCNC: 139 MMOL/L (ref 136–145)
WBC NRBC COR # BLD: 5.45 10*3/MM3 (ref 3.4–10.8)

## 2020-05-26 PROCEDURE — 85025 COMPLETE CBC W/AUTO DIFF WBC: CPT

## 2020-05-26 PROCEDURE — 99214 OFFICE O/P EST MOD 30 MIN: CPT | Performed by: INTERNAL MEDICINE

## 2020-05-26 PROCEDURE — G0463 HOSPITAL OUTPT CLINIC VISIT: HCPCS | Performed by: INTERNAL MEDICINE

## 2020-05-26 PROCEDURE — 25010000003 HEPARIN LOCK FLUSH PER 10 UNITS: Performed by: INTERNAL MEDICINE

## 2020-05-26 PROCEDURE — 80053 COMPREHEN METABOLIC PANEL: CPT

## 2020-05-26 PROCEDURE — 82670 ASSAY OF TOTAL ESTRADIOL: CPT

## 2020-05-26 RX ORDER — SODIUM CHLORIDE 0.9 % (FLUSH) 0.9 %
10 SYRINGE (ML) INJECTION AS NEEDED
Status: DISCONTINUED | OUTPATIENT
Start: 2020-05-26 | End: 2020-05-26 | Stop reason: HOSPADM

## 2020-05-26 RX ORDER — HEPARIN SODIUM (PORCINE) LOCK FLUSH IV SOLN 100 UNIT/ML 100 UNIT/ML
500 SOLUTION INTRAVENOUS AS NEEDED
Status: CANCELLED | OUTPATIENT
Start: 2020-10-05

## 2020-05-26 RX ORDER — SODIUM CHLORIDE 0.9 % (FLUSH) 0.9 %
10 SYRINGE (ML) INJECTION AS NEEDED
Status: CANCELLED | OUTPATIENT
Start: 2020-10-05

## 2020-05-26 RX ORDER — HEPARIN SODIUM (PORCINE) LOCK FLUSH IV SOLN 100 UNIT/ML 100 UNIT/ML
500 SOLUTION INTRAVENOUS AS NEEDED
Status: DISCONTINUED | OUTPATIENT
Start: 2020-05-26 | End: 2020-05-26 | Stop reason: HOSPADM

## 2020-05-26 RX ORDER — DIAZEPAM 5 MG/1
5 TABLET ORAL EVERY 6 HOURS PRN
Qty: 60 TABLET | Refills: 0 | Status: SHIPPED | OUTPATIENT
Start: 2020-05-26 | End: 2020-12-10

## 2020-05-26 RX ADMIN — SODIUM CHLORIDE, PRESERVATIVE FREE 10 ML: 5 INJECTION INTRAVENOUS at 10:00

## 2020-05-26 RX ADMIN — HEPARIN 500 UNITS: 100 SYRINGE at 10:00

## 2020-05-26 NOTE — PROGRESS NOTES
Moy Sparrow  [id number]  1980    Subjective      Ms Andrews presented for follow up appointment for breast cancer.   She is currently receiving exemestane.  She had a surgical oophorectomy performed in March 2020.  Continues to report left lower back pain.  This is likely musculoskeletal in nature.  Reassurance given.  She is reporting new left-sided chest wall pain that goes into the back.  No swelling.  No cough or shortness of breath.  This could be related to chemotherapy port.  I have instructed her to get her port taken out and if persistent pain after removing the port we will consider additional work-up.  Continue to struggle with depression and anxiety related to her cancer diagnosis.  She remains on Pristiq.  She is requesting new prescription of Valium which was arranged today.  Continue to struggle with intermittent hot flashes however this is gotten somewhat better compared to last year.  ROS as below.       History of Present Illness    This is a very pleasant 38-year-old female who was seen in consultation at the request of Dr. Ortega for evaluation of newly diagnosed breast cancer on 9/24/18.  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  Her past medical history is otherwise unremarkable except history of anxiety/depression and gastroesophageal reflux disease.She tells me that she felt a lump in her right breast in August 2018 and subsequently underwent a mammogram and ultrasonographic evaluation on August 27 which showed right breast mass approximately 1.5 cm.  The breast mass was felt to be suspicious for malignancy.  She then underwent ultrasound guided biopsy of right breast on 8/28/18 which showed invasive ductal carcinoma, moderately differentiated.  The carcinoma was tested positive for estrogen receptor and it was tested negative for progesterone receptor as well as HER-2.  Patient underwent right mastectomy and sentinel lymph node biopsy on September 5  which showed invasive ductal carcinoma of 1.5 cm, moderately differentiated which was completely excised.  [place] lymph node showed micrometastasis (less than 2 mm) involving 1 of 7 lymph nodes.    Her recurrence score was high at 36, so we recommended adjuvant chemotherapy with dose dense AC followed by taxane.     She completed her adjuvant chemotherapy uneventfully and subsequently received adjuvant radiation therapy.    She is currently on Lupron and exemestane.    Past Medical History, Past Surgical History, Social History, Family History have been reviewed and are without significant changes except as mentioned.    Review of Systems   CONSTITUTIONAL: Fatigue+  Weight loss + No  fever, chills, weakness.  HEENT: Eyes: No visual loss, blurred vision, double vision or yellow sclerae. Ears, Nose, Throat: No hearing loss, sneezing, congestion, runny nose or sore throat.  SKIN: No rash or itching.  CARDIOVASCULAR: No chest pain . No palpitations or edema.  RESPIRATORY: chronic exertional SOB + No  cough or sputum.  GASTROINTESTINAL: nausea + No anorexia,  vomiting or diarrhea. No abdominal pain or blood.  GENITOURINARY: Negative for urgency, frequency or dysuria.   NEUROLOGICAL: No headache, dizziness, syncope, paralysis, ataxia, numbness or tingling in the extremities. No change in bowel or bladder control.  MUSCULOSKELETAL: Back pain+   HEMATOLOGIC: No anemia, bleeding or bruising.  LYMPHATICS: No enlarged nodes. No history of splenectomy.  PSYCHIATRIC: Anxiety + depression+  ENDOCRINOLOGIC: No reports of sweating, cold or heat intolerance. No polyuria or polydipsia.  ALLERGIES: No history of asthma, hives, eczema or rhinitis.    Medications:  The current medication list was reviewed in the EMR    ALLERGIES:    Allergies   Allergen Reactions   • Penicillins Rash       Objective      Vitals:    11/26/18 0857   BP: 124/81   Pulse: 100   Resp: 18   Temp: 99.8 °F (37.7 °C)   TempSrc: Temporal   SpO2: 98%   Weight:  "65.8 kg (145 lb)   Height: 154.9 cm (60.98\")   PainSc: 0-No pain     Current Status 11/26/2018   ECOG score 0       Physical Exam    General: Alert, awake, oriented.  Well dressed.  Not in apparent distress. Vitals as above.   HEAD: normocephalic, atraumatic.   EYES: PERRL, EOMI.  vision is grossly intact.  Neck: Supple, no adenopathy or thyromegaly.   Throat: normal oral cavity and pharynx. No inflammation, swelling, exudate, or lesions.  CARDIAC: Normal S1 and S2. No S3, S4 or murmurs. Rhythm is regular.Extremities are warm and well perfused.   LUNGS: Clear to auscultation and percussion without rales, rhonchi, wheezing or diminished breath sounds.  ABDOMEN: Positive bowel sounds. Soft, nondistended, nontender  . No guarding or rebound. No masses.  Back:No bony tenderness.   EXTREMITIES: No significant deformity or joint abnormality.  Peripheral pulses intact. No varicosities.  Skin: No rash or bruising.  Neurological: Grossly non-focal exam. No focal weakness. Gait: Normal.   Psych: Mood and affect normal. No hallucination or suicidal thoughts.   Lymphatics:No adenopathy     RECENT LABS: Independently reviewed and summarized  Hematology WBC   Date Value Ref Range Status   11/26/2018 9.18 3.20 - 9.80 10*3/mm3 Final     RBC   Date Value Ref Range Status   11/26/2018 3.76 (L) 3.77 - 5.16 10*6/mm3 Final     Hemoglobin   Date Value Ref Range Status   11/26/2018 11.1 (L) 12.0 - 15.5 g/dL Final     Hematocrit   Date Value Ref Range Status   11/26/2018 32.4 (L) 35.0 - 45.0 % Final     Platelets   Date Value Ref Range Status   11/26/2018 222 150 - 450 10*3/mm3 Final            Lab Results   Component Value Date    GLUCOSE 113 (H) 11/26/2018    BUN 15 11/26/2018    CREATININE 0.72 11/26/2018    EGFRIFNONA 90 11/26/2018    BCR 20.8 11/26/2018    K 3.4 (L) 11/26/2018    CO2 24.0 11/26/2018    CALCIUM 9.1 11/26/2018    ALBUMIN 4.70 11/26/2018    AST 22 11/26/2018    ALT 12 11/26/2018     X ray lumbar spine reviewed. Showed no " fracture or mets. No prior comparison available.     Imaging (independently reviewed and summarized):   Mammogram result from December 2030, 2019 reviewed.  Showed no mammographic evidence of malignancy.    Moy Sparrow reports a pain score of 0.  Given her pain assessment as noted, treatment options were discussed and the following options were decided upon as a follow-up plan to address the patient's pain: continuation of current treatment plan for pain.    PHQ-9 Total Score: 0  Depression screen negative        Diagnosis:   (1) Invasive ductal carcinoma, right   Date of diagnosis: 9/5/18   Stage: IB ([id number]), recurrence score 36   Prior treatment:   Right mastectomy with sentinel node biopsy and ALND (9/5/18)  Chemotherapy: Start date (10/15/18)   Completed dose dense AC followed by dose dense taxol (2/11/19, last dose 1/28/19).   Adjuvant RT: 5040 cGy right chest wall and axilla     Current therapy:   exemestane (start 7/2019)       (2) Upper back pain   (3) Anxiety   (4) Low back pain   (5) Obesity       Assessment/Plan       (1) Invasive ductal carcinoma, right, stage IB, ER+/IN-/HER2-        - She is on adjuvant hormonal therapy.   - No clinical evidence of recurrence.   -Results of mammogram reviewed which showed no evidence of recurrence.  Continue exemestane.  We will arrange for DEXA scan to evaluate for bone mineral density given she is on exemestane.  She was instructed to take calcium and vitamin D tablets.    (2) Upper back/chest pain: Chronic, likely musculo-skeletal in nature. On PRN tramadol.  Her left-sided chest/back pain appears to be new.  No concern for thrombosis.  She was instructed to get her port removed.  If persistent pain after port removal we will consider additional work-up.    (3) Anxiety: Continues to struggle with generalized anxiety. On pristiq and PRN valium.  New prescription of Valium was sent to her pharmacy.    (4) Lower back pain: Persistent low back pain, left  side. X ray negative. Likely MSK in nature. Reassurance given.     Prescription sent: valium   Imaging: Dexa scan arranged today.       Yobani Emery MD     11/26/2018      CC:

## 2020-06-04 DIAGNOSIS — Z17.0 MALIGNANT NEOPLASM OF UPPER-OUTER QUADRANT OF RIGHT BREAST IN FEMALE, ESTROGEN RECEPTOR POSITIVE (HCC): Primary | ICD-10-CM

## 2020-06-04 DIAGNOSIS — C50.411 MALIGNANT NEOPLASM OF UPPER-OUTER QUADRANT OF RIGHT BREAST IN FEMALE, ESTROGEN RECEPTOR POSITIVE (HCC): Primary | ICD-10-CM

## 2020-06-16 ENCOUNTER — APPOINTMENT (OUTPATIENT)
Dept: NUCLEAR MEDICINE | Facility: HOSPITAL | Age: 40
End: 2020-06-16

## 2020-06-22 ENCOUNTER — HOSPITAL ENCOUNTER (OUTPATIENT)
Dept: NUCLEAR MEDICINE | Facility: HOSPITAL | Age: 40
Discharge: HOME | End: 2020-06-22

## 2020-06-22 DIAGNOSIS — Z17.0 MALIGNANT NEOPLASM OF UPPER-OUTER QUADRANT OF RIGHT BREAST IN FEMALE, ESTROGEN RECEPTOR POSITIVE (HCC): ICD-10-CM

## 2020-06-22 DIAGNOSIS — C50.411 MALIGNANT NEOPLASM OF UPPER-OUTER QUADRANT OF RIGHT BREAST IN FEMALE, ESTROGEN RECEPTOR POSITIVE (HCC): ICD-10-CM

## 2020-06-22 PROCEDURE — A9503 TC99M MEDRONATE: HCPCS | Performed by: INTERNAL MEDICINE

## 2020-06-22 PROCEDURE — 0 TECHNETIUM MEDRONATE KIT: Performed by: INTERNAL MEDICINE

## 2020-06-22 PROCEDURE — 78306 BONE IMAGING WHOLE BODY: CPT

## 2020-06-22 RX ORDER — TC 99M MEDRONATE 20 MG/10ML
27.1 INJECTION, POWDER, LYOPHILIZED, FOR SOLUTION INTRAVENOUS
Status: COMPLETED | OUTPATIENT
Start: 2020-06-22 | End: 2020-06-22

## 2020-06-22 RX ADMIN — Medication 27.1 MILLICURIE: at 09:37

## 2020-06-24 ENCOUNTER — PROCEDURE VISIT (OUTPATIENT)
Dept: SURGERY | Facility: CLINIC | Age: 40
End: 2020-06-24

## 2020-06-24 VITALS
SYSTOLIC BLOOD PRESSURE: 126 MMHG | HEART RATE: 88 BPM | HEIGHT: 61 IN | TEMPERATURE: 98.6 F | WEIGHT: 162 LBS | DIASTOLIC BLOOD PRESSURE: 70 MMHG | BODY MASS INDEX: 30.58 KG/M2

## 2020-06-24 DIAGNOSIS — Z45.2 ENCOUNTER FOR VENOUS ACCESS DEVICE CARE: Primary | ICD-10-CM

## 2020-06-24 PROCEDURE — 36590 REMOVAL TUNNELED CV CATH: CPT | Performed by: SURGERY

## 2020-06-24 NOTE — PATIENT INSTRUCTIONS
"ãÄÔÑ ßÊáÉ ÇáÌÓã ááÈÇáÛíä  BMI for Adults    ãÄÔÑ ßÊáÉ ÇáÌÓã (BMI) ÑÞã íÌÑí ÍÓÇÈå ãä ãÚØíÇÊ æÒä ÇáÔÎÕ æØæáå. æÞÏ íÓÇÚÏ ãÄÔÑ ßÊáÉ ÇáÌÓã Úáì ÊÞÏíÑ ßãíÉ ÇáÏåæä Ýí ÇáÌÓã. ßãÇ íãßä Ãä íÓÇÚÏ ãÄÔÑ ßÊáÉ ÇáÌÓã Ýí ÊÍÏíÏ ÃæáÆß ÇáÐíä ÞÏ íßæäæä ÃßËÑ ÚÑÖÉ áãÔßáÇÊ ØÈíÉ ãÚíäÉ.  ßíÝ íõÓÊÎÏã ãÄÔÑ ßÊáÉ ÇáÌÓã ãÚ ÇáÈÇáÛíä¿  íõÓÊÎÏã ãÄÔÑ ßÊáÉ ÇáÌÓã ßÃÏÇÉ ÝÑÒ ááßÔÝ Úä ÇÍÊãÇáÇÊ ÇáÅÕÇÈÉ ÈãÔßáÇÊ ÇáæÒä. æíÌÑí ÇÓÊÎÏÇãå ááÊÍÞÞ ãä ãÏì ÅÕÇÈÉ ÇáÔÎÕ ãä ÇáÓãäÉ Ãæ ÒíÇÏÉ ÇáæÒä Ãæ äÞÕ ÇáæÒä Ãæ ÊãÊÚå ÈæÒä ÕÍí.  ßíÝ íÌÑí ÍÓÇÈ ãÄÔÑ ßÊáÉ ÇáÌÓã¿  íÞíÓ ãÄÔÑ ßÊáÉ ÇáÌÓã ÇáæÒä æíÞÇÑäå ÈÇáØæá. æíãßä ÅÌÑÇÁ ÇáÞíÇÓ ÈÇáäÙÇã ÇáÅäÌáíÒí (ÇáæáÇíÇÊ ÇáãÊÍÏÉ) Ãæ ÈÇáäÙÇã ÇáãÊÑí. áÇÍÙ Ãäå ÊÊæÝÑ ÌÏÇæá áãÓÇÚÏÊß Ýí ãÚÑÝÉ ãÄÔÑ ßÊáÉ ÇáÌÓã ÎÇÕÊß ÈÓÑÚÉ æÓåæáÉ Ïæä ÅÌÑÇÁ åÐå ÇáÍÓÇÈÇÊ ÈäÝÓß.  áÍÓÇÈ ãÄÔÑ ßÊáÉ ÇáÌÓã ÈÇáÞíÇÓÇÊ ÇáÅäÌáíÒíÉ (ÇáæáÇíÇÊ ÇáãÊÍÏÉ)¡ ÓíÊæáì ãÞÏã ÇáÑÚÇíÉ ÇáÕÍíÉ ÈãÇ íáí:  1. ÍÓÇÈ ÇáæÒä ÈÇáÃÑØÇá.  2. ÖÑÈ ÚÏÏ ÇáÃÑØÇá Ýí 703.  • Úáì ÓÈíá ÇáãËÇá¡ ÈÇáäÓÈÉ áÔÎÕ íÒä 180 ÑØáÇð¡ íÊã ÖÑÈ åÐÇ ÇáÑÞã Ýí 703¡ Ãí ãÇ íÚÇÏá 126,540.  3. ÞíÇÓ ÇáØæá ÈÇáÈæÕÉ. Ëã ÇÖÑÈ åÐÇ ÇáÚÏÏ Ýí äÝÓå ááÍÕæá Úáì ÇáØæá ÈæÍÏÉ \"ÇáÈæÕÉ ÇáãÑÈÚÉ\".  • Úáì ÓÈíá ÇáãËÇá¡ ÈÇáäÓÈÉ áÔÎÕ Øæáå 70 ÈæÕÉ¡ íßæä Øæáå \"ÈÇáÈæÕÉ ÇáãÑÈÚÉ\" åæ 70 ÈæÕÉ × 70 ÈæÕÉ Ãí ãÇ íÓÇæí 4900 ÈæÕÉ ãÑÈÚÉ.  4. ÞÓãÉ ÇáãÌãæÚ ãä ÇáÎØæÉ ÑÞã 2 (ÚÏÏ ÇáÃÑØÇá × 703) Úáì ÇáãÌãæÚ ãä ÇáÎØæÉ ÑÞã 3 (ÇáÈæÕÉ ÇáãÑÈÚÉ): 126,540 ÷ 4900 = 25.8. æåæ ãÄÔÑ ßÊáÉ ÇáÌÓã.  áÍÓÇÈ ãÄÔÑ ßÊáÉ ÇáÌÓã ÈÇáÞíÇÓÇÊ ÇáãÊÑíÉ¡ ÓíÊæáì ãÞÏã ÇáÑÚÇíÉ ÇáÕÍíÉ ÈãÇ íáí:  1. ÍÓÇÈ ÇáæÒä ÈÇáßíáæÌÑÇã.  2. ÞíÇÓ ÇáØæá ÈÇáãÊÑ. Ëã ÇÖÑÈ åÐÇ ÇáÚÏÏ Ýí äÝÓå ááÍÕæá Úáì ÇáØæá ÈæÍÏÉ \"ÇáãÊÑ ÇáãÑÈÚ\".  • Úáì ÓÈíá ÇáãËÇá¡ ÈÇáäÓÈÉ áÔÎÕ Øæáå 1.75 ãÊÑ¡ íßæä Øæáå \"ÈÇáãÊÑ ÇáãÑÈÚ\" 1.75 × 1.75 Ãí ãÇ íÓÇæí 3.1 ÃãÊÇÑ ãÑÈÚÉ.  3. Ëã ÇÞÓã ÚÏÏ ÇáßíáæÌÑÇãÇÊ (ÇáæÒä) Úáì ÚÏÏ ÇáÃãÊÇÑ ÇáãÑÈÚÉ. Ýí åÐÇ ÇáãËÇá: 70 ÷ 3.1 = 22.6. æåæ ãÄÔÑ ßÊáÉ ÇáÌÓã.  ßíÝ íõÝÓÑ ãÄÔÑ ßÊáÉ ÇáÌÓã¿  áÊÝÓíÑ ÇáäÊÇÆÌ¡ ÓíÓÊÎÏã ãÞÏã ÇáÑÚÇíÉ ÇáÕÍíÉ ÇáÑÓæã ÇáÈíÇäíÉ áãÄÔÑ ßÊáÉ ÇáÌÓã áÊÍÏíÏ ãÇ ÅÐÇ ßäÊ ÊÚÇäí ãä äÞÕ ÇáæÒä Ãæ ÒíÇÏÉ ÇáæÒä Ãæ ÇáÓãäÉ Ãæ Ãäß ÊÊãÊÚ ÈæÒä " ØÈíÚí. ÊõÓÊÎÏã ÇáÅÑÔÇÏÇÊ ÇáÊÇáíÉ:  • ÃÞá ãä ÇáæÒä: ãÄÔÑ ßÊáÉ ÇáÌÓã ÃÞá ãä 18.5.  • Ðæ æÒä ØÈíÚí: ãÄÔÑ ßÊáÉ ÇáÌÓã Èíä 18.5 æ 24.9.  • ÒíÇÏÉ Ýí ÇáæÒä: ãÄÔÑ ßÊáÉ ÇáÌÓã Èíä 25 æ 29.9.  • ÇáÓãäÉ: ãÄÔÑ ßÊáÉ ÇáÌÓã 30 ÝãÇ ÝæÞ.  íÑÌì ãáÇÍÙÉ ãÇ íáí:  • íÊÖãä ÇáæÒä ÍÌã ÇáÏåæä æÇáÚÖáÇÊ¡ áÐÇ ÝÅä ÇáÔÎÕ ÐÇ ÇáÈäíÉ ÇáÚÖáíÉ¡ ãËá ÇáÑíÇÖí¡ ÞÏ íßæä ãÄÔÑ ßÊáÉ ÇáÌÓã áÏíå ÃÚáì ãä 24.9. Ýí ãËá åÐå ÇáÍÇáÇÊ¡ áÇ íßæä ãÄÔÑ ßÊáÉ ÇáÌÓã ãÞíÇÓðÇ ÏÞíÞðÇ ááÏåæä Ýí ÇáÌÓã.  • æáÊÍÏíÏ ÅÐÇ ãÇ ßÇä ÝÇÆÖ ÇáÏåæä Ýí ÇáÌÓã åæ ÇáÓÈÈ Ýí Ãä íÈáÛ ãÄÔÑ ßÊáÉ ÇáÌÓã 25 Ãæ ÃÚáì¡ ÝÞÏ íáÒã Ãä Úáì ãÞÏã ÇáÑÚÇíÉ ÇáÕÍíÉ ÅÌÑÇÁ ãÒíÏ ãä ÇáÊÞííãÇÊ.  • æÚÇÏÉ ãÇ íõÝÓÑ ãÄÔÑ ßÊáÉ ÇáÌÓã ÈäÝÓ ÇáØÑíÞÉ ááÑÌÇá æÇáäÓÇÁ.  áãÇÐÇ íÚÊÈÑ ãÄÔÑ ßÊáÉ ÇáÌÓã ÃÏÇÉ ãÝíÏÉ¿  ãÄÔÑ ßÊáÉ ÇáÌÓã ãÝíÏ ÈØÑíÞÊíä:  • ÊÍÏíÏ ãÔßáÇÊ ÇáæÒä ÇáãÍÊãáÉ¡ ÇáÊí ÞÏ Êßæä ÐÇÊ ÕáÉ ÈãÔßáÉ ØÈíÉ Ãæ ÞÏ ÊÒíÏ ÎØÑ ÇáÅÕÇÈÉ ÈãÔßáÇÊ ØÈíÉ.  • ÊÍÓíä ÊÛííÑÇÊ äãØ ÇáÍíÇÉ æÇáäÙÇã ÇáÛÐÇÆí ááæÕæá Åáì æÒä ÕÍí.  ãáÎÕ  • ãÄÔÑ ßÊáÉ ÇáÌÓã (BMI) ÑÞã íÌÑí ÍÓÇÈå ãä ãÚØíÇÊ æÒä ÇáÔÎÕ æØæáå.  • æÞÏ íÓÇÚÏ ãÄÔÑ ßÊáÉ ÇáÌÓã Úáì ÊÞÏíÑ ßãíÉ ÇáÏåæä Ýí ÇáÌÓã. ßãÇ íãßä Ãä íÓÇÚÏ ãÄÔÑ ßÊáÉ ÇáÌÓã Ýí ÊÍÏíÏ ÃæáÆß ÇáÐíä ÞÏ íßæäæä ÃßËÑ ÚÑÖÉ áãÔßáÇÊ ØÈíÉ ãÚíäÉ.  • íãßä ÞíÇÓ ãÄÔÑ ßÊáÉ ÇáÌÓã ÈÇáÞíÇÓÇÊ ÇáÅäÌáíÒíÉ Ãæ ÇáÞíÇÓÇÊ ÇáãÊÑíÉ.  • áÊÝÓíÑ ÇáäÊÇÆÌ¡ ÓíÓÊÎÏã ãÞÏã ÇáÑÚÇíÉ ÇáÕÍíÉ ÇáÑÓæã ÇáÈíÇäíÉ áãÄÔÑ ßÊáÉ ÇáÌÓã áÊÍÏíÏ ãÇ ÅÐÇ ßäÊ ÊÚÇäí ãä äÞÕ ÇáæÒä Ãæ ÒíÇÏÉ ÇáæÒä Ãæ ÇáÓãäÉ Ãæ Ãäß ÊÊãÊÚ ÈæÒä ØÈíÚí.  áíÓ ÇáåÏÝ ãä åÐå ÇáãÚáæãÇÊ Ãä Êßæä ÈÏíáÇð ááÅÑÔÇÏÇÊ ÇáÊí íÞÏãåÇ ãæÝÑ ÇáÑÚÇíÉ ÇáÕÍíÉ. ÊÃßÏ ãä ãäÇÞÔÉ ÃíÉ ÃÓÆáÉ ÊÏæÑ Ýí Ðåäß ãÚ ãæÝÑ ÇáÑÚÇíÉ ÇáÕÍíÉ.þ  Document Released: 07/26/2017 Document Revised: 03/29/2019 Document Reviewed: 12/26/2018  [employer] Patient Education © 2020 [employer]

## 2020-06-24 NOTE — PROGRESS NOTES
40-year-old female who had a left IJ Mediport placed for chemotherapy recent treatments for her breast cancer.  Patient is completed her chemotherapy treatments and her medical oncologist wished to have her Mediport removed.  Patient is not on any anticoagulation.  Went over with her how we would do this under local here in the office she clearly understands and wishes to proceed.  Area was prepped and draped in normal sterile fashion.  We infiltrated local good block was obtained.  Made a skin incision sharply with a scalpel through the previous scar followed it down to the port.  Port was delivered up into the wound.  The 2 Prolene sutures were removed.  The port and the catheter were removed and is 1 piece without any difficulty.  4-0 Monocryl was used to close the small tunnel.  The wound was then closed with running 4-0 Monocryl septic or stitch.  Steri-Strips were applied.  Patient instructed in local wound care.

## 2020-06-25 ENCOUNTER — TELEPHONE (OUTPATIENT)
Dept: ONCOLOGY | Facility: HOSPITAL | Age: 40
End: 2020-06-25

## 2020-06-25 ENCOUNTER — TELEPHONE (OUTPATIENT)
Dept: ONCOLOGY | Facility: CLINIC | Age: 40
End: 2020-06-25

## 2020-06-25 NOTE — TELEPHONE ENCOUNTER
----- Message from Hailey Emery MD sent at 6/25/2020  7:25 AM CDT -----  Let patient know that her bone density is normal.

## 2020-06-25 NOTE — TELEPHONE ENCOUNTER
Patient called back and I told her that her bone density was normal      Patient phone # [phone]

## 2020-06-30 PROCEDURE — U0002 COVID-19 LAB TEST NON-CDC: HCPCS | Performed by: NURSE PRACTITIONER

## 2020-10-01 PROBLEM — M54.50 ACUTE LOW BACK PAIN WITHOUT SCIATICA: Status: RESOLVED | Noted: 2019-05-31 | Resolved: 2020-10-01

## 2020-10-05 ENCOUNTER — OFFICE VISIT (OUTPATIENT)
Dept: ONCOLOGY | Facility: CLINIC | Age: 40
End: 2020-10-05

## 2020-10-05 ENCOUNTER — LAB (OUTPATIENT)
Dept: ONCOLOGY | Facility: HOSPITAL | Age: 40
End: 2020-10-05

## 2020-10-05 VITALS
BODY MASS INDEX: 32.02 KG/M2 | HEIGHT: 61 IN | DIASTOLIC BLOOD PRESSURE: 80 MMHG | TEMPERATURE: 97.7 F | HEART RATE: 82 BPM | SYSTOLIC BLOOD PRESSURE: 128 MMHG | WEIGHT: 169.6 LBS | RESPIRATION RATE: 18 BRPM

## 2020-10-05 DIAGNOSIS — C50.411 MALIGNANT NEOPLASM OF UPPER-OUTER QUADRANT OF RIGHT BREAST IN FEMALE, ESTROGEN RECEPTOR POSITIVE (HCC): ICD-10-CM

## 2020-10-05 DIAGNOSIS — G89.29 CHRONIC LEFT-SIDED LOW BACK PAIN WITHOUT SCIATICA: ICD-10-CM

## 2020-10-05 DIAGNOSIS — I89.0 LYMPHEDEMA OF EXTREMITY: ICD-10-CM

## 2020-10-05 DIAGNOSIS — F41.1 GENERALIZED ANXIETY DISORDER: ICD-10-CM

## 2020-10-05 DIAGNOSIS — C50.411 MALIGNANT NEOPLASM OF UPPER-OUTER QUADRANT OF RIGHT BREAST IN FEMALE, ESTROGEN RECEPTOR POSITIVE (HCC): Primary | ICD-10-CM

## 2020-10-05 DIAGNOSIS — Z17.0 MALIGNANT NEOPLASM OF UPPER-OUTER QUADRANT OF RIGHT BREAST IN FEMALE, ESTROGEN RECEPTOR POSITIVE (HCC): ICD-10-CM

## 2020-10-05 DIAGNOSIS — Z17.0 MALIGNANT NEOPLASM OF UPPER-OUTER QUADRANT OF RIGHT BREAST IN FEMALE, ESTROGEN RECEPTOR POSITIVE (HCC): Primary | ICD-10-CM

## 2020-10-05 DIAGNOSIS — M54.50 CHRONIC LEFT-SIDED LOW BACK PAIN WITHOUT SCIATICA: ICD-10-CM

## 2020-10-05 LAB
ALBUMIN SERPL-MCNC: 4.3 G/DL (ref 3.5–5.2)
ALBUMIN/GLOB SERPL: 1.3 G/DL
ALP SERPL-CCNC: 113 U/L (ref 39–117)
ALT SERPL W P-5'-P-CCNC: 13 U/L (ref 1–33)
ANION GAP SERPL CALCULATED.3IONS-SCNC: 8 MMOL/L (ref 5–15)
AST SERPL-CCNC: 16 U/L (ref 1–32)
BASOPHILS # BLD AUTO: 0.05 10*3/MM3 (ref 0–0.2)
BASOPHILS NFR BLD AUTO: 0.8 % (ref 0–1.5)
BILIRUB SERPL-MCNC: <0.2 MG/DL (ref 0–1.2)
BUN SERPL-MCNC: 17 MG/DL (ref 6–20)
BUN/CREAT SERPL: 24.6 (ref 7–25)
CALCIUM SPEC-SCNC: 9.3 MG/DL (ref 8.6–10.5)
CHLORIDE SERPL-SCNC: 102 MMOL/L (ref 98–107)
CO2 SERPL-SCNC: 28 MMOL/L (ref 22–29)
CREAT SERPL-MCNC: 0.69 MG/DL (ref 0.57–1)
DEPRECATED RDW RBC AUTO: 40.6 FL (ref 37–54)
EOSINOPHIL # BLD AUTO: 0.19 10*3/MM3 (ref 0–0.4)
EOSINOPHIL NFR BLD AUTO: 2.9 % (ref 0.3–6.2)
ERYTHROCYTE [DISTWIDTH] IN BLOOD BY AUTOMATED COUNT: 13.2 % (ref 12.3–15.4)
GFR SERPL CREATININE-BSD FRML MDRD: 93 ML/MIN/1.73
GLOBULIN UR ELPH-MCNC: 3.3 GM/DL
GLUCOSE SERPL-MCNC: 98 MG/DL (ref 65–99)
HCT VFR BLD AUTO: 35.8 % (ref 34–46.6)
HGB BLD-MCNC: 11.8 G/DL (ref 12–15.9)
IMM GRANULOCYTES # BLD AUTO: 0 10*3/MM3 (ref 0–0.05)
IMM GRANULOCYTES NFR BLD AUTO: 0 % (ref 0–0.5)
LYMPHOCYTES # BLD AUTO: 2.14 10*3/MM3 (ref 0.7–3.1)
LYMPHOCYTES NFR BLD AUTO: 32.7 % (ref 19.6–45.3)
MCH RBC QN AUTO: 28.1 PG (ref 26.6–33)
MCHC RBC AUTO-ENTMCNC: 33 G/DL (ref 31.5–35.7)
MCV RBC AUTO: 85.2 FL (ref 79–97)
MONOCYTES # BLD AUTO: 0.51 10*3/MM3 (ref 0.1–0.9)
MONOCYTES NFR BLD AUTO: 7.8 % (ref 5–12)
NEUTROPHILS NFR BLD AUTO: 3.65 10*3/MM3 (ref 1.7–7)
NEUTROPHILS NFR BLD AUTO: 55.8 % (ref 42.7–76)
NRBC BLD AUTO-RTO: 0 /100 WBC (ref 0–0.2)
PLATELET # BLD AUTO: 267 10*3/MM3 (ref 140–450)
PMV BLD AUTO: 8.7 FL (ref 6–12)
POTASSIUM SERPL-SCNC: 3.8 MMOL/L (ref 3.5–5.2)
PROT SERPL-MCNC: 7.6 G/DL (ref 6–8.5)
RBC # BLD AUTO: 4.2 10*6/MM3 (ref 3.77–5.28)
SODIUM SERPL-SCNC: 138 MMOL/L (ref 136–145)
WBC # BLD AUTO: 6.54 10*3/MM3 (ref 3.4–10.8)

## 2020-10-05 PROCEDURE — 80053 COMPREHEN METABOLIC PANEL: CPT | Performed by: INTERNAL MEDICINE

## 2020-10-05 PROCEDURE — G0463 HOSPITAL OUTPT CLINIC VISIT: HCPCS | Performed by: INTERNAL MEDICINE

## 2020-10-05 PROCEDURE — 99214 OFFICE O/P EST MOD 30 MIN: CPT | Performed by: INTERNAL MEDICINE

## 2020-10-05 PROCEDURE — 85025 COMPLETE CBC W/AUTO DIFF WBC: CPT | Performed by: INTERNAL MEDICINE

## 2020-10-05 NOTE — PROGRESS NOTES
Inna Sparrow was seen in follow up breast cancer.   She continue to remain on exemestane.  Tolerating treatment well without any side effects.  She continue struggle with chronic aches and pains which are not related to cancer diagnosis.  Continues to struggle with anxiety related to her breast cancer.  She is reporting new right upper extremity swelling which is likely lymphedema from her cancer surgery.  She does not have any port.  No prior history of thrombosis.  She was recommended to do exercises involving upper extremity and use compression stocking.  Reassurance given.    Past Medical History, Past Surgical History, Social History, Family History have been reviewed and are without significant changes except as mentioned.    Review of Systems       CONSTITUTIONAL: fatigue + No weight loss, fever, chills, weakness.  HEENT: Eyes: No visual loss, blurred vision, double vision or yellow sclerae. Ears, Nose, Throat: No hearing loss, sneezing, congestion, runny nose or sore throat.  SKIN: No rash or itching.  CARDIOVASCULAR: No chest pain, chest pressure or chest discomfort. No palpitations. Edema of RUE +   RESPIRATORY: No shortness of breath, cough or sputum.  GASTROINTESTINAL: No anorexia, nausea, vomiting or diarrhea. No abdominal pain or blood.  GENITOURINARY: Negative for urgency, frequency or dysuria.   NEUROLOGICAL: No headache, dizziness, syncope, paralysis, ataxia, numbness or tingling in the extremities. No change in bowel or bladder control.  MUSCULOSKELETAL: chronic joint pain + .  HEMATOLOGIC: No anemia, bleeding or bruising.  LYMPHATICS: No enlarged nodes. No history of splenectomy.  PSYCHIATRIC: anxiety + No history of depression.  ENDOCRINOLOGIC: No reports of sweating, cold or heat intolerance. No polyuria or polydipsia.  ALLERGIES: No history of asthma, hives, eczema or rhinitis.      Medications:  The current medication list was reviewed in the EMR    ALLERGIES:     Allergies   Allergen Reactions   • Penicillins Rash       Objective      There were no vitals filed for this visit.  Current Status 1/27/2020   ECOG score 0       Physical Exam      GENERAL: Alert, awake, oriented.  Well dressed.  Not in apparent distress. Vitals as above.   HEAD: Normocephalic, atraumatic.   EYES: PERRL, EOMI. vision is grossly intact.  NECK: Supple, no adenopathy or thyromegaly.   THROAT: Normal oral cavity and pharynx. No inflammation, swelling, exudate, or lesions.  CARDIAC: Normal S1 and S2. No S3, S4 or murmurs. Rhythm is regular.  Extremities are warm and well perfused.   LUNGS: Clear to auscultation and percussion without rales, rhonchi, wheezing or diminished breath sounds.  ABDOMEN: Positive bowel sounds. Soft, nondistended, nontender. No guarding or rebound. No masses.  BACK:  No bony tenderness.   EXTREMITIES: No significant deformity or joint abnormality.  Peripheral pulses intact. No varicosities.  SKIN: No rash or bruising.  NEUROLOGICAL: Grossly non-focal exam. No focal weakness. Gait: Normal.   PSYCH: Mood and affect normal. No hallucination or suicidal thoughts.   LYMPHATIC: No cervical, supraclavicular or axillary adenopathy.       RECENT LABS:Independently reviewed and summarized  Hematology WBC   Date Value Ref Range Status   05/26/2020 5.45 3.40 - 10.80 10*3/mm3 Final     RBC   Date Value Ref Range Status   05/26/2020 3.90 3.77 - 5.28 10*6/mm3 Final     Hemoglobin   Date Value Ref Range Status   05/26/2020 11.5 (L) 12.0 - 15.9 g/dL Final     Hematocrit   Date Value Ref Range Status   05/26/2020 34.1 34.0 - 46.6 % Final     Platelets   Date Value Ref Range Status   05/26/2020 242 140 - 450 10*3/mm3 Final              I have reviewed old records and summarized them in HPI as well as assessment and plan section of this note.     Moy Sparrow reports a pain score of 0.  Given her pain assessment as noted, treatment options were discussed and the following options were  decided upon as a follow-up plan to address the patient's pain: continuation of current treatment plan for pain.        Diagnosis:   (1) Invasive ductal carcinoma, right   Date of diagnosis: 9/5/18   Stage: IB ([id number]), recurrence score 36   Prior treatment:   Right mastectomy with sentinel node biopsy and ALND (9/5/18)  Chemotherapy: Start date (10/15/18)   Completed dose dense AC followed by dose dense taxol (2/11/19, last dose 1/28/19).   Adjuvant RT: 5040 cGy right chest wall and axilla      Current therapy:   exemestane (start 7/2019)     (2) Generalized anxiety disorder  (3) Chronic low back pain   (4) Right upper extremity lymphedema     Assessment/Plan     (1) Invasive ductal carcinoma, right   Continue exemestane.  Tolerating treatment well.  No new side effects from exemestane.  No clinical evidence of breast cancer recurrence.  She continues to struggle with anxiety related to her breast cancer and chronic aches and pains which not related to cancer or cancer treatment.  She is due for left breast mammogram in December.  I will arrange this today.    (2) Generalized anxiety disorder  Continue to struggle with anxiety related to her cancer diagnosis.  She remains on Pristiq and as needed Valium.    (3) Chronic low back pain   Likely musculoskeletal in nature.  PRN tramadol.    (4) Right upper extremity lymphedema   New issue.   Likely related to her breast cancer surgery.  Recommend right upper extremity exercises and compression stocking    10/5/2020      CC:

## 2020-11-20 RX ORDER — DESVENLAFAXINE SUCCINATE 50 MG/1
50 TABLET, EXTENDED RELEASE ORAL DAILY
Qty: 30 TABLET | Refills: 11 | Status: CANCELLED | OUTPATIENT
Start: 2020-11-20

## 2020-11-23 RX ORDER — DESVENLAFAXINE SUCCINATE 50 MG/1
50 TABLET, EXTENDED RELEASE ORAL DAILY
Qty: 30 TABLET | Refills: 11 | Status: SHIPPED | OUTPATIENT
Start: 2020-11-23 | End: 2020-12-10

## 2020-12-10 ENCOUNTER — OFFICE VISIT (OUTPATIENT)
Dept: FAMILY MEDICINE CLINIC | Facility: CLINIC | Age: 40
End: 2020-12-10

## 2020-12-10 VITALS
DIASTOLIC BLOOD PRESSURE: 80 MMHG | OXYGEN SATURATION: 100 % | SYSTOLIC BLOOD PRESSURE: 130 MMHG | WEIGHT: 169.9 LBS | BODY MASS INDEX: 32.08 KG/M2 | HEART RATE: 78 BPM | HEIGHT: 61 IN

## 2020-12-10 DIAGNOSIS — F41.9 ANXIETY: Primary | ICD-10-CM

## 2020-12-10 DIAGNOSIS — F32.89 OTHER DEPRESSION: ICD-10-CM

## 2020-12-10 PROCEDURE — 99213 OFFICE O/P EST LOW 20 MIN: CPT | Performed by: NURSE PRACTITIONER

## 2020-12-10 RX ORDER — ALPRAZOLAM 0.25 MG/1
0.25 TABLET ORAL 2 TIMES DAILY PRN
Qty: 20 TABLET | Refills: 0 | Status: SHIPPED | OUTPATIENT
Start: 2020-12-10 | End: 2023-01-11

## 2020-12-10 RX ORDER — DESVENLAFAXINE 100 MG/1
100 TABLET, EXTENDED RELEASE ORAL DAILY
Qty: 30 TABLET | Refills: 11 | Status: SHIPPED | OUTPATIENT
Start: 2020-12-10 | End: 2021-10-25 | Stop reason: SDUPTHER

## 2020-12-10 NOTE — PROGRESS NOTES
Chief Complaint   Patient presents with   • Annual Exam   • Med Refill     Subjective   Moy Sparrow is a 40 y.o. female.     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 Symptoms include decreased concentration, depressed mood, excessive worry, insomnia, irritability and panic. Patient reports no chest pain, compulsions, confusion, dizziness, dry mouth, feeling of choking, hyperventilation, impotence, malaise, muscle tension, nausea, nervous/anxious behavior, obsessions, palpitations, restlessness, shortness of breath or suicidal ideas. Symptoms occur most days. The severity of symptoms is moderate. The quality of sleep is good. Nighttime awakenings: none.     Her past medical history is significant for depression. Compliance with medications is %.   Depression  Visit Type: follow-up  Patient presents with the following symptoms: decreased concentration, depressed mood, excessive worry, insomnia, irritability and panic.  Patient is not experiencing: anhedonia, chest pain, choking sensation, compulsions, confusion, dry mouth, feelings of hopelessness, hyperventilation, impotence, malaise, muscle tension, nervousness/anxiety, obsessions, palpitations, psychomotor retardation, restlessness, shortness of breath, suicidal ideas, suicidal planning, thoughts of death and weight gain.  Nighttime awakenings: none  Compliance with medications:  %             The following portions of the patient's history were reviewed and updated as appropriate: allergies, current medications, past social history and problem list.    Review of Systems   Constitutional: Positive for irritability. Negative for activity change, appetite change, chills, diaphoresis, fatigue, fever, unexpected weight change and weight gain.   HENT: Negative.  Negative for congestion, dental problem,  "drooling, ear discharge, ear pain, facial swelling and hearing loss.    Eyes: Negative for photophobia, discharge, redness, itching and visual disturbance.   Respiratory: Negative.  Negative for apnea, cough, choking, chest tightness, shortness of breath, wheezing and stridor.    Cardiovascular: Negative for chest pain, palpitations and leg swelling.   Gastrointestinal: Negative.  Negative for abdominal distention, abdominal pain, anal bleeding, blood in stool, constipation, diarrhea, nausea and rectal pain.   Endocrine: Negative.  Negative for cold intolerance, heat intolerance, polydipsia, polyphagia and polyuria.   Genitourinary: Negative.  Negative for impotence.   Musculoskeletal: Positive for arthralgias. Negative for back pain, gait problem, joint swelling, myalgias, neck pain and neck stiffness.   Skin: Negative.  Negative for color change, pallor and rash.   Allergic/Immunologic: Negative.  Negative for environmental allergies, food allergies and immunocompromised state.   Neurological: Negative.  Negative for dizziness, tremors, seizures, syncope, facial asymmetry, speech difficulty, weakness, light-headedness, numbness and headaches.   Hematological: Negative.  Negative for adenopathy. Does not bruise/bleed easily.   Psychiatric/Behavioral: Positive for agitation, decreased concentration and sleep disturbance. Negative for behavioral problems, confusion, dysphoric mood, hallucinations, self-injury and suicidal ideas. The patient has insomnia. The patient is not nervous/anxious and is not hyperactive.         Increase in anxiety -not as much depression -situational due to work        Objective   /80   Pulse 78   Ht 154.9 cm (60.98\")   Wt 77.1 kg (169 lb 14.4 oz)   LMP 11/01/2019 (Approximate)   SpO2 100%   BMI 32.12 kg/m²   Physical Exam  Vitals signs and nursing note reviewed.   HENT:      Head: Normocephalic.      Right Ear: Tympanic membrane normal. There is no impacted cerumen.      Left " Ear: There is no impacted cerumen.      Nose: Nose normal.      Mouth/Throat:      Mouth: Mucous membranes are moist.      Pharynx: No oropharyngeal exudate or posterior oropharyngeal erythema.   Neck:      Musculoskeletal: Normal range of motion and neck supple.   Cardiovascular:      Rate and Rhythm: Normal rate.      Pulses: Normal pulses.      Heart sounds: No murmur. No friction rub. No gallop.    Pulmonary:      Effort: Pulmonary effort is normal. No respiratory distress.      Breath sounds: Normal breath sounds. No stridor. No wheezing, rhonchi or rales.   Chest:      Chest wall: No tenderness.   Abdominal:      General: Abdomen is flat. Bowel sounds are normal. There is no distension.      Palpations: There is no mass.      Tenderness: There is no abdominal tenderness.      Hernia: No hernia is present.   Musculoskeletal: Normal range of motion.   Skin:     General: Skin is warm.      Coloration: Skin is not jaundiced or pale.      Findings: No bruising, erythema, lesion or rash.   Neurological:      General: No focal deficit present.      Mental Status: She is alert.      Cranial Nerves: No cranial nerve deficit.      Sensory: No sensory deficit.      Motor: No weakness.      Coordination: Coordination normal.         Assessment/Plan   Problems Addressed this Visit        Other    Anxiety - Primary    Relevant Medications    ALPRAZolam (Xanax) 0.25 MG tablet    Other Relevant Orders    Urine Drug Screen - Urine, Clean Catch    TSH    Lipid Panel    Depression    Relevant Medications    desvenlafaxine (Pristiq) 100 MG 24 hr tablet    ALPRAZolam (Xanax) 0.25 MG tablet    Other Relevant Orders    Urine Drug Screen - Urine, Clean Catch    TSH    Lipid Panel      Diagnoses       Codes Comments    Anxiety    -  Primary ICD-10-CM: F41.9  ICD-9-CM: 300.00     Other depression     ICD-10-CM: F32.89  ICD-9-CM: 311            New Medications Ordered This Visit   Medications   • desvenlafaxine (Pristiq) 100 MG 24 hr  tablet     Sig: Take 1 tablet by mouth Daily.     Dispense:  30 tablet     Refill:  11   • ALPRAZolam (Xanax) 0.25 MG tablet     Sig: Take 1 tablet by mouth 2 (Two) Times a Day As Needed for Anxiety.     Dispense:  20 tablet     Refill:  0      increase pristiq to 100mg daily -add xanax prn for anxiety use very sparingly not daily as directed    Patient understands the risks associated with this controlled medication, including tolerance and addiction.  she also agrees to only obtain this medication from me, and not from a another provider, unless that provider is covering for me in my absence.  she also agrees to be compliant in dosing, and not self adjust the dose of medication.  A signed controlled substance agreement is on file, and she has received a controlled substance education sheet at this a previous visit.  she has also signed a consent for treatment with a controlled substance as per [facility] policy. WM was obtained.      Follow up if worsen patient agrees   Mammogram and labs utd, needs uds as directed-meds as directed, follow up if worsen -needs updated thyroid, cholesterol and uds

## 2020-12-18 ENCOUNTER — LAB (OUTPATIENT)
Dept: LAB | Facility: HOSPITAL | Age: 40
End: 2020-12-18

## 2020-12-18 LAB
AMPHET+METHAMPHET UR QL: NEGATIVE
AMPHETAMINES UR QL: NEGATIVE
BARBITURATES UR QL SCN: NEGATIVE
BENZODIAZ UR QL SCN: POSITIVE
BUPRENORPHINE SERPL-MCNC: NEGATIVE NG/ML
CANNABINOIDS SERPL QL: NEGATIVE
CHOLEST SERPL-MCNC: 196 MG/DL (ref 0–200)
COCAINE UR QL: NEGATIVE
HDLC SERPL-MCNC: 45 MG/DL (ref 40–60)
LDLC SERPL CALC-MCNC: 128 MG/DL (ref 0–100)
LDLC/HDLC SERPL: 2.79 {RATIO}
METHADONE UR QL SCN: NEGATIVE
OPIATES UR QL: NEGATIVE
OXYCODONE UR QL SCN: NEGATIVE
PCP UR QL SCN: NEGATIVE
PROPOXYPH UR QL: NEGATIVE
TRICYCLICS UR QL SCN: NEGATIVE
TRIGL SERPL-MCNC: 127 MG/DL (ref 0–150)
TSH SERPL DL<=0.05 MIU/L-ACNC: 2.25 UIU/ML (ref 0.27–4.2)
VLDLC SERPL-MCNC: 23 MG/DL (ref 5–40)

## 2020-12-18 PROCEDURE — 84443 ASSAY THYROID STIM HORMONE: CPT | Performed by: NURSE PRACTITIONER

## 2020-12-18 PROCEDURE — 80306 DRUG TEST PRSMV INSTRMNT: CPT | Performed by: NURSE PRACTITIONER

## 2020-12-18 PROCEDURE — 80061 LIPID PANEL: CPT | Performed by: NURSE PRACTITIONER

## 2020-12-18 PROCEDURE — 36415 COLL VENOUS BLD VENIPUNCTURE: CPT | Performed by: NURSE PRACTITIONER

## 2020-12-21 RX ORDER — FAMOTIDINE 20 MG/1
40 TABLET, FILM COATED ORAL DAILY
Qty: 60 TABLET | Refills: 6 | Status: SHIPPED | OUTPATIENT
Start: 2020-12-21 | End: 2021-07-30 | Stop reason: SDUPTHER

## 2020-12-28 DIAGNOSIS — C50.411 MALIGNANT NEOPLASM OF UPPER-OUTER QUADRANT OF RIGHT BREAST IN FEMALE, ESTROGEN RECEPTOR POSITIVE (HCC): ICD-10-CM

## 2020-12-28 DIAGNOSIS — Z17.0 MALIGNANT NEOPLASM OF UPPER-OUTER QUADRANT OF RIGHT BREAST IN FEMALE, ESTROGEN RECEPTOR POSITIVE (HCC): ICD-10-CM

## 2020-12-30 ENCOUNTER — TELEPHONE (OUTPATIENT)
Dept: ONCOLOGY | Facility: HOSPITAL | Age: 40
End: 2020-12-30

## 2020-12-30 NOTE — TELEPHONE ENCOUNTER
----- Message from Hailey Emery MD sent at 12/30/2020 12:41 PM CST -----  Let patient know that her mammogram did not show any evidence of cancer.  Continue follow-up.

## 2021-01-04 ENCOUNTER — OFFICE VISIT (OUTPATIENT)
Dept: ONCOLOGY | Facility: CLINIC | Age: 41
End: 2021-01-04

## 2021-01-04 ENCOUNTER — LAB (OUTPATIENT)
Dept: ONCOLOGY | Facility: HOSPITAL | Age: 41
End: 2021-01-04

## 2021-01-04 VITALS
WEIGHT: 169 LBS | HEIGHT: 61 IN | TEMPERATURE: 97.2 F | HEART RATE: 88 BPM | DIASTOLIC BLOOD PRESSURE: 74 MMHG | RESPIRATION RATE: 18 BRPM | SYSTOLIC BLOOD PRESSURE: 120 MMHG | BODY MASS INDEX: 31.91 KG/M2

## 2021-01-04 DIAGNOSIS — Z17.0 MALIGNANT NEOPLASM OF UPPER-OUTER QUADRANT OF RIGHT BREAST IN FEMALE, ESTROGEN RECEPTOR POSITIVE (HCC): ICD-10-CM

## 2021-01-04 DIAGNOSIS — M54.50 CHRONIC LEFT-SIDED LOW BACK PAIN WITHOUT SCIATICA: Chronic | ICD-10-CM

## 2021-01-04 DIAGNOSIS — F32.89 OTHER DEPRESSION: Chronic | ICD-10-CM

## 2021-01-04 DIAGNOSIS — C50.411 MALIGNANT NEOPLASM OF UPPER-OUTER QUADRANT OF RIGHT BREAST IN FEMALE, ESTROGEN RECEPTOR POSITIVE (HCC): Primary | Chronic | ICD-10-CM

## 2021-01-04 DIAGNOSIS — G89.29 CHRONIC LEFT-SIDED LOW BACK PAIN WITHOUT SCIATICA: Chronic | ICD-10-CM

## 2021-01-04 DIAGNOSIS — C50.411 MALIGNANT NEOPLASM OF UPPER-OUTER QUADRANT OF RIGHT BREAST IN FEMALE, ESTROGEN RECEPTOR POSITIVE (HCC): ICD-10-CM

## 2021-01-04 DIAGNOSIS — Z17.0 MALIGNANT NEOPLASM OF UPPER-OUTER QUADRANT OF RIGHT BREAST IN FEMALE, ESTROGEN RECEPTOR POSITIVE (HCC): Primary | Chronic | ICD-10-CM

## 2021-01-04 DIAGNOSIS — F41.9 ANXIETY: Chronic | ICD-10-CM

## 2021-01-04 DIAGNOSIS — I89.0 LYMPHEDEMA OF EXTREMITY: ICD-10-CM

## 2021-01-04 LAB
ALBUMIN SERPL-MCNC: 4.3 G/DL (ref 3.5–5.2)
ALBUMIN/GLOB SERPL: 1.2 G/DL
ALP SERPL-CCNC: 141 U/L (ref 39–117)
ALT SERPL W P-5'-P-CCNC: 17 U/L (ref 1–33)
ANION GAP SERPL CALCULATED.3IONS-SCNC: 10 MMOL/L (ref 5–15)
AST SERPL-CCNC: 23 U/L (ref 1–32)
BASOPHILS # BLD AUTO: 0.06 10*3/MM3 (ref 0–0.2)
BASOPHILS NFR BLD AUTO: 0.9 % (ref 0–1.5)
BILIRUB SERPL-MCNC: 0.2 MG/DL (ref 0–1.2)
BUN SERPL-MCNC: 19 MG/DL (ref 6–20)
BUN/CREAT SERPL: 22.6 (ref 7–25)
CALCIUM SPEC-SCNC: 9.5 MG/DL (ref 8.6–10.5)
CHLORIDE SERPL-SCNC: 102 MMOL/L (ref 98–107)
CO2 SERPL-SCNC: 28 MMOL/L (ref 22–29)
CREAT SERPL-MCNC: 0.84 MG/DL (ref 0.57–1)
DEPRECATED RDW RBC AUTO: 40 FL (ref 37–54)
EOSINOPHIL # BLD AUTO: 0.18 10*3/MM3 (ref 0–0.4)
EOSINOPHIL NFR BLD AUTO: 2.6 % (ref 0.3–6.2)
ERYTHROCYTE [DISTWIDTH] IN BLOOD BY AUTOMATED COUNT: 12.7 % (ref 12.3–15.4)
GFR SERPL CREATININE-BSD FRML MDRD: 74 ML/MIN/1.73
GLOBULIN UR ELPH-MCNC: 3.6 GM/DL
GLUCOSE SERPL-MCNC: 82 MG/DL (ref 65–99)
HCT VFR BLD AUTO: 39.3 % (ref 34–46.6)
HGB BLD-MCNC: 13 G/DL (ref 12–15.9)
IMM GRANULOCYTES # BLD AUTO: 0.02 10*3/MM3 (ref 0–0.05)
IMM GRANULOCYTES NFR BLD AUTO: 0.3 % (ref 0–0.5)
LYMPHOCYTES # BLD AUTO: 1.99 10*3/MM3 (ref 0.7–3.1)
LYMPHOCYTES NFR BLD AUTO: 29.1 % (ref 19.6–45.3)
MCH RBC QN AUTO: 28.9 PG (ref 26.6–33)
MCHC RBC AUTO-ENTMCNC: 33.1 G/DL (ref 31.5–35.7)
MCV RBC AUTO: 87.3 FL (ref 79–97)
MONOCYTES # BLD AUTO: 0.56 10*3/MM3 (ref 0.1–0.9)
MONOCYTES NFR BLD AUTO: 8.2 % (ref 5–12)
NEUTROPHILS NFR BLD AUTO: 4.03 10*3/MM3 (ref 1.7–7)
NEUTROPHILS NFR BLD AUTO: 58.9 % (ref 42.7–76)
NRBC BLD AUTO-RTO: 0 /100 WBC (ref 0–0.2)
PLATELET # BLD AUTO: 286 10*3/MM3 (ref 140–450)
PMV BLD AUTO: 8.4 FL (ref 6–12)
POTASSIUM SERPL-SCNC: 4 MMOL/L (ref 3.5–5.2)
PROT SERPL-MCNC: 7.9 G/DL (ref 6–8.5)
RBC # BLD AUTO: 4.5 10*6/MM3 (ref 3.77–5.28)
SODIUM SERPL-SCNC: 140 MMOL/L (ref 136–145)
WBC # BLD AUTO: 6.84 10*3/MM3 (ref 3.4–10.8)

## 2021-01-04 PROCEDURE — G0463 HOSPITAL OUTPT CLINIC VISIT: HCPCS | Performed by: INTERNAL MEDICINE

## 2021-01-04 PROCEDURE — 85025 COMPLETE CBC W/AUTO DIFF WBC: CPT | Performed by: INTERNAL MEDICINE

## 2021-01-04 PROCEDURE — 80053 COMPREHEN METABOLIC PANEL: CPT | Performed by: INTERNAL MEDICINE

## 2021-01-04 PROCEDURE — 99213 OFFICE O/P EST LOW 20 MIN: CPT | Performed by: INTERNAL MEDICINE

## 2021-01-04 NOTE — PROGRESS NOTES
Chief Complaint  Breast cancer     Subjective    History of Present Illness      Moy Sparrow presents to [facility] for   History of Present Illness       She is doing well since her last visit.  No new health issues.  No new hospitalizations.  Her anxiety medication was changed from Valium to Xanax.  She is taking this only as needed.  She is tolerating exemestane well without any side effects.  Continue to report chronic back pain which is remained stable.  Continue pain anxiety related to her cancer recurrence risk especially.  Reassurance given.    Oncology History:   Invasive ductal carcinoma, right   Date of diagnosis: 9/5/18   Stage: IB ([id number]), recurrence score 36   Prior treatment:   Right mastectomy with sentinel node biopsy and ALND (9/5/18)  Chemotherapy: Start date (10/15/18)   Completed dose dense AC followed by dose dense taxol (2/11/19, last dose 1/28/19).   Adjuvant RT: 5040 cGy right chest wall and axilla   Surgical oophorectomy      Current therapy:   exemestane (start 7/2019)     Objective     Vitals:    01/04/21 0751   BP: 120/74   Pulse: 88   Resp: 18   Temp: 97.2 °F (36.2 °C)            Physical Exam  Vitals signs and nursing note reviewed.   Constitutional:       General: She is not in acute distress.     Appearance: Normal appearance.   HENT:      Mouth/Throat:      Mouth: Mucous membranes are moist.      Pharynx: No oropharyngeal exudate.   Neck:      Musculoskeletal: Normal range of motion. No neck rigidity or muscular tenderness.   Lymphadenopathy:      Cervical: No cervical adenopathy.   Skin:     General: Skin is dry.      Coloration: Skin is not jaundiced or pale.   Neurological:      Mental Status: She is alert.   Psychiatric:         Mood and Affect: Mood normal.         Behavior: Behavior normal.         Thought Content: Thought content normal.              Result Review :   The following data was reviewed by: Hailey ALEXANDER  MD Yuly on 01/04/2021:  CMP    CMP 3/11/20 5/26/20 10/5/20   BUN 11 17 17   Creatinine 0.71 0.64 0.69   eGFR Non African Am 91 102 93   Sodium 139 139 138   Potassium 3.5 4.1 3.8   Chloride 103 105 102   Calcium 8.7 8.8 9.3   Albumin  4.20 4.30   Total Bilirubin  0.2 <0.2   Alkaline Phosphatase  121 (A) 113   AST (SGOT)  20 16   ALT (SGPT)  17 13   (A) Abnormal value            CBC    CBC 5/26/20 10/5/20 1/4/21   WBC 5.45 6.54 6.84   RBC 3.90 4.20 4.50   Hemoglobin 11.5 (A) 11.8 (A) 13.0   Hematocrit 34.1 35.8 39.3   MCV 87.4 85.2 87.3   MCH 29.5 28.1 28.9   MCHC 33.7 33.0 33.1   RDW 12.3 13.2 12.7   Platelets 242 267 286   (A) Abnormal value            Data reviewed: Radiologic studies Mammogram from December 29, 2020 reviewed on 1/4/21.   No mammographic evidence of malignancy.  Unchanged compared to prior exam.      Assessment and Plan    Problem List Items Addressed This Visit        Other    Anxiety  Stable on Xanax.  She will continue this.    Depression  Stable.  Continue Pristiq.    Malignant neoplasm of upper-outer quadrant of right breast in female, estrogen receptor positive (CMS/HCC) - Primary    Continue exemestane.  She is tolerating this well.      Chronic left-sided low back pain without sciatica  Stable.  Likely musculoskeletal.  Continue to monitor      Lymphedema of extremity: Stable continue upper extremity exercises        I spent 20 minutes caring for Moy on this date of service. This time includes time spent by me in the following activities:preparing for the visit, reviewing tests, counseling and educating the patient/family/caregiver, ordering medications, tests, or procedures, independently interpreting results and communicating that information with the patient/family/caregiver and care coordination  Follow Up   Patient was given instructions and counseling regarding her condition or for health maintenance advice. Please see specific information pulled into the AVS if  appropriate.

## 2021-01-05 ENCOUNTER — TELEPHONE (OUTPATIENT)
Dept: ONCOLOGY | Facility: HOSPITAL | Age: 41
End: 2021-01-05

## 2021-02-02 ENCOUNTER — APPOINTMENT (OUTPATIENT)
Dept: ONCOLOGY | Facility: HOSPITAL | Age: 41
End: 2021-02-02

## 2021-02-11 ENCOUNTER — APPOINTMENT (OUTPATIENT)
Dept: ONCOLOGY | Facility: HOSPITAL | Age: 41
End: 2021-02-11

## 2021-02-18 ENCOUNTER — APPOINTMENT (OUTPATIENT)
Dept: ONCOLOGY | Facility: HOSPITAL | Age: 41
End: 2021-02-18

## 2021-02-22 ENCOUNTER — APPOINTMENT (OUTPATIENT)
Dept: ONCOLOGY | Facility: HOSPITAL | Age: 41
End: 2021-02-22

## 2021-02-23 ENCOUNTER — LAB (OUTPATIENT)
Dept: ONCOLOGY | Facility: HOSPITAL | Age: 41
End: 2021-02-23

## 2021-02-23 DIAGNOSIS — Z17.0 MALIGNANT NEOPLASM OF UPPER-OUTER QUADRANT OF RIGHT BREAST IN FEMALE, ESTROGEN RECEPTOR POSITIVE (HCC): ICD-10-CM

## 2021-02-23 DIAGNOSIS — C50.411 MALIGNANT NEOPLASM OF UPPER-OUTER QUADRANT OF RIGHT BREAST IN FEMALE, ESTROGEN RECEPTOR POSITIVE (HCC): ICD-10-CM

## 2021-02-23 LAB
ALP SERPL-CCNC: 136 U/L (ref 39–117)
HOLD SPECIMEN: NORMAL

## 2021-02-23 PROCEDURE — 36415 COLL VENOUS BLD VENIPUNCTURE: CPT

## 2021-02-23 PROCEDURE — 84075 ASSAY ALKALINE PHOSPHATASE: CPT

## 2021-02-24 ENCOUNTER — TELEPHONE (OUTPATIENT)
Dept: ONCOLOGY | Facility: HOSPITAL | Age: 41
End: 2021-02-24

## 2021-03-11 ENCOUNTER — TELEPHONE (OUTPATIENT)
Dept: FAMILY MEDICINE CLINIC | Facility: CLINIC | Age: 41
End: 2021-03-11

## 2021-03-12 DIAGNOSIS — Z12.11 ENCOUNTER FOR SCREENING COLONOSCOPY: Primary | ICD-10-CM

## 2021-03-30 ENCOUNTER — TRANSCRIBE ORDERS (OUTPATIENT)
Dept: LAB | Facility: HOSPITAL | Age: 41
End: 2021-03-30

## 2021-03-30 ENCOUNTER — LAB (OUTPATIENT)
Dept: LAB | Facility: HOSPITAL | Age: 41
End: 2021-03-30

## 2021-03-30 DIAGNOSIS — Z79.899 ENCOUNTER FOR LONG-TERM (CURRENT) USE OF OTHER MEDICATIONS: Primary | ICD-10-CM

## 2021-03-30 LAB
ALBUMIN SERPL-MCNC: 4.3 G/DL (ref 3.5–5.2)
ALBUMIN/GLOB SERPL: 1.4 G/DL
ALP SERPL-CCNC: 129 U/L (ref 39–117)
ALT SERPL W P-5'-P-CCNC: 22 U/L (ref 1–33)
ANION GAP SERPL CALCULATED.3IONS-SCNC: 11.8 MMOL/L (ref 5–15)
AST SERPL-CCNC: 20 U/L (ref 1–32)
BASOPHILS # BLD AUTO: 0.05 10*3/MM3 (ref 0–0.2)
BASOPHILS NFR BLD AUTO: 0.6 % (ref 0–1.5)
BILIRUB CONJ SERPL-MCNC: <0.2 MG/DL (ref 0–0.3)
BILIRUB SERPL-MCNC: 0.2 MG/DL (ref 0–1.2)
BUN SERPL-MCNC: 22 MG/DL (ref 6–20)
BUN/CREAT SERPL: 29.7 (ref 7–25)
CALCIUM SPEC-SCNC: 9.2 MG/DL (ref 8.6–10.5)
CHLORIDE SERPL-SCNC: 102 MMOL/L (ref 98–107)
CHOLEST SERPL-MCNC: 246 MG/DL (ref 0–200)
CO2 SERPL-SCNC: 25.2 MMOL/L (ref 22–29)
CREAT SERPL-MCNC: 0.74 MG/DL (ref 0.57–1)
DEPRECATED RDW RBC AUTO: 42.4 FL (ref 37–54)
EOSINOPHIL # BLD AUTO: 0.14 10*3/MM3 (ref 0–0.4)
EOSINOPHIL NFR BLD AUTO: 1.6 % (ref 0.3–6.2)
ERYTHROCYTE [DISTWIDTH] IN BLOOD BY AUTOMATED COUNT: 13.7 % (ref 12.3–15.4)
GFR SERPL CREATININE-BSD FRML MDRD: 86 ML/MIN/1.73
GLOBULIN UR ELPH-MCNC: 3.1 GM/DL
GLUCOSE SERPL-MCNC: 92 MG/DL (ref 65–99)
HCG INTACT+B SERPL-ACNC: 1.04 MIU/ML
HCT VFR BLD AUTO: 35.3 % (ref 34–46.6)
HDLC SERPL-MCNC: 40 MG/DL (ref 40–60)
HGB BLD-MCNC: 12 G/DL (ref 12–15.9)
IMM GRANULOCYTES # BLD AUTO: 0.03 10*3/MM3 (ref 0–0.05)
IMM GRANULOCYTES NFR BLD AUTO: 0.3 % (ref 0–0.5)
LDLC SERPL CALC-MCNC: 170 MG/DL (ref 0–100)
LDLC/HDLC SERPL: 4.18 {RATIO}
LYMPHOCYTES # BLD AUTO: 1.77 10*3/MM3 (ref 0.7–3.1)
LYMPHOCYTES NFR BLD AUTO: 20 % (ref 19.6–45.3)
MCH RBC QN AUTO: 29.3 PG (ref 26.6–33)
MCHC RBC AUTO-ENTMCNC: 34 G/DL (ref 31.5–35.7)
MCV RBC AUTO: 86.3 FL (ref 79–97)
MONOCYTES # BLD AUTO: 0.56 10*3/MM3 (ref 0.1–0.9)
MONOCYTES NFR BLD AUTO: 6.3 % (ref 5–12)
NEUTROPHILS NFR BLD AUTO: 6.32 10*3/MM3 (ref 1.7–7)
NEUTROPHILS NFR BLD AUTO: 71.2 % (ref 42.7–76)
NRBC BLD AUTO-RTO: 0 /100 WBC (ref 0–0.2)
PLATELET # BLD AUTO: 355 10*3/MM3 (ref 140–450)
PMV BLD AUTO: 9 FL (ref 6–12)
POTASSIUM SERPL-SCNC: 4 MMOL/L (ref 3.5–5.2)
PROT SERPL-MCNC: 7.4 G/DL (ref 6–8.5)
RBC # BLD AUTO: 4.09 10*6/MM3 (ref 3.77–5.28)
SODIUM SERPL-SCNC: 139 MMOL/L (ref 136–145)
TRIGL SERPL-MCNC: 195 MG/DL (ref 0–150)
VLDLC SERPL-MCNC: 36 MG/DL (ref 5–40)
WBC # BLD AUTO: 8.87 10*3/MM3 (ref 3.4–10.8)

## 2021-03-30 PROCEDURE — 80061 LIPID PANEL: CPT | Performed by: NURSE PRACTITIONER

## 2021-03-30 PROCEDURE — 84702 CHORIONIC GONADOTROPIN TEST: CPT | Performed by: NURSE PRACTITIONER

## 2021-03-30 PROCEDURE — 85025 COMPLETE CBC W/AUTO DIFF WBC: CPT | Performed by: NURSE PRACTITIONER

## 2021-03-30 PROCEDURE — 80053 COMPREHEN METABOLIC PANEL: CPT | Performed by: NURSE PRACTITIONER

## 2021-03-30 PROCEDURE — 82248 BILIRUBIN DIRECT: CPT | Performed by: NURSE PRACTITIONER

## 2021-03-30 PROCEDURE — 36415 COLL VENOUS BLD VENIPUNCTURE: CPT | Performed by: NURSE PRACTITIONER

## 2021-05-02 ENCOUNTER — LAB (OUTPATIENT)
Dept: LAB | Facility: HOSPITAL | Age: 41
End: 2021-05-02

## 2021-05-02 DIAGNOSIS — Z01.818 PREOP TESTING: Primary | ICD-10-CM

## 2021-05-02 LAB — SARS-COV-2 N GENE RESP QL NAA+PROBE: NOT DETECTED

## 2021-05-02 PROCEDURE — C9803 HOPD COVID-19 SPEC COLLECT: HCPCS

## 2021-05-02 PROCEDURE — 87635 SARS-COV-2 COVID-19 AMP PRB: CPT

## 2021-05-04 RX ORDER — ISOTRETINOIN 20 MG/1
20 CAPSULE ORAL DAILY
COMMUNITY
End: 2023-01-11

## 2021-05-05 ENCOUNTER — ANESTHESIA (OUTPATIENT)
Dept: GASTROENTEROLOGY | Facility: HOSPITAL | Age: 41
End: 2021-05-05

## 2021-05-05 ENCOUNTER — ANESTHESIA EVENT (OUTPATIENT)
Dept: GASTROENTEROLOGY | Facility: HOSPITAL | Age: 41
End: 2021-05-05

## 2021-05-05 ENCOUNTER — HOSPITAL ENCOUNTER (OUTPATIENT)
Facility: HOSPITAL | Age: 41
Setting detail: HOSPITAL OUTPATIENT SURGERY
Discharge: HOME | End: 2021-05-05
Attending: INTERNAL MEDICINE | Admitting: INTERNAL MEDICINE

## 2021-05-05 VITALS
TEMPERATURE: 97.4 F | BODY MASS INDEX: 31.91 KG/M2 | HEIGHT: 61 IN | SYSTOLIC BLOOD PRESSURE: 104 MMHG | DIASTOLIC BLOOD PRESSURE: 76 MMHG | WEIGHT: 169 LBS | OXYGEN SATURATION: 98 % | RESPIRATION RATE: 18 BRPM | HEART RATE: 88 BPM

## 2021-05-05 DIAGNOSIS — Z86.010 HISTORY OF COLON POLYPS: ICD-10-CM

## 2021-05-05 PROCEDURE — 25010000002 PROPOFOL 10 MG/ML EMULSION: Performed by: NURSE ANESTHETIST, CERTIFIED REGISTERED

## 2021-05-05 RX ORDER — LIDOCAINE HYDROCHLORIDE 20 MG/ML
INJECTION, SOLUTION INTRAVENOUS AS NEEDED
Status: DISCONTINUED | OUTPATIENT
Start: 2021-05-05 | End: 2021-05-05 | Stop reason: SURG

## 2021-05-05 RX ORDER — DEXTROSE AND SODIUM CHLORIDE 5; .45 G/100ML; G/100ML
30 INJECTION, SOLUTION INTRAVENOUS CONTINUOUS PRN
Status: DISCONTINUED | OUTPATIENT
Start: 2021-05-05 | End: 2021-05-05 | Stop reason: HOSPADM

## 2021-05-05 RX ORDER — PROPOFOL 10 MG/ML
VIAL (ML) INTRAVENOUS AS NEEDED
Status: DISCONTINUED | OUTPATIENT
Start: 2021-05-05 | End: 2021-05-05 | Stop reason: SURG

## 2021-05-05 RX ADMIN — PROPOFOL 100 MG: 10 INJECTION, EMULSION INTRAVENOUS at 14:27

## 2021-05-05 RX ADMIN — PROPOFOL 30 MG: 10 INJECTION, EMULSION INTRAVENOUS at 14:34

## 2021-05-05 RX ADMIN — PROPOFOL 50 MG: 10 INJECTION, EMULSION INTRAVENOUS at 14:30

## 2021-05-05 RX ADMIN — LIDOCAINE HYDROCHLORIDE 100 MG: 20 INJECTION, SOLUTION INTRAVENOUS at 14:27

## 2021-05-05 RX ADMIN — PROPOFOL 50 MG: 10 INJECTION, EMULSION INTRAVENOUS at 14:32

## 2021-05-05 RX ADMIN — DEXTROSE AND SODIUM CHLORIDE 30 ML/HR: 5; 450 INJECTION, SOLUTION INTRAVENOUS at 14:03

## 2021-05-05 NOTE — ANESTHESIA PREPROCEDURE EVALUATION
Anesthesia Evaluation     Patient summary reviewed and Nursing notes reviewed   NPO Solid Status: > 8 hours  NPO Liquid Status: > 8 hours           Airway   Mallampati: II  TM distance: >3 FB  Neck ROM: full  No difficulty expected  Dental - normal exam     Pulmonary - negative pulmonary ROS and normal exam   Cardiovascular - negative cardio ROS and normal exam        Neuro/Psych  (+) dizziness/light headedness, psychiatric history Anxiety and Depression,     GI/Hepatic/Renal/Endo    (+) obesity,  GERD,    (-) morbid obesity    Musculoskeletal     (+) neck pain,   Abdominal  - normal exam   Substance History - negative use     OB/GYN negative ob/gyn ROS         Other      history of cancer      Other Comment: Breast cancer, takes oral chemo                Anesthesia Plan    ASA 3     MAC     intravenous induction     Anesthetic plan, all risks, benefits, and alternatives have been provided, discussed and informed consent has been obtained with: patient.

## 2021-05-05 NOTE — ANESTHESIA POSTPROCEDURE EVALUATION
Patient: Moy Sparrow    Procedure Summary     Date: 05/05/21 Room / Location: [facility] ENDOSCOPY 2 / [facility] ENDOSCOPY    Anesthesia Start: 1411 Anesthesia Stop: 1441    Procedure: COLONOSCOPY (N/A ) Diagnosis:       History of colon polyps      (History of colon polyps [Z86.010])    Surgeons: Shin Vanessa DO Provider: Tatiana Gil CRNA    Anesthesia Type: MAC ASA Status: 3          Anesthesia Type: MAC    Vitals  No vitals data found for the desired time range.          Post Anesthesia Care and Evaluation    Patient location during evaluation: bedside  Patient participation: waiting for patient participation  Level of consciousness: sleepy but conscious  Pain score: 0  Pain management: adequate  Airway patency: patent  Anesthetic complications: No anesthetic complications  PONV Status: none  Cardiovascular status: acceptable  Respiratory status: acceptable  Hydration status: acceptable

## 2021-05-10 ENCOUNTER — LAB (OUTPATIENT)
Dept: ONCOLOGY | Facility: HOSPITAL | Age: 41
End: 2021-05-10

## 2021-05-10 ENCOUNTER — OFFICE VISIT (OUTPATIENT)
Dept: ONCOLOGY | Facility: CLINIC | Age: 41
End: 2021-05-10

## 2021-05-10 VITALS
HEART RATE: 100 BPM | RESPIRATION RATE: 18 BRPM | TEMPERATURE: 97.9 F | WEIGHT: 172.5 LBS | SYSTOLIC BLOOD PRESSURE: 124 MMHG | HEIGHT: 61 IN | DIASTOLIC BLOOD PRESSURE: 81 MMHG | BODY MASS INDEX: 32.57 KG/M2

## 2021-05-10 DIAGNOSIS — Z17.0 MALIGNANT NEOPLASM OF UPPER-OUTER QUADRANT OF RIGHT BREAST IN FEMALE, ESTROGEN RECEPTOR POSITIVE (HCC): Primary | ICD-10-CM

## 2021-05-10 DIAGNOSIS — C50.411 MALIGNANT NEOPLASM OF UPPER-OUTER QUADRANT OF RIGHT BREAST IN FEMALE, ESTROGEN RECEPTOR POSITIVE (HCC): Primary | ICD-10-CM

## 2021-05-10 DIAGNOSIS — Z17.0 MALIGNANT NEOPLASM OF UPPER-OUTER QUADRANT OF RIGHT BREAST IN FEMALE, ESTROGEN RECEPTOR POSITIVE (HCC): ICD-10-CM

## 2021-05-10 DIAGNOSIS — C50.411 MALIGNANT NEOPLASM OF UPPER-OUTER QUADRANT OF RIGHT BREAST IN FEMALE, ESTROGEN RECEPTOR POSITIVE (HCC): ICD-10-CM

## 2021-05-10 LAB
ALBUMIN SERPL-MCNC: 4.3 G/DL (ref 3.5–5.2)
ALBUMIN/GLOB SERPL: 1.3 G/DL
ALP SERPL-CCNC: 136 U/L (ref 39–117)
ALT SERPL W P-5'-P-CCNC: 18 U/L (ref 1–33)
ANION GAP SERPL CALCULATED.3IONS-SCNC: 8 MMOL/L (ref 5–15)
AST SERPL-CCNC: 21 U/L (ref 1–32)
BASOPHILS # BLD AUTO: 0.08 10*3/MM3 (ref 0–0.2)
BASOPHILS NFR BLD AUTO: 1.2 % (ref 0–1.5)
BILIRUB SERPL-MCNC: 0.2 MG/DL (ref 0–1.2)
BUN SERPL-MCNC: 17 MG/DL (ref 6–20)
BUN/CREAT SERPL: 22.7 (ref 7–25)
CALCIUM SPEC-SCNC: 9.8 MG/DL (ref 8.6–10.5)
CHLORIDE SERPL-SCNC: 102 MMOL/L (ref 98–107)
CO2 SERPL-SCNC: 29 MMOL/L (ref 22–29)
CREAT SERPL-MCNC: 0.75 MG/DL (ref 0.57–1)
DEPRECATED RDW RBC AUTO: 39.5 FL (ref 37–54)
EOSINOPHIL # BLD AUTO: 0.18 10*3/MM3 (ref 0–0.4)
EOSINOPHIL NFR BLD AUTO: 2.7 % (ref 0.3–6.2)
ERYTHROCYTE [DISTWIDTH] IN BLOOD BY AUTOMATED COUNT: 12.7 % (ref 12.3–15.4)
GFR SERPL CREATININE-BSD FRML MDRD: 84 ML/MIN/1.73
GLOBULIN UR ELPH-MCNC: 3.4 GM/DL
GLUCOSE SERPL-MCNC: 98 MG/DL (ref 65–99)
HCT VFR BLD AUTO: 36.8 % (ref 34–46.6)
HGB BLD-MCNC: 12.4 G/DL (ref 12–15.9)
HOLD SPECIMEN: NORMAL
IMM GRANULOCYTES # BLD AUTO: 0.02 10*3/MM3 (ref 0–0.05)
IMM GRANULOCYTES NFR BLD AUTO: 0.3 % (ref 0–0.5)
LAB AP CASE REPORT: NORMAL
LYMPHOCYTES # BLD AUTO: 2.29 10*3/MM3 (ref 0.7–3.1)
LYMPHOCYTES NFR BLD AUTO: 33.8 % (ref 19.6–45.3)
MCH RBC QN AUTO: 28.8 PG (ref 26.6–33)
MCHC RBC AUTO-ENTMCNC: 33.7 G/DL (ref 31.5–35.7)
MCV RBC AUTO: 85.6 FL (ref 79–97)
MONOCYTES # BLD AUTO: 0.57 10*3/MM3 (ref 0.1–0.9)
MONOCYTES NFR BLD AUTO: 8.4 % (ref 5–12)
NEUTROPHILS NFR BLD AUTO: 3.63 10*3/MM3 (ref 1.7–7)
NEUTROPHILS NFR BLD AUTO: 53.6 % (ref 42.7–76)
NRBC BLD AUTO-RTO: 0 /100 WBC (ref 0–0.2)
PATH REPORT.FINAL DX SPEC: NORMAL
PLATELET # BLD AUTO: 285 10*3/MM3 (ref 140–450)
PMV BLD AUTO: 8.9 FL (ref 6–12)
POTASSIUM SERPL-SCNC: 4.1 MMOL/L (ref 3.5–5.2)
PROT SERPL-MCNC: 7.7 G/DL (ref 6–8.5)
RBC # BLD AUTO: 4.3 10*6/MM3 (ref 3.77–5.28)
SODIUM SERPL-SCNC: 139 MMOL/L (ref 136–145)
WBC # BLD AUTO: 6.77 10*3/MM3 (ref 3.4–10.8)

## 2021-05-10 PROCEDURE — 99212 OFFICE O/P EST SF 10 MIN: CPT | Performed by: NURSE PRACTITIONER

## 2021-05-10 PROCEDURE — 80053 COMPREHEN METABOLIC PANEL: CPT

## 2021-05-10 PROCEDURE — G0463 HOSPITAL OUTPT CLINIC VISIT: HCPCS | Performed by: NURSE PRACTITIONER

## 2021-05-10 PROCEDURE — 85025 COMPLETE CBC W/AUTO DIFF WBC: CPT

## 2021-05-10 RX ORDER — EXEMESTANE 25 MG/1
25 TABLET ORAL DAILY
Qty: 90 TABLET | Refills: 2 | Status: SHIPPED | OUTPATIENT
Start: 2021-05-10 | End: 2022-01-25 | Stop reason: SDUPTHER

## 2021-05-10 NOTE — PROGRESS NOTES
"DATE OF VISIT: 5/10/2021      REASON FOR VISIT:  Follow-up for breast cancer; currently on Exemestane      Subjective:    She is doing well since her last visit.  No new health issues.  No new hospitalizations.  She is tolerating Exemestane well, she needs new prescription sent to pharmacy today.     Oncology History:   Invasive ductal carcinoma, right   Date of diagnosis: 9/5/18   Stage: IB ([id number]), recurrence score 36   Prior treatment:   Right mastectomy with sentinel node biopsy and ALND (9/5/18)  Chemotherapy: Start date (10/15/18)   Completed dose dense AC followed by dose dense taxol (2/11/19, last dose 1/28/19).   Adjuvant RT: 5040 cGy right chest wall and axilla   Surgical oophorectomy       Current therapy:   exemestane (start 7/2019)     Past Medical History, Past Surgical History, Social History, Family History have been reviewed and are without significant changes except as mentioned.    Review of Systems   A comprehensive 14 point review of systems was performed and was negative except as mentioned.    Medications:  The current medication list was reviewed in the EMR    ALLERGIES:    Allergies   Allergen Reactions   • Penicillins Rash       Objective      Vitals:    05/10/21 0844   BP: 124/81   Pulse: 100   Resp: 18   Temp: 97.9 °F (36.6 °C)   TempSrc: Temporal   Weight: 78.2 kg (172 lb 8 oz)   Height: 154.9 cm (60.98\")   PainSc: 0-No pain     Current Status 5/10/2021   ECOG score 0       Physical Exam  Vitals signs and nursing note reviewed.   Constitutional:       General: She is not in acute distress.     Appearance: Normal appearance.   HENT:      Mouth/Throat:      Mouth: Mucous membranes are moist.      Pharynx: No oropharyngeal exudate.   Neck:      Musculoskeletal: Normal range of motion. No neck rigidity or muscular tenderness.   Lymphadenopathy:      Cervical: No cervical adenopathy.   Skin:     General: Skin is dry.      Coloration: Skin is not jaundiced or pale.   Neurological:      " Mental Status: She is alert.   Psychiatric:         Mood and Affect: Mood normal.         Behavior: Behavior normal.         Thought Content: Thought content normal.     RECENT LABS:  Glucose   Date Value Ref Range Status   05/10/2021 98 65 - 99 mg/dL Final     Sodium   Date Value Ref Range Status   05/10/2021 139 136 - 145 mmol/L Final     Potassium   Date Value Ref Range Status   05/10/2021 4.1 3.5 - 5.2 mmol/L Final     CO2   Date Value Ref Range Status   05/10/2021 29.0 22.0 - 29.0 mmol/L Final     Chloride   Date Value Ref Range Status   05/10/2021 102 98 - 107 mmol/L Final     Anion Gap   Date Value Ref Range Status   05/10/2021 8.0 5.0 - 15.0 mmol/L Final     Creatinine   Date Value Ref Range Status   05/10/2021 0.75 0.57 - 1.00 mg/dL Final     BUN   Date Value Ref Range Status   05/10/2021 17 6 - 20 mg/dL Final     BUN/Creatinine Ratio   Date Value Ref Range Status   05/10/2021 22.7 7.0 - 25.0 Final     Calcium   Date Value Ref Range Status   05/10/2021 9.8 8.6 - 10.5 mg/dL Final     eGFR Non  Amer   Date Value Ref Range Status   05/10/2021 84 >60 mL/min/1.73 Final     Alkaline Phosphatase   Date Value Ref Range Status   05/10/2021 136 (H) 39 - 117 U/L Final     Total Protein   Date Value Ref Range Status   05/10/2021 7.7 6.0 - 8.5 g/dL Final     ALT (SGPT)   Date Value Ref Range Status   05/10/2021 18 1 - 33 U/L Final     AST (SGOT)   Date Value Ref Range Status   05/10/2021 21 1 - 32 U/L Final     Total Bilirubin   Date Value Ref Range Status   05/10/2021 0.2 0.0 - 1.2 mg/dL Final     Albumin   Date Value Ref Range Status   05/10/2021 4.30 3.50 - 5.20 g/dL Final     Globulin   Date Value Ref Range Status   05/10/2021 3.4 gm/dL Final     Lab Results   Component Value Date    WBC 6.77 05/10/2021    HGB 12.4 05/10/2021    HCT 36.8 05/10/2021    MCV 85.6 05/10/2021     05/10/2021     Lab Results   Component Value Date    NEUTROABS 3.63 05/10/2021    IRON 126 11/01/2017    [id number] 261  11/01/2017     Lab Results   Component Value Date    HCGQUANT 1.04 03/30/2021               RADIOLOGY DATA :  No radiology results for the last 7 days        Assessment/Plan   Stage IA breast cancer,right breast, ER/DC+, HER 2 negative, Oncotype DX score 36; status post mastectomy; followed by chemotherapy, radiation therapy and currently on Exemestane.New prescription sent to pharmacy; pt will be due for mammogram in December; RTC in 4 mos for labs and follow-up.                 PHQ-9 Total Score: 0     Moy Sparrow reports a pain score of 0.  Given her pain assessment as noted, treatment options were discussed and the following options were decided upon as a follow-up plan to address the patient's pain: reports  no pain today.         Nusrat Murphy, APRN  5/10/2021  15:21 CDT        Part of this note may be an electronic transcription/translation of spoken language to printed text using the Dragon Dictation System.          CC:

## 2021-07-09 ENCOUNTER — OFFICE VISIT (OUTPATIENT)
Dept: ONCOLOGY | Facility: CLINIC | Age: 41
End: 2021-07-09

## 2021-07-09 ENCOUNTER — LAB (OUTPATIENT)
Dept: ONCOLOGY | Facility: HOSPITAL | Age: 41
End: 2021-07-09

## 2021-07-09 VITALS
WEIGHT: 173.4 LBS | HEART RATE: 107 BPM | TEMPERATURE: 97.7 F | BODY MASS INDEX: 32.78 KG/M2 | SYSTOLIC BLOOD PRESSURE: 142 MMHG | RESPIRATION RATE: 18 BRPM | DIASTOLIC BLOOD PRESSURE: 83 MMHG

## 2021-07-09 DIAGNOSIS — C50.411 MALIGNANT NEOPLASM OF UPPER-OUTER QUADRANT OF RIGHT BREAST IN FEMALE, ESTROGEN RECEPTOR POSITIVE (HCC): ICD-10-CM

## 2021-07-09 DIAGNOSIS — Z17.0 MALIGNANT NEOPLASM OF UPPER-OUTER QUADRANT OF RIGHT BREAST IN FEMALE, ESTROGEN RECEPTOR POSITIVE (HCC): ICD-10-CM

## 2021-07-09 DIAGNOSIS — N63.20 LEFT BREAST MASS: Primary | ICD-10-CM

## 2021-07-09 LAB
ALBUMIN SERPL-MCNC: 4.1 G/DL (ref 3.5–5.2)
ALBUMIN/GLOB SERPL: 1.1 G/DL
ALP SERPL-CCNC: 143 U/L (ref 39–117)
ALT SERPL W P-5'-P-CCNC: 16 U/L (ref 1–33)
ANION GAP SERPL CALCULATED.3IONS-SCNC: 11 MMOL/L (ref 5–15)
AST SERPL-CCNC: 19 U/L (ref 1–32)
BASOPHILS # BLD AUTO: 0.03 10*3/MM3 (ref 0–0.2)
BASOPHILS NFR BLD AUTO: 0.4 % (ref 0–1.5)
BILIRUB SERPL-MCNC: 0.3 MG/DL (ref 0–1.2)
BUN SERPL-MCNC: 11 MG/DL (ref 6–20)
BUN/CREAT SERPL: 15.7 (ref 7–25)
CALCIUM SPEC-SCNC: 9.4 MG/DL (ref 8.6–10.5)
CHLORIDE SERPL-SCNC: 103 MMOL/L (ref 98–107)
CO2 SERPL-SCNC: 24 MMOL/L (ref 22–29)
CREAT SERPL-MCNC: 0.7 MG/DL (ref 0.57–1)
DEPRECATED RDW RBC AUTO: 40 FL (ref 37–54)
EOSINOPHIL # BLD AUTO: 0.08 10*3/MM3 (ref 0–0.4)
EOSINOPHIL NFR BLD AUTO: 1.2 % (ref 0.3–6.2)
ERYTHROCYTE [DISTWIDTH] IN BLOOD BY AUTOMATED COUNT: 13.1 % (ref 12.3–15.4)
GFR SERPL CREATININE-BSD FRML MDRD: 91 ML/MIN/1.73
GLOBULIN UR ELPH-MCNC: 3.6 GM/DL
GLUCOSE SERPL-MCNC: 105 MG/DL (ref 65–99)
HCT VFR BLD AUTO: 38.2 % (ref 34–46.6)
HGB BLD-MCNC: 12.7 G/DL (ref 12–15.9)
IMM GRANULOCYTES # BLD AUTO: 0.02 10*3/MM3 (ref 0–0.05)
IMM GRANULOCYTES NFR BLD AUTO: 0.3 % (ref 0–0.5)
LYMPHOCYTES # BLD AUTO: 1.21 10*3/MM3 (ref 0.7–3.1)
LYMPHOCYTES NFR BLD AUTO: 17.8 % (ref 19.6–45.3)
MCH RBC QN AUTO: 28.4 PG (ref 26.6–33)
MCHC RBC AUTO-ENTMCNC: 33.2 G/DL (ref 31.5–35.7)
MCV RBC AUTO: 85.5 FL (ref 79–97)
MONOCYTES # BLD AUTO: 0.4 10*3/MM3 (ref 0.1–0.9)
MONOCYTES NFR BLD AUTO: 5.9 % (ref 5–12)
NEUTROPHILS NFR BLD AUTO: 5.04 10*3/MM3 (ref 1.7–7)
NEUTROPHILS NFR BLD AUTO: 74.4 % (ref 42.7–76)
NRBC BLD AUTO-RTO: 0 /100 WBC (ref 0–0.2)
PLATELET # BLD AUTO: 274 10*3/MM3 (ref 140–450)
PMV BLD AUTO: 8.4 FL (ref 6–12)
POTASSIUM SERPL-SCNC: 3.7 MMOL/L (ref 3.5–5.2)
PROT SERPL-MCNC: 7.7 G/DL (ref 6–8.5)
RBC # BLD AUTO: 4.47 10*6/MM3 (ref 3.77–5.28)
SODIUM SERPL-SCNC: 138 MMOL/L (ref 136–145)
WBC # BLD AUTO: 6.78 10*3/MM3 (ref 3.4–10.8)

## 2021-07-09 PROCEDURE — 80053 COMPREHEN METABOLIC PANEL: CPT

## 2021-07-09 PROCEDURE — 99213 OFFICE O/P EST LOW 20 MIN: CPT | Performed by: INTERNAL MEDICINE

## 2021-07-09 PROCEDURE — G0463 HOSPITAL OUTPT CLINIC VISIT: HCPCS | Performed by: INTERNAL MEDICINE

## 2021-07-09 PROCEDURE — 85025 COMPLETE CBC W/AUTO DIFF WBC: CPT

## 2021-07-09 NOTE — PROGRESS NOTES
Chief Complaint  Breast Cancer and Follow-up    Subjective          Moy Sparrow presents to [facility]  History of Present Illness     Moy Sparrow was seen in follow-up as she is reporting new left breast mass.  She recently had implant placed and noticed a new mass couple of weeks ago.  No distortion of nipple or nipple discharge.  No injury or trauma having said that she did have implant surgery about 4 months ago.      Objective   Vital Signs:   /83   Pulse 107   Temp 97.7 °F (36.5 °C) (Temporal)   Resp 18   Wt 78.7 kg (173 lb 6.4 oz)   BMI 32.78 kg/m²     Physical Exam  Vitals and nursing note reviewed. Exam conducted with a chaperone present.   Chest:            Result Review :   The following data was reviewed by: Hailey Emery MD on 07/09/2021:  Common labs    Common Labsle 3/30/21 3/30/21 3/30/21 5/10/21 5/10/21 7/9/21 7/9/21    1352 1352 1352 0838 0838 1404 1404   Glucose   92  98  105 (A)   BUN   22 (A)  17  11   Creatinine   0.74  0.75  0.70   eGFR Non  Am   86  84  91   Sodium   139  139  138   Potassium   4.0  4.1  3.7   Chloride   102  102  103   Calcium   9.2  9.8  9.4   Albumin   4.30  4.30  4.10   Total Bilirubin   0.2  0.2  0.3   Alkaline Phosphatase   129 (A)  136 (A)  143 (A)   AST (SGOT)   20  21  19   ALT (SGPT)   22  18  16   WBC 8.87   6.77  6.78    Hemoglobin 12.0   12.4  12.7    Hematocrit 35.3   36.8  38.2    Platelets 355   285  274    Total Cholesterol  246 (A)        Triglycerides  195 (A)        HDL Cholesterol  40        LDL Cholesterol   170 (A)        (A) Abnormal value            CMP    CMP 3/30/21 5/10/21 7/9/21   Glucose 92 98 105 (A)   BUN 22 (A) 17 11   Creatinine 0.74 0.75 0.70   eGFR Non African Am 86 84 91   Sodium 139 139 138   Potassium 4.0 4.1 3.7   Chloride 102 102 103   Calcium 9.2 9.8 9.4   Albumin 4.30 4.30 4.10   Total Bilirubin 0.2 0.2 0.3   Alkaline Phosphatase 129 (A) 136 (A) 143  (A)   AST (SGOT) 20 21 19   ALT (SGPT) 22 18 16   (A) Abnormal value            CBC    CBC 3/30/21 5/10/21 7/9/21   WBC 8.87 6.77 6.78   RBC 4.09 4.30 4.47   Hemoglobin 12.0 12.4 12.7   Hematocrit 35.3 36.8 38.2   MCV 86.3 85.6 85.5   MCH 29.3 28.8 28.4   MCHC 34.0 33.7 33.2   RDW 13.7 12.7 13.1   Platelets 355 285 274           CBC w/diff    CBC w/Diff 3/30/21 5/10/21 7/9/21   WBC 8.87 6.77 6.78   RBC 4.09 4.30 4.47   Hemoglobin 12.0 12.4 12.7   Hematocrit 35.3 36.8 38.2   MCV 86.3 85.6 85.5   MCH 29.3 28.8 28.4   MCHC 34.0 33.7 33.2   RDW 13.7 12.7 13.1   Platelets 355 285 274   Neutrophil Rel % 71.2 53.6 74.4   Immature Granulocyte Rel % 0.3 0.3 0.3   Lymphocyte Rel % 20.0 33.8 17.8 (A)   Monocyte Rel % 6.3 8.4 5.9   Eosinophil Rel % 1.6 2.7 1.2   Basophil Rel % 0.6 1.2 0.4   (A) Abnormal value                     Moy Sparrow reports a pain score of 0.  Given her pain assessment as noted, treatment options were discussed and the following options were decided upon as a follow-up plan to address the patient's pain: continuation of current treatment plan for pain.    Patient screened positive for depression based on a PHQ-9 score of 0 on 7/9/2021. Follow-up recommendations include: Suicide Risk Assessment performed.       Assessment and Plan    Diagnoses and all orders for this visit:    1. Left breast mass (Primary)  -     Mammo Diagnostic Left With CAD; Future    2. Malignant neoplasm of upper-outer quadrant of right breast in female, estrogen receptor positive (CMS/HCC)    Patient with history of right breast cancer treated with surgery followed by chemo, radiation, currently on hormone treatment.  She recently had implant placed and is reporting new left breast.  On exam she does appear to have heart linear nodular lesion which could represent scar tissue however given her history of breast cancer I will order left breast diagnostic mammogram with ultrasound to rule out underlying  malignancy.  Reassurance given.   Could represent scar tissue.       I spent 24 minutes caring for Moy on this date of service. This time includes time spent by me in the following activities:preparing for the visit, performing a medically appropriate examination and/or evaluation , counseling and educating the patient/family/caregiver and ordering medications, tests, or procedures  Follow Up   No follow-ups on file.  Patient was given instructions and counseling regarding her condition or for health maintenance advice. Please see specific information pulled into the AVS if appropriate.

## 2021-07-21 ENCOUNTER — TELEPHONE (OUTPATIENT)
Dept: ONCOLOGY | Facility: HOSPITAL | Age: 41
End: 2021-07-21

## 2021-07-21 NOTE — TELEPHONE ENCOUNTER
----- Message from Hailey Emery MD sent at 7/20/2021  4:16 PM CDT -----  Let patient know her mammogram was normal.

## 2021-07-30 RX ORDER — FAMOTIDINE 20 MG/1
40 TABLET, FILM COATED ORAL DAILY
Qty: 60 TABLET | Refills: 6 | Status: SHIPPED | OUTPATIENT
Start: 2021-07-30 | End: 2022-08-10 | Stop reason: SDUPTHER

## 2021-09-10 DIAGNOSIS — C50.411 MALIGNANT NEOPLASM OF UPPER-OUTER QUADRANT OF RIGHT BREAST IN FEMALE, ESTROGEN RECEPTOR POSITIVE (HCC): Primary | ICD-10-CM

## 2021-09-10 DIAGNOSIS — Z17.0 MALIGNANT NEOPLASM OF UPPER-OUTER QUADRANT OF RIGHT BREAST IN FEMALE, ESTROGEN RECEPTOR POSITIVE (HCC): Primary | ICD-10-CM

## 2021-09-13 ENCOUNTER — APPOINTMENT (OUTPATIENT)
Dept: ONCOLOGY | Facility: HOSPITAL | Age: 41
End: 2021-09-13

## 2021-09-13 ENCOUNTER — APPOINTMENT (OUTPATIENT)
Dept: ONCOLOGY | Facility: CLINIC | Age: 41
End: 2021-09-13

## 2021-09-26 NOTE — TELEPHONE ENCOUNTER
----- Message from Hailey Emery MD sent at 2/24/2021  3:32 PM CST -----  Regarding: RE: patient question  Yes   ----- Message -----  From: Mis Baxter RN  Sent: 2/24/2021   2:56 PM CST  To: Hailey Emery MD  Subject: RE: patient question                             Informed patient of information.  She is asking if it's okay for her to start on Accutane?   ----- Message -----  From: Hailey Emery MD  Sent: 2/24/2021   7:31 AM CST  To: Mis Baxter RN  Subject: RE: patient question                             Not directly.   But it does affect bone and bone density in general.   Likely that is why her ALP is high.   This is not worrisome and would continue exemestane with close monitoring.   ----- Message -----  From: Mis Baxter RN  Sent: 2/24/2021   7:21 AM CST  To: Hailey Emery MD  Subject: FW: patient question                             Patient had question.  ----- Message -----  From: AMELIE Acuña RN  Sent: 2/23/2021   4:12 PM CST  To: Mis Baxter RN  Subject: patient question                                 Patient stated she has been reading about her exemestane and is curious if it could be the cause of her elevated alp?              
Informed patient of information.  She verbalized understanding.  
Private car

## 2021-09-28 ENCOUNTER — LAB (OUTPATIENT)
Dept: ONCOLOGY | Facility: HOSPITAL | Age: 41
End: 2021-09-28

## 2021-09-28 ENCOUNTER — OFFICE VISIT (OUTPATIENT)
Dept: ONCOLOGY | Facility: CLINIC | Age: 41
End: 2021-09-28

## 2021-09-28 VITALS
DIASTOLIC BLOOD PRESSURE: 79 MMHG | SYSTOLIC BLOOD PRESSURE: 138 MMHG | TEMPERATURE: 97.3 F | OXYGEN SATURATION: 97 % | BODY MASS INDEX: 31.21 KG/M2 | WEIGHT: 165.1 LBS | HEART RATE: 107 BPM

## 2021-09-28 DIAGNOSIS — Z51.81 VISIT FOR MONITORING ARIMIDEX THERAPY: ICD-10-CM

## 2021-09-28 DIAGNOSIS — Z17.0 MALIGNANT NEOPLASM OF UPPER-OUTER QUADRANT OF RIGHT BREAST IN FEMALE, ESTROGEN RECEPTOR POSITIVE (HCC): Primary | ICD-10-CM

## 2021-09-28 DIAGNOSIS — Z79.811 VISIT FOR MONITORING ARIMIDEX THERAPY: ICD-10-CM

## 2021-09-28 DIAGNOSIS — C50.411 MALIGNANT NEOPLASM OF UPPER-OUTER QUADRANT OF RIGHT BREAST IN FEMALE, ESTROGEN RECEPTOR POSITIVE (HCC): ICD-10-CM

## 2021-09-28 DIAGNOSIS — C50.411 MALIGNANT NEOPLASM OF UPPER-OUTER QUADRANT OF RIGHT BREAST IN FEMALE, ESTROGEN RECEPTOR POSITIVE (HCC): Primary | ICD-10-CM

## 2021-09-28 DIAGNOSIS — Z17.0 MALIGNANT NEOPLASM OF UPPER-OUTER QUADRANT OF RIGHT BREAST IN FEMALE, ESTROGEN RECEPTOR POSITIVE (HCC): ICD-10-CM

## 2021-09-28 LAB
ALBUMIN SERPL-MCNC: 4.3 G/DL (ref 3.5–5.2)
ALBUMIN/GLOB SERPL: 1.2 G/DL
ALP SERPL-CCNC: 140 U/L (ref 39–117)
ALT SERPL W P-5'-P-CCNC: 14 U/L (ref 1–33)
ANION GAP SERPL CALCULATED.3IONS-SCNC: 9 MMOL/L (ref 5–15)
AST SERPL-CCNC: 16 U/L (ref 1–32)
BASOPHILS # BLD AUTO: 0.06 10*3/MM3 (ref 0–0.2)
BASOPHILS NFR BLD AUTO: 0.7 % (ref 0–1.5)
BILIRUB SERPL-MCNC: 0.3 MG/DL (ref 0–1.2)
BUN SERPL-MCNC: 16 MG/DL (ref 6–20)
BUN/CREAT SERPL: 22.5 (ref 7–25)
CALCIUM SPEC-SCNC: 9.4 MG/DL (ref 8.6–10.5)
CHLORIDE SERPL-SCNC: 103 MMOL/L (ref 98–107)
CO2 SERPL-SCNC: 27 MMOL/L (ref 22–29)
CREAT SERPL-MCNC: 0.71 MG/DL (ref 0.57–1)
DEPRECATED RDW RBC AUTO: 42.2 FL (ref 37–54)
EOSINOPHIL # BLD AUTO: 0.2 10*3/MM3 (ref 0–0.4)
EOSINOPHIL NFR BLD AUTO: 2.3 % (ref 0.3–6.2)
ERYTHROCYTE [DISTWIDTH] IN BLOOD BY AUTOMATED COUNT: 13.3 % (ref 12.3–15.4)
GFR SERPL CREATININE-BSD FRML MDRD: 90 ML/MIN/1.73
GLOBULIN UR ELPH-MCNC: 3.6 GM/DL
GLUCOSE SERPL-MCNC: 99 MG/DL (ref 65–99)
HCT VFR BLD AUTO: 37.6 % (ref 34–46.6)
HGB BLD-MCNC: 12.4 G/DL (ref 12–15.9)
HOLD SPECIMEN: NORMAL
IMM GRANULOCYTES # BLD AUTO: 0.02 10*3/MM3 (ref 0–0.05)
IMM GRANULOCYTES NFR BLD AUTO: 0.2 % (ref 0–0.5)
LYMPHOCYTES # BLD AUTO: 2.42 10*3/MM3 (ref 0.7–3.1)
LYMPHOCYTES NFR BLD AUTO: 27.3 % (ref 19.6–45.3)
MCH RBC QN AUTO: 28.6 PG (ref 26.6–33)
MCHC RBC AUTO-ENTMCNC: 33 G/DL (ref 31.5–35.7)
MCV RBC AUTO: 86.8 FL (ref 79–97)
MONOCYTES # BLD AUTO: 0.63 10*3/MM3 (ref 0.1–0.9)
MONOCYTES NFR BLD AUTO: 7.1 % (ref 5–12)
NEUTROPHILS NFR BLD AUTO: 5.52 10*3/MM3 (ref 1.7–7)
NEUTROPHILS NFR BLD AUTO: 62.4 % (ref 42.7–76)
NRBC BLD AUTO-RTO: 0 /100 WBC (ref 0–0.2)
PLATELET # BLD AUTO: 322 10*3/MM3 (ref 140–450)
PMV BLD AUTO: 8.5 FL (ref 6–12)
POTASSIUM SERPL-SCNC: 3.8 MMOL/L (ref 3.5–5.2)
PROT SERPL-MCNC: 7.9 G/DL (ref 6–8.5)
RBC # BLD AUTO: 4.33 10*6/MM3 (ref 3.77–5.28)
SODIUM SERPL-SCNC: 139 MMOL/L (ref 136–145)
WBC # BLD AUTO: 8.85 10*3/MM3 (ref 3.4–10.8)

## 2021-09-28 PROCEDURE — 85025 COMPLETE CBC W/AUTO DIFF WBC: CPT

## 2021-09-28 PROCEDURE — 99213 OFFICE O/P EST LOW 20 MIN: CPT | Performed by: INTERNAL MEDICINE

## 2021-09-28 PROCEDURE — 80053 COMPREHEN METABOLIC PANEL: CPT

## 2021-09-28 PROCEDURE — G0463 HOSPITAL OUTPT CLINIC VISIT: HCPCS | Performed by: INTERNAL MEDICINE

## 2021-09-28 NOTE — PROGRESS NOTES
Inna Sparrow seen in follow-up for breast cancer.    No new health issues since last visit.   No new medications.   No new hospitalization.   Feels well.         Past Medical History, Past Surgical History, Social History, Family History have been reviewed and are without significant changes except as mentioned.        Medications:  The current medication list was reviewed in the EMR    ALLERGIES:    Allergies   Allergen Reactions   • Penicillins Rash       Objective      Vitals:    09/28/21 1259   BP: 138/79   Pulse: 107   Temp: 97.3 °F (36.3 °C)   SpO2: 97%   Weight: 74.9 kg (165 lb 1.6 oz)   PainSc: 0-No pain     Current Status 7/9/2021   ECOG score 0       Physical Exam  Vitals and nursing note reviewed.   Constitutional:       Appearance: Normal appearance.   Cardiovascular:      Rate and Rhythm: Normal rate and regular rhythm.   Pulmonary:      Effort: Pulmonary effort is normal.      Breath sounds: Normal breath sounds.   Neurological:      General: No focal deficit present.      Mental Status: She is alert and oriented to person, place, and time. Mental status is at baseline.   Psychiatric:         Mood and Affect: Mood normal.         Behavior: Behavior normal.         Thought Content: Thought content normal.               RECENT LABS:Independently reviewed and summarized  Hematology WBC   Date Value Ref Range Status   09/28/2021 8.85 3.40 - 10.80 10*3/mm3 Final     RBC   Date Value Ref Range Status   09/28/2021 4.33 3.77 - 5.28 10*6/mm3 Final     Hemoglobin   Date Value Ref Range Status   09/28/2021 12.4 12.0 - 15.9 g/dL Final     Hematocrit   Date Value Ref Range Status   09/28/2021 37.6 34.0 - 46.6 % Final     Platelets   Date Value Ref Range Status   09/28/2021 322 140 - 450 10*3/mm3 Final       Lab Results   Component Value Date    GLUCOSE 99 09/28/2021    BUN 16 09/28/2021    CREATININE 0.71 09/28/2021    EGFRIFNONA 90 09/28/2021    BCR 22.5 09/28/2021    K 3.8 09/28/2021     CO2 27.0 09/28/2021    CALCIUM 9.4 09/28/2021    ALBUMIN 4.30 09/28/2021    AST 16 09/28/2021    ALT 14 09/28/2021          Moy Sparrow reports a pain score of 0.  Given her pain assessment as noted, treatment options were discussed and the following options were decided upon as a follow-up plan to address the patient's pain: continuation of current treatment plan for pain.    Patient screened positive for depression based on a PHQ-9 score of 0 on 9/28/2021. Follow-up recommendations include: Suicide Risk Assessment performed.      Diagnosis:   (1) Invasive ductal carcinoma, right   Date of diagnosis: 9/5/18   Stage: IB ([id number]), recurrence score 36   Prior treatment:   Right mastectomy with sentinel node biopsy and ALND (9/5/18)  Chemotherapy: Start date (10/15/18)   Completed dose dense AC followed by dose dense taxol (2/11/19, last dose 1/28/19)  Adjuvant RT: 5040 cGy right chest wall and axilla     Current treatment:   Ovarian suppression (surgical) and exemestane (11/26/18)     Assessment/Plan     Chronic, stable.  Currently on exemestane.  Tolerating treatment well without any major side effects.  Mammogram in July 2021 showed no evidence of recurrence.  Continue calcium and vitamin D supplements.  No clinical evidence of recurrence.  I will order DEXA scan to assess for bone mineral density.  Her alkaline phosphatase is mildly elevated however stable.  No new aches or pains.  I will check CBC, CMP in 3 months followed by physical exam.  Instructed to call if new questions or concerns in interim.    Recommendations:   · Continue exemestane.  · DEXA scan in a week to assess for bone mineral density.    · CBC, CMP in 3 months.          9/28/2021      CC:

## 2021-10-07 ENCOUNTER — TELEPHONE (OUTPATIENT)
Dept: ONCOLOGY | Facility: HOSPITAL | Age: 41
End: 2021-10-07

## 2021-10-07 NOTE — TELEPHONE ENCOUNTER
----- Message from Hailey Emery MD sent at 10/6/2021  5:07 PM CDT -----  Bone density is normal. Recheck in a year.

## 2021-10-25 RX ORDER — DESVENLAFAXINE 100 MG/1
100 TABLET, EXTENDED RELEASE ORAL DAILY
Qty: 30 TABLET | Refills: 1 | Status: SHIPPED | OUTPATIENT
Start: 2021-10-25 | End: 2022-05-13 | Stop reason: SDUPTHER

## 2022-01-24 ENCOUNTER — APPOINTMENT (OUTPATIENT)
Dept: ONCOLOGY | Facility: CLINIC | Age: 42
End: 2022-01-24

## 2022-01-25 ENCOUNTER — LAB (OUTPATIENT)
Dept: ONCOLOGY | Facility: HOSPITAL | Age: 42
End: 2022-01-25

## 2022-01-25 ENCOUNTER — OFFICE VISIT (OUTPATIENT)
Dept: ONCOLOGY | Facility: CLINIC | Age: 42
End: 2022-01-25

## 2022-01-25 ENCOUNTER — APPOINTMENT (OUTPATIENT)
Dept: ONCOLOGY | Facility: HOSPITAL | Age: 42
End: 2022-01-25

## 2022-01-25 ENCOUNTER — APPOINTMENT (OUTPATIENT)
Dept: ONCOLOGY | Facility: CLINIC | Age: 42
End: 2022-01-25

## 2022-01-25 VITALS
WEIGHT: 166.4 LBS | SYSTOLIC BLOOD PRESSURE: 139 MMHG | HEART RATE: 103 BPM | RESPIRATION RATE: 18 BRPM | BODY MASS INDEX: 31.46 KG/M2 | DIASTOLIC BLOOD PRESSURE: 93 MMHG | OXYGEN SATURATION: 99 % | TEMPERATURE: 97.6 F

## 2022-01-25 DIAGNOSIS — Z17.0 MALIGNANT NEOPLASM OF UPPER-OUTER QUADRANT OF RIGHT BREAST IN FEMALE, ESTROGEN RECEPTOR POSITIVE: Primary | ICD-10-CM

## 2022-01-25 DIAGNOSIS — Z17.0 MALIGNANT NEOPLASM OF UPPER-OUTER QUADRANT OF RIGHT BREAST IN FEMALE, ESTROGEN RECEPTOR POSITIVE: ICD-10-CM

## 2022-01-25 DIAGNOSIS — C50.411 MALIGNANT NEOPLASM OF UPPER-OUTER QUADRANT OF RIGHT BREAST IN FEMALE, ESTROGEN RECEPTOR POSITIVE: ICD-10-CM

## 2022-01-25 DIAGNOSIS — C50.411 MALIGNANT NEOPLASM OF UPPER-OUTER QUADRANT OF RIGHT BREAST IN FEMALE, ESTROGEN RECEPTOR POSITIVE: Primary | ICD-10-CM

## 2022-01-25 LAB
ALBUMIN SERPL-MCNC: 4.1 G/DL (ref 3.5–5.2)
ALBUMIN/GLOB SERPL: 1.2 G/DL
ALP SERPL-CCNC: 137 U/L (ref 39–117)
ALT SERPL W P-5'-P-CCNC: 14 U/L (ref 1–33)
ANION GAP SERPL CALCULATED.3IONS-SCNC: 12 MMOL/L (ref 5–15)
AST SERPL-CCNC: 18 U/L (ref 1–32)
BASOPHILS # BLD AUTO: 0.05 10*3/MM3 (ref 0–0.2)
BASOPHILS NFR BLD AUTO: 0.8 % (ref 0–1.5)
BILIRUB SERPL-MCNC: 0.2 MG/DL (ref 0–1.2)
BUN SERPL-MCNC: 16 MG/DL (ref 6–20)
BUN/CREAT SERPL: 20 (ref 7–25)
CALCIUM SPEC-SCNC: 9.1 MG/DL (ref 8.6–10.5)
CHLORIDE SERPL-SCNC: 104 MMOL/L (ref 98–107)
CO2 SERPL-SCNC: 25 MMOL/L (ref 22–29)
CREAT SERPL-MCNC: 0.8 MG/DL (ref 0.57–1)
DEPRECATED RDW RBC AUTO: 41.7 FL (ref 37–54)
EOSINOPHIL # BLD AUTO: 0.13 10*3/MM3 (ref 0–0.4)
EOSINOPHIL NFR BLD AUTO: 2 % (ref 0.3–6.2)
ERYTHROCYTE [DISTWIDTH] IN BLOOD BY AUTOMATED COUNT: 13.6 % (ref 12.3–15.4)
GFR SERPL CREATININE-BSD FRML MDRD: 78 ML/MIN/1.73
GLOBULIN UR ELPH-MCNC: 3.4 GM/DL
GLUCOSE SERPL-MCNC: 115 MG/DL (ref 65–99)
HCT VFR BLD AUTO: 38 % (ref 34–46.6)
HGB BLD-MCNC: 12.7 G/DL (ref 12–15.9)
HOLD SPECIMEN: NORMAL
IMM GRANULOCYTES # BLD AUTO: 0.02 10*3/MM3 (ref 0–0.05)
IMM GRANULOCYTES NFR BLD AUTO: 0.3 % (ref 0–0.5)
LYMPHOCYTES # BLD AUTO: 2.21 10*3/MM3 (ref 0.7–3.1)
LYMPHOCYTES NFR BLD AUTO: 34.1 % (ref 19.6–45.3)
MCH RBC QN AUTO: 27.9 PG (ref 26.6–33)
MCHC RBC AUTO-ENTMCNC: 33.4 G/DL (ref 31.5–35.7)
MCV RBC AUTO: 83.5 FL (ref 79–97)
MONOCYTES # BLD AUTO: 0.51 10*3/MM3 (ref 0.1–0.9)
MONOCYTES NFR BLD AUTO: 7.9 % (ref 5–12)
NEUTROPHILS NFR BLD AUTO: 3.57 10*3/MM3 (ref 1.7–7)
NEUTROPHILS NFR BLD AUTO: 54.9 % (ref 42.7–76)
NRBC BLD AUTO-RTO: 0 /100 WBC (ref 0–0.2)
PLATELET # BLD AUTO: 315 10*3/MM3 (ref 140–450)
PMV BLD AUTO: 8.7 FL (ref 6–12)
POTASSIUM SERPL-SCNC: 3.8 MMOL/L (ref 3.5–5.2)
PROT SERPL-MCNC: 7.5 G/DL (ref 6–8.5)
RBC # BLD AUTO: 4.55 10*6/MM3 (ref 3.77–5.28)
SODIUM SERPL-SCNC: 141 MMOL/L (ref 136–145)
WBC NRBC COR # BLD: 6.49 10*3/MM3 (ref 3.4–10.8)

## 2022-01-25 PROCEDURE — 80053 COMPREHEN METABOLIC PANEL: CPT

## 2022-01-25 PROCEDURE — 99212 OFFICE O/P EST SF 10 MIN: CPT | Performed by: NURSE PRACTITIONER

## 2022-01-25 PROCEDURE — 85025 COMPLETE CBC W/AUTO DIFF WBC: CPT

## 2022-01-25 PROCEDURE — G0463 HOSPITAL OUTPT CLINIC VISIT: HCPCS | Performed by: NURSE PRACTITIONER

## 2022-01-25 RX ORDER — EXEMESTANE 25 MG/1
25 TABLET ORAL DAILY
Qty: 90 TABLET | Refills: 2 | Status: SHIPPED | OUTPATIENT
Start: 2022-01-25

## 2022-01-31 NOTE — PROGRESS NOTES
DATE OF VISIT: 1/25/2022      REASON FOR VISIT:  Follow-up for breast cancer; currently on Aromasin      HISTORY OF PRESENT ILLNESS:     41 yr old female seen today in Dr. Yobani callahan. She has history of Stage IA right breast cancer, ER/DE+ HER 2 vincent negative      Oncology History:   Invasive ductal carcinoma, right   Date of diagnosis: 9/5/18   Stage: IB ([id number]), recurrence score 36   Prior treatment:   Right mastectomy with sentinel node biopsy and ALND (9/5/18)  Chemotherapy: Start date (10/15/18)   Completed dose dense AC followed by dose dense taxol (2/11/19, last dose 1/28/19).   Adjuvant RT: 5040 cGy right chest wall and axilla   Surgical oophorectomy       Current therapy:   exemestane (start 7/2019)             Past Medical History, Past Surgical History, Social History, Family History have been reviewed and are without significant changes except as mentioned.    Review of Systems   A comprehensive 14 point review of systems was performed and was negative except as mentioned.    Medications:  The current medication list was reviewed in the EMR    ALLERGIES:    Allergies   Allergen Reactions   • Penicillins Rash       Objective      Vitals:    01/25/22 1032   BP: 139/93   Pulse: 103   Resp: 18   Temp: 97.6 °F (36.4 °C)   SpO2: 99%   Weight: 75.5 kg (166 lb 6.4 oz)   PainSc: 0-No pain     Current Status 7/9/2021   ECOG score 0       Physical Exam  Vitals and nursing note reviewed.   Constitutional:       Appearance: Normal appearance.   Cardiovascular:      Rate and Rhythm: Normal rate and regular rhythm.   Pulmonary:      Effort: Pulmonary effort is normal.      Breath sounds: Normal breath sounds.   Neurological:      General: No focal deficit present.      Mental Status: She is alert and oriented to person, place, and time. Mental status is at baseline.   Psychiatric:         Mood and Affect: Mood normal.         Behavior: Behavior normal.         Thought Content: Thought content normal.     I have  reexamined the patient and the results are consistent with the previously documented exam. CICI White     RECENT LABS:  Glucose   Date Value Ref Range Status   01/25/2022 115 (H) 65 - 99 mg/dL Final     Sodium   Date Value Ref Range Status   01/25/2022 141 136 - 145 mmol/L Final     Potassium   Date Value Ref Range Status   01/25/2022 3.8 3.5 - 5.2 mmol/L Final     CO2   Date Value Ref Range Status   01/25/2022 25.0 22.0 - 29.0 mmol/L Final     Chloride   Date Value Ref Range Status   01/25/2022 104 98 - 107 mmol/L Final     Anion Gap   Date Value Ref Range Status   01/25/2022 12.0 5.0 - 15.0 mmol/L Final     Creatinine   Date Value Ref Range Status   01/25/2022 0.80 0.57 - 1.00 mg/dL Final     BUN   Date Value Ref Range Status   01/25/2022 16 6 - 20 mg/dL Final     BUN/Creatinine Ratio   Date Value Ref Range Status   01/25/2022 20.0 7.0 - 25.0 Final     Calcium   Date Value Ref Range Status   01/25/2022 9.1 8.6 - 10.5 mg/dL Final     eGFR Non  Amer   Date Value Ref Range Status   01/25/2022 78 >60 mL/min/1.73 Final     Alkaline Phosphatase   Date Value Ref Range Status   01/25/2022 137 (H) 39 - 117 U/L Final     Total Protein   Date Value Ref Range Status   01/25/2022 7.5 6.0 - 8.5 g/dL Final     ALT (SGPT)   Date Value Ref Range Status   01/25/2022 14 1 - 33 U/L Final     AST (SGOT)   Date Value Ref Range Status   01/25/2022 18 1 - 32 U/L Final     Total Bilirubin   Date Value Ref Range Status   01/25/2022 0.2 0.0 - 1.2 mg/dL Final     Albumin   Date Value Ref Range Status   01/25/2022 4.10 3.50 - 5.20 g/dL Final     Globulin   Date Value Ref Range Status   01/25/2022 3.4 gm/dL Final     Lab Results   Component Value Date    WBC 6.49 01/25/2022    HGB 12.7 01/25/2022    HCT 38.0 01/25/2022    MCV 83.5 01/25/2022     01/25/2022     Lab Results   Component Value Date    NEUTROABS 3.57 01/25/2022    IRON 126 11/01/2017    BWFUCXAU01 261 11/01/2017     Lab Results   Component Value Date     HCGQUANT 1.04 03/30/2021               RADIOLOGY DATA :  No radiology results for the last 7 days        Assessment/Plan   Stage IA right breast cancer; see above oncology history for full details.  -Lab reviewed today and stable  continue with exemestane and calcium and vitamin D  Mammogram from 7/2021 showed no recurrence  -RTC in 4 mos with repeat labs.  -New rx sent to pharmacy for exemestane.                 PHQ-9 Total Score: 0     Claytonlisbeth Feng Andrews reports a pain score of 0.  Given her pain assessment as noted, treatment options were discussed and the following options were decided upon as a follow-up plan to address the patient's pain: no pain today.         Nusrat Murphy, APRN  1/31/2022  09:52 CST        Part of this note may be an electronic transcription/translation of spoken language to printed text using the Dragon Dictation System.          CC:

## 2022-02-08 ENCOUNTER — APPOINTMENT (OUTPATIENT)
Dept: ONCOLOGY | Facility: CLINIC | Age: 42
End: 2022-02-08

## 2022-02-08 ENCOUNTER — APPOINTMENT (OUTPATIENT)
Dept: ONCOLOGY | Facility: HOSPITAL | Age: 42
End: 2022-02-08

## 2022-05-16 RX ORDER — DESVENLAFAXINE 100 MG/1
100 TABLET, EXTENDED RELEASE ORAL DAILY
Qty: 30 TABLET | Refills: 1 | Status: SHIPPED | OUTPATIENT
Start: 2022-05-16 | End: 2022-08-10 | Stop reason: SDUPTHER

## 2022-05-23 ENCOUNTER — LAB (OUTPATIENT)
Dept: ONCOLOGY | Facility: HOSPITAL | Age: 42
End: 2022-05-23

## 2022-05-23 ENCOUNTER — OFFICE VISIT (OUTPATIENT)
Dept: ONCOLOGY | Facility: CLINIC | Age: 42
End: 2022-05-23

## 2022-05-23 VITALS
TEMPERATURE: 96.8 F | SYSTOLIC BLOOD PRESSURE: 132 MMHG | RESPIRATION RATE: 18 BRPM | HEART RATE: 78 BPM | DIASTOLIC BLOOD PRESSURE: 84 MMHG | OXYGEN SATURATION: 100 % | BODY MASS INDEX: 32.71 KG/M2 | WEIGHT: 173 LBS

## 2022-05-23 DIAGNOSIS — C50.411 MALIGNANT NEOPLASM OF UPPER-OUTER QUADRANT OF RIGHT BREAST IN FEMALE, ESTROGEN RECEPTOR POSITIVE: ICD-10-CM

## 2022-05-23 DIAGNOSIS — Z17.0 MALIGNANT NEOPLASM OF UPPER-OUTER QUADRANT OF RIGHT BREAST IN FEMALE, ESTROGEN RECEPTOR POSITIVE: Primary | ICD-10-CM

## 2022-05-23 DIAGNOSIS — C50.411 MALIGNANT NEOPLASM OF UPPER-OUTER QUADRANT OF RIGHT BREAST IN FEMALE, ESTROGEN RECEPTOR POSITIVE: Primary | ICD-10-CM

## 2022-05-23 DIAGNOSIS — Z17.0 MALIGNANT NEOPLASM OF UPPER-OUTER QUADRANT OF RIGHT BREAST IN FEMALE, ESTROGEN RECEPTOR POSITIVE: ICD-10-CM

## 2022-05-23 LAB
ALBUMIN SERPL-MCNC: 4.2 G/DL (ref 3.5–5.2)
ALBUMIN/GLOB SERPL: 1.3 G/DL
ALP SERPL-CCNC: 125 U/L (ref 39–117)
ALT SERPL W P-5'-P-CCNC: 17 U/L (ref 1–33)
ANION GAP SERPL CALCULATED.3IONS-SCNC: 10 MMOL/L (ref 5–15)
AST SERPL-CCNC: 18 U/L (ref 1–32)
BASOPHILS # BLD AUTO: 0.08 10*3/MM3 (ref 0–0.2)
BASOPHILS NFR BLD AUTO: 1.1 % (ref 0–1.5)
BILIRUB SERPL-MCNC: 0.2 MG/DL (ref 0–1.2)
BUN SERPL-MCNC: 18 MG/DL (ref 6–20)
BUN/CREAT SERPL: 22.8 (ref 7–25)
CALCIUM SPEC-SCNC: 9.1 MG/DL (ref 8.6–10.5)
CHLORIDE SERPL-SCNC: 103 MMOL/L (ref 98–107)
CO2 SERPL-SCNC: 25 MMOL/L (ref 22–29)
CREAT SERPL-MCNC: 0.79 MG/DL (ref 0.57–1)
DEPRECATED RDW RBC AUTO: 39.8 FL (ref 37–54)
EGFRCR SERPLBLD CKD-EPI 2021: 94.7 ML/MIN/1.73
EOSINOPHIL # BLD AUTO: 0.19 10*3/MM3 (ref 0–0.4)
EOSINOPHIL NFR BLD AUTO: 2.5 % (ref 0.3–6.2)
ERYTHROCYTE [DISTWIDTH] IN BLOOD BY AUTOMATED COUNT: 13.2 % (ref 12.3–15.4)
GLOBULIN UR ELPH-MCNC: 3.2 GM/DL
GLUCOSE SERPL-MCNC: 92 MG/DL (ref 65–99)
HCT VFR BLD AUTO: 35.8 % (ref 34–46.6)
HGB BLD-MCNC: 12.4 G/DL (ref 12–15.9)
IMM GRANULOCYTES # BLD AUTO: 0.03 10*3/MM3 (ref 0–0.05)
IMM GRANULOCYTES NFR BLD AUTO: 0.4 % (ref 0–0.5)
LYMPHOCYTES # BLD AUTO: 2.77 10*3/MM3 (ref 0.7–3.1)
LYMPHOCYTES NFR BLD AUTO: 36.9 % (ref 19.6–45.3)
MCH RBC QN AUTO: 28.9 PG (ref 26.6–33)
MCHC RBC AUTO-ENTMCNC: 34.6 G/DL (ref 31.5–35.7)
MCV RBC AUTO: 83.4 FL (ref 79–97)
MONOCYTES # BLD AUTO: 0.6 10*3/MM3 (ref 0.1–0.9)
MONOCYTES NFR BLD AUTO: 8 % (ref 5–12)
NEUTROPHILS NFR BLD AUTO: 3.84 10*3/MM3 (ref 1.7–7)
NEUTROPHILS NFR BLD AUTO: 51.1 % (ref 42.7–76)
NRBC BLD AUTO-RTO: 0 /100 WBC (ref 0–0.2)
PLATELET # BLD AUTO: 278 10*3/MM3 (ref 140–450)
PMV BLD AUTO: 8.5 FL (ref 6–12)
POTASSIUM SERPL-SCNC: 4.2 MMOL/L (ref 3.5–5.2)
PROT SERPL-MCNC: 7.4 G/DL (ref 6–8.5)
RBC # BLD AUTO: 4.29 10*6/MM3 (ref 3.77–5.28)
SODIUM SERPL-SCNC: 138 MMOL/L (ref 136–145)
WBC NRBC COR # BLD: 7.51 10*3/MM3 (ref 3.4–10.8)

## 2022-05-23 PROCEDURE — 99214 OFFICE O/P EST MOD 30 MIN: CPT | Performed by: INTERNAL MEDICINE

## 2022-05-23 PROCEDURE — 85025 COMPLETE CBC W/AUTO DIFF WBC: CPT

## 2022-05-23 PROCEDURE — G0463 HOSPITAL OUTPT CLINIC VISIT: HCPCS | Performed by: INTERNAL MEDICINE

## 2022-05-23 PROCEDURE — 80053 COMPREHEN METABOLIC PANEL: CPT

## 2022-05-23 NOTE — PROGRESS NOTES
Inna Sparrow was seen in follow-up for breast cancer.  She remains on exemestane.  Denies any new side effects.  No new hospitalizations.  No new medications.    Past Medical History, Past Surgical History, Social History, Family History have been reviewed and are without significant changes except as mentioned.        Medications:  The current medication list was reviewed in the EMR    ALLERGIES:    Allergies   Allergen Reactions   • Penicillins Rash       Objective      Vitals:    05/23/22 0810   BP: 132/84   Pulse: 78   Resp: 18   Temp: 96.8 °F (36 °C)   SpO2: 100%   Weight: 78.5 kg (173 lb)   PainSc: 0-No pain     Current Status 7/9/2021   ECOG score 0       Physical Exam  Vitals and nursing note reviewed.   Constitutional:       Appearance: Normal appearance.   Musculoskeletal:         General: No tenderness.      Cervical back: Normal range of motion and neck supple. No rigidity.   Lymphadenopathy:      Cervical: No cervical adenopathy.   Neurological:      General: No focal deficit present.      Mental Status: She is alert and oriented to person, place, and time. Mental status is at baseline.   Psychiatric:         Mood and Affect: Mood normal.         Behavior: Behavior normal.         Thought Content: Thought content normal.               RECENT LABS:Independently reviewed and summarized  Hematology WBC   Date Value Ref Range Status   05/23/2022 7.51 3.40 - 10.80 10*3/mm3 Final     RBC   Date Value Ref Range Status   05/23/2022 4.29 3.77 - 5.28 10*6/mm3 Final     Hemoglobin   Date Value Ref Range Status   05/23/2022 12.4 12.0 - 15.9 g/dL Final     Hematocrit   Date Value Ref Range Status   05/23/2022 35.8 34.0 - 46.6 % Final     Platelets   Date Value Ref Range Status   05/23/2022 278 140 - 450 10*3/mm3 Final            Lab Results   Component Value Date    GLUCOSE 92 05/23/2022    BUN 18 05/23/2022    CREATININE 0.79 05/23/2022    EGFRIFNONA 78 01/25/2022    BCR 22.8 05/23/2022    K  4.2 05/23/2022    CO2 25.0 05/23/2022    CALCIUM 9.1 05/23/2022    ALBUMIN 4.20 05/23/2022    AST 18 05/23/2022    ALT 17 05/23/2022       Moy Sparrow reports a pain score of 0.  Given her pain assessment as noted, treatment options were discussed and the following options were decided upon as a follow-up plan to address the patient's pain: continuation of current treatment plan for pain.    Patient screened negative for depression based on a PHQ-9 score of 0 on 5/23/2022.         Diagnosis:   (1) Invasive ductal carcinoma, right   Date of diagnosis: 9/5/18   Stage: IB ([id number]), recurrence score 36   Prior treatment:   Right mastectomy with sentinel node biopsy and ALND (9/5/18)  Chemotherapy: Start date (10/15/18)   Completed dose dense AC followed by dose dense taxol (2/11/19, last dose 1/28/19)  Adjuvant RT: 5040 cGy right chest wall and axilla      Current treatment:   Ovarian suppression (surgical) and exemestane (11/26/18)     Assessment & Plan       Chronic, stable.  Currently on exemestane.  Tolerating treatment well without any major side effect.  Due to her bilateral breast implant MRI breast is needed to screen for breast cancer.  I will order this on today's visit.  Continue to take calcium and vitamin D supplements for bone health.  Her alkaline phosphatase is mildly elevated however stable.  No new aches or pains.  I will check CBC, CMP in 3 months followed by physical exam.    Recommendations:   · Continue exemestane.  · Recommend MRI breast for breast cancer screening (she has bilateral breast implants)  · Counseled about healthy diet and exercise.  · CBC, CMP in 3 months.        5/23/2022      CC:

## 2022-07-19 DIAGNOSIS — Z17.0 MALIGNANT NEOPLASM OF UPPER-OUTER QUADRANT OF RIGHT BREAST IN FEMALE, ESTROGEN RECEPTOR POSITIVE: Primary | ICD-10-CM

## 2022-07-19 DIAGNOSIS — C50.411 MALIGNANT NEOPLASM OF UPPER-OUTER QUADRANT OF RIGHT BREAST IN FEMALE, ESTROGEN RECEPTOR POSITIVE: Primary | ICD-10-CM

## 2022-07-22 ENCOUNTER — HOSPITAL ENCOUNTER (OUTPATIENT)
Dept: MRI IMAGING | Facility: HOSPITAL | Age: 42
Discharge: HOME | End: 2022-07-22
Admitting: INTERNAL MEDICINE

## 2022-07-22 DIAGNOSIS — Z17.0 MALIGNANT NEOPLASM OF UPPER-OUTER QUADRANT OF RIGHT BREAST IN FEMALE, ESTROGEN RECEPTOR POSITIVE: ICD-10-CM

## 2022-07-22 DIAGNOSIS — C50.411 MALIGNANT NEOPLASM OF UPPER-OUTER QUADRANT OF RIGHT BREAST IN FEMALE, ESTROGEN RECEPTOR POSITIVE: ICD-10-CM

## 2022-07-22 PROCEDURE — 77049 MRI BREAST C-+ W/CAD BI: CPT

## 2022-07-22 PROCEDURE — 25010000002 GADOTERIDOL PER 1 ML: Performed by: INTERNAL MEDICINE

## 2022-07-22 PROCEDURE — A9579 GAD-BASE MR CONTRAST NOS,1ML: HCPCS | Performed by: INTERNAL MEDICINE

## 2022-07-22 RX ADMIN — GADOTERIDOL 8 ML: 279.3 INJECTION, SOLUTION INTRAVENOUS at 09:42

## 2022-08-11 RX ORDER — DESVENLAFAXINE 100 MG/1
100 TABLET, EXTENDED RELEASE ORAL DAILY
Qty: 30 TABLET | Refills: 1 | Status: SHIPPED | OUTPATIENT
Start: 2022-08-11 | End: 2022-11-23 | Stop reason: SDUPTHER

## 2022-08-11 RX ORDER — FAMOTIDINE 20 MG/1
40 TABLET, FILM COATED ORAL DAILY
Qty: 60 TABLET | Refills: 6 | Status: SHIPPED | OUTPATIENT
Start: 2022-08-11 | End: 2023-01-11 | Stop reason: SDUPTHER

## 2022-08-21 NOTE — PROGRESS NOTES
Inna Sparrow was seen in follow up for breast cancer.       Past Medical History, Past Surgical History, Social History, Family History have been reviewed and are without significant changes except as mentioned.        Medications:  The current medication list was reviewed in the EMR    ALLERGIES:    Allergies   Allergen Reactions   • Penicillins Rash       Objective      There were no vitals filed for this visit.  Current Status 7/9/2021   ECOG score 0       Physical Exam  ***    RECENT LABS:Independently reviewed and summarized  Hematology WBC   Date Value Ref Range Status   05/23/2022 7.51 3.40 - 10.80 10*3/mm3 Final     RBC   Date Value Ref Range Status   05/23/2022 4.29 3.77 - 5.28 10*6/mm3 Final     Hemoglobin   Date Value Ref Range Status   05/23/2022 12.4 12.0 - 15.9 g/dL Final     Hematocrit   Date Value Ref Range Status   05/23/2022 35.8 34.0 - 46.6 % Final     Platelets   Date Value Ref Range Status   05/23/2022 278 140 - 450 10*3/mm3 Final          Imaging (independently reviewed and summarized):          Diagnosis:   (1) Invasive ductal carcinoma, right   Date of diagnosis: 9/5/18   Stage: IB ([id number]), recurrence score 36   Prior treatment:   Right mastectomy with sentinel node biopsy and ALND (9/5/18)  Chemotherapy: Start date (10/15/18)   Completed dose dense AC followed by dose dense taxol (2/11/19, last dose 1/28/19)  Adjuvant RT: 5040 cGy right chest wall and axilla      Current treatment:   Ovarian suppression (surgical) and exemestane (11/26/18)     Assessment & Plan   ***          Recommendations:           8/21/2022      CC:

## 2022-08-22 ENCOUNTER — APPOINTMENT (OUTPATIENT)
Dept: ONCOLOGY | Facility: HOSPITAL | Age: 42
End: 2022-08-22

## 2022-08-22 ENCOUNTER — APPOINTMENT (OUTPATIENT)
Dept: ONCOLOGY | Facility: CLINIC | Age: 42
End: 2022-08-22

## 2022-10-13 ENCOUNTER — APPOINTMENT (OUTPATIENT)
Dept: ONCOLOGY | Facility: HOSPITAL | Age: 42
End: 2022-10-13

## 2022-10-18 ENCOUNTER — LAB (OUTPATIENT)
Dept: ONCOLOGY | Facility: HOSPITAL | Age: 42
End: 2022-10-18

## 2022-10-18 ENCOUNTER — OFFICE VISIT (OUTPATIENT)
Dept: ONCOLOGY | Facility: CLINIC | Age: 42
End: 2022-10-18

## 2022-10-18 VITALS
OXYGEN SATURATION: 97 % | DIASTOLIC BLOOD PRESSURE: 85 MMHG | WEIGHT: 171 LBS | TEMPERATURE: 96.4 F | RESPIRATION RATE: 18 BRPM | SYSTOLIC BLOOD PRESSURE: 140 MMHG | BODY MASS INDEX: 32.33 KG/M2 | HEART RATE: 85 BPM

## 2022-10-18 DIAGNOSIS — C50.411 MALIGNANT NEOPLASM OF UPPER-OUTER QUADRANT OF RIGHT BREAST IN FEMALE, ESTROGEN RECEPTOR POSITIVE: ICD-10-CM

## 2022-10-18 DIAGNOSIS — Z17.0 MALIGNANT NEOPLASM OF UPPER-OUTER QUADRANT OF RIGHT BREAST IN FEMALE, ESTROGEN RECEPTOR POSITIVE: ICD-10-CM

## 2022-10-18 DIAGNOSIS — Z51.81 VISIT FOR MONITORING ARIMIDEX THERAPY: ICD-10-CM

## 2022-10-18 DIAGNOSIS — Z17.0 MALIGNANT NEOPLASM OF UPPER-OUTER QUADRANT OF RIGHT BREAST IN FEMALE, ESTROGEN RECEPTOR POSITIVE: Primary | ICD-10-CM

## 2022-10-18 DIAGNOSIS — C50.411 MALIGNANT NEOPLASM OF UPPER-OUTER QUADRANT OF RIGHT BREAST IN FEMALE, ESTROGEN RECEPTOR POSITIVE: Primary | ICD-10-CM

## 2022-10-18 DIAGNOSIS — Z79.811 VISIT FOR MONITORING ARIMIDEX THERAPY: ICD-10-CM

## 2022-10-18 LAB
ALBUMIN SERPL-MCNC: 4.2 G/DL (ref 3.5–5.2)
ALBUMIN/GLOB SERPL: 1.3 G/DL
ALP SERPL-CCNC: 119 U/L (ref 39–117)
ALT SERPL W P-5'-P-CCNC: 13 U/L (ref 1–33)
ANION GAP SERPL CALCULATED.3IONS-SCNC: 9 MMOL/L (ref 5–15)
AST SERPL-CCNC: 19 U/L (ref 1–32)
BILIRUB SERPL-MCNC: 0.2 MG/DL (ref 0–1.2)
BUN SERPL-MCNC: 16 MG/DL (ref 6–20)
BUN/CREAT SERPL: 20.3 (ref 7–25)
CALCIUM SPEC-SCNC: 9.2 MG/DL (ref 8.6–10.5)
CHLORIDE SERPL-SCNC: 106 MMOL/L (ref 98–107)
CO2 SERPL-SCNC: 27 MMOL/L (ref 22–29)
CREAT SERPL-MCNC: 0.79 MG/DL (ref 0.57–1)
DEPRECATED RDW RBC AUTO: 39.4 FL (ref 37–54)
EGFRCR SERPLBLD CKD-EPI 2021: 94.7 ML/MIN/1.73
ERYTHROCYTE [DISTWIDTH] IN BLOOD BY AUTOMATED COUNT: 12.8 % (ref 12.3–15.4)
GLOBULIN UR ELPH-MCNC: 3.2 GM/DL
GLUCOSE SERPL-MCNC: 99 MG/DL (ref 65–99)
HCT VFR BLD AUTO: 38.2 % (ref 34–46.6)
HGB BLD-MCNC: 12.4 G/DL (ref 12–15.9)
MCH RBC QN AUTO: 27.6 PG (ref 26.6–33)
MCHC RBC AUTO-ENTMCNC: 32.5 G/DL (ref 31.5–35.7)
MCV RBC AUTO: 84.9 FL (ref 79–97)
PLATELET # BLD AUTO: 280 10*3/MM3 (ref 140–450)
PMV BLD AUTO: 8.6 FL (ref 6–12)
POTASSIUM SERPL-SCNC: 4.1 MMOL/L (ref 3.5–5.2)
PROT SERPL-MCNC: 7.4 G/DL (ref 6–8.5)
RBC # BLD AUTO: 4.5 10*6/MM3 (ref 3.77–5.28)
SODIUM SERPL-SCNC: 142 MMOL/L (ref 136–145)
WBC NRBC COR # BLD: 6.87 10*3/MM3 (ref 3.4–10.8)

## 2022-10-18 PROCEDURE — 85027 COMPLETE CBC AUTOMATED: CPT

## 2022-10-18 PROCEDURE — 80053 COMPREHEN METABOLIC PANEL: CPT

## 2022-10-18 PROCEDURE — 99213 OFFICE O/P EST LOW 20 MIN: CPT | Performed by: INTERNAL MEDICINE

## 2022-10-18 PROCEDURE — G0463 HOSPITAL OUTPT CLINIC VISIT: HCPCS | Performed by: INTERNAL MEDICINE

## 2022-10-18 NOTE — PROGRESS NOTES
Inna Sparrow was seen in follow-up for breast cancer.  She has been stable since last visit.  No new health issues since last visit.   No new medications.   No new hospitalization.   Feels well.         Past Medical History, Past Surgical History, Social History, Family History have been reviewed and are without significant changes except as mentioned.        Medications:  The current medication list was reviewed in the EMR    ALLERGIES:    Allergies   Allergen Reactions   • Penicillins Rash       Objective      Vitals:    10/18/22 0802   BP: 140/85   Pulse: 85   Resp: 18   Temp: 96.4 °F (35.8 °C)   SpO2: 97%         Current Status 7/9/2021   ECOG score 0       Physical Exam  Vitals and nursing note reviewed.   Constitutional:       Appearance: Normal appearance.   Neurological:      General: No focal deficit present.      Mental Status: She is alert and oriented to person, place, and time. Mental status is at baseline.   Psychiatric:         Mood and Affect: Mood normal.         Behavior: Behavior normal.         Thought Content: Thought content normal.               RECENT LABS:Independently reviewed and summarized  Hematology WBC   Date Value Ref Range Status   05/23/2022 7.51 3.40 - 10.80 10*3/mm3 Final     RBC   Date Value Ref Range Status   05/23/2022 4.29 3.77 - 5.28 10*6/mm3 Final     Hemoglobin   Date Value Ref Range Status   05/23/2022 12.4 12.0 - 15.9 g/dL Final     Hematocrit   Date Value Ref Range Status   05/23/2022 35.8 34.0 - 46.6 % Final     Platelets   Date Value Ref Range Status   05/23/2022 278 140 - 450 10*3/mm3 Final            Imaging (independently reviewed and summarized):       MRI Breast Bilateral Screening With & Without Contrast    Result Date: 7/29/2022  Normal MRI examination of the right and left breast. History of right mastectomy and bilateral breast reconstruction, implants. No masses. No abnormal enhancement. BIRADS: 2, Benign finding(s) Electronically  signed by:  Dilan Fabian MD  7/29/2022 2:14 PM CDT Workstation: [id number]    Moy Sparrow reports a pain score of 0.  Given her pain assessment as noted, treatment options were discussed and the following options were decided upon as a follow-up plan to address the patient's pain: continuation of current treatment plan for pain.    Patient screened negative for depression based on a PHQ-9 score of 0 on 10/18/2022.         Diagnosis:   (1) Invasive ductal carcinoma, right   Date of diagnosis: 9/5/18   Stage: IB ([id number]), recurrence score 36   Prior treatment:   Right mastectomy with sentinel node biopsy and ALND (9/5/18)  Chemotherapy: Start date (10/15/18)   Completed dose dense AC followed by dose dense taxol (2/11/19, last dose 1/28/19)  Adjuvant RT: 5040 cGy right chest wall and axilla      Current treatment:   Ovarian suppression (surgical) and exemestane (11/26/18)     Assessment & Plan     Chronic, stable.  She is currently on exemestane.  Tolerating treatment well without any major side effect.  Result of recent MRI breast reviewed which showed no evidence of recurrence.  Recommend continue exemestane.  Recommend calcium and vitamin D supplement for bone health.  Recommend about diet, exercise and weight loss.  I will order DEXA scan in 4 months to evaluate for bone mineral density.  I will plan to see her back in 4 months with CBC, CMP      Recommendations:   · Continue exemestane.  · Recommend 7 and half year of treatment.  · Recommend healthy diet and exercise  · Continue calcium and vitamin D supplement for bone health.  · DEXA scan in 4 months to evaluate for bone mineral density.  · CBC, CMP in 4 months.        10/18/2022      CC:

## 2022-11-18 RX ORDER — DESVENLAFAXINE 100 MG/1
100 TABLET, EXTENDED RELEASE ORAL DAILY
Qty: 30 TABLET | Refills: 1 | Status: CANCELLED | OUTPATIENT
Start: 2022-11-18

## 2022-11-22 RX ORDER — DESVENLAFAXINE 100 MG/1
100 TABLET, EXTENDED RELEASE ORAL DAILY
Qty: 30 TABLET | Refills: 1 | Status: CANCELLED | OUTPATIENT
Start: 2022-11-22

## 2022-11-23 RX ORDER — DESVENLAFAXINE 100 MG/1
100 TABLET, EXTENDED RELEASE ORAL DAILY
Qty: 30 TABLET | Refills: 1 | Status: SHIPPED | OUTPATIENT
Start: 2022-11-23 | End: 2023-01-11 | Stop reason: SDUPTHER

## 2022-11-23 NOTE — TELEPHONE ENCOUNTER
Rx Refill Note  Requested Prescriptions     Pending Prescriptions Disp Refills   • desvenlafaxine (Pristiq) 100 MG 24 hr tablet 30 tablet 1     Sig: Take 1 tablet by mouth Daily.      Last office visit with prescribing clinician: 10/18/2022      Next office visit with prescribing clinician: 2/16/2023            Urmila Liao Rep  11/23/22, 08:28 CST

## 2022-11-23 NOTE — TELEPHONE ENCOUNTER
Incoming Refill Request      Medication requested (name and dose): Pristiq     Pharmacy where request should be sent: [facility]     Additional details provided by patient: Pt would like a call back once it has been sent in     Best call back number: [phone]    Does the patient have less than a 3 day supply:  [x] Yes  [] No    Sheeba Mills  11/23/22, 13:06 CST

## 2022-11-23 NOTE — TELEPHONE ENCOUNTER
Rx Refill Note  Requested Prescriptions     Pending Prescriptions Disp Refills   • desvenlafaxine (Pristiq) 100 MG 24 hr tablet 30 tablet 1     Sig: Take 1 tablet by mouth Daily.      Last office visit with prescribing clinician: 10/18/2022      Next office visit with prescribing clinician: 2/16/2023            Patricia Case RN  11/23/22, 13:25 CST

## 2022-11-23 NOTE — TELEPHONE ENCOUNTER
Rx Refill Note  Requested Prescriptions     Pending Prescriptions Disp Refills   • desvenlafaxine (Pristiq) 100 MG 24 hr tablet 30 tablet 1     Sig: Take 1 tablet by mouth Daily.      Last office visit with prescribing clinician: 10/18/2022      Next office visit with prescribing clinician: 2/16/2023            Urmila Liao Rep  11/23/22, 08:05 CST

## 2022-11-24 RX ORDER — DESVENLAFAXINE 100 MG/1
100 TABLET, EXTENDED RELEASE ORAL DAILY
Qty: 30 TABLET | Refills: 1 | Status: SHIPPED | OUTPATIENT
Start: 2022-11-24 | End: 2023-01-11 | Stop reason: SDUPTHER

## 2022-12-09 NOTE — TELEPHONE ENCOUNTER
Pt stated she spoke with you about her neck pain last week and has decided she would like a scan because it is still hurting.     5

## 2023-01-11 ENCOUNTER — OFFICE VISIT (OUTPATIENT)
Dept: FAMILY MEDICINE CLINIC | Facility: CLINIC | Age: 43
End: 2023-01-11
Payer: COMMERCIAL

## 2023-01-11 VITALS
HEART RATE: 89 BPM | BODY MASS INDEX: 32.28 KG/M2 | DIASTOLIC BLOOD PRESSURE: 88 MMHG | SYSTOLIC BLOOD PRESSURE: 122 MMHG | OXYGEN SATURATION: 99 % | WEIGHT: 171 LBS | HEIGHT: 61 IN

## 2023-01-11 DIAGNOSIS — K21.9 GASTROESOPHAGEAL REFLUX DISEASE WITHOUT ESOPHAGITIS: ICD-10-CM

## 2023-01-11 DIAGNOSIS — Z00.00 GENERAL MEDICAL EXAM: ICD-10-CM

## 2023-01-11 DIAGNOSIS — F41.9 ANXIETY: Primary | ICD-10-CM

## 2023-01-11 PROCEDURE — 99213 OFFICE O/P EST LOW 20 MIN: CPT | Performed by: NURSE PRACTITIONER

## 2023-01-11 RX ORDER — FAMOTIDINE 20 MG/1
40 TABLET, FILM COATED ORAL DAILY
Qty: 60 TABLET | Refills: 11 | Status: SHIPPED | OUTPATIENT
Start: 2023-01-11

## 2023-01-11 RX ORDER — DESVENLAFAXINE 100 MG/1
100 TABLET, EXTENDED RELEASE ORAL DAILY
Qty: 90 TABLET | Refills: 3 | Status: SHIPPED | OUTPATIENT
Start: 2023-01-11

## 2023-01-11 RX ORDER — HYDROXYZINE HYDROCHLORIDE 10 MG/1
10-20 TABLET, FILM COATED ORAL NIGHTLY
Qty: 90 TABLET | Refills: 3 | Status: SHIPPED | OUTPATIENT
Start: 2023-01-11

## 2023-01-11 RX ORDER — BUSPIRONE HYDROCHLORIDE 10 MG/1
10 TABLET ORAL 3 TIMES DAILY PRN
Qty: 90 TABLET | Refills: 3 | Status: SHIPPED | OUTPATIENT
Start: 2023-01-11

## 2023-01-11 NOTE — PROGRESS NOTES
Chief Complaint   Patient presents with   • Anxiety     Med refills     Subjective   Moy Sparrow is a 42 y.o. female.           Anxiety  Presents for follow-up visit. Symptoms include depressed mood, nausea, nervous/anxious behavior and panic. Patient reports no confusion, dizziness or shortness of breath. Symptoms occur most days. The quality of sleep is good.     Compliance with medications is %.   Heartburn  She complains of nausea. She reports no abdominal pain, no choking, no coughing or no wheezing. This is a recurrent problem. The current episode started more than 1 month ago. The problem occurs frequently. The problem has been resolved. Associated symptoms include fatigue. She has tried a PPI (pepcid ) for the symptoms. The treatment provided mild relief.        The following portions of the patient's history were reviewed and updated as appropriate: allergies, current medications, past social history and problem list.    Review of Systems   Constitutional: Positive for fatigue. Negative for chills, diaphoresis, fever and unexpected weight change.   HENT: Negative.  Negative for congestion, dental problem, ear discharge, ear pain, facial swelling, postnasal drip and rhinorrhea.    Eyes: Negative.  Negative for photophobia, pain, redness and visual disturbance.   Respiratory: Negative.  Negative for cough, choking, chest tightness, shortness of breath, wheezing and stridor.    Cardiovascular: Negative.    Gastrointestinal: Positive for nausea. Negative for abdominal distention, abdominal pain, blood in stool, constipation, diarrhea, rectal pain and vomiting.   Endocrine: Negative.  Negative for polydipsia, polyphagia and polyuria.   Genitourinary: Negative.    Musculoskeletal: Negative.  Negative for arthralgias, back pain and gait problem.   Skin: Negative.    Allergic/Immunologic: Negative.  Negative for environmental allergies, food allergies and immunocompromised state.   Neurological:  "Negative.  Negative for dizziness, facial asymmetry and headaches.   Hematological: Negative.  Negative for adenopathy. Does not bruise/bleed easily.   Psychiatric/Behavioral: Negative for agitation, behavioral problems and confusion. The patient is nervous/anxious.         Anxiety -improved        Objective   /88   Pulse 89   Ht 154.9 cm (61\")   Wt 77.6 kg (171 lb)   LMP 11/01/2019 (Approximate)   SpO2 99%   BMI 32.31 kg/m²   Physical Exam  Vitals and nursing note reviewed.   HENT:      Head: Normocephalic.      Right Ear: Tympanic membrane normal. There is no impacted cerumen.      Left Ear: There is no impacted cerumen.      Nose: Nose normal. No congestion or rhinorrhea.      Mouth/Throat:      Mouth: Mucous membranes are moist.   Eyes:      Pupils: Pupils are equal, round, and reactive to light.   Cardiovascular:      Rate and Rhythm: Normal rate.      Pulses: Normal pulses.      Heart sounds: No murmur heard.    No friction rub. No gallop.   Pulmonary:      Effort: Pulmonary effort is normal. No respiratory distress.      Breath sounds: No stridor. No wheezing, rhonchi or rales.   Chest:      Chest wall: No tenderness.   Abdominal:      General: Abdomen is flat. There is no distension.      Palpations: There is no mass.      Tenderness: There is no abdominal tenderness.      Hernia: No hernia is present.   Musculoskeletal:         General: No swelling, tenderness, deformity or signs of injury. Normal range of motion.      Cervical back: Normal range of motion.      Right lower leg: No edema.      Left lower leg: No edema.   Skin:     General: Skin is warm.      Coloration: Skin is not jaundiced or pale.      Findings: No bruising, erythema, lesion or rash.   Neurological:      General: No focal deficit present.      Mental Status: She is alert and oriented to person, place, and time.      Cranial Nerves: No cranial nerve deficit.      Sensory: No sensory deficit.      Motor: No weakness.      " Coordination: Coordination normal.   Psychiatric:         Mood and Affect: Mood normal.         Behavior: Behavior normal.              Assessment & Plan     Problems Addressed this Visit        Health Encounters    General medical exam    Relevant Medications    desvenlafaxine (Pristiq) 100 MG 24 hr tablet    Other Relevant Orders    CBC & Differential    Comprehensive Metabolic Panel    Hemoglobin A1c    Iron    Lipid Panel    Vitamin D,25-Hydroxy    Vitamin B12    TSH    Magnesium       Mental Health    Anxiety - Primary    Relevant Medications    desvenlafaxine (Pristiq) 100 MG 24 hr tablet    Other Relevant Orders    CBC & Differential    Comprehensive Metabolic Panel    Hemoglobin A1c    Iron    Lipid Panel    Vitamin D,25-Hydroxy    Vitamin B12    TSH    Magnesium    Ambulatory Referral to Behavioral Health (Completed)   Other Visit Diagnoses     Gastroesophageal reflux disease without esophagitis        Relevant Medications    famotidine (PEPCID) 20 MG tablet      Diagnoses       Codes Comments    Anxiety    -  Primary ICD-10-CM: F41.9  ICD-9-CM: 300.00     General medical exam     ICD-10-CM: Z00.00  ICD-9-CM: V70.9     Gastroesophageal reflux disease without esophagitis     ICD-10-CM: K21.9  ICD-9-CM: 530.81            New Medications Ordered This Visit   Medications   • desvenlafaxine (Pristiq) 100 MG 24 hr tablet     Sig: Take 1 tablet by mouth Daily.     Dispense:  90 tablet     Refill:  3   • famotidine (PEPCID) 20 MG tablet     Sig: Take 2 tablets by mouth Daily.     Dispense:  60 tablet     Refill:  11   • hydrOXYzine (ATARAX) 10 MG tablet     Sig: Take 1-2 tablets by mouth Every Night.     Dispense:  90 tablet     Refill:  3   • busPIRone (BUSPAR) 10 MG tablet     Sig: Take 1 tablet by mouth 3 (Three) Times a Day As Needed.     Dispense:  90 tablet     Refill:  3     Current Outpatient Medications on File Prior to Visit   Medication Sig Dispense Refill   • exemestane (AROMASIN) 25 MG chemo tablet  Take 1 tablet by mouth Daily. 90 tablet 2   • [DISCONTINUED] ALPRAZolam (Xanax) 0.25 MG tablet Take 1 tablet by mouth 2 (Two) Times a Day As Needed for Anxiety. 20 tablet 0   • [DISCONTINUED] desvenlafaxine (Pristiq) 100 MG 24 hr tablet Take 1 tablet by mouth Daily. 30 tablet 1   • [DISCONTINUED] famotidine (PEPCID) 20 MG tablet Take 2 tablets by mouth Daily. 60 tablet 6   • oxyCODONE-acetaminophen (PERCOCET) 5-325 MG per tablet Take 1 to 2 tablets by mouth every 4 (four) to 6 (six) hours as needed for pain **Maximum of 6 tablets per day** 40 tablet 0   • promethazine (PHENERGAN) 25 MG tablet Take 1 tablet by mouth Every 6 (Six) Hours As Needed for nausea. 20 tablet 1   • [DISCONTINUED] azithromycin (ZITHROMAX) 250 MG tablet Take 2 tablets by mouth on day 1, then 1 tablet daily on days 2 through 5. (Patient taking differently: Take 2 tablets by mouth on day 1, then 1 tablet daily on days 2 through 5.) 6 tablet 0   • [DISCONTINUED] clindamycin (CLEOCIN) 300 MG capsule Take 2 capsules by mouth twice a day. 28 capsule 1   • [DISCONTINUED] desvenlafaxine (Pristiq) 100 MG 24 hr tablet Take 1 tablet by mouth Daily **Need to make appointment in December for more refills** 30 tablet 1   • [DISCONTINUED] ISOtretinoin (ACCUTANE) 20 MG capsule Take 20 mg by mouth Daily.     • [DISCONTINUED] ISOtretinoin (ACCUTANE) 30 MG capsule Take 1 capsule by mouth Daily with food. 30 capsule 0   • [DISCONTINUED] ISOtretinoin (ACCUTANE) 40 MG capsule Take 1 capsule by mouth Daily with food. 30 capsule 0     No current facility-administered medications on file prior to visit.       15 minutes   Follow Up   Return in about 1 year (around 1/11/2024).        It's not just what you eat, but when you eat  Eat breakfast, and eat smaller meals throughout the day. A healthy breakfast can jumpstart your metabolism, while eating small, healthy meals (rather than the standard three large meals) keeps your energy up.   Avoid eating at night. Try to eat  dinner earlier and fast for 14-16 hours until breakfast the next morning. Studies suggest that eating only when you’re most active and giving your digestive system a long break each day may help to regulate weight.     Refill meds, diet discussed      Add buspar as needed  Atarax 10mg bid prn       Refer to [facility]

## 2023-01-18 ENCOUNTER — LAB (OUTPATIENT)
Dept: LAB | Facility: HOSPITAL | Age: 43
End: 2023-01-18
Payer: COMMERCIAL

## 2023-01-18 PROCEDURE — 82607 VITAMIN B-12: CPT | Performed by: NURSE PRACTITIONER

## 2023-01-18 PROCEDURE — 83540 ASSAY OF IRON: CPT | Performed by: NURSE PRACTITIONER

## 2023-01-18 PROCEDURE — 83735 ASSAY OF MAGNESIUM: CPT | Performed by: NURSE PRACTITIONER

## 2023-01-18 PROCEDURE — 80061 LIPID PANEL: CPT | Performed by: NURSE PRACTITIONER

## 2023-01-18 PROCEDURE — 80050 GENERAL HEALTH PANEL: CPT | Performed by: NURSE PRACTITIONER

## 2023-01-18 PROCEDURE — 82306 VITAMIN D 25 HYDROXY: CPT | Performed by: NURSE PRACTITIONER

## 2023-01-18 PROCEDURE — 36415 COLL VENOUS BLD VENIPUNCTURE: CPT | Performed by: NURSE PRACTITIONER

## 2023-01-18 PROCEDURE — 83036 HEMOGLOBIN GLYCOSYLATED A1C: CPT | Performed by: NURSE PRACTITIONER

## 2023-01-19 LAB
25(OH)D3 SERPL-MCNC: 24.6 NG/ML (ref 30–100)
ALBUMIN SERPL-MCNC: 4.4 G/DL (ref 3.5–5.2)
ALBUMIN/GLOB SERPL: 1.2 G/DL
ALP SERPL-CCNC: 133 U/L (ref 39–117)
ALT SERPL W P-5'-P-CCNC: 19 U/L (ref 1–33)
ANION GAP SERPL CALCULATED.3IONS-SCNC: 12.3 MMOL/L (ref 5–15)
AST SERPL-CCNC: 21 U/L (ref 1–32)
BASOPHILS # BLD AUTO: 0.05 10*3/MM3 (ref 0–0.2)
BASOPHILS NFR BLD AUTO: 0.6 % (ref 0–1.5)
BILIRUB SERPL-MCNC: 0.5 MG/DL (ref 0–1.2)
BUN SERPL-MCNC: 18 MG/DL (ref 6–20)
BUN/CREAT SERPL: 25 (ref 7–25)
CALCIUM SPEC-SCNC: 9.5 MG/DL (ref 8.6–10.5)
CHLORIDE SERPL-SCNC: 103 MMOL/L (ref 98–107)
CHOLEST SERPL-MCNC: 237 MG/DL (ref 0–200)
CO2 SERPL-SCNC: 23.7 MMOL/L (ref 22–29)
CREAT SERPL-MCNC: 0.72 MG/DL (ref 0.57–1)
DEPRECATED RDW RBC AUTO: 40.3 FL (ref 37–54)
EGFRCR SERPLBLD CKD-EPI 2021: 105.9 ML/MIN/1.73
EOSINOPHIL # BLD AUTO: 0.13 10*3/MM3 (ref 0–0.4)
EOSINOPHIL NFR BLD AUTO: 1.6 % (ref 0.3–6.2)
ERYTHROCYTE [DISTWIDTH] IN BLOOD BY AUTOMATED COUNT: 13.1 % (ref 12.3–15.4)
GLOBULIN UR ELPH-MCNC: 3.6 GM/DL
GLUCOSE SERPL-MCNC: 72 MG/DL (ref 65–99)
HBA1C MFR BLD: 5.4 % (ref 4.8–5.6)
HCT VFR BLD AUTO: 39.6 % (ref 34–46.6)
HDLC SERPL-MCNC: 52 MG/DL (ref 40–60)
HGB BLD-MCNC: 13.2 G/DL (ref 12–15.9)
IMM GRANULOCYTES # BLD AUTO: 0.02 10*3/MM3 (ref 0–0.05)
IMM GRANULOCYTES NFR BLD AUTO: 0.2 % (ref 0–0.5)
IRON 24H UR-MRATE: 131 MCG/DL (ref 37–145)
LDLC SERPL CALC-MCNC: 175 MG/DL (ref 0–100)
LDLC/HDLC SERPL: 3.32 {RATIO}
LYMPHOCYTES # BLD AUTO: 1.82 10*3/MM3 (ref 0.7–3.1)
LYMPHOCYTES NFR BLD AUTO: 22.6 % (ref 19.6–45.3)
MAGNESIUM SERPL-MCNC: 2.1 MG/DL (ref 1.6–2.6)
MCH RBC QN AUTO: 28.3 PG (ref 26.6–33)
MCHC RBC AUTO-ENTMCNC: 33.3 G/DL (ref 31.5–35.7)
MCV RBC AUTO: 84.8 FL (ref 79–97)
MONOCYTES # BLD AUTO: 0.42 10*3/MM3 (ref 0.1–0.9)
MONOCYTES NFR BLD AUTO: 5.2 % (ref 5–12)
NEUTROPHILS NFR BLD AUTO: 5.61 10*3/MM3 (ref 1.7–7)
NEUTROPHILS NFR BLD AUTO: 69.8 % (ref 42.7–76)
NRBC BLD AUTO-RTO: 0 /100 WBC (ref 0–0.2)
PLATELET # BLD AUTO: 353 10*3/MM3 (ref 140–450)
PMV BLD AUTO: 9.3 FL (ref 6–12)
POTASSIUM SERPL-SCNC: 4.1 MMOL/L (ref 3.5–5.2)
PROT SERPL-MCNC: 8 G/DL (ref 6–8.5)
RBC # BLD AUTO: 4.67 10*6/MM3 (ref 3.77–5.28)
SODIUM SERPL-SCNC: 139 MMOL/L (ref 136–145)
TRIGL SERPL-MCNC: 62 MG/DL (ref 0–150)
TSH SERPL DL<=0.05 MIU/L-ACNC: 0.96 UIU/ML (ref 0.27–4.2)
VIT B12 BLD-MCNC: 389 PG/ML (ref 211–946)
VLDLC SERPL-MCNC: 10 MG/DL (ref 5–40)
WBC NRBC COR # BLD: 8.05 10*3/MM3 (ref 3.4–10.8)

## 2023-01-23 ENCOUNTER — TELEPHONE (OUTPATIENT)
Dept: FAMILY MEDICINE CLINIC | Facility: CLINIC | Age: 43
End: 2023-01-23
Payer: COMMERCIAL

## 2023-01-23 NOTE — PROGRESS NOTES
Per CICI Zavala, Ms. Sparrow has been called with recent lab results and recommendations.   Continue current medications and follow-up as planned or sooner if any problems.

## 2023-01-23 NOTE — TELEPHONE ENCOUNTER
Per CICI Zavala, Ms. Sparrow has been called with recent lab results and recommendations.   Continue current medications and follow-up as planned or sooner if any problems.     ----- Message from CICI Gardienr sent at 1/19/2023  7:44 AM CST -----  Can you let her know vitamin d is low -needs vitamin d 2000 iu and monitor diet closely , no fast foods or processed foods   ----- Message -----  From: Lab, Background User  Sent: 1/19/2023  12:53 AM CST  To: CICI Gardiner

## 2023-02-20 ENCOUNTER — TELEPHONE (OUTPATIENT)
Dept: ONCOLOGY | Facility: CLINIC | Age: 43
End: 2023-02-20
Payer: COMMERCIAL

## 2023-04-04 ENCOUNTER — TELEPHONE (OUTPATIENT)
Dept: ONCOLOGY | Facility: CLINIC | Age: 43
End: 2023-04-04
Payer: COMMERCIAL

## 2023-05-31 RX ORDER — EXEMESTANE 25 MG/1
25 TABLET ORAL DAILY
Qty: 90 TABLET | Refills: 2 | Status: SHIPPED | OUTPATIENT
Start: 2023-05-31

## 2023-05-31 NOTE — TELEPHONE ENCOUNTER
Rx Refill Note  Requested Prescriptions     Pending Prescriptions Disp Refills   • exemestane (AROMASIN) 25 MG tablet 90 tablet 2     Sig: Take 1 tablet by mouth Daily.      Last office visit with prescribing clinician: 1/25/2022   Last telemedicine visit with prescribing clinician: Visit date not found   Next office visit with prescribing clinician: Visit date not found                         Would you like a call back once the refill request has been completed: [] Yes [] No    If the office needs to give you a call back, can they leave a voicemail: [] Yes [] No    Urmlia Liao Rep  05/31/23, 08:25 CDT

## 2023-08-08 ENCOUNTER — OFFICE VISIT (OUTPATIENT)
Dept: ONCOLOGY | Facility: CLINIC | Age: 43
End: 2023-08-08
Payer: COMMERCIAL

## 2023-08-08 ENCOUNTER — LAB (OUTPATIENT)
Dept: ONCOLOGY | Facility: HOSPITAL | Age: 43
End: 2023-08-08
Payer: COMMERCIAL

## 2023-08-08 VITALS
BODY MASS INDEX: 30.69 KG/M2 | HEART RATE: 92 BPM | SYSTOLIC BLOOD PRESSURE: 123 MMHG | TEMPERATURE: 97 F | WEIGHT: 162.4 LBS | RESPIRATION RATE: 18 BRPM | OXYGEN SATURATION: 100 % | DIASTOLIC BLOOD PRESSURE: 90 MMHG

## 2023-08-08 DIAGNOSIS — Z17.0 MALIGNANT NEOPLASM OF UPPER-OUTER QUADRANT OF RIGHT BREAST IN FEMALE, ESTROGEN RECEPTOR POSITIVE: ICD-10-CM

## 2023-08-08 DIAGNOSIS — C50.411 MALIGNANT NEOPLASM OF UPPER-OUTER QUADRANT OF RIGHT BREAST IN FEMALE, ESTROGEN RECEPTOR POSITIVE: ICD-10-CM

## 2023-08-08 DIAGNOSIS — Z17.0 MALIGNANT NEOPLASM OF UPPER-OUTER QUADRANT OF RIGHT BREAST IN FEMALE, ESTROGEN RECEPTOR POSITIVE: Primary | ICD-10-CM

## 2023-08-08 DIAGNOSIS — C50.411 MALIGNANT NEOPLASM OF UPPER-OUTER QUADRANT OF RIGHT BREAST IN FEMALE, ESTROGEN RECEPTOR POSITIVE: Primary | ICD-10-CM

## 2023-08-08 LAB
ALBUMIN SERPL-MCNC: 4 G/DL (ref 3.5–5.2)
ALBUMIN/GLOB SERPL: 1.1 G/DL
ALP SERPL-CCNC: 117 U/L (ref 39–117)
ALT SERPL W P-5'-P-CCNC: 11 U/L (ref 1–33)
ANION GAP SERPL CALCULATED.3IONS-SCNC: 8 MMOL/L (ref 5–15)
AST SERPL-CCNC: 16 U/L (ref 1–32)
BILIRUB SERPL-MCNC: 0.2 MG/DL (ref 0–1.2)
BUN SERPL-MCNC: 11 MG/DL (ref 6–20)
BUN/CREAT SERPL: 14.7 (ref 7–25)
CALCIUM SPEC-SCNC: 9 MG/DL (ref 8.6–10.5)
CHLORIDE SERPL-SCNC: 104 MMOL/L (ref 98–107)
CO2 SERPL-SCNC: 27 MMOL/L (ref 22–29)
CREAT SERPL-MCNC: 0.75 MG/DL (ref 0.57–1)
DEPRECATED RDW RBC AUTO: 39.3 FL (ref 37–54)
EGFRCR SERPLBLD CKD-EPI 2021: 100.2 ML/MIN/1.73
ERYTHROCYTE [DISTWIDTH] IN BLOOD BY AUTOMATED COUNT: 13.1 % (ref 12.3–15.4)
GLOBULIN UR ELPH-MCNC: 3.8 GM/DL
GLUCOSE SERPL-MCNC: 84 MG/DL (ref 65–99)
HCT VFR BLD AUTO: 35.5 % (ref 34–46.6)
HGB BLD-MCNC: 11.9 G/DL (ref 12–15.9)
MCH RBC QN AUTO: 27.9 PG (ref 26.6–33)
MCHC RBC AUTO-ENTMCNC: 33.5 G/DL (ref 31.5–35.7)
MCV RBC AUTO: 83.1 FL (ref 79–97)
PLATELET # BLD AUTO: 298 10*3/MM3 (ref 140–450)
PMV BLD AUTO: 9.1 FL (ref 6–12)
POTASSIUM SERPL-SCNC: 4.5 MMOL/L (ref 3.5–5.2)
PROT SERPL-MCNC: 7.8 G/DL (ref 6–8.5)
RBC # BLD AUTO: 4.27 10*6/MM3 (ref 3.77–5.28)
SODIUM SERPL-SCNC: 139 MMOL/L (ref 136–145)
WBC NRBC COR # BLD: 7.31 10*3/MM3 (ref 3.4–10.8)

## 2023-08-08 NOTE — PROGRESS NOTES
Inna Sparrow was seen in follow-up for breast cancer  She is overall doing well.  No new health issues or hospitalization.  Feels overall well.    Past Medical History, Past Surgical History, Social History, Family History have been reviewed and are without significant changes except as mentioned.        Medications:  The current medication list was reviewed in the EMR    ALLERGIES:    Allergies   Allergen Reactions    Penicillins Rash       Objective      Vitals:    08/08/23 1332   BP: 123/90   Pulse: 92   Resp: 18   Temp: 97 øF (36.1 øC)   SpO2: 100%             7/9/2021     2:18 PM   Current Status   ECOG score 0       Physical Exam  Vitals and nursing note reviewed.   Constitutional:       Appearance: Normal appearance.   Neurological:      General: No focal deficit present.      Mental Status: She is alert and oriented to person, place, and time. Mental status is at baseline.   Psychiatric:         Mood and Affect: Mood normal.         Behavior: Behavior normal.         Thought Content: Thought content normal.             RECENT LABS:Independently reviewed and summarized  Hematology WBC   Date Value Ref Range Status   01/18/2023 8.05 3.40 - 10.80 10*3/mm3 Final     RBC   Date Value Ref Range Status   01/18/2023 4.67 3.77 - 5.28 10*6/mm3 Final     Hemoglobin   Date Value Ref Range Status   01/18/2023 13.2 12.0 - 15.9 g/dL Final     Hematocrit   Date Value Ref Range Status   01/18/2023 39.6 34.0 - 46.6 % Final     Platelets   Date Value Ref Range Status   01/18/2023 353 140 - 450 10*3/mm3 Final     WBC   Date Value Ref Range Status   08/08/2023 7.31 3.40 - 10.80 10*3/mm3 Final     RBC   Date Value Ref Range Status   08/08/2023 4.27 3.77 - 5.28 10*6/mm3 Final     Hemoglobin   Date Value Ref Range Status   08/08/2023 11.9 (L) 12.0 - 15.9 g/dL Final     Hematocrit   Date Value Ref Range Status   08/08/2023 35.5 34.0 - 46.6 % Final     MCV   Date Value Ref Range Status   08/08/2023 83.1 79.0 -  97.0 fL Final     MCH   Date Value Ref Range Status   08/08/2023 27.9 26.6 - 33.0 pg Final     MCHC   Date Value Ref Range Status   08/08/2023 33.5 31.5 - 35.7 g/dL Final     RDW   Date Value Ref Range Status   08/08/2023 13.1 12.3 - 15.4 % Final     RDW-SD   Date Value Ref Range Status   08/08/2023 39.3 37.0 - 54.0 fl Final     MPV   Date Value Ref Range Status   08/08/2023 9.1 6.0 - 12.0 fL Final     Platelets   Date Value Ref Range Status   08/08/2023 298 140 - 450 10*3/mm3 Final     Neutrophil %   Date Value Ref Range Status   01/18/2023 69.8 42.7 - 76.0 % Final     Lymphocyte %   Date Value Ref Range Status   01/18/2023 22.6 19.6 - 45.3 % Final     Monocyte %   Date Value Ref Range Status   01/18/2023 5.2 5.0 - 12.0 % Final     Eosinophil %   Date Value Ref Range Status   01/18/2023 1.6 0.3 - 6.2 % Final     Basophil %   Date Value Ref Range Status   01/18/2023 0.6 0.0 - 1.5 % Final     Immature Grans %   Date Value Ref Range Status   01/18/2023 0.2 0.0 - 0.5 % Final     Neutrophils, Absolute   Date Value Ref Range Status   01/18/2023 5.61 1.70 - 7.00 10*3/mm3 Final     Lymphocytes, Absolute   Date Value Ref Range Status   01/18/2023 1.82 0.70 - 3.10 10*3/mm3 Final     Monocytes, Absolute   Date Value Ref Range Status   01/18/2023 0.42 0.10 - 0.90 10*3/mm3 Final     Eosinophils, Absolute   Date Value Ref Range Status   01/18/2023 0.13 0.00 - 0.40 10*3/mm3 Final     Basophils, Absolute   Date Value Ref Range Status   01/18/2023 0.05 0.00 - 0.20 10*3/mm3 Final     Immature Grans, Absolute   Date Value Ref Range Status   01/18/2023 0.02 0.00 - 0.05 10*3/mm3 Final     nRBC   Date Value Ref Range Status   01/18/2023 0.0 0.0 - 0.2 /100 WBC Final       Lab Results   Component Value Date    GLUCOSE 84 08/08/2023    BUN 11 08/08/2023    CREATININE 0.75 08/08/2023    EGFR 100.2 08/08/2023    BCR 14.7 08/08/2023    K 4.5 08/08/2023    CO2 27.0 08/08/2023    CALCIUM 9.0 08/08/2023    ALBUMIN 4.0 08/08/2023    BILITOT 0.2  08/08/2023    AST 16 08/08/2023    ALT 11 08/08/2023              Imaging (independently reviewed and summarized):     3/28/23    DEXA     IMPRESSION:  CONCLUSION:  Normal bone density      Moy Sparrow reports a pain score of 0.  Given her pain assessment as noted, treatment options were discussed and the following options were decided upon as a follow-up plan to address the patient's pain: continuation of current treatment plan for pain.      Patient screened negative for depression based on a PHQ-9 score of 0 on 8/8/2023.     Diagnosis:   (1) Invasive ductal carcinoma, right   Date of diagnosis: 9/5/18   Stage: IB ([id number]), recurrence score 36   Prior treatment:   Right mastectomy with sentinel node biopsy and ALND (9/5/18)  Chemotherapy: Start date (10/15/18)   Completed dose dense AC followed by dose dense taxol (2/11/19, last dose 1/28/19)  Adjuvant RT: 5040 cGy right chest wall and axilla      Current treatment:   Ovarian suppression (surgical) and exemestane (11/26/18)     Assessment & Plan     (1) Invasive ductal carcinoma, right     Chronic, stable.  Currently on exemestane.  Tolerating treatment well without any major side effects.  Continue exemestane with calcium and vitamin D supplement for bone health.    Recommendations:   Continue exemestane.  Recommend 7 and half year of treatment.  Continue calcium and vitamin D supplements for bone health.    CBC, CMP in 6 months.  DEXA scan annually.    8/8/2023      CC:

## 2023-08-10 ENCOUNTER — HOSPITAL ENCOUNTER (EMERGENCY)
Facility: HOSPITAL | Age: 43
Discharge: HOME | End: 2023-08-10
Attending: EMERGENCY MEDICINE
Payer: COMMERCIAL

## 2023-08-10 ENCOUNTER — APPOINTMENT (OUTPATIENT)
Dept: CT IMAGING | Facility: HOSPITAL | Age: 43
End: 2023-08-10
Payer: COMMERCIAL

## 2023-08-10 VITALS
TEMPERATURE: 98.9 F | WEIGHT: 163.4 LBS | HEART RATE: 93 BPM | HEIGHT: 61 IN | RESPIRATION RATE: 18 BRPM | DIASTOLIC BLOOD PRESSURE: 84 MMHG | OXYGEN SATURATION: 100 % | SYSTOLIC BLOOD PRESSURE: 126 MMHG | BODY MASS INDEX: 30.85 KG/M2

## 2023-08-10 DIAGNOSIS — N76.0 BACTERIAL VAGINOSIS: Primary | ICD-10-CM

## 2023-08-10 DIAGNOSIS — K63.89 EPIPLOIC APPENDAGITIS: ICD-10-CM

## 2023-08-10 DIAGNOSIS — R10.2 PELVIC PAIN: ICD-10-CM

## 2023-08-10 DIAGNOSIS — B96.89 BACTERIAL VAGINOSIS: Primary | ICD-10-CM

## 2023-08-10 LAB
ALBUMIN SERPL-MCNC: 4.2 G/DL (ref 3.5–5.2)
ALBUMIN/GLOB SERPL: 1 G/DL
ALP SERPL-CCNC: 134 U/L (ref 39–117)
ALT SERPL W P-5'-P-CCNC: 16 U/L (ref 1–33)
ANION GAP SERPL CALCULATED.3IONS-SCNC: 9 MMOL/L (ref 5–15)
AST SERPL-CCNC: 23 U/L (ref 1–32)
BACTERIA UR QL AUTO: ABNORMAL /HPF
BACTERIAL VAGINOSIS VAG-IMP: POSITIVE
BASOPHILS # BLD AUTO: 0.08 10*3/MM3 (ref 0–0.2)
BASOPHILS NFR BLD AUTO: 1 % (ref 0–1.5)
BILIRUB SERPL-MCNC: 0.4 MG/DL (ref 0–1.2)
BILIRUB UR QL STRIP: NEGATIVE
BUN SERPL-MCNC: 14 MG/DL (ref 6–20)
BUN/CREAT SERPL: 16.3 (ref 7–25)
CALCIUM SPEC-SCNC: 9 MG/DL (ref 8.6–10.5)
CANDIDA DNA VAG QL NAA+PROBE: NOT DETECTED
CANDIDA DNA VAG QL NAA+PROBE: NOT DETECTED
CHLORIDE SERPL-SCNC: 103 MMOL/L (ref 98–107)
CLARITY UR: CLEAR
CO2 SERPL-SCNC: 25 MMOL/L (ref 22–29)
COLOR UR: YELLOW
CREAT SERPL-MCNC: 0.86 MG/DL (ref 0.57–1)
DEPRECATED RDW RBC AUTO: 40.2 FL (ref 37–54)
EGFRCR SERPLBLD CKD-EPI 2021: 85 ML/MIN/1.73
EOSINOPHIL # BLD AUTO: 0.13 10*3/MM3 (ref 0–0.4)
EOSINOPHIL NFR BLD AUTO: 1.7 % (ref 0.3–6.2)
ERYTHROCYTE [DISTWIDTH] IN BLOOD BY AUTOMATED COUNT: 13 % (ref 12.3–15.4)
GLOBULIN UR ELPH-MCNC: 4.2 GM/DL
GLUCOSE SERPL-MCNC: 99 MG/DL (ref 65–99)
GLUCOSE UR STRIP-MCNC: NEGATIVE MG/DL
HCT VFR BLD AUTO: 37.4 % (ref 34–46.6)
HGB BLD-MCNC: 12.1 G/DL (ref 12–15.9)
HGB UR QL STRIP.AUTO: NEGATIVE
HOLD SPECIMEN: NORMAL
HYALINE CASTS UR QL AUTO: ABNORMAL /LPF
IMM GRANULOCYTES # BLD AUTO: 0.03 10*3/MM3 (ref 0–0.05)
IMM GRANULOCYTES NFR BLD AUTO: 0.4 % (ref 0–0.5)
KETONES UR QL STRIP: NEGATIVE
LEUKOCYTE ESTERASE UR QL STRIP.AUTO: ABNORMAL
LIPASE SERPL-CCNC: 16 U/L (ref 13–60)
LYMPHOCYTES # BLD AUTO: 1.64 10*3/MM3 (ref 0.7–3.1)
LYMPHOCYTES NFR BLD AUTO: 20.9 % (ref 19.6–45.3)
MCH RBC QN AUTO: 27.4 PG (ref 26.6–33)
MCHC RBC AUTO-ENTMCNC: 32.4 G/DL (ref 31.5–35.7)
MCV RBC AUTO: 84.6 FL (ref 79–97)
MONOCYTES # BLD AUTO: 0.52 10*3/MM3 (ref 0.1–0.9)
MONOCYTES NFR BLD AUTO: 6.6 % (ref 5–12)
NEUTROPHILS NFR BLD AUTO: 5.44 10*3/MM3 (ref 1.7–7)
NEUTROPHILS NFR BLD AUTO: 69.4 % (ref 42.7–76)
NITRITE UR QL STRIP: NEGATIVE
NRBC BLD AUTO-RTO: 0 /100 WBC (ref 0–0.2)
PH UR STRIP.AUTO: 7 [PH] (ref 5–9)
PLATELET # BLD AUTO: 299 10*3/MM3 (ref 140–450)
PMV BLD AUTO: 8.9 FL (ref 6–12)
POTASSIUM SERPL-SCNC: 3.9 MMOL/L (ref 3.5–5.2)
PROT SERPL-MCNC: 8.4 G/DL (ref 6–8.5)
PROT UR QL STRIP: ABNORMAL
RBC # BLD AUTO: 4.42 10*6/MM3 (ref 3.77–5.28)
RBC # UR STRIP: ABNORMAL /HPF
REF LAB TEST METHOD: ABNORMAL
SODIUM SERPL-SCNC: 137 MMOL/L (ref 136–145)
SP GR UR STRIP: 1.02 (ref 1–1.03)
SQUAMOUS #/AREA URNS HPF: ABNORMAL /HPF
T VAGINALIS DNA VAG QL NAA+PROBE: NOT DETECTED
UROBILINOGEN UR QL STRIP: ABNORMAL
WBC # UR STRIP: ABNORMAL /HPF
WBC NRBC COR # BLD: 7.84 10*3/MM3 (ref 3.4–10.8)

## 2023-08-10 PROCEDURE — 74177 CT ABD & PELVIS W/CONTRAST: CPT

## 2023-08-10 PROCEDURE — 80053 COMPREHEN METABOLIC PANEL: CPT

## 2023-08-10 PROCEDURE — 87086 URINE CULTURE/COLONY COUNT: CPT

## 2023-08-10 PROCEDURE — 85025 COMPLETE CBC W/AUTO DIFF WBC: CPT

## 2023-08-10 PROCEDURE — 81001 URINALYSIS AUTO W/SCOPE: CPT

## 2023-08-10 PROCEDURE — 0352U HC INF DIS BACTERIAL VAGINOSIS AND VAGINITIS AMPLIFIED PROBE TECHNIQUE: CPT

## 2023-08-10 PROCEDURE — 99285 EMERGENCY DEPT VISIT HI MDM: CPT

## 2023-08-10 PROCEDURE — 83690 ASSAY OF LIPASE: CPT

## 2023-08-10 PROCEDURE — 25510000001 IOPAMIDOL 61 % SOLUTION: Performed by: EMERGENCY MEDICINE

## 2023-08-10 RX ORDER — SODIUM CHLORIDE 0.9 % (FLUSH) 0.9 %
10 SYRINGE (ML) INJECTION AS NEEDED
Status: DISCONTINUED | OUTPATIENT
Start: 2023-08-10 | End: 2023-08-10 | Stop reason: HOSPADM

## 2023-08-10 RX ORDER — METRONIDAZOLE 500 MG/1
500 TABLET ORAL 2 TIMES DAILY
Qty: 14 TABLET | Refills: 0 | Status: SHIPPED | OUTPATIENT
Start: 2023-08-10 | End: 2023-08-17

## 2023-08-10 RX ADMIN — IOPAMIDOL 90 ML: 612 INJECTION, SOLUTION INTRAVENOUS at 12:24

## 2023-08-10 NOTE — DISCHARGE INSTRUCTIONS
Home to rest. Take flagyl as prescribed, start today. Take tylenol and ibuprofen as needed for pain. Follow up with your primary care provider in 2-3 days if symptoms persist. Return for worsening symptoms.

## 2023-08-10 NOTE — ED PROVIDER NOTES
"Subjective   History of Present Illness  43 year old female patient presents to ER for complaint of pelvic pain for past 2 days. Pain is constant, achy, and is 5/10 presently. She reports constipation since Monday. She is passing some stool but it has been \"ribbon shaped\" and difficult to pass. She took a laxative last night and today had liquid stool only. She denies urinary symptoms. She denies vaginal itching or discharge. She had a new sexual partner recently but used protection. She has a history of breast cancer in 2018 and also precancerous polyps on colonoscopy. Her last colonoscopy was two years ago and was negative. She smokes cigarettes occasionally, denies ETOH or illicit drug use. She has also had a total hysterectomy.     Review of Systems   Constitutional: Negative.  Negative for fever.   HENT: Negative.     Eyes: Negative.    Respiratory: Negative.     Cardiovascular: Negative.    Gastrointestinal:  Positive for abdominal pain and constipation.   Endocrine: Negative.    Genitourinary: Negative.  Negative for dysuria, urgency, vaginal discharge and vaginal pain.   Musculoskeletal: Negative.    Skin: Negative.    Allergic/Immunologic: Negative.    Neurological: Negative.    Hematological: Negative.    Psychiatric/Behavioral: Negative.       Past Medical History:   Diagnosis Date    Anxiety     Breast cancer     Cancer     breast right    Depressive disorder     Drug therapy     Encounter for gynecological examination     Encounter for vision screening 2005    Vision screen (1)      GERD (gastroesophageal reflux disease)     Health examination of defined subpopulation     Health examination of sub-group      Hx of radiation therapy     Malaise and fatigue     Palpitations     Skin cancer      Skin Ca Pre Cancer    Soft tissue swelling     right side of neck-2 different areas     Urinary tract infectious disease     Wears glasses        Allergies   Allergen Reactions    Penicillins Rash       Past " Surgical History:   Procedure Laterality Date    BREAST BIOPSY       SECTION      COLONOSCOPY N/A 3/1/2018    Procedure: COLONOSCOPY;  Surgeon: Shin Vanessa DO;  Location: [facility] ENDOSCOPY;  Service:     COLONOSCOPY N/A 2021    Procedure: COLONOSCOPY;  Surgeon: Shin Vanessa DO;  Location: [facility] ENDOSCOPY;  Service: Gastroenterology;  Laterality: N/A;    ENDOSCOPY N/A 10/5/2018    Procedure: ESOPHAGOGASTRODUODENOSCOPY;  Surgeon: Shin Vanessa DO;  Location: [facility] ENDOSCOPY;  Service: Gastroenterology    LAPAROSCOPIC ASSISTED VAGINAL HYSTERECTOMY SALPINGO OOPHORECTOMY N/A 3/10/2020    Procedure: ABDOMIAL HYSTERECTOMY BILATERAL SALPINGO-OOPHERECTOMY CYSTOSCOPY;  Surgeon: Evans Syed MD;  Location: [facility] OR;  Service: Obstetrics/Gynecology;  Laterality: N/A;    MASTECTOMY Right     MASTECTOMY WITH SENTINEL NODE BIOPSY AND AXILLARY NODE DISSECTION Right 2018    Procedure: INJECT RIGHT BREAST AND THEN PERFORM SENTINEL LYMPH NODE BIOPSY AND THEN  REMOVE RIGHT BREAST AND NIPPLE AND        (INJECTION ON SDS @7:00 A.M.);  Surgeon: Abdoul Ortega MD;  Location: [facility] OR;  Service: General    OOPHORECTOMY      PAP SMEAR      US GUIDED FINE NEEDLE ASPIRATION  2018    VENOUS ACCESS DEVICE (PORT) INSERTION Left 10/10/2018    Procedure: MEDIPORT PLACEMENT      (LEFT SIDE,UNATTACHED PORT )      (C-ARM);  Surgeon: Abdoul Ortega MD;  Location: [facility];  Service: General       Family History   Problem Relation Age of Onset    Bipolar disorder Mother     Schizophrenia Mother     Colon cancer Father         Colorectal Cancer    No Known Problems Sister        Social History     Socioeconomic History    Marital status: Single    Number of children: 2    Years of education: 14   Tobacco Use    Smoking status: Former     Years: 25.00     Types: Cigarettes     Quit date: 2019     Years since quittin.5    Smokeless tobacco: Never    Tobacco comments:     smokes 1-2  "cigs/ day   Vaping Use    Vaping Use: Never used   Substance and Sexual Activity    Alcohol use: No    Drug use: No    Sexual activity: Defer           Objective   /84 (BP Location: Right arm, Patient Position: Sitting)   Pulse 93   Temp 98.9 øF (37.2 øC) (Oral)   Resp 18   Ht 154.9 cm (61\")   Wt 74.1 kg (163 lb 6.4 oz)   LMP 03/01/2018 (Approximate)   SpO2 100%   BMI 30.87 kg/mý     Physical Exam  Vitals and nursing note reviewed.   Constitutional:       General: She is not in acute distress.     Appearance: Normal appearance. She is not ill-appearing, toxic-appearing or diaphoretic.   HENT:      Head: Normocephalic.      Nose: Nose normal.      Mouth/Throat:      Mouth: Mucous membranes are moist.   Eyes:      Pupils: Pupils are equal, round, and reactive to light.   Cardiovascular:      Rate and Rhythm: Normal rate and regular rhythm.      Pulses: Normal pulses.      Heart sounds: Normal heart sounds.   Pulmonary:      Effort: Pulmonary effort is normal.      Breath sounds: Normal breath sounds.   Abdominal:      General: Bowel sounds are normal. There is no distension.      Palpations: Abdomen is soft.      Tenderness: There is abdominal tenderness in the right lower quadrant, suprapubic area and left lower quadrant. There is guarding.   Musculoskeletal:         General: Normal range of motion.      Cervical back: Normal range of motion.   Skin:     General: Skin is warm and dry.      Capillary Refill: Capillary refill takes less than 2 seconds.   Neurological:      Mental Status: She is alert and oriented to person, place, and time.   Psychiatric:         Mood and Affect: Mood normal.         Behavior: Behavior normal.         Thought Content: Thought content normal.         Judgment: Judgment normal.       Procedures           ED Course  ED Course as of 08/10/23 1302   Thu Aug 10, 2023   1253 Spoke with patient about her results and plan for discharge including flagyl for BV. She declines pain " medication at present. She verbalizes understanding and is agreeable with plan. [HS]      ED Course User Index  [HS] Kendy Cordon, APRN           Results for orders placed or performed during the hospital encounter of 08/10/23   MVP Vaginosis Panel - Swab, Vagina    Specimen: Vagina; Swab   Result Value Ref Range    Bacterial vaginosis Positive (A) Negative    Candida glabrata/krusei Not Detected Not Detected    CANDIDA GROUP Not Detected Not Detected    Trichomonas vaginalis Not Detected Not Detected   Comprehensive Metabolic Panel    Specimen: Blood   Result Value Ref Range    Glucose 99 65 - 99 mg/dL    BUN 14 6 - 20 mg/dL    Creatinine 0.86 0.57 - 1.00 mg/dL    Sodium 137 136 - 145 mmol/L    Potassium 3.9 3.5 - 5.2 mmol/L    Chloride 103 98 - 107 mmol/L    CO2 25.0 22.0 - 29.0 mmol/L    Calcium 9.0 8.6 - 10.5 mg/dL    Total Protein 8.4 6.0 - 8.5 g/dL    Albumin 4.2 3.5 - 5.2 g/dL    ALT (SGPT) 16 1 - 33 U/L    AST (SGOT) 23 1 - 32 U/L    Alkaline Phosphatase 134 (H) 39 - 117 U/L    Total Bilirubin 0.4 0.0 - 1.2 mg/dL    Globulin 4.2 gm/dL    A/G Ratio 1.0 g/dL    BUN/Creatinine Ratio 16.3 7.0 - 25.0    Anion Gap 9.0 5.0 - 15.0 mmol/L    eGFR 85.0 >60.0 mL/min/1.73   Lipase    Specimen: Blood   Result Value Ref Range    Lipase 16 13 - 60 U/L   Urinalysis With Culture If Indicated - Urine, Clean Catch    Specimen: Urine, Clean Catch   Result Value Ref Range    Color, UA Yellow Yellow, Straw, Dark Yellow, [place]    Appearance, UA Clear Clear    pH, UA 7.0 5.0 - 9.0    Specific Gravity, UA 1.022 1.003 - 1.030    Glucose, UA Negative Negative    Ketones, UA Negative Negative    Bilirubin, UA Negative Negative    Blood, UA Negative Negative    Protein, UA Trace (A) Negative    Leuk Esterase, UA Small (1+) (A) Negative    Nitrite, UA Negative Negative    Urobilinogen, UA 0.2 E.U./dL 0.2 - 1.0 E.U./dL   CBC Auto Differential    Specimen: Blood   Result Value Ref Range    WBC 7.84 3.40 - 10.80 10*3/mm3    RBC 4.42  3.77 - 5.28 10*6/mm3    Hemoglobin 12.1 12.0 - 15.9 g/dL    Hematocrit 37.4 34.0 - 46.6 %    MCV 84.6 79.0 - 97.0 fL    MCH 27.4 26.6 - 33.0 pg    MCHC 32.4 31.5 - 35.7 g/dL    RDW 13.0 12.3 - 15.4 %    RDW-SD 40.2 37.0 - 54.0 fl    MPV 8.9 6.0 - 12.0 fL    Platelets 299 140 - 450 10*3/mm3    Neutrophil % 69.4 42.7 - 76.0 %    Lymphocyte % 20.9 19.6 - 45.3 %    Monocyte % 6.6 5.0 - 12.0 %    Eosinophil % 1.7 0.3 - 6.2 %    Basophil % 1.0 0.0 - 1.5 %    Immature Grans % 0.4 0.0 - 0.5 %    Neutrophils, Absolute 5.44 1.70 - 7.00 10*3/mm3    Lymphocytes, Absolute 1.64 0.70 - 3.10 10*3/mm3    Monocytes, Absolute 0.52 0.10 - 0.90 10*3/mm3    Eosinophils, Absolute 0.13 0.00 - 0.40 10*3/mm3    Basophils, Absolute 0.08 0.00 - 0.20 10*3/mm3    Immature Grans, Absolute 0.03 0.00 - 0.05 10*3/mm3    nRBC 0.0 0.0 - 0.2 /100 WBC   Urinalysis, Microscopic Only - Urine, Clean Catch    Specimen: Urine, Clean Catch   Result Value Ref Range    RBC, UA 0-2 (A) None Seen /HPF    WBC, UA 6-12 (A) None Seen, 0-2, 3-5 /HPF    Bacteria, UA None Seen None Seen /HPF    Squamous Epithelial Cells, UA 3-5 (A) None Seen, 0-2 /HPF    Hyaline Casts, UA 0-2 None Seen /LPF    Methodology Automated Microscopy    Gold Top - SST   Result Value Ref Range    Extra Tube Hold for add-ons.      CT Abdomen Pelvis With Contrast    Result Date: 8/10/2023  INDICATION: pelvic pain, constipation, hx cancer. COMPARISON: None relevant. TECHNIQUE: Helical CT of the abdomen and pelvis was performed with intravenous contrast. Multiplanar reformations were provided. FINDINGS: Ovoid fat focus with surrounding edema within the pelvis abutting the sigmoid colon.  Bowel otherwise appears normal with no obstruction or evidence of appendicitis. Liver, spleen, pancreas, adrenals, kidneys, gallbladder, and urinary bladder appear normal.  Uterus is absent.  No lymphadenopathy.     Epiploic appendagitis sigmoid colon.                                      Medical Decision  Making  Problems Addressed:  Bacterial vaginosis: complicated acute illness or injury  Epiploic appendagitis: complicated acute illness or injury  Pelvic pain: complicated acute illness or injury    Amount and/or Complexity of Data Reviewed  Labs: ordered.  Radiology: ordered.    Risk  Prescription drug management.        Final diagnoses:   Bacterial vaginosis   Epiploic appendagitis   Pelvic pain       ED Disposition  ED Disposition       ED Disposition   Discharge    Condition   Stable    Comment   --               Sally Campo, APRN  [address]  [address]  [place] [address]  [phone]    Call   ER follow up in 2-3 days for reevaluation         Medication List        New Prescriptions      metroNIDAZOLE 500 MG tablet  Commonly known as: FLAGYL  Take 1 tablet by mouth 2 (Two) Times a Day for 7 days.               Where to Get Your Medications        These medications were sent to [facility]  [address] [place] [address]      Hours: Monday through Friday 7:00am to 5:00pm Phone: [phone]   metroNIDAZOLE 500 MG tablet            Kendy Cordon APRN  08/10/23 1302

## 2023-08-11 LAB — BACTERIA SPEC AEROBE CULT: NORMAL

## 2024-05-09 NOTE — H&P (VIEW-ONLY)
Subjective   Moy Sparrow is a 38 y.o. female     History of Present Illness felt a mass in the lateral aspect of her right breast last week and call her primary care provider who ordered a mammogram and ultrasound.  Patient had a mammogram and ultrasound done this morning and it was felt to be a BI-RADS 5 study of the right breast and so she was referred directly to us.  No history nipple discharge breast pain no history of any palpable masses otherwise in either breast.  No family history of significant breast cancer.  She does state that she had 2 great aunts that had breast cancer but no other relatives.  No history of trauma to her breast.  I reviewed her mammogram as well as her ultrasound and at the 9 o'clock position in the right breast 2 cm from the nipple she has a 1.4 x 1.8 x 1.1 cm hypoechoic concerning mass suggestive of malignancy.  The report states that in the right breast she has a 5 x 4 x 3 mm lymph node 6    centimeters from the nipple at 1 o'clock position.  I can appreciate this on ultrasound are not appreciated on mammogram.  Patient also has a small cyst 4 o'clock position right breast.  This was patient's first mammogram.  Denies any history of nipple discharge.  She is not on any blood thinners.  She does smoke.  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      Review of Systems   Constitutional: Negative.    HENT: Positive for trouble swallowing.    Eyes: Negative.    Respiratory: Negative.    Cardiovascular: Negative.    Gastrointestinal:        Blood in stool.   Endocrine: Negative.    Genitourinary: Negative.    Musculoskeletal: Negative.    Skin: Negative.    Allergic/Immunologic: Negative.    Neurological: Positive for headaches.   Psychiatric/Behavioral:        Depression.     Past Medical History:   Diagnosis Date   • Depressive disorder    • Encounter for gynecological examination    • Encounter for vision screening 2005    Vision screen (1)     • Health examination of defined  subpopulation     Health examination of sub-group     • Malaise and fatigue    • Palpitations    • Soft tissue swelling     right side of neck-2 different areas    • Urinary tract infectious disease      Past Surgical History:   Procedure Laterality Date   •  SECTION     • COLONOSCOPY N/A 3/1/2018    Procedure: COLONOSCOPY;  Surgeon: Shin Vanessa DO;  Location: [facility] ENDOSCOPY;  Service:    • PAP SMEAR       Family History   Problem Relation Age of Onset   • Bipolar disorder Mother    • Schizophrenia Mother    • Colon cancer Father         Colorectal Cancer     Social History     Social History   • Marital status: Single     Spouse name: N/A   • Number of children: N/A   • Years of education: N/A     Occupational History   • Not on file.     Social History Main Topics   • Smoking status: Former Smoker     Packs/day: 1.00     Quit date: 2016   • Smokeless tobacco: Never Used   • Alcohol use No   • Drug use: Unknown   • Sexual activity: Defer     Other Topics Concern   • Not on file     Social History Narrative   • No narrative on file     No Known Allergies    Home Medications:  Prior to Admission medications    Medication Sig Start Date End Date Taking? Authorizing Provider   pantoprazole (PROTONIX) 40 MG EC tablet Take 1 tablet by mouth Daily. 18  Yes Sally Cunningham APRN   Vortioxetine HBr (TRINTELLIX) 10 MG tablet Take 1 tablet by mouth Daily. 18  Yes Sally Cunningham APRN   baclofen (LIORESAL) 10 MG tablet Take 1 tablet by mouth 3 (Three) Times a Day. 18   Sally Cunningham APRN   busPIRone (BUSPAR) 5 MG tablet Take 1 tablet by mouth 3 (Three) Times a Day. 11/15/17   Sally Cunningham APRN   diazePAM (VALIUM) 2 MG tablet Take 1 tablet by mouth Every 8 (Eight) Hours As Needed for Anxiety. 17   Sally Cunningham APRN   hydrocortisone (PROCTOSOL HC) 2.5 % rectal cream Apply to affected area topically 2 times every day for 2 weeks, and then as needed. 18         MethylPREDNISolone (MEDROL, GABO,) 4 MG tablet Take as directed on package with food. 8/27/18   Shazia Gomes APRN   ranitidine (ZANTAC) 150 MG capsule Take 1 capsule by mouth 2 (Two) Times a Day. 5/8/18 5/8/19  Jah Arita MD   Vortioxetine HBr (TRINTELLIX) 5 MG tablet Take 5 mg by mouth Daily With Breakfast. 3/28/18   Sally Cunningham APRN       Objective   Physical Exam   Constitutional: She is oriented to person, place, and time. She appears well-developed and well-nourished. No distress.   HENT:   Head: Normocephalic and atraumatic.   Nose: Nose normal.   Eyes: Conjunctivae are normal.   Neck: Normal range of motion. No tracheal deviation present. No thyromegaly present.   Cardiovascular: Normal rate, regular rhythm and normal heart sounds.    No murmur heard.  Pulmonary/Chest: Effort normal and breath sounds normal. No respiratory distress. She has no wheezes. She has no rales. She exhibits no tenderness.       Abdominal: Soft. She exhibits no distension. There is no tenderness. There is no rebound and no guarding. No hernia.   Musculoskeletal: She exhibits no tenderness or deformity.   Neurological: She is alert and oriented to person, place, and time.   Skin: Skin is warm and dry. No rash noted.   Psychiatric: She has a normal mood and affect. Her behavior is normal. Judgment and thought content normal.   Vitals reviewed.      Assessment/Plan palpable concerning mass right breast.  I would recommend ultrasound guided mammotome biopsy of right breast mass and possible also biopsied a small lymph node.  I fully discussed the procedure and also leaving a clip with the patient she clearly understands the procedure alternatives risk and benefits and wishes to proceed      The encounter diagnosis was Breast mass.                     This document has been electronically signed by Abdoul Ortega MD on August 27, 2018 4:20 PM          Patient underwent ultrasound-guided right breast mammotome  biopsy and pathology was invasive ductal carcinoma that was estrogen positive progesterone negative and Herceptin negative.  I have gone over these findings with the patient and she clearly understands and wishes to proceed with mastectomy and sentinel lymph node injection and biopsy and possible axillary lymph node dissection.  We also discussed possibly doing a prophylactic left mastectomy but she wishes to wait a couple days before making that decision.  She clearly would like to proceed with surgery done as soon as possible so we will set her up for surgery next week.  I fully explained injection of right breast for sentinel lymph node biopsy and then performing sentinel lymph node biopsy and then removing right breast and nipple and possible axillary lymph node dissection.  She clearly is understands her alternatives risk and benefits and wishes to proceed     Statement Selected

## 2024-09-18 NOTE — TELEPHONE ENCOUNTER
Last OV 10/2018    Next Luis Alfredo OV 11/30/2020   Detail Level: Detailed Size Of Lesion In Cm (Optional): 0 Introduction Text (Please End With A Colon): The following procedure was deferred:

## (undated) DEVICE — SYS SKIN CLS DERMABOND PRINEO W/22CM MESH TP

## (undated) DEVICE — ENDOPATH XCEL BLADELESS TROCARS WITH STABILITY SLEEVES: Brand: ENDOPATH XCEL

## (undated) DEVICE — MEDI-VAC YANKAUER SUCTION HANDLE W/BULBOUS TIP: Brand: CARDINAL HEALTH

## (undated) DEVICE — SOL IRRIG H2O 1000ML STRL

## (undated) DEVICE — 3M™ STERI-STRIP™ REINFORCED ADHESIVE SKIN CLOSURES, R1547, 1/2 IN X 4 IN (12 MM X 100 MM), 6 STRIPS/ENVELOPE: Brand: 3M™ STERI-STRIP™

## (undated) DEVICE — BLUNT TIP TROCAR: Brand: AUTO SUTURE

## (undated) DEVICE — TRAP SXN POLYP QUICKCATCH LF

## (undated) DEVICE — ENSEAL 20 CM SHAFT, LARGE JAW: Brand: ENSEAL X1

## (undated) DEVICE — DEV UTERINE EPLORA1 SHRP W/VACULOK SYR 3MM

## (undated) DEVICE — GLV SURG TRIUMPH LT PF LTX 7 STRL

## (undated) DEVICE — GOWN,AURORA,NOREINF,RAGLAN,XL,STERILE: Brand: MEDLINE

## (undated) DEVICE — SPNG GZ WOVN 4X4IN 12PLY 10/BX STRL

## (undated) DEVICE — GLV SURG TRIUMPH ORTHO W/ALOE PF LTX 6.5 STRL

## (undated) DEVICE — RESERVOIR,SUCTION,100CC,SILICONE: Brand: MEDLINE

## (undated) DEVICE — SOL IRR NACL 0.9PCT 3000ML

## (undated) DEVICE — SOL IRR NACL 0.9PCT BT 1000ML

## (undated) DEVICE — SPNG LAP 18X18IN LF STRL PK/5

## (undated) DEVICE — SUT SILK 2/0 FS BLK 18IN 685G

## (undated) DEVICE — CATH FOL LUBRISIL IC 3WY 18F 5CC

## (undated) DEVICE — CANN SMPL SOFTECH BIFLO ETCO2 A/M 7FT

## (undated) DEVICE — DRP SURG U/BUTT W/PCH BACK STRL

## (undated) DEVICE — MONOPOLAR METZENBAUM SCISSOR TIP, DISPOSABLE: Brand: MONOPOLAR METZENBAUM SCISSOR TIP, DISPOSABLE

## (undated) DEVICE — BITEBLOCK ENDO W/STRAP 60F A/ LF DISP

## (undated) DEVICE — GLV SURG SENSICARE POLYISPRN W/ALOE PF LF 6 GRN STRL

## (undated) DEVICE — GLV SURG SENSICARE PI LF PF 7.5 GRN STRL

## (undated) DEVICE — WOUND RETRACTOR AND PROTECTOR: Brand: ALEXIS O WOUND PROTECTOR-RETRACTOR

## (undated) DEVICE — STERILE POLYISOPRENE POWDER-FREE SURGICAL GLOVES WITH EMOLLIENT COATING: Brand: PROTEXIS

## (undated) DEVICE — GLV SURG SENSICARE POLYISPRN W/ALOE PF LF 6.5 GRN STRL

## (undated) DEVICE — SUT VIC 3/0 RB1 27IN J215H

## (undated) DEVICE — SUT VICRYL 3-0 SH-1 PO 18IN J772D

## (undated) DEVICE — SPNG DRN AMD EXCILON 6PLY 4X4IN PK/2

## (undated) DEVICE — STPLR SKIN VISISTAT WD 35CT

## (undated) DEVICE — BNDG ADHS CURAD FLX/FABRC 1X3IN STRL LF

## (undated) DEVICE — GLV SURG TRIUMPH LT PF LTX 7.5 STRL

## (undated) DEVICE — SKIN AFFIX SURG ADHESIVE 72/CS 0.55ML: Brand: MEDLINE

## (undated) DEVICE — GOWN,PREVENTION PLUS,XLNG/XXLARGE,STRL: Brand: MEDLINE

## (undated) DEVICE — STCKNT IMPERV 12IN STRL

## (undated) DEVICE — UNDRPD BREATH 23X36 BG/10

## (undated) DEVICE — INTENDED FOR TISSUE SEPARATION, AND OTHER PROCEDURES THAT REQUIRE A SHARP SURGICAL BLADE TO PUNCTURE OR CUT.: Brand: BARD-PARKER ® CARBON RIB-BACK BLADES

## (undated) DEVICE — GLV SURG SENSICARE PI PF LF 7 GRN STRL

## (undated) DEVICE — GLV SURG SENSICARE GREEN W/ALOE PF LF 7 STRL

## (undated) DEVICE — SUT GUT CHRM 2/0 SH 27IN G123H

## (undated) DEVICE — DRSNG SURESITE WNDW 4X4.5

## (undated) DEVICE — SINGLE-USE BIOPSY FORCEPS: Brand: RADIAL JAW 4

## (undated) DEVICE — SUT PROLN 3/0 SH D/A 36IN 8522H

## (undated) DEVICE — GLV SURG TRIUMPH LT PF LTX 6.5 STRL

## (undated) DEVICE — DRN WND FLT 90D HUBLSS FULL TROC 10MM LF

## (undated) DEVICE — PK MAJ PROC LF 60

## (undated) DEVICE — SUT VIC 3/0 SH 27IN J416H

## (undated) DEVICE — SUT VIC 2/0 TIES 18IN J111T

## (undated) DEVICE — SUT VICRYL 0 6X18IN UNDYED TIES 45 CM

## (undated) DEVICE — SUT MONOCRYL 4/0 PS2 27IN Y426H ETY426H

## (undated) DEVICE — SUT VIC 0 CT1 36IN J946H

## (undated) DEVICE — APPL CHLORAPREP W/TINT 26ML ORNG

## (undated) DEVICE — METER,URINE,400ML,DRAIN BAG,L/F,LL,SLIDE: Brand: MEDLINE

## (undated) DEVICE — SOL IRRIG NACL 1000ML

## (undated) DEVICE — CAMERA COVER: Brand: UNBRANDED

## (undated) DEVICE — Device: Brand: DISPOSABLE ELECTROSURGICAL SNARE

## (undated) DEVICE — SUT MERSILENE 5MM ETHD9212

## (undated) DEVICE — BNDG ELAS CO-FLEX SLF ADHR 4IN5YD LF STRL

## (undated) DEVICE — CYSTO/BLADDER IRRIGATION SET, REGULATING CLAMP

## (undated) DEVICE — OCCL COLPO PNEUMO  STRL

## (undated) DEVICE — SYR LL TP 10ML STRL

## (undated) DEVICE — TOWEL,OR,DSP,ST,BLUE,DLX,4/PK,20PK/CS: Brand: MEDLINE

## (undated) DEVICE — CVR FLUOROSCOPE C/ARM W/TP 36X28IN

## (undated) DEVICE — SYR LUER/TIP MONOJECT RGD/PK 60CC STRL

## (undated) DEVICE — BNDG ADHS CURAD FLX/FABRC 2X4IN STRL LF

## (undated) DEVICE — PK LAP GYN 60

## (undated) DEVICE — NDL HYPO SFTY GLD 22G 1 1/2IN

## (undated) DEVICE — SUT GUT CHRM 2/0 CT1 36IN 923H

## (undated) DEVICE — VCARE SMALL UTERINE MANIPULATOR: Brand: VCARE SMALL

## (undated) DEVICE — ST CVR PROB PULLUP ULTRASND 5X48IN